# Patient Record
Sex: FEMALE | Race: WHITE | NOT HISPANIC OR LATINO | Employment: OTHER | URBAN - METROPOLITAN AREA
[De-identification: names, ages, dates, MRNs, and addresses within clinical notes are randomized per-mention and may not be internally consistent; named-entity substitution may affect disease eponyms.]

---

## 2018-03-26 DIAGNOSIS — E78.5 HYPERLIPIDEMIA, UNSPECIFIED HYPERLIPIDEMIA TYPE: Primary | ICD-10-CM

## 2018-03-27 DIAGNOSIS — E11.8 TYPE 2 DIABETES MELLITUS WITH COMPLICATION, UNSPECIFIED LONG TERM INSULIN USE STATUS: Primary | ICD-10-CM

## 2018-03-27 RX ORDER — FENOFIBRATE 54 MG/1
TABLET ORAL
Qty: 90 TABLET | Refills: 0 | Status: SHIPPED | OUTPATIENT
Start: 2018-03-27 | End: 2018-06-13 | Stop reason: SDUPTHER

## 2018-03-27 RX ORDER — GLIPIZIDE 10 MG/1
10 TABLET, FILM COATED, EXTENDED RELEASE ORAL 2 TIMES DAILY
Qty: 30 TABLET | Refills: 0 | Status: SHIPPED | OUTPATIENT
Start: 2018-03-27 | End: 2018-03-28 | Stop reason: SDUPTHER

## 2018-03-28 DIAGNOSIS — E11.8 TYPE 2 DIABETES MELLITUS WITH COMPLICATION, UNSPECIFIED LONG TERM INSULIN USE STATUS: ICD-10-CM

## 2018-03-28 DIAGNOSIS — E11.8 TYPE 2 DIABETES MELLITUS WITH COMPLICATION, WITHOUT LONG-TERM CURRENT USE OF INSULIN (HCC): Primary | ICD-10-CM

## 2018-03-28 PROBLEM — E11.9 TYPE 2 DIABETES MELLITUS (HCC): Status: ACTIVE | Noted: 2018-03-28

## 2018-03-28 RX ORDER — GLIPIZIDE 10 MG/1
TABLET, FILM COATED, EXTENDED RELEASE ORAL
Qty: 30 TABLET | Refills: 0 | Status: SHIPPED | OUTPATIENT
Start: 2018-03-28 | End: 2018-03-28 | Stop reason: SDUPTHER

## 2018-03-28 RX ORDER — GLIPIZIDE 10 MG/1
10 TABLET, FILM COATED, EXTENDED RELEASE ORAL 2 TIMES DAILY
Qty: 180 TABLET | Refills: 1 | Status: SHIPPED | OUTPATIENT
Start: 2018-03-28 | End: 2018-09-20 | Stop reason: SDUPTHER

## 2018-03-28 NOTE — TELEPHONE ENCOUNTER
Pt called about glipizide script that was just sent in  It was only for 15 days  Can she get a 90 day supply sent in to [facility]  Her pending appt is 6/13/18  Thank you

## 2018-05-11 LAB — HBA1C MFR BLD HPLC: 8.1 %

## 2018-05-15 ENCOUNTER — TELEPHONE (OUTPATIENT)
Dept: ENDOCRINOLOGY | Facility: HOSPITAL | Age: 73
End: 2018-05-15

## 2018-05-15 NOTE — TELEPHONE ENCOUNTER
I forget what she is currently taking for diabetes and why she went off Byetta, was it cost?I need to know so I can make an adjustment

## 2018-05-15 NOTE — TELEPHONE ENCOUNTER
She stopped the Byetta due to cost  She is taking Metformin 1000mg twice daily, and Glipizide ER 10 twice daily  She is asking what you thought about Lacie?

## 2018-05-15 NOTE — TELEPHONE ENCOUNTER
Patient has been Off bayetta since march    A1C results are 8 1 very concerned  Is there something she can take? Her appt is 6/13 and sugars are high

## 2018-05-16 DIAGNOSIS — E11.8 TYPE 2 DIABETES MELLITUS WITH COMPLICATION, WITHOUT LONG-TERM CURRENT USE OF INSULIN (HCC): Primary | ICD-10-CM

## 2018-05-16 NOTE — TELEPHONE ENCOUNTER
Pt said that she looked into Bydureon and it is affordable  She would like it sent to [facility] in [address] please

## 2018-05-16 NOTE — TELEPHONE ENCOUNTER
Ova Xochitl is once a week byetta  Is this cost better than byetta with her insurance? It is fine to use this if she can afford it

## 2018-06-13 ENCOUNTER — OFFICE VISIT (OUTPATIENT)
Dept: ENDOCRINOLOGY | Facility: HOSPITAL | Age: 73
End: 2018-06-13
Payer: MEDICARE

## 2018-06-13 VITALS
DIASTOLIC BLOOD PRESSURE: 76 MMHG | HEIGHT: 58 IN | WEIGHT: 177.4 LBS | HEART RATE: 78 BPM | SYSTOLIC BLOOD PRESSURE: 140 MMHG | BODY MASS INDEX: 37.24 KG/M2

## 2018-06-13 DIAGNOSIS — E11.65 TYPE 2 DIABETES MELLITUS WITH HYPERGLYCEMIA, WITHOUT LONG-TERM CURRENT USE OF INSULIN (HCC): Primary | ICD-10-CM

## 2018-06-13 DIAGNOSIS — E04.2 MULTIPLE THYROID NODULES: ICD-10-CM

## 2018-06-13 PROBLEM — I10 HYPERTENSION: Status: ACTIVE | Noted: 2018-06-13

## 2018-06-13 PROBLEM — E78.5 HYPERLIPIDEMIA: Status: ACTIVE | Noted: 2018-06-13

## 2018-06-13 PROCEDURE — 99214 OFFICE O/P EST MOD 30 MIN: CPT | Performed by: INTERNAL MEDICINE

## 2018-06-13 RX ORDER — FUROSEMIDE 40 MG/1
40 TABLET ORAL DAILY
COMMUNITY
End: 2019-08-15 | Stop reason: ALTCHOICE

## 2018-06-13 RX ORDER — METOPROLOL SUCCINATE 100 MG/1
100 TABLET, EXTENDED RELEASE ORAL 2 TIMES DAILY
COMMUNITY
Start: 2015-09-15 | End: 2021-08-30 | Stop reason: ALTCHOICE

## 2018-06-13 RX ORDER — FENOFIBRATE 54 MG/1
54 TABLET ORAL DAILY
COMMUNITY
End: 2019-02-14 | Stop reason: SDUPTHER

## 2018-06-13 RX ORDER — ACETAMINOPHEN 160 MG
2000 TABLET,DISINTEGRATING ORAL DAILY
COMMUNITY

## 2018-06-13 RX ORDER — FOLIC ACID 0.8 MG
TABLET ORAL DAILY
COMMUNITY

## 2018-06-13 RX ORDER — EXENATIDE 2 MG/.85ML
2 INJECTION, SUSPENSION, EXTENDED RELEASE SUBCUTANEOUS WEEKLY
Qty: 4 PEN | Refills: 6 | Status: SHIPPED | OUTPATIENT
Start: 2018-06-13 | End: 2019-04-23 | Stop reason: SDUPTHER

## 2018-06-13 NOTE — LETTER
June 13, 2018     Kailyn Keys, [address] [place] [address] 1    Patient: Jackson Bay   YOB: 1945   Date of Visit: 6/13/2018       Dear Dr Sean Wu: Thank you for referring Jackson [place] to me for evaluation  Below are my notes for this consultation  If you have questions, please do not hesitate to call me  I look forward to following your patient along with you  Sincerely,        Camila Sorto MD        CC: MD Camila Hannah MD  6/13/2018 12:20 PM  Sign at close encounter  6/13/2018    Assessment/Plan      Diagnoses and all orders for this visit:    Type 2 diabetes mellitus with hyperglycemia, without long-term current use of insulin ([address] )  -     BYDUREON BCISE 2 MG/0 85ML AUIJ; Inject 2 mg under the skin once a week  -     HEMOGLOBIN A1C W/ EAG ESTIMATION Lab Collect; Future  -     Comprehensive metabolic panel Lab Collect; Future  -     TSH, 3rd generation Lab Collect; Future  -     Thyroid Antibodies Panel Lab Collect; Future  -     Microalbumin / creatinine urine ratio Lab Collect; Future    Multiple thyroid nodules  -     TSH, 3rd generation Lab Collect; Future  -     Thyroid Antibodies Panel Lab Collect; Future  -     US thyroid; Future    Other orders  -     apixaban (ELIQUIS) 5 mg; Take 5 mg by mouth 2 (two) times a day    -     metoprolol succinate (TOPROL-XL) 100 mg 24 hr tablet; Take 100 mg by mouth 2 (two) times a day    -     fenofibrate (TRICOR) 54 MG tablet; Take 54 mg by mouth daily    -     metFORMIN (GLUCOPHAGE) 1000 MG tablet; Take 1,000 mg by mouth 2 (two) times a day with meals    -     cholecalciferol (VITAMIN D3) 1,000 units tablet; Take 2,000 Units by mouth daily  -     Magnesium 500 MG CAPS; Take by mouth daily    -     furosemide (LASIX) 40 mg tablet; Take 40 mg by mouth daily prn        Assessment/Plan:  1  Type 2 diabetes    Her most recent hemoglobin A1c has gone up to 8 1%, most likely due to her being off Byetta  She is now on Bydureon which should bring her blood sugars down  They are already starting to improve  She will continue the same Bydureon, metformin, and glipizide for now  She will continue to check her sugars at least once a day  She will continue to work on diet and weight loss  2   Diabetic neuropathy  She has some minor symptoms with some numbness of her feet and some decrease in vibration sensation on exam   I will monitor these over time  3   Thyroid nodules  She has a history of thyroid nodules biopsy benign in the past   I will repeat her thyroid ultrasound before her next visit  4   Hyperkalemia  She has been hospitalized in the past for significant hyperkalemia when she was taking both Prinivil and valsartan  Her potassium is elevated again this time and I have asked her to follow up with Dr Perlita Hong regarding this  I will have her follow up in 3 months with preceding hemoglobin A1c, CMP, TSH, thyroid antibodies, urine microalbumin to creatinine ratio, and thyroid ultrasound  CC: Diabetes Consult    History of Present Illness     HPI: Mejia Hector is a 67y o  year old female with type 2 diabetes for between 10-15 years years  She is on oral agents at home and takes Metformin 1000 mg BID, Glipizide XL 10 mg BID, and Bydureon 2 mg once a week  She denies any polyuria, polydipsia,  and blurry vision  She has up to once a night nocturia  She always has polyphagia  She has some numbness and tingling of the toes and feet  She denies chest pain or shortness of breath and is due to see her cardiologist, Dr Perlita Hong next week  She denies nephropathy, retinopathy, heart attack, stroke and claudication but does admit to neuropathy  She had good blood sugars in the past until she stopped the byetta several months and the blood sugars went up to 150-170  She started Bydureon about 2 weeks ago   Blood sugars last 2 days 120 or less in am   She has a history of thyroid nodules with biopsy in the past that was benign  She has not had a recent ultrasound  She has some occasional palpitation, but no other symptoms of thyroid excess or deficiency  She has no history of head or neck irradiation in the past   She has no compressive thyroid symptoms or difficulty with swallowing  Hypoglycemic episodes: No never  H/o of hypoglycemia causing hospitalization or intervention such as glucagon injection  or ambulance call  No   Hypoglycemia symptoms: never less than 85 blood sugar  Treatment of hypoglycemia: she would drink juice  Glucagon:No   Medic alert tag: recommended,Yes  The patient's last eye exam was in may 2018 with no retinopathy in the past   The patient's last foot exam was in not seen  Last A1C was 8 1% on 05/11/2018  Blood Sugar/Glucometer/Pump/CGM review: checks blood sugars once a day in the am   Before breakfast: were 150-180, now less than 120 the last 2 days back on Bydureon  Before lunch:   Before dinner:   Bedtime:     Review of Systems   Constitutional: Negative for fatigue and unexpected weight change  Weight has gone down slowly over the years  HENT: Positive for hearing loss  Negative for tinnitus  Low tones are a problem with hearing  Eyes: Negative for visual disturbance  Respiratory: Negative for chest tightness and shortness of breath  Cardiovascular: Positive for palpitations and leg swelling  Negative for chest pain  Occasional palpitations  For cardiology 6 month follow up next week with Dr Barbara Sinclair  Ankle edema by the evening  Gastrointestinal: Negative for abdominal pain, constipation, diarrhea and nausea  Endocrine: Positive for polyphagia  Negative for polydipsia and polyuria  Nocturia once a night  Drinking more for health reasons  Musculoskeletal: Positive for arthralgias and neck pain  Negative for back pain  Pain in posterior neck when turn neck  Has bilateral  Knee pain     Skin: Negative for rash and wound  Neurological: Positive for numbness  Negative for dizziness, tremors, light-headedness and headaches  Some numbness and tingling in toes and fingers  Psychiatric/Behavioral: Negative for dysphoric mood and sleep disturbance  The patient is not nervous/anxious          Historical Information   Past Medical History:   Diagnosis Date    Atrial [facility] )     Colon cancer ([facility] [facility] )     limited to wall of colon, no chemo or XRT needed    Hypomagnesemia     Leukocytosis     chanelle 2 gene positive    Osteoarthritis      Past Surgical History:   Procedure Laterality Date    APPENDECTOMY      with colectomy    COLECTOMY LAKE      24 inches    HYSTERECTOMY      LAPAROSCOPIC CHOLECYSTECTOMY       Social History   History   Alcohol Use No     History   Drug Use No     History   Smoking Status    Never Smoker   Smokeless Tobacco    Never Used     Family History:   Family History   Problem Relation Age of Onset    Asthma Mother     Heart disease Mother      post heart surgery    Heart attack Father     Heart attack Brother     Colon cancer Brother     Colon cancer Brother     Heart attack Brother     Diabetes type II Brother     Heart attack Brother     Colon cancer Brother     No Known Problems Brother     Heart disease Paternal Uncle     No Known Problems Daughter     No Known Problems Daughter     No Known Problems Daughter        Meds/Allergies   Current Outpatient Prescriptions   Medication Sig Dispense Refill    apixaban (ELIQUIS) 5 mg Take 5 mg by mouth 2 (two) times a day        BYDUREON BCISE 2 MG/0 85ML AUIJ Inject 2 mg under the skin once a week 4 pen 6    cholecalciferol (VITAMIN D3) 1,000 units tablet Take 2,000 Units by mouth daily      fenofibrate (TRICOR) 54 MG tablet Take 54 mg by mouth daily        furosemide (LASIX) 40 mg tablet Take 40 mg by mouth daily prn      glipiZIDE (GLUCOTROL XL) 10 mg 24 hr tablet Take 1 tablet (10 mg total) by mouth 2 (two) times a day 180 tablet 1    Magnesium 500 MG CAPS Take by mouth daily        metFORMIN (GLUCOPHAGE) 1000 MG tablet Take 1,000 mg by mouth 2 (two) times a day with meals        metoprolol succinate (TOPROL-XL) 100 mg 24 hr tablet Take 100 mg by mouth 2 (two) times a day         No current facility-administered medications for this visit  Allergies   Allergen Reactions    Epinephrine      Other reaction(s): increased HR    Lidocaine      Other reaction(s): increased HR    Neomycin      Other reaction(s): rash    Pioglitazone      Other reaction(s): Fluid retention       Objective   Vitals: Blood pressure 140/76, pulse 78, height 4' 10" (1 473 m), weight 80 5 kg (177 lb 6 4 oz)  Invasive Devices          No matching active lines, drains, or airways          Physical Exam   Constitutional: She is oriented to person, place, and time  She appears well-developed and well-nourished  HENT:   Head: Normocephalic and atraumatic  Eyes: Conjunctivae and EOM are normal  Pupils are equal, round, and reactive to light  No lid lag, stare, proptosis, or periorbital edema  Neck: Normal range of motion  Neck supple  Thyromegaly present  Thyroid enlarged and nodular in feel  No bruits over the thyroid or carotids  Cardiovascular: Normal rate, regular rhythm, normal heart sounds and intact distal pulses  No murmur heard  BP recheck 122/70  Pulmonary/Chest: Effort normal and breath sounds normal  She has no wheezes  Abdominal: Soft  Bowel sounds are normal  There is no tenderness  Musculoskeletal: Normal range of motion  She exhibits edema  She exhibits no deformity  1-2+ bilateral lower extremity edema, right greater than left  No ulceration of the feet  No tremor of the outstretched hands  No spinous process tenderness  No CVAT  Lymphadenopathy:     She has no cervical adenopathy  Neurological: She is alert and oriented to person, place, and time  She has normal reflexes     Vibration sensation diminished tot he bilateral 1st toe DIP  Skin: Skin is warm and dry  No rash noted  Vitals reviewed  The history was obtained from the review of the chart and from the patient  Lab Results:    Blood workdone on 05/11/2018 at [address] [place] [address] showed a CMP with a potassium of 5 9, BUN 29, creatinine 0 63, GFR over 60, glucose 144 fasting, BUN/creatinine ratio 46 but was otherwise normal   Magnesium was 1 3  Total cholesterol 126, triglycerides 163, HDL 39, LDL 54  Hemoglobin A1c was 8 1%  CBC continues to show an elevated WBC at 20, MCV at 102, platelet count 571  No results found for this or any previous visit (from the past 99847 hour(s))        Future Appointments  Date Time Provider Rasheed Clancy   10/22/2018 10:20 AM Brandyn Zavaleta, [address] [facility]

## 2018-06-13 NOTE — PATIENT INSTRUCTIONS
hgba1c 8 1%  This is due to being off of the byetta  It should get better now you are on bydureon  Your potassium is high, Follow up with Dr Anaid Fuentes regarding that  Continue the Bydureon, glipizide, and metformin  Continue to check blood sugars at least once a day  We'll recheck your thyroid ultrasound before the next visit  Follow up in 3 months with blood work

## 2018-06-13 NOTE — PROGRESS NOTES
6/13/2018    Assessment/Plan      Diagnoses and all orders for this visit:    Type 2 diabetes mellitus with hyperglycemia, without long-term current use of insulin ([facility]CC)  -     BYDUREON BCISE 2 MG/0 85ML AUIJ; Inject 2 mg under the skin once a week  -     HEMOGLOBIN A1C W/ EAG ESTIMATION Lab Collect; Future  -     Comprehensive metabolic panel Lab Collect; Future  -     TSH, 3rd generation Lab Collect; Future  -     Thyroid Antibodies Panel Lab Collect; Future  -     Microalbumin / creatinine urine ratio Lab Collect; Future    Multiple thyroid nodules  -     TSH, 3rd generation Lab Collect; Future  -     Thyroid Antibodies Panel Lab Collect; Future  -     US thyroid; Future    Other orders  -     apixaban (ELIQUIS) 5 mg; Take 5 mg by mouth 2 (two) times a day    -     metoprolol succinate (TOPROL-XL) 100 mg 24 hr tablet; Take 100 mg by mouth 2 (two) times a day    -     fenofibrate (TRICOR) 54 MG tablet; Take 54 mg by mouth daily    -     metFORMIN (GLUCOPHAGE) 1000 MG tablet; Take 1,000 mg by mouth 2 (two) times a day with meals    -     cholecalciferol (VITAMIN D3) 1,000 units tablet; Take 2,000 Units by mouth daily  -     Magnesium 500 MG CAPS; Take by mouth daily    -     furosemide (LASIX) 40 mg tablet; Take 40 mg by mouth daily prn        Assessment/Plan:  1  Type 2 diabetes  Her most recent hemoglobin A1c has gone up to 8 1%, most likely due to her being off Byetta  She is now on Bydureon which should bring her blood sugars down  They are already starting to improve  She will continue the same Bydureon, metformin, and glipizide for now  She will continue to check her sugars at least once a day  She will continue to work on diet and weight loss  2   Diabetic neuropathy  She has some minor symptoms with some numbness of her feet and some decrease in vibration sensation on exam   I will monitor these over time  3   Thyroid nodules    She has a history of thyroid nodules biopsy benign in the past   I will repeat her thyroid ultrasound before her next visit  4   Hyperkalemia  She has been hospitalized in the past for significant hyperkalemia when she was taking both Prinivil and valsartan  Her potassium is elevated again this time and I have asked her to follow up with Dr Florentino Jaramillo regarding this  I will have her follow up in 3 months with preceding hemoglobin A1c, CMP, TSH, thyroid antibodies, urine microalbumin to creatinine ratio, and thyroid ultrasound  CC: Diabetes Consult    History of Present Illness     HPI: Rufino Ward is a 67y o  year old female with type 2 diabetes for between 10-15 years years  She is on oral agents at home and takes Metformin 1000 mg BID, Glipizide XL 10 mg BID, and Bydureon 2 mg once a week  She denies any polyuria, polydipsia,  and blurry vision  She has up to once a night nocturia  She always has polyphagia  She has some numbness and tingling of the toes and feet  She denies chest pain or shortness of breath and is due to see her cardiologist, Dr Florentino Jaramillo next week  She denies nephropathy, retinopathy, heart attack, stroke and claudication but does admit to neuropathy  She had good blood sugars in the past until she stopped the byetta several months and the blood sugars went up to 150-170  She started Bydureon about 2 weeks ago  Blood sugars last 2 days 120 or less in am   She has a history of thyroid nodules with biopsy in the past that was benign  She has not had a recent ultrasound  She has some occasional palpitation, but no other symptoms of thyroid excess or deficiency  She has no history of head or neck irradiation in the past   She has no compressive thyroid symptoms or difficulty with swallowing  Hypoglycemic episodes: No never  H/o of hypoglycemia causing hospitalization or intervention such as glucagon injection  or ambulance call  No   Hypoglycemia symptoms: never less than 85 blood sugar    Treatment of hypoglycemia: she would drink juice   Glucagon:No   Medic alert tag: recommended,Yes  The patient's last eye exam was in may 2018 with no retinopathy in the past   The patient's last foot exam was in not seen  Last A1C was 8 1% on 05/11/2018  Blood Sugar/Glucometer/Pump/CGM review: checks blood sugars once a day in the am   Before breakfast: were 150-180, now less than 120 the last 2 days back on Bydureon  Before lunch:   Before dinner:   Bedtime:     Review of Systems   Constitutional: Negative for fatigue and unexpected weight change  Weight has gone down slowly over the years  HENT: Positive for hearing loss  Negative for tinnitus  Low tones are a problem with hearing  Eyes: Negative for visual disturbance  Respiratory: Negative for chest tightness and shortness of breath  Cardiovascular: Positive for palpitations and leg swelling  Negative for chest pain  Occasional palpitations  For cardiology 6 month follow up next week with Dr Virginie Mcdonald  Ankle edema by the evening  Gastrointestinal: Negative for abdominal pain, constipation, diarrhea and nausea  Endocrine: Positive for polyphagia  Negative for polydipsia and polyuria  Nocturia once a night  Drinking more for health reasons  Musculoskeletal: Positive for arthralgias and neck pain  Negative for back pain  Pain in posterior neck when turn neck  Has bilateral  Knee pain  Skin: Negative for rash and wound  Neurological: Positive for numbness  Negative for dizziness, tremors, light-headedness and headaches  Some numbness and tingling in toes and fingers  Psychiatric/Behavioral: Negative for dysphoric mood and sleep disturbance  The patient is not nervous/anxious          Historical Information   Past Medical History:   Diagnosis Date    Atrial [facility] [address] )     Colon cancer ([facility] [address] )     limited to wall of colon, no chemo or XRT needed    Hypomagnesemia     Leukocytosis     chanelle 2 gene positive    Osteoarthritis Past Surgical History:   Procedure Laterality Date    APPENDECTOMY      with colectomy    COLECTOMY LAKE      24 inches    HYSTERECTOMY      LAPAROSCOPIC CHOLECYSTECTOMY       Social History   History   Alcohol Use No     History   Drug Use No     History   Smoking Status    Never Smoker   Smokeless Tobacco    Never Used     Family History:   Family History   Problem Relation Age of Onset    Asthma Mother     Heart disease Mother      post heart surgery    Heart attack Father     Heart attack Brother     Colon cancer Brother     Colon cancer Brother     Heart attack Brother     Diabetes type II Brother     Heart attack Brother     Colon cancer Brother     No Known Problems Brother     Heart disease Paternal Uncle     No Known Problems Daughter     No Known Problems Daughter     No Known Problems Daughter        Meds/Allergies   Current Outpatient Prescriptions   Medication Sig Dispense Refill    apixaban (ELIQUIS) 5 mg Take 5 mg by mouth 2 (two) times a day        BYDUREON BCISE 2 MG/0 85ML AUIJ Inject 2 mg under the skin once a week 4 pen 6    cholecalciferol (VITAMIN D3) 1,000 units tablet Take 2,000 Units by mouth daily      fenofibrate (TRICOR) 54 MG tablet Take 54 mg by mouth daily        furosemide (LASIX) 40 mg tablet Take 40 mg by mouth daily prn      glipiZIDE (GLUCOTROL XL) 10 mg 24 hr tablet Take 1 tablet (10 mg total) by mouth 2 (two) times a day 180 tablet 1    Magnesium 500 MG CAPS Take by mouth daily        metFORMIN (GLUCOPHAGE) 1000 MG tablet Take 1,000 mg by mouth 2 (two) times a day with meals        metoprolol succinate (TOPROL-XL) 100 mg 24 hr tablet Take 100 mg by mouth 2 (two) times a day         No current facility-administered medications for this visit        Allergies   Allergen Reactions    Epinephrine      Other reaction(s): increased HR    Lidocaine      Other reaction(s): increased HR    Neomycin      Other reaction(s): rash    Pioglitazone Other reaction(s): Fluid retention       Objective   Vitals: Blood pressure 140/76, pulse 78, height 4' 10" (1 473 m), weight 80 5 kg (177 lb 6 4 oz)  Invasive Devices          No matching active lines, drains, or airways          Physical Exam   Constitutional: She is oriented to person, place, and time  She appears well-developed and well-nourished  HENT:   Head: Normocephalic and atraumatic  Eyes: Conjunctivae and EOM are normal  Pupils are equal, round, and reactive to light  No lid lag, stare, proptosis, or periorbital edema  Neck: Normal range of motion  Neck supple  Thyromegaly present  Thyroid enlarged and nodular in feel  No bruits over the thyroid or carotids  Cardiovascular: Normal rate, regular rhythm, normal heart sounds and intact distal pulses  No murmur heard  BP recheck 122/70  Pulmonary/Chest: Effort normal and breath sounds normal  She has no wheezes  Abdominal: Soft  Bowel sounds are normal  There is no tenderness  Musculoskeletal: Normal range of motion  She exhibits edema  She exhibits no deformity  1-2+ bilateral lower extremity edema, right greater than left  No ulceration of the feet  No tremor of the outstretched hands  No spinous process tenderness  No CVAT  Lymphadenopathy:     She has no cervical adenopathy  Neurological: She is alert and oriented to person, place, and time  She has normal reflexes  Vibration sensation diminished tot he bilateral 1st toe DIP  Skin: Skin is warm and dry  No rash noted  Vitals reviewed  The history was obtained from the review of the chart and from the patient  Lab Results:    Blood workdone on 05/11/2018 at [address] [place] [address] showed a CMP with a potassium of 5 9, BUN 29, creatinine 0 63, GFR over 60, glucose 144 fasting, BUN/creatinine ratio 46 but was otherwise normal   Magnesium was 1 3  Total cholesterol 126, triglycerides 163, HDL 39, LDL 54  Hemoglobin A1c was 8 1%    CBC continues to show an elevated WBC at 20, MCV at 102, platelet count 557  No results found for this or any previous visit (from the past 46441 hour(s))        Future Appointments  Date Time Provider Rasheed Clancy   10/22/2018 10:20 AM Deanna Laughlin, [address] [facility]

## 2018-06-28 DIAGNOSIS — E11.8 TYPE 2 DIABETES MELLITUS WITH COMPLICATION, UNSPECIFIED WHETHER LONG TERM INSULIN USE: Primary | ICD-10-CM

## 2018-06-28 RX ORDER — METOPROLOL SUCCINATE 100 MG/1
TABLET, EXTENDED RELEASE ORAL
Qty: 180 TABLET | Refills: 0 | OUTPATIENT
Start: 2018-06-28

## 2018-06-28 NOTE — TELEPHONE ENCOUNTER
I do not order metoprolol, so I will not refill it  I can not refill metformin is it is requested on the same order  It must be sent individually to refill

## 2018-09-04 DIAGNOSIS — E78.5 HYPERLIPIDEMIA, UNSPECIFIED HYPERLIPIDEMIA TYPE: ICD-10-CM

## 2018-09-04 RX ORDER — FENOFIBRATE 54 MG/1
TABLET ORAL
Qty: 90 TABLET | Refills: 0 | Status: SHIPPED | OUTPATIENT
Start: 2018-09-04 | End: 2018-12-12 | Stop reason: SDUPTHER

## 2018-09-20 DIAGNOSIS — E11.8 TYPE 2 DIABETES MELLITUS WITH COMPLICATION, UNSPECIFIED WHETHER LONG TERM INSULIN USE: Primary | ICD-10-CM

## 2018-09-20 RX ORDER — GLIPIZIDE 10 MG/1
10 TABLET, FILM COATED, EXTENDED RELEASE ORAL 2 TIMES DAILY
Qty: 180 TABLET | Refills: 2 | Status: SHIPPED | OUTPATIENT
Start: 2018-09-20 | End: 2019-06-11 | Stop reason: SDUPTHER

## 2018-09-30 DIAGNOSIS — E11.8 TYPE 2 DIABETES MELLITUS WITH COMPLICATION, UNSPECIFIED WHETHER LONG TERM INSULIN USE: ICD-10-CM

## 2018-10-16 LAB
HBA1C MFR BLD HPLC: 7.1 %
MICROALBUMIN UR-MCNC: 4.2 MG/L (ref 0–20)
MICROALBUMIN/CREAT UR: 85 MG/G{CREAT}

## 2018-10-22 ENCOUNTER — OFFICE VISIT (OUTPATIENT)
Dept: ENDOCRINOLOGY | Facility: HOSPITAL | Age: 73
End: 2018-10-22
Payer: MEDICARE

## 2018-10-22 VITALS
HEIGHT: 58 IN | DIASTOLIC BLOOD PRESSURE: 78 MMHG | WEIGHT: 179.4 LBS | BODY MASS INDEX: 37.66 KG/M2 | SYSTOLIC BLOOD PRESSURE: 140 MMHG | HEART RATE: 90 BPM

## 2018-10-22 DIAGNOSIS — I10 HYPERTENSION, UNSPECIFIED TYPE: ICD-10-CM

## 2018-10-22 DIAGNOSIS — E11.65 TYPE 2 DIABETES MELLITUS WITH HYPERGLYCEMIA, WITHOUT LONG-TERM CURRENT USE OF INSULIN (HCC): Primary | ICD-10-CM

## 2018-10-22 DIAGNOSIS — E78.5 HYPERLIPIDEMIA, UNSPECIFIED HYPERLIPIDEMIA TYPE: ICD-10-CM

## 2018-10-22 DIAGNOSIS — E04.2 MULTIPLE THYROID NODULES: ICD-10-CM

## 2018-10-22 DIAGNOSIS — E11.42 DIABETIC POLYNEUROPATHY ASSOCIATED WITH TYPE 2 DIABETES MELLITUS (HCC): ICD-10-CM

## 2018-10-22 PROCEDURE — 99214 OFFICE O/P EST MOD 30 MIN: CPT | Performed by: INTERNAL MEDICINE

## 2018-10-22 NOTE — LETTER
October 22, 2018     Tong Rose, [address] [facility] [address]    Patient: Viola Klein   YOB: 1945   Date of Visit: 10/22/2018       Dear Dr Mariah Shaffer: Thank you for referring Viola Klein to me for evaluation  Below are my notes for this consultation  If you have questions, please do not hesitate to call me  I look forward to following your patient along with you  Sincerely,        Lalo Fajardo MD        CC: No Recipients  Lalo Fajardo MD  10/22/2018 12:50 PM  Sign at close encounter  10/22/2018    Assessment/Plan      Diagnoses and all orders for this visit:    Type 2 diabetes mellitus with hyperglycemia, without long-term current use of insulin (HCC)  -     Microalbumin / creatinine urine ratio Lab Collect; Future  -     Comprehensive metabolic panel Lab Collect; Future  -     HEMOGLOBIN A1C W/ EAG ESTIMATION Lab Collect; Future    Multiple thyroid nodules    Hypertension, unspecified type  -     Comprehensive metabolic panel Lab Collect; Future    Hyperlipidemia, unspecified hyperlipidemia type  -     Comprehensive metabolic panel Lab Collect; Future  -     Lipid Panel with Direct LDL reflex Lab Collect; Future    Diabetic polyneuropathy associated with type 2 diabetes mellitus (HCC)        Assessment/Plan:  1  Type 2 diabetes  Her most recent hemoglobin A1c is improved at 7 1%  This is reasonable for her age  For now, she will continue the same Bydureon, metformin, and glipizide  She will continue to check her blood sugars at least once a day  She will continue to keep working on diet and exercise  Of note, her urine microalbumin to creatinine ratio was slightly elevated today  I will repeat it next visit I she has a history of hyperkalemia and has been unable to use Ace inhibitors or ARBs  2   Diabetic neuropathy  She has some decreased vibration sensation on exam but no other complaints  I will follow this over time    3   Multinodular goiter  Most recent thyroid ultrasound did show a decrease in size of the largest dominant nodule  The rest of the nodules are stable in size  She is biochemically euthyroid  I will follow this over time  4   Hypertension  Blood pressure is under reasonable control on her current metoprolol  5   Hyperlipidemia  She is on fenofibrate 54 mg daily  I will repeat her lipid profile with next visit  I have asked her to follow up in 3 months with preceding hemoglobin A1c, CMP, lipid profile, and urine microalbumin to creatinine ratio  CC: Diabetes Follow up    History of Present Illness     HPI: Ed Cantu is a 75y o  year old female with type 2 diabetes for 10-15 years  She is on oral agents at home and takes bydureon 2 mg once a week, Metformin 1000 mg BID, and glipizide XL 10 mg BID  She denies any polyuria, polydipsia, polyphagia, and blurry vision she has nocturia up to once a night  She denies chest pain or shortness of breath  She is due for an echocardiogram and a follow-up visit with her cardiologist in the next several months  She has slight numbness and tingling of the toes without change  She denies nephropathy, retinopathy, heart attack, stroke and claudication but does admit to neuropathy  She has a nontoxic multinodular goiter  She is on no medicines  She is getting serial ultrasounds and blood work to follow this  She has no compressive thyroid symptoms or difficulties with swallowing  She has hypertension  She is on metoprolol 100 mg twice a day and furosemide 40 mg daily  She has no chest pain, shortness of breath, or headaches  She has hyperlipidemia and is on fenofibrate 54 mg daily  Hypoglycemic episodes: Yes rare  Medic alert tag: recommended,Yes  The patient's last eye exam was in in October 2018 with no retinopathy  The patient's last foot exam was in not seen   Last A1C was   Lab Results   Component Value Date    HGBA1C 7 1 10/16/2018     Blood Sugar/Glucometer/Pump/CGM review: checks blood sugars once a day in am   Before breakfast:   Before lunch:   Before dinner:   Bedtime:     Review of Systems   Constitutional: Negative for fatigue and unexpected weight change  HENT: Negative for trouble swallowing  Eyes: Negative for visual disturbance  Respiratory: Negative for chest tightness and shortness of breath  Cardiovascular: Positive for palpitations  Negative for chest pain  Occasional heart palpitations  Due to see Dr Jadyn Baptiste in jan 2019, last visit July 2018 and stable cardiac status  For echo soon  Gastrointestinal: Negative for constipation and diarrhea  Endocrine: Negative for polydipsia, polyphagia and polyuria  Nocturia up to once a night  Drinking more for health so urinates more  Musculoskeletal: Positive for arthralgias  Has knee pain on steps  To go back to orthopedic for cortisone injection  Skin: Negative for wound  Neurological: Positive for numbness  A bit of numbness and tingling in the toes without change  Psychiatric/Behavioral: Negative for sleep disturbance         Historical Information   Past Medical History:   Diagnosis Date    [facility] fibrillation [facility] )     [facility]     limited to wall of colon, no chemo or XRT needed    Hypomagnesemia     Leukocytosis     chanelle 2 gene positive    Osteoarthritis      Past Surgical History:   Procedure Laterality Date    APPENDECTOMY      with colectomy    COLECTOMY [place]      24 inches    HYSTERECTOMY      LAPAROSCOPIC CHOLECYSTECTOMY       Social History   History   Alcohol Use No     History   Drug Use No     History   Smoking Status    Never Smoker   Smokeless Tobacco    Never Used     Family History:   Family History   Problem Relation Age of Onset    Asthma Mother     Heart disease Mother         post heart surgery    Heart attack Father     Heart attack Brother     Colon cancer Brother     Colon cancer Brother     Heart attack Brother     Diabetes type II Brother     Heart attack Brother     Colon cancer Brother     No Known Problems Brother     Heart disease Paternal Uncle     No Known Problems Daughter     No Known Problems Daughter     No Known Problems Daughter        Meds/Allergies   Current Outpatient Prescriptions   Medication Sig Dispense Refill    apixaban (ELIQUIS) 5 mg Take 5 mg by mouth 2 (two) times a day        BYDUREON BCISE 2 MG/0 85ML AUIJ Inject 2 mg under the skin once a week 4 pen 6    cholecalciferol (VITAMIN D3) 1,000 units tablet Take 2,000 Units by mouth daily      fenofibrate (TRICOR) 54 MG tablet Take 54 mg by mouth daily        fenofibrate (TRICOR) 54 MG tablet TAKE 1 TABLET BY MOUTH EVERY DAY 90 tablet 0    furosemide (LASIX) 40 mg tablet Take 40 mg by mouth daily prn      glipiZIDE (GLUCOTROL XL) 10 mg 24 hr tablet Take 1 tablet (10 mg total) by mouth 2 (two) times a day 180 tablet 2    Magnesium 500 MG CAPS Take by mouth daily        metFORMIN (GLUCOPHAGE) 1000 MG tablet TAKE 1 TABLET BY MOUTH TWICE A DAY WITH FOOD 180 tablet 0    metoprolol succinate (TOPROL-XL) 100 mg 24 hr tablet Take 100 mg by mouth 2 (two) times a day         No current facility-administered medications for this visit  Allergies   Allergen Reactions    Epinephrine      Other reaction(s): increased HR    Lidocaine      Other reaction(s): increased HR    Neomycin      Other reaction(s): rash    Pioglitazone      Other reaction(s): Fluid retention       Objective   Vitals: Blood pressure 140/78, pulse 90, height 4' 10" (1 473 m), weight 81 4 kg (179 lb 6 4 oz)  Invasive Devices          No matching active lines, drains, or airways          Physical Exam   Constitutional: She is oriented to person, place, and time  She appears well-developed and well-nourished  HENT:   Head: Normocephalic and atraumatic  Eyes: Pupils are equal, round, and reactive to light   Conjunctivae and EOM are normal    Neck: Normal range of motion  Neck supple  No thyromegaly present  No carotid bruits  Cardiovascular: Normal rate, regular rhythm, normal heart sounds and intact distal pulses  No murmur heard  Pulmonary/Chest: Effort normal and breath sounds normal  She has no wheezes  Musculoskeletal: Normal range of motion  She exhibits no edema or deformity  Flat arches  1-2+ edema of the lower extremities  No ulceration of the feet when examined with the shoes and socks removed  Lymphadenopathy:     She has no cervical adenopathy  Neurological: She is alert and oriented to person, place, and time  She has normal reflexes  Vibration sensation diminished to the 1st toe DIP joint bilaterally  Microfilament sensation intact bilaterally  Skin: Skin is warm and dry  No rash noted  Vitals reviewed  The history was obtained from the review of the chart and from the patient and   Lab Results:    Most recent Alc is  Lab Results   Component Value Date    HGBA1C 7 1 10/16/2018           CMP done on 10/16/2018 showed a potassium of 5 6 with a creatinine is 0 67 and glucose 128 fasting  The rest of the CMP was normal  TSH was 2 23  Urine microalbumin to creatinine ratio was 85  Thyroid ultrasound done [facility] on 10/15/2018 demonstrated a right lobe of the thyroid measuring 5 4 x 1 9 x 1 4 cm and a left lobe of the thyroid measuring 6 9 x 3 5 x 4 3 cm  There is 3 3 cm complex nodule in the mid to upper pole of the left lobe of the thyroid that is decreased in size from previous ultrasound when it was 3 8 cm  There is a 1 5 cm lower pole left thyroid nodule stable in size  There is a 1 cm upper pole right-sided thyroid nodule stable in size        Future Appointments  Date Time Provider Rasheed Clancy   2/14/2019 11:00 AM Melodie Rose MD [facility]

## 2018-10-22 NOTE — PATIENT INSTRUCTIONS
hgab1c is 7 1%  This is much better  Urine test shows a bit of protein spilling, kidney function is normal  Let's repeat next visit  No change in bydureon, metformin, or glipizide  Continue to check blood sugars once a day  Thyroid nodules are stable in size  Potassium is still high and you are not on any medicines that raise the potassium  Avoid foods with potassium in them  Follow up in 3 months with blood work  Potassium Content of Foods List   WHAT YOU NEED TO KNOW:   What is potassium? Potassium is a mineral that is found in most foods  Potassium helps to balance fluids and minerals in your body  It also helps your body maintain a normal blood pressure  Potassium helps your muscles contract and your nerves function normally  You may need to increase or decrease potassium if you have certain health conditions  Why do I need to change the amount of potassium I eat? · You may need more potassium  if you have hypokalemia (low potassium levels) or high blood pressure  You may also need more potassium if you are taking diuretics  Diuretics and certain medicines cause your body to lose potassium  · You may need less potassium  in your diet if you have hyperkalemia (high potassium levels) or kidney disease  How much potassium does fruit contain? The amount of potassium in milligrams (mg) contained in each fruit or serving of fruit is listed beside the item    · High-potassium foods (more than 200 mg per serving):      ¨ 1 medium banana (425)    ¨ ½ of a papaya (390)    ¨ ½ cup of prune juice (370)    ¨ ¼ cup of raisins (270)    ¨ 1 medium andrew (325) or kiwi (240)    ¨ 1 small orange (240) or ½ cup of orange juice (235)    ¨ ½ cup of cubed cantaloupe (215) or diced honeydew melon (200)    ¨ 1 medium pear (200)    · Medium-potassium foods (50 to 200 mg per serving):      ¨ 1 medium peach (185)    ¨ 1 small apple or ½ cup of apple juice (150)    ¨ ½ cup of peaches canned in juice (120)    ¨ ½ cup of canned pineapple (100)    ¨ ½ cup of fresh, sliced strawberries (127)    ¨ ½ cup of watermelon (85)    · Low-potassium foods (less than 50 mg per serving):      ¨ ½ cup of cranberries (45) or cranberry juice cocktail (20)    ¨ ½ cup of nectar of papaya, andrew, or pear (35)  How much potassium do vegetables contain? · High-potassium foods (more than 200 mg per serving):      ¨ 1 medium baked potato, with skin (925)    ¨ 1 baked medium sweet potato, with skin (450)    ¨ ½ cup of tomato or vegetable juice (275), or 1 medium raw tomato (290)    ¨ ½ cup of mushrooms (280)    ¨ ½ cup of fresh brussels sprouts (250)    ¨ ½ cup of cooked zucchini (220) or winter squash (250)    ¨ ¼ of a medium avocado (245)    ¨ ½ cup of broccoli (230)    · Medium-potassium foods (50 to 200 mg per serving):      ¨ ½ cup of corn (195)    ¨ ½ cup of fresh or cooked carrots (180)    ¨ ½ cup of fresh cauliflower (150)    ¨ ½ cup of asparagus (155)    ¨ ½ cup of canned peas (90)     ¨ 1 cup of lettuce, all types (100)    ¨ ½ cup of fresh green beans (90)    ¨ ½ cup of frozen green beans (85)    ¨ ½ cup of cucumber (80)  How much potassium do protein foods contain? · High-potassium foods (more than 200 mg per serving):      ¨ ½ cup of cooked jackson beans (400) or lentils (365)    ¨ 1 cup of soy milk (300)    ¨ 3 ounces of baked or broiled salmon (319)    ¨ 3 ounces of roasted turkey, dark meat (250)    ¨ ¼ cup of sunflower seeds (241)    ¨ 3 ounces of cooked lean beef (224)    ¨ 2 tablespoons of smooth peanut butter (210)    · Medium-potassium foods (50 to 200 mg per serving):      ¨ 1 ounce of salted peanuts, almonds, or cashews (200)    ¨ 1 large egg (60)  How much potassium do dairy foods contain?    · High-potassium foods (more than 200 mg per serving):      ¨ 6 ounces of yogurt (260 to 435)    ¨ 1 cup of nonfat, low-fat, or whole milk (350 to 380)    · Medium-potassium foods (50 to 200 mg per serving):      ¨ ½ cup of ricotta cheese (154)    ¨ ½ cup of vanilla ice cream (131)    ¨ ½ cup of low-fat (2%) cottage cheese (110)    · Low-potassium foods (less than 50 mg per serving):      ¨ 1 ounce of cheese (20 to 30)    How much potassium do grains contain? · 1 slice of white bread (30)    · ½ cup of white or brown rice (50)    · ½ cup of spaghetti or macaroni (30)    · 1 flour or corn tortilla (50)    · 1 four-inch waffle (50)  What other foods contain potassium? · 1 tablespoon of molasses (295)    · 1½ ounces of chocolate (165)    · Some salt substitutes may contain a high amount of potassium  Check the food label to find the amount of potassium it contains  CARE AGREEMENT:   You have the right to help plan your care  Discuss treatment options with your caregivers to decide what care you want to receive  You always have the right to refuse treatment  The above information is an  only  It is not intended as medical advice for individual conditions or treatments  Talk to your doctor, nurse or pharmacist before following any medical regimen to see if it is safe and effective for you  © 2017 [employer]  Information is for End User's use only and may not be sold, redistributed or otherwise used for commercial purposes  All illustrations and images included in CareNotes® are the copyrighted property of [employer]  or [employer]

## 2018-10-22 NOTE — PROGRESS NOTES
10/22/2018    Assessment/Plan      Diagnoses and all orders for this visit:    Type 2 diabetes mellitus with hyperglycemia, without long-term current use of insulin (HCC)  -     Microalbumin / creatinine urine ratio Lab Collect; Future  -     Comprehensive metabolic panel Lab Collect; Future  -     HEMOGLOBIN A1C W/ EAG ESTIMATION Lab Collect; Future    Multiple thyroid nodules    Hypertension, unspecified type  -     Comprehensive metabolic panel Lab Collect; Future    Hyperlipidemia, unspecified hyperlipidemia type  -     Comprehensive metabolic panel Lab Collect; Future  -     Lipid Panel with Direct LDL reflex Lab Collect; Future    Diabetic polyneuropathy associated with type 2 diabetes mellitus (HCC)        Assessment/Plan:  1  Type 2 diabetes  Her most recent hemoglobin A1c is improved at 7 1%  This is reasonable for her age  For now, she will continue the same Bydureon, metformin, and glipizide  She will continue to check her blood sugars at least once a day  She will continue to keep working on diet and exercise  Of note, her urine microalbumin to creatinine ratio was slightly elevated today  I will repeat it next visit I she has a history of hyperkalemia and has been unable to use Ace inhibitors or ARBs  2   Diabetic neuropathy  She has some decreased vibration sensation on exam but no other complaints  I will follow this over time  3   Multinodular goiter  Most recent thyroid ultrasound did show a decrease in size of the largest dominant nodule  The rest of the nodules are stable in size  She is biochemically euthyroid  I will follow this over time  4   Hypertension  Blood pressure is under reasonable control on her current metoprolol  5   Hyperlipidemia  She is on fenofibrate 54 mg daily  I will repeat her lipid profile with next visit  I have asked her to follow up in 3 months with preceding hemoglobin A1c, CMP, lipid profile, and urine microalbumin to creatinine ratio        CC: Diabetes Follow up    History of Present Illness     HPI: Ed Cantu is a 75y o  year old female with type 2 diabetes for 10-15 years  She is on oral agents at home and takes bydureon 2 mg once a week, Metformin 1000 mg BID, and glipizide XL 10 mg BID  She denies any polyuria, polydipsia, polyphagia, and blurry vision she has nocturia up to once a night  She denies chest pain or shortness of breath  She is due for an echocardiogram and a follow-up visit with her cardiologist in the next several months  She has slight numbness and tingling of the toes without change  She denies nephropathy, retinopathy, heart attack, stroke and claudication but does admit to neuropathy  She has a nontoxic multinodular goiter  She is on no medicines  She is getting serial ultrasounds and blood work to follow this  She has no compressive thyroid symptoms or difficulties with swallowing  She has hypertension  She is on metoprolol 100 mg twice a day and furosemide 40 mg daily  She has no chest pain, shortness of breath, or headaches  She has hyperlipidemia and is on fenofibrate 54 mg daily  Hypoglycemic episodes: Yes rare  Medic alert tag: recommended,Yes  The patient's last eye exam was in in October 2018 with no retinopathy  The patient's last foot exam was in not seen  Last A1C was   Lab Results   Component Value Date    HGBA1C 7 1 10/16/2018     Blood Sugar/Glucometer/Pump/CGM review: checks blood sugars once a day in am   Before breakfast:   Before lunch:   Before dinner:   Bedtime:     Review of Systems   Constitutional: Negative for fatigue and unexpected weight change  HENT: Negative for trouble swallowing  Eyes: Negative for visual disturbance  Respiratory: Negative for chest tightness and shortness of breath  Cardiovascular: Positive for palpitations  Negative for chest pain  Occasional heart palpitations   Due to see Dr Bayron Allen in jan 2019, last visit July 2018 and stable cardiac status  For echo soon  Gastrointestinal: Negative for constipation and diarrhea  Endocrine: Negative for polydipsia, polyphagia and polyuria  Nocturia up to once a night  Drinking more for health so urinates more  Musculoskeletal: Positive for arthralgias  Has knee pain on steps  To go back to orthopedic for cortisone injection  Skin: Negative for wound  Neurological: Positive for numbness  A bit of numbness and tingling in the toes without change  Psychiatric/Behavioral: Negative for sleep disturbance         Historical Information   Past Medical History:   Diagnosis Date    Atrial fibrillation ([address] )     Colon cancer (HCC)     limited to wall of colon, no chemo or XRT needed    Hypomagnesemia     Leukocytosis     chanelle 2 gene positive    Osteoarthritis      Past Surgical History:   Procedure Laterality Date    APPENDECTOMY      with colectomy    COLECTOMY [place]      24 inches    HYSTERECTOMY      LAPAROSCOPIC CHOLECYSTECTOMY       Social History   History   Alcohol Use No     History   Drug Use No     History   Smoking Status    Never Smoker   Smokeless Tobacco    Never Used     Family History:   Family History   Problem Relation Age of Onset    Asthma Mother     Heart disease Mother         post heart surgery    Heart attack Father     Heart attack Brother     Colon cancer Brother     Colon cancer Brother     Heart attack Brother     Diabetes type II Brother     Heart attack Brother     Colon cancer Brother     No Known Problems Brother     Heart disease Paternal Uncle     No Known Problems Daughter     No Known Problems Daughter     No Known Problems Daughter        Meds/Allergies   Current Outpatient Prescriptions   Medication Sig Dispense Refill    apixaban (ELIQUIS) 5 mg Take 5 mg by mouth 2 (two) times a day        BYDUREON BCISE 2 MG/0 85ML AUIJ Inject 2 mg under the skin once a week 4 pen 6    cholecalciferol (VITAMIN D3) 1,000 units tablet Take 2,000 Units by mouth daily      fenofibrate (TRICOR) 54 MG tablet Take 54 mg by mouth daily        fenofibrate (TRICOR) 54 MG tablet TAKE 1 TABLET BY MOUTH EVERY DAY 90 tablet 0    furosemide (LASIX) 40 mg tablet Take 40 mg by mouth daily prn      glipiZIDE (GLUCOTROL XL) 10 mg 24 hr tablet Take 1 tablet (10 mg total) by mouth 2 (two) times a day 180 tablet 2    Magnesium 500 MG CAPS Take by mouth daily        metFORMIN (GLUCOPHAGE) 1000 MG tablet TAKE 1 TABLET BY MOUTH TWICE A DAY WITH FOOD 180 tablet 0    metoprolol succinate (TOPROL-XL) 100 mg 24 hr tablet Take 100 mg by mouth 2 (two) times a day         No current facility-administered medications for this visit  Allergies   Allergen Reactions    Epinephrine      Other reaction(s): increased HR    Lidocaine      Other reaction(s): increased HR    Neomycin      Other reaction(s): rash    Pioglitazone      Other reaction(s): Fluid retention       Objective   Vitals: Blood pressure 140/78, pulse 90, height 4' 10" (1 473 m), weight 81 4 kg (179 lb 6 4 oz)  Invasive Devices          No matching active lines, drains, or airways          Physical Exam   Constitutional: She is oriented to person, place, and time  She appears well-developed and well-nourished  HENT:   Head: Normocephalic and atraumatic  Eyes: Pupils are equal, round, and reactive to light  Conjunctivae and EOM are normal    Neck: Normal range of motion  Neck supple  No thyromegaly present  No carotid bruits  Cardiovascular: Normal rate, regular rhythm, normal heart sounds and intact distal pulses  Pulses are no weak pulses  No murmur heard  Pulses:       Dorsalis pedis pulses are 2+ on the right side, and 2+ on the left side  Posterior tibial pulses are 2+ on the right side, and 2+ on the left side  Pulmonary/Chest: Effort normal and breath sounds normal  She has no wheezes  Musculoskeletal: Normal range of motion   She exhibits no edema or deformity  Flat arches  1-2+ edema of the lower extremities  No ulceration of the feet when examined with the shoes and socks removed  Feet:   Right Foot:   Skin Integrity: Negative for ulcer, skin breakdown, erythema, warmth, callus or dry skin  Left Foot:   Skin Integrity: Negative for ulcer, skin breakdown, erythema, warmth, callus or dry skin  Lymphadenopathy:     She has no cervical adenopathy  Neurological: She is alert and oriented to person, place, and time  She has normal reflexes  Vibration sensation diminished to the 1st toe DIP joint bilaterally  Microfilament sensation intact bilaterally  Skin: Skin is warm and dry  No rash noted  Vitals reviewed  Patient's shoes and socks removed  Right Foot/Ankle   Right Foot Inspection  Skin Exam: skin normal and skin intact no dry skin, no warmth, no callus, no erythema, no maceration, no abnormal color, no pre-ulcer, no ulcer and no callus                          Toe Exam: ROM and strength within normal limits and swelling  Sensory   Vibration: diminished    Monofilament testing: intact  Vascular  Capillary refills: < 3 seconds  The right DP pulse is 2+  The right PT pulse is 2+  Left Foot/Ankle  Left Foot Inspection  Skin Exam: skin normal and skin intactno dry skin, no warmth, no erythema, no maceration, normal color, no pre-ulcer, no ulcer and no callus                         Toe Exam: ROM and strength within normal limits and swelling                   Sensory   Vibration: diminished    Monofilament: intact  Vascular  Capillary refills: < 3 seconds  The left DP pulse is 2+  The left PT pulse is 2+  Assign Risk Category:  No deformity present; No loss of protective sensation; No weak pulses       Risk: 0        The history was obtained from the review of the chart and from the patient and       Lab Results:    Most recent Alc is  Lab Results   Component Value Date    HGBA1C 7 1 10/16/2018           CMP done on 10/16/2018 showed a potassium of 5 6 with a creatinine is 0 67 and glucose 128 fasting  The rest of the CMP was normal  TSH was 2 23  Urine microalbumin to creatinine ratio was 85  Thyroid ultrasound done a [facility] on 10/15/2018 demonstrated a right lobe of the thyroid measuring 5 4 x 1 9 x 1 4 cm and a left lobe of the thyroid measuring 6 9 x 3 5 x 4 3 cm  There is 3 3 cm complex nodule in the mid to upper pole of the left lobe of the thyroid that is decreased in size from previous ultrasound when it was 3 8 cm  There is a 1 5 cm lower pole left thyroid nodule stable in size  There is a 1 cm upper pole right-sided thyroid nodule stable in size        Future Appointments  Date Time Provider Rasheed Clancy   2/14/2019 11:00 AM Dany Steward MD [facility]

## 2018-12-12 DIAGNOSIS — E78.5 HYPERLIPIDEMIA, UNSPECIFIED HYPERLIPIDEMIA TYPE: ICD-10-CM

## 2018-12-13 RX ORDER — FENOFIBRATE 54 MG/1
TABLET ORAL
Qty: 90 TABLET | Refills: 0 | Status: SHIPPED | OUTPATIENT
Start: 2018-12-13 | End: 2019-03-13 | Stop reason: SDUPTHER

## 2019-01-03 DIAGNOSIS — E11.8 TYPE 2 DIABETES MELLITUS WITH COMPLICATION, UNSPECIFIED WHETHER LONG TERM INSULIN USE: ICD-10-CM

## 2019-02-01 LAB
CREAT ?TM UR-SCNC: 42 UMOL/L
EXT MICROALBUMIN URINE RANDOM: 2.1
HBA1C MFR BLD HPLC: 7.1 %
MICROALBUMIN/CREAT UR: 50 MG/G{CREAT}

## 2019-02-08 ENCOUNTER — TELEPHONE (OUTPATIENT)
Dept: GASTROENTEROLOGY | Facility: CLINIC | Age: 74
End: 2019-02-08

## 2019-02-08 NOTE — TELEPHONE ENCOUNTER
tranferred from [facility]-   Pt needs to reschedule breath test cancelled last week and reschedule colonoscopy from December  She states she was driving at the moment and will call back Monday to schedule

## 2019-02-11 NOTE — TELEPHONE ENCOUNTER
Spoke with pt to sched SIBO, pt stated she is having a lot of health problems and will call back to schedule SIBO and COLON when she is ready and feeling better   Routing message to TK so she is aware regarding COLON   [facility]

## 2019-02-14 ENCOUNTER — OFFICE VISIT (OUTPATIENT)
Dept: ENDOCRINOLOGY | Facility: HOSPITAL | Age: 74
End: 2019-02-14
Payer: MEDICARE

## 2019-02-14 VITALS
BODY MASS INDEX: 36.53 KG/M2 | HEART RATE: 84 BPM | SYSTOLIC BLOOD PRESSURE: 138 MMHG | WEIGHT: 174 LBS | DIASTOLIC BLOOD PRESSURE: 64 MMHG | HEIGHT: 58 IN

## 2019-02-14 DIAGNOSIS — I10 ESSENTIAL HYPERTENSION: ICD-10-CM

## 2019-02-14 DIAGNOSIS — E04.2 MULTIPLE THYROID NODULES: ICD-10-CM

## 2019-02-14 DIAGNOSIS — E11.65 TYPE 2 DIABETES MELLITUS WITH HYPERGLYCEMIA, WITHOUT LONG-TERM CURRENT USE OF INSULIN (HCC): Primary | ICD-10-CM

## 2019-02-14 DIAGNOSIS — E78.2 MIXED HYPERLIPIDEMIA: ICD-10-CM

## 2019-02-14 DIAGNOSIS — E11.42 DIABETIC POLYNEUROPATHY ASSOCIATED WITH TYPE 2 DIABETES MELLITUS (HCC): ICD-10-CM

## 2019-02-14 PROCEDURE — 99215 OFFICE O/P EST HI 40 MIN: CPT | Performed by: INTERNAL MEDICINE

## 2019-02-14 RX ORDER — HYDROCHLOROTHIAZIDE 25 MG/1
25 TABLET ORAL DAILY
COMMUNITY
End: 2021-08-30 | Stop reason: ALTCHOICE

## 2019-02-14 NOTE — LETTER
February 14, 2019     Irasema Catherine, [address] [facility] [address]    Patient: Albert Canchola   YOB: 1945   Date of Visit: 2/14/2019       Dear Dr Giancarlo Fajardo: Thank you for referring Albert Canchola to me for evaluation  Below are my notes for this consultation  If you have questions, please do not hesitate to call me  I look forward to following your patient along with you  Sincerely,        Rishi Delacruz MD        CC: No Recipients  Rishi Delacruz MD  2/14/2019  5:47 PM  Sign at close encounter  2/14/2019    Assessment/Plan      Diagnoses and all orders for this visit:    Type 2 diabetes mellitus with hyperglycemia, without long-term current use of insulin ([facility] [address] )  -     HEMOGLOBIN A1C W/ EAG ESTIMATION Lab Collect; Future  -     Comprehensive metabolic panel Lab Collect; Future  -     TSH, 3rd generation Lab Collect; Future  -     T4, free Lab Collect; Future    Diabetic polyneuropathy associated with type 2 diabetes mellitus (HCC)  -     HEMOGLOBIN A1C W/ EAG ESTIMATION Lab Collect; Future  -     Comprehensive metabolic panel Lab Collect; Future  -     TSH, 3rd generation Lab Collect; Future  -     T4, free Lab Collect; Future    Multiple thyroid nodules  -     TSH, 3rd generation Lab Collect; Future  -     T4, free Lab Collect; Future    Essential hypertension  -     Comprehensive metabolic panel Lab Collect; Future    Mixed hyperlipidemia  -     Comprehensive metabolic panel Lab Collect; Future    Other orders  -     hydrochlorothiazide (HYDRODIURIL) 25 mg tablet; Take 25 mg by mouth daily        Assessment/Plan:  1  Type 2 diabetes  Her most recent hemoglobin A1c was good at 7 1%  For now, she will continue the same metformin, Bydureon, and glipizide  She will continue to check her blood sugars up to twice a day  2  Diabetic neuropathy  She has no significant symptoms  I will follow this over time  3  Microalbuminuria    Her most recent microalbumin was decreased from over 80 to 50  Unfortunately, this may be inaccurate due to her recent renal stones causing hematuria which will increase the microalbumin in the urine  For now, she is not on an ACE-inhibitor or ARB due to elevated potassium levels  I would like her renal stones to be treated and then I would repeat her urine microalbumin when there are no renal stones present  4  Hypertension  Most recent blood pressure is under decent control on hydrochlorothiazide and metoprolol  5  Hyperlipidemia  She is taking fenofibrate 54 mg daily and lipids have improved  6  Multinodular goiter  She is clinically euthyroid  Nodules have been biopsied in the past   This will be followed over time  Last thyroid ultrasound in October of 2018 showed stable size thyroid nodules  I have asked her to follow up in 3 months with preceding hemoglobin A1c, CMP, TSH, and free T4       CC: Diabetes type 2, thyroid, blood pressure, and cholesterol follow-up    History of Present Illness     HPI: Kaylan Mosquera is a 75y o  year old female with type 2 diabetes for 15 years  She is on oral agents at home and takes glipizide XL 10 mg twice a day, metformin 1000 mg twice a day, Bydureon 2 mg once a week  She denies any polyuria, polydipsia, polyphagia, and blurry vision  She will get nocturia after 6 hours  She can get slight numbness and tingling of the fingers and toes  She denies chest pain or shortness of breath  She denies nephropathy, retinopathy, heart attack, stroke and claudication but does admit to neuropathy  She has hypertension and takes metoprolol 100 mg twice a day and hydrochlorothiazide 25 mg daily  She has hyperlipidemia and takes fenofibrate 54 mg daily  She has a history of multiple thyroid nodules with a nontoxic multinodular goiter  She has had some of the thyroid nodules biopsied in the past and were benign  Last ultrasound in October of 2018 showed stable thyroid nodules    She denies constipation, depression or anxiety, tremors, fatigue, or insomnia  She has some intermittent diarrhea or loose stools and weight is 5 lb less than last visit  Hypoglycemic episodes: Yes rare  The patient's last eye exam was in October 2018 with no retinopathy  The patient's last foot exam was in not seen  Last A1C was done on 02/01/2019 was  Lab Results   Component Value Date    HGBA1C 7 1 02/01/2019     Blood Sugar/Glucometer/Pump/CGM review: checks blood sugars once a day in am, occasionally checks twice a day  Before breakfast:   Before lunch:   Before dinner:   Bedtime:     Review of Systems   Constitutional: Positive for unexpected weight change  Negative for fatigue  5 lb less than last visit  HENT: Negative for trouble swallowing  Eyes: Negative for visual disturbance  Wears glasses  Respiratory: Negative for chest tightness and shortness of breath  Cardiovascular: Positive for palpitations and leg swelling  Negative for chest pain  Dr Pacheco Coker last week  Has been having renal stones since November 2018 which is new  She has been passing a lot of them  Potassium has been elevated  Lasix changed to HCTZ  Saw urologist 2 weeks ago and under evaluation to do 24 hour urine  Has normal; calcium and high uric acid  Occasional afib sensation  Gastrointestinal: Positive for diarrhea  Negative for abdominal pain, constipation and nausea  Has very loose bowels  Has rapid bowel movement after eating  Endocrine: Negative for polydipsia, polyphagia and polyuria  Nocturia after about 6 hours  Genitourinary:        Has had frequent renal stones since 11/18 and under evaluation from urologist  Having intermittent hematuria  Skin: Negative for wound  Neurological: Positive for numbness  Negative for tremors  Will get numbness and tingling of the fingers a slightly in the toes     Psychiatric/Behavioral: Negative for dysphoric mood and sleep disturbance  The patient is not nervous/anxious          Historical Information   Past Medical History:   Diagnosis Date    Atrial [address] [address] )     Colon cancer ([facility] [address] )     limited to wall of colon, no chemo or XRT needed    Hypomagnesemia     Leukocytosis     chanelle 2 gene positive    Osteoarthritis      Past Surgical History:   Procedure Laterality Date    APPENDECTOMY      with colectomy    COLECTOMY [place]      24 inches    HYSTERECTOMY      LAPAROSCOPIC CHOLECYSTECTOMY       Social History   Social History     Substance and Sexual Activity   Alcohol Use No     Social History     Substance and Sexual Activity   Drug Use No     Social History     Tobacco Use   Smoking Status Never Smoker   Smokeless Tobacco Never Used     Family History:   Family History   Problem Relation Age of Onset    Asthma Mother     Heart disease Mother         post heart surgery    Heart attack Father     Heart attack Brother     Colon cancer Brother     Colon cancer Brother     Heart attack Brother     Diabetes type II Brother     Heart attack Brother     Colon cancer Brother     No Known Problems Brother     Heart disease Paternal Uncle     No Known Problems Daughter     No Known Problems Daughter     No Known Problems Daughter        Meds/Allergies   Current Outpatient Medications   Medication Sig Dispense Refill    apixaban (ELIQUIS) 5 mg Take 5 mg by mouth 2 (two) times a day        BYDUREON BCISE 2 MG/0 85ML AUIJ Inject 2 mg under the skin once a week 4 pen 6    cholecalciferol (VITAMIN D3) 1,000 units tablet Take 2,000 Units by mouth daily      fenofibrate (TRICOR) 54 MG tablet TAKE 1 TABLET BY MOUTH EVERY DAY 90 tablet 0    glipiZIDE (GLUCOTROL XL) 10 mg 24 hr tablet Take 1 tablet (10 mg total) by mouth 2 (two) times a day 180 tablet 2    hydrochlorothiazide (HYDRODIURIL) 25 mg tablet Take 25 mg by mouth daily      Magnesium 500 MG CAPS Take by mouth daily        metFORMIN (GLUCOPHAGE) 1000 MG tablet TAKE 1 TABLET BY MOUTH TWICE A DAY WITH FOOD 180 tablet 0    metoprolol succinate (TOPROL-XL) 100 mg 24 hr tablet Take 100 mg by mouth 2 (two) times a day        furosemide (LASIX) 40 mg tablet Take 40 mg by mouth daily prn       No current facility-administered medications for this visit  Allergies   Allergen Reactions    Epinephrine      Other reaction(s): increased HR    Lidocaine      Other reaction(s): increased HR    Neomycin      Other reaction(s): rash    Pioglitazone      Other reaction(s): Fluid retention       Objective   Vitals: Blood pressure 138/64, pulse 84, height 4' 10" (1 473 m), weight 78 9 kg (174 lb)  Invasive Devices          None          Physical Exam   Constitutional: She is oriented to person, place, and time  She appears well-developed and well-nourished  HENT:   Head: Normocephalic and atraumatic  Eyes: Pupils are equal, round, and reactive to light  Conjunctivae and EOM are normal    No lid lag, stare, proptosis, or periorbital edema  Neck: Normal range of motion  Neck supple  No thyromegaly present  No carotid bruits  Thyroid remains nodular in size  Cardiovascular: Normal rate, regular rhythm, normal heart sounds and intact distal pulses  No murmur heard  Pulmonary/Chest: Effort normal and breath sounds normal  She has no wheezes  Musculoskeletal: Normal range of motion  She exhibits no edema or deformity  No ulceration of the feet  No tremor of the outstretched hands  Lymphadenopathy:     She has no cervical adenopathy  Neurological: She is alert and oriented to person, place, and time  She has normal reflexes  Skin: Skin is warm and dry  No rash noted  Vitals reviewed  The history was obtained from the review of the chart and from the patient and   Lab Results:    Most recent Alc is  Lab Results   Component Value Date    HGBA1C 7 1 02/01/2019           Urine microalbumin to creatinine ratio was 50   PTH was 26 with a calcium of 10 1     Future Appointments   Date Time Provider Rasheed Clancy   5/30/2019 11:20 AM John Hale MD [facility]

## 2019-02-14 NOTE — PATIENT INSTRUCTIONS
hgba1c is 7 1%  This is good  Continue the same bydureon, metformin, and glipizide  We have to wait until the kidney stones are treated and gone and then we can reevaluate the protein in the urine  Continue to check blood sugars daily  Follow up in 3 months with blood work

## 2019-02-14 NOTE — PROGRESS NOTES
2/14/2019    Assessment/Plan      Diagnoses and all orders for this visit:    Type 2 diabetes mellitus with hyperglycemia, without long-term current use of insulin ([facility] [address] )  -     HEMOGLOBIN A1C W/ EAG ESTIMATION Lab Collect; Future  -     Comprehensive metabolic panel Lab Collect; Future  -     TSH, 3rd generation Lab Collect; Future  -     T4, free Lab Collect; Future    Diabetic polyneuropathy associated with type 2 diabetes mellitus (HCC)  -     HEMOGLOBIN A1C W/ EAG ESTIMATION Lab Collect; Future  -     Comprehensive metabolic panel Lab Collect; Future  -     TSH, 3rd generation Lab Collect; Future  -     T4, free Lab Collect; Future    Multiple thyroid nodules  -     TSH, 3rd generation Lab Collect; Future  -     T4, free Lab Collect; Future    Essential hypertension  -     Comprehensive metabolic panel Lab Collect; Future    Mixed hyperlipidemia  -     Comprehensive metabolic panel Lab Collect; Future    Other orders  -     hydrochlorothiazide (HYDRODIURIL) 25 mg tablet; Take 25 mg by mouth daily        Assessment/Plan:  1  Type 2 diabetes  Her most recent hemoglobin A1c was good at 7 1%  For now, she will continue the same metformin, Bydureon, and glipizide  She will continue to check her blood sugars up to twice a day  2  Diabetic neuropathy  She has no significant symptoms  I will follow this over time  3  Microalbuminuria  Her most recent microalbumin was decreased from over 80 to 50  Unfortunately, this may be inaccurate due to her recent renal stones causing hematuria which will increase the microalbumin in the urine  For now, she is not on an ACE-inhibitor or ARB due to elevated potassium levels  I would like her renal stones to be treated and then I would repeat her urine microalbumin when there are no renal stones present  4  Hypertension  Most recent blood pressure is under decent control on hydrochlorothiazide and metoprolol  5  Hyperlipidemia    She is taking fenofibrate 54 mg daily and lipids have improved  6  Multinodular goiter  She is clinically euthyroid  Nodules have been biopsied in the past   This will be followed over time  Last thyroid ultrasound in October of 2018 showed stable size thyroid nodules  I have asked her to follow up in 3 months with preceding hemoglobin A1c, CMP, TSH, and free T4       CC: Diabetes type 2, thyroid, blood pressure, and cholesterol follow-up    History of Present Illness     HPI: Amadeo Shay is a 75y o  year old female with type 2 diabetes for 15 years  She is on oral agents at home and takes glipizide XL 10 mg twice a day, metformin 1000 mg twice a day, Bydureon 2 mg once a week  She denies any polyuria, polydipsia, polyphagia, and blurry vision  She will get nocturia after 6 hours  She can get slight numbness and tingling of the fingers and toes  She denies chest pain or shortness of breath  She denies nephropathy, retinopathy, heart attack, stroke and claudication but does admit to neuropathy  She has hypertension and takes metoprolol 100 mg twice a day and hydrochlorothiazide 25 mg daily  She has hyperlipidemia and takes fenofibrate 54 mg daily  She has a history of multiple thyroid nodules with a nontoxic multinodular goiter  She has had some of the thyroid nodules biopsied in the past and were benign  Last ultrasound in October of 2018 showed stable thyroid nodules  She denies constipation, depression or anxiety, tremors, fatigue, or insomnia  She has some intermittent diarrhea or loose stools and weight is 5 lb less than last visit  Hypoglycemic episodes: Yes rare  The patient's last eye exam was in October 2018 with no retinopathy  The patient's last foot exam was in not seen  Last A1C was done on 02/01/2019 was  Lab Results   Component Value Date    HGBA1C 7 1 02/01/2019     Blood Sugar/Glucometer/Pump/CGM review: checks blood sugars once a day in am, occasionally checks twice a day    Before breakfast:   Before lunch:   Before dinner:   Bedtime:     Review of Systems   Constitutional: Positive for unexpected weight change  Negative for fatigue  5 lb less than last visit  HENT: Negative for trouble swallowing  Eyes: Negative for visual disturbance  Wears glasses  Respiratory: Negative for chest tightness and shortness of breath  Cardiovascular: Positive for palpitations and leg swelling  Negative for chest pain  Dr Justice Louis last week  Has been having renal stones since November 2018 which is new  She has been passing a lot of them  Potassium has been elevated  Lasix changed to HCTZ  Saw urologist 2 weeks ago and under evaluation to do 24 hour urine  Has normal; calcium and high uric acid  Occasional afib sensation  Gastrointestinal: Positive for diarrhea  Negative for abdominal pain, constipation and nausea  Has very loose bowels  Has rapid bowel movement after eating  Endocrine: Negative for polydipsia, polyphagia and polyuria  Nocturia after about 6 hours  Genitourinary:        Has had frequent renal stones since 11/18 and under evaluation from urologist  Having intermittent hematuria  Skin: Negative for wound  Neurological: Positive for numbness  Negative for tremors  Will get numbness and tingling of the fingers a slightly in the toes  Psychiatric/Behavioral: Negative for dysphoric mood and sleep disturbance  The patient is not nervous/anxious          Historical Information   Past Medical History:   Diagnosis Date    Atrial fibrillation ([facility] [address] )     Colon cancer ([facility] [address] )     limited to wall of colon, no chemo or XRT needed    Hypomagnesemia     Leukocytosis     chanelle 2 gene positive    Osteoarthritis      Past Surgical History:   Procedure Laterality Date    APPENDECTOMY      with colectomy    COLECTOMY LAKE      24 inches    HYSTERECTOMY      LAPAROSCOPIC CHOLECYSTECTOMY       Social History   Social History     Substance and Sexual Activity   Alcohol Use No     Social History     Substance and Sexual Activity   Drug Use No     Social History     Tobacco Use   Smoking Status Never Smoker   Smokeless Tobacco Never Used     Family History:   Family History   Problem Relation Age of Onset    Asthma Mother     Heart disease Mother         post heart surgery    Heart attack Father     Heart attack Brother     Colon cancer Brother     Colon cancer Brother     Heart attack Brother     Diabetes type II Brother     Heart attack Brother     Colon cancer Brother     No Known Problems Brother     Heart disease Paternal Uncle     No Known Problems Daughter     No Known Problems Daughter     No Known Problems Daughter        Meds/Allergies   Current Outpatient Medications   Medication Sig Dispense Refill    apixaban (ELIQUIS) 5 mg Take 5 mg by mouth 2 (two) times a day        BYDUREON BCISE 2 MG/0 85ML AUIJ Inject 2 mg under the skin once a week 4 pen 6    cholecalciferol (VITAMIN D3) 1,000 units tablet Take 2,000 Units by mouth daily      fenofibrate (TRICOR) 54 MG tablet TAKE 1 TABLET BY MOUTH EVERY DAY 90 tablet 0    glipiZIDE (GLUCOTROL XL) 10 mg 24 hr tablet Take 1 tablet (10 mg total) by mouth 2 (two) times a day 180 tablet 2    hydrochlorothiazide (HYDRODIURIL) 25 mg tablet Take 25 mg by mouth daily      Magnesium 500 MG CAPS Take by mouth daily        metFORMIN (GLUCOPHAGE) 1000 MG tablet TAKE 1 TABLET BY MOUTH TWICE A DAY WITH FOOD 180 tablet 0    metoprolol succinate (TOPROL-XL) 100 mg 24 hr tablet Take 100 mg by mouth 2 (two) times a day        furosemide (LASIX) 40 mg tablet Take 40 mg by mouth daily prn       No current facility-administered medications for this visit        Allergies   Allergen Reactions    Epinephrine      Other reaction(s): increased HR    Lidocaine      Other reaction(s): increased HR    Neomycin      Other reaction(s): rash    Pioglitazone      Other reaction(s): Fluid retention       Objective Vitals: Blood pressure 138/64, pulse 84, height 4' 10" (1 473 m), weight 78 9 kg (174 lb)  Invasive Devices          None          Physical Exam   Constitutional: She is oriented to person, place, and time  She appears well-developed and well-nourished  HENT:   Head: Normocephalic and atraumatic  Eyes: Pupils are equal, round, and reactive to light  Conjunctivae and EOM are normal    No lid lag, stare, proptosis, or periorbital edema  Neck: Normal range of motion  Neck supple  No thyromegaly present  No carotid bruits  Thyroid remains nodular in size  Cardiovascular: Normal rate, regular rhythm, normal heart sounds and intact distal pulses  No murmur heard  Pulmonary/Chest: Effort normal and breath sounds normal  She has no wheezes  Musculoskeletal: Normal range of motion  She exhibits no edema or deformity  No ulceration of the feet  No tremor of the outstretched hands  Lymphadenopathy:     She has no cervical adenopathy  Neurological: She is alert and oriented to person, place, and time  She has normal reflexes  Skin: Skin is warm and dry  No rash noted  Vitals reviewed  The history was obtained from the review of the chart and from the patient and   Lab Results:    Most recent Alc is  Lab Results   Component Value Date    HGBA1C 7 1 02/01/2019           Urine microalbumin to creatinine ratio was 50  PTH was 26 with a calcium of 10 1      Future Appointments   Date Time Provider Rasheed Clancy   5/30/2019 11:20 AM Suzie Franklin MD [facility]

## 2019-02-21 ENCOUNTER — TELEPHONE (OUTPATIENT)
Dept: GASTROENTEROLOGY | Facility: CLINIC | Age: 74
End: 2019-02-21

## 2019-02-21 NOTE — TELEPHONE ENCOUNTER
----- Message from Raul De Jesus MD sent at 2/21/2019  3:53 PM EST -----  Highly and I just wanted to see if he got this message about the CT scan

## 2019-02-21 NOTE — TELEPHONE ENCOUNTER
----- Message from Mason Ambrocio MD sent at 2/21/2019  3:53 PM EST -----  Highly and I just wanted to see if he got this message about the CT scan

## 2019-02-21 NOTE — TELEPHONE ENCOUNTER
----- Message from Adalberto Jang MD sent at 2/21/2019  3:53 PM EST -----  Highly and I just wanted to see if he got this message about the CT scan

## 2019-02-22 ENCOUNTER — TELEPHONE (OUTPATIENT)
Dept: GASTROENTEROLOGY | Facility: CLINIC | Age: 74
End: 2019-02-22

## 2019-02-22 DIAGNOSIS — K86.2 PANCREATIC CYST: Primary | ICD-10-CM

## 2019-02-22 NOTE — TELEPHONE ENCOUNTER
Order created/printed  Called patient to verify where she would go for study so I could fax order with labs  Patient states she thought Dr Sanjay Robertson said she did not have to go for CT scan for at least 3 months  Patient is concerned about contrast affecting her kidneys as she is having kidney issues and wanted me also to confirm that she definitely needs contrast?  Also wants to know why she can't have MRI instead so she does not have to have more radiation? I did advise patient that if the CT scan is done at a later date that labs will need to be done prior to the CT scan

## 2019-02-22 NOTE — TELEPHONE ENCOUNTER
----- Message from Amos Zuniga MD sent at 2/21/2019  3:53 PM EST -----  Highly and I just wanted to see if he got this message about the CT scan

## 2019-02-26 NOTE — TELEPHONE ENCOUNTER
We can do an MRI of her insurance company approves it  Abdomen MRI attention pancreas    Patient and I had this discussion already I did explain the CT scan is a slightly superior test but if she would rather have the MRI and can get it approved it will give us the information we need with less dye risk thanks

## 2019-02-26 NOTE — TELEPHONE ENCOUNTER
Patient wants to know if she can have MRI instead? And/or if she needs to proceed with CT scan - how soon does it need to be done  We can return the call for you if you let us know

## 2019-02-27 NOTE — TELEPHONE ENCOUNTER
Dr Kevin Gillette cancelled breath test and colon (pt had scheduled twice and cancelled twice) She did not want to reschedule-other health issues going on  Wait for her to reschedule? Or send final letters?

## 2019-03-04 NOTE — TELEPHONE ENCOUNTER
I spoke to patient again  Explained MRI of abdomen was okay (less contrast)  She was thinking the MRI of abdomen would not have any contrast   I am just confirming with Dr Anitha Toure that abdominal MRI with attention to pancreas is usually ordered as  MRI abdomen with and w/o contrast     Also patient states that she wants to do the test that Dr Anitha Toure believes will give the best answer and if both tests require contrast please let me know which test that is  Patient has Medicare so no prior [facility] is needed for either test   She does have secondary of [employer] but per patient does not need PA through [facility] either

## 2019-03-04 NOTE — TELEPHONE ENCOUNTER
A little bit of phone tag but I did reach the patient  After long discussion let's go with the MRI with and without contrast   Attention pancreas and liver    Thanks

## 2019-03-06 NOTE — TELEPHONE ENCOUNTER
Documentation on result notes WO sent to clinical requested CT be ordered  Patient had called office and requested MRI which WO has approved  No CT order completed for noted

## 2019-03-13 DIAGNOSIS — E78.5 HYPERLIPIDEMIA, UNSPECIFIED HYPERLIPIDEMIA TYPE: ICD-10-CM

## 2019-03-13 RX ORDER — FENOFIBRATE 54 MG/1
54 TABLET ORAL DAILY
Qty: 90 TABLET | Refills: 1 | Status: SHIPPED | OUTPATIENT
Start: 2019-03-13 | End: 2019-09-07 | Stop reason: SDUPTHER

## 2019-03-14 NOTE — TELEPHONE ENCOUNTER
High nursing, this appeared in might been today  I do not know that there is anything to do looking at it and my missing something?

## 2019-03-30 DIAGNOSIS — E11.8 TYPE 2 DIABETES MELLITUS WITH COMPLICATION, UNSPECIFIED WHETHER LONG TERM INSULIN USE: ICD-10-CM

## 2019-04-23 ENCOUNTER — TELEPHONE (OUTPATIENT)
Dept: ENDOCRINOLOGY | Facility: HOSPITAL | Age: 74
End: 2019-04-23

## 2019-04-23 DIAGNOSIS — E11.65 TYPE 2 DIABETES MELLITUS WITH HYPERGLYCEMIA, WITHOUT LONG-TERM CURRENT USE OF INSULIN (HCC): ICD-10-CM

## 2019-04-23 RX ORDER — EXENATIDE 2 MG/.85ML
2 INJECTION, SUSPENSION, EXTENDED RELEASE SUBCUTANEOUS WEEKLY
Qty: 8 PEN | Refills: 0 | Status: SHIPPED | OUTPATIENT
Start: 2019-04-23 | End: 2019-05-30 | Stop reason: SDUPTHER

## 2019-04-29 LAB
LEFT EYE DIABETIC RETINOPATHY: NORMAL
RIGHT EYE DIABETIC RETINOPATHY: NORMAL

## 2019-05-07 NOTE — TELEPHONE ENCOUNTER
Pt left [facility] asking if Dr Khris Jones can call her [phone]; states she is supposed to have a proc but has add'l questions for him first; test is either a CT or an MRI of the abdomen

## 2019-05-08 LAB
LEFT EYE DIABETIC RETINOPATHY: NORMAL
RIGHT EYE DIABETIC RETINOPATHY: NORMAL

## 2019-05-15 NOTE — TELEPHONE ENCOUNTER
Called pt back and again she asked similar questions about the MRI vs CT scan  Did review the past notes and noted that Dr Freda Posada at the end did order the MRI with and without contrast   She is agreeable but still has concerns with the contrast and also has questions if her potassium level would be affected by the contrast   States her levels are often elevated  Told her will call [facility] where she would have it done and confirm that is safe for her  Called [facility] MRI and was noted that the Gadolinium would not affect her potassium level  Relayed this to the pt but she is now thinking a ct scan may be better  Called [facility] ct scan Mckayla who checked with the nurse and noted the contrast would not affect her potassium levels at all  Pt notified  She will have her labs done next week and once Dr Freda Posada reviews she will then proceed with a test, probably the ct scan  audio/video/written material

## 2019-05-23 LAB — HBA1C MFR BLD HPLC: 6.8 %

## 2019-05-30 ENCOUNTER — OFFICE VISIT (OUTPATIENT)
Dept: ENDOCRINOLOGY | Facility: HOSPITAL | Age: 74
End: 2019-05-30
Payer: MEDICARE

## 2019-05-30 VITALS
DIASTOLIC BLOOD PRESSURE: 70 MMHG | SYSTOLIC BLOOD PRESSURE: 130 MMHG | HEART RATE: 86 BPM | BODY MASS INDEX: 36.78 KG/M2 | HEIGHT: 58 IN | WEIGHT: 175.2 LBS

## 2019-05-30 DIAGNOSIS — E78.2 MIXED HYPERLIPIDEMIA: ICD-10-CM

## 2019-05-30 DIAGNOSIS — I10 ESSENTIAL HYPERTENSION: ICD-10-CM

## 2019-05-30 DIAGNOSIS — E04.2 MULTIPLE THYROID NODULES: ICD-10-CM

## 2019-05-30 DIAGNOSIS — E11.65 TYPE 2 DIABETES MELLITUS WITH HYPERGLYCEMIA, WITHOUT LONG-TERM CURRENT USE OF INSULIN (HCC): Primary | ICD-10-CM

## 2019-05-30 DIAGNOSIS — E11.42 DIABETIC POLYNEUROPATHY ASSOCIATED WITH TYPE 2 DIABETES MELLITUS (HCC): ICD-10-CM

## 2019-05-30 PROCEDURE — 99214 OFFICE O/P EST MOD 30 MIN: CPT | Performed by: NURSE PRACTITIONER

## 2019-05-30 RX ORDER — EXENATIDE 2 MG/.85ML
2 INJECTION, SUSPENSION, EXTENDED RELEASE SUBCUTANEOUS WEEKLY
Qty: 8 PEN | Refills: 4 | Status: SHIPPED | OUTPATIENT
Start: 2019-05-30 | End: 2019-08-01 | Stop reason: SDUPTHER

## 2019-05-31 DIAGNOSIS — E11.65 TYPE 2 DIABETES MELLITUS WITH HYPERGLYCEMIA, WITHOUT LONG-TERM CURRENT USE OF INSULIN (HCC): ICD-10-CM

## 2019-06-11 DIAGNOSIS — E11.8 TYPE 2 DIABETES MELLITUS WITH COMPLICATION, UNSPECIFIED WHETHER LONG TERM INSULIN USE: ICD-10-CM

## 2019-06-12 RX ORDER — GLIPIZIDE 10 MG/1
TABLET, FILM COATED, EXTENDED RELEASE ORAL
Qty: 180 TABLET | Refills: 2 | Status: SHIPPED | OUTPATIENT
Start: 2019-06-12 | End: 2019-08-21 | Stop reason: SDUPTHER

## 2019-06-18 DIAGNOSIS — E11.8 TYPE 2 DIABETES MELLITUS WITH COMPLICATION, UNSPECIFIED WHETHER LONG TERM INSULIN USE: ICD-10-CM

## 2019-06-26 DIAGNOSIS — E11.65 TYPE 2 DIABETES MELLITUS WITH HYPERGLYCEMIA, WITHOUT LONG-TERM CURRENT USE OF INSULIN (HCC): ICD-10-CM

## 2019-06-26 RX ORDER — EXENATIDE 2 MG/.85ML
2 INJECTION, SUSPENSION, EXTENDED RELEASE SUBCUTANEOUS WEEKLY
Qty: 3.4 PEN | Refills: 1 | Status: SHIPPED | OUTPATIENT
Start: 2019-06-26 | End: 2020-05-05

## 2019-06-26 NOTE — TELEPHONE ENCOUNTER
FYI    Pt called back, she is still unsure of what test to pursue  Discussed the option of having an ov to discuss and she immediately agreed  She would like to have the safest and most thorough test but still has concerns with side effects and her lab results  She feels would be best to discuss so she can feel comfortable with her decision    Transferred to  for an apt with Dr Edyta Marcus

## 2019-06-26 NOTE — TELEPHONE ENCOUNTER
Called pt to follow up on the MRI/CT scan issue and did she have either done? Pt states no she has not done yet as she is dealing with eye issues that required surgery  She will call back soon to discuss the order but may want to see Dr Mel Nyhan first to discuss

## 2019-08-01 ENCOUNTER — OFFICE VISIT (OUTPATIENT)
Dept: GASTROENTEROLOGY | Facility: CLINIC | Age: 74
End: 2019-08-01
Payer: MEDICARE

## 2019-08-01 VITALS
HEIGHT: 58 IN | WEIGHT: 174 LBS | BODY MASS INDEX: 36.53 KG/M2 | SYSTOLIC BLOOD PRESSURE: 130 MMHG | HEART RATE: 86 BPM | DIASTOLIC BLOOD PRESSURE: 62 MMHG

## 2019-08-01 DIAGNOSIS — R18.8 CIRRHOSIS OF LIVER WITH ASCITES, UNSPECIFIED HEPATIC CIRRHOSIS TYPE (HCC): ICD-10-CM

## 2019-08-01 DIAGNOSIS — K74.60 HEPATIC CIRRHOSIS, UNSPECIFIED HEPATIC CIRRHOSIS TYPE, UNSPECIFIED WHETHER ASCITES PRESENT (HCC): Primary | ICD-10-CM

## 2019-08-01 DIAGNOSIS — C18.2 MALIGNANT NEOPLASM OF ASCENDING COLON (HCC): ICD-10-CM

## 2019-08-01 DIAGNOSIS — K74.60 CIRRHOSIS OF LIVER WITH ASCITES, UNSPECIFIED HEPATIC CIRRHOSIS TYPE (HCC): ICD-10-CM

## 2019-08-01 DIAGNOSIS — K86.2 PANCREATIC CYST: ICD-10-CM

## 2019-08-01 PROCEDURE — 99213 OFFICE O/P EST LOW 20 MIN: CPT | Performed by: INTERNAL MEDICINE

## 2019-08-01 RX ORDER — ALLOPURINOL 100 MG/1
100 TABLET ORAL DAILY
Refills: 3 | COMMUNITY
Start: 2019-07-01 | End: 2019-12-19 | Stop reason: SDUPTHER

## 2019-08-01 NOTE — LETTER
August 1, 2019     [facility] [place] [id number]    Patient: Precious Griggs   YOB: 1945   Date of Visit: 8/1/2019       Dear Dr Aliyah Wilson: Thank you for referring Precious Griggs to me for evaluation  Below are my notes for this consultation  If you have questions, please do not hesitate to call me  I look forward to following your patient along with you           Sincerely,        Jayden Willingham MD        CC: Demetrio Funez MD

## 2019-08-01 NOTE — PROGRESS NOTES
[facility] - Outpatient Follow-up Note  Daniel Hitchcock 75 y o  female MRN: [id number]  Encounter: [id number]    ASSESSMENT AND PLAN:      1  Hepatic cirrhosis, unspecified hepatic cirrhosis type, unspecified whether ascites present ([facility] [address] )      2  Cirrhosis of liver with ascites, unspecified hepatic cirrhosis type ([facility] [address] )  Her cirrhosis is almost certainly caused by PINTO  Previous serology his route excluded viral etiology she does not drink alcohol  The new finding of a fluid wave raises a question of ascites  I would like to proceed with ultrasound 1st if she has ascites I would recommend a paracentesis  We did discuss the importance of a 2000 mg sodium diet  She is run consistently high potassiums in the scheduled to see Nephrology for management  This will be useful as I suspect she will require diuretics going forward for the ascites and probably can't tolerate potassium-sparing diuretics  I will be in touch with her after the ultrasound is completed  - Protime-INR; Future  - Comprehensive metabolic panel; Future  - US abdomen complete; Future    3  Malignant neoplasm of ascending colon [facility])  She has a history of colon cancer resected for cure over 10 years ago  She should have surveillance colonoscopy she is due for it I would like to defer this for right now given the new problem with ascites  4  Pancreatic cyst  Incidental finding of a 1 5 cm cyst in the pancreas 6 months ago she does need repeat imaging  We will see if the ultrasound visualize is this  If not eventually an MRI would be in order      Followup Appointment:   Four weeks  ______________________________________________________________________    Chief Complaint   Patient presents with    Discuss ordering CT scan    Due for colonoscopy     HPI:  She reports increased abdominal distension  No pain but feeling weak tired and run down  No swelling of her hands or feet  Bowels are pretty regular  No heartburn or indigestion  She is eating fairly normally      Historical Information   Past Medical History:   Diagnosis Date    Atrial fibrillation ([facility] [address] )     Colon cancer ([facility] [address] )     limited to wall of colon, no chemo or XRT needed    Diabetes mellitus ([facility] [address] )     Hypertension     Hypomagnesemia     Leukocytosis     chanelle 2 gene positive    Osteoarthritis      Past Surgical History:   Procedure Laterality Date    APPENDECTOMY      with colectomy    COLECTOMY LAKE      24 inches    HYSTERECTOMY      LAPAROSCOPIC CHOLECYSTECTOMY       Social History     Substance and Sexual Activity   Alcohol Use No     Social History     Substance and Sexual Activity   Drug Use No     Social History     Tobacco Use   Smoking Status Never Smoker   Smokeless Tobacco Never Used     Family History   Problem Relation Age of Onset    Asthma Mother     Heart disease Mother         post heart surgery    Heart attack Father     Coronary artery disease Father     Heart attack Brother     Colon cancer Brother     Colon cancer Brother     Heart attack Brother     Diabetes type II Brother     Heart attack Brother     Colon cancer Brother     No Known Problems Brother     Heart disease Paternal Uncle     No Known Problems Daughter     No Known Problems Daughter     No Known Problems Daughter          Current Outpatient Medications:     allopurinol (ZYLOPRIM) 100 mg tablet    apixaban (ELIQUIS) 5 mg    BYDUREON BCISE 2 MG/0 85ML AUIJ    cholecalciferol (VITAMIN D3) 1,000 units tablet    fenofibrate (TRICOR) 54 MG tablet    glipiZIDE (GLUCOTROL XL) 10 mg 24 hr tablet    glucose blood (ONE TOUCH ULTRA TEST) test strip    hydrochlorothiazide (HYDRODIURIL) 25 mg tablet    Magnesium 500 MG CAPS    metFORMIN (GLUCOPHAGE) 1000 MG tablet    metoprolol succinate (TOPROL-XL) 100 mg 24 hr tablet    furosemide (LASIX) 40 mg tablet  Allergies   Allergen Reactions    Epinephrine      Other reaction(s): increased HR    Lidocaine      Other reaction(s): increased HR    Neomycin      Other reaction(s): rash    Pioglitazone      Other reaction(s): Fluid retention       10 Point REVIEW OF SYSTEMS IS OTHERWISE NEGATIVE  PHYSICAL EXAM:    Blood pressure 130/62, pulse 86, height 4' 10" (1 473 m), weight 78 9 kg (174 lb)  Body mass index is 36 37 kg/m²  General Appearance:  Alert, cooperative, no distress  HEENT:  Normocephalic, atraumatic, anicteric  Neck: Supple, symmetrical, trachea midline  Lungs: Clear to auscultation bilaterally; no rales, rhonchi or wheezing; respirations unlabored   Heart: Regular rate and rhythm; no murmur, rub, or gallop  Abdomen:   Soft, distended with a fluid wave mild tenderness no rebound or guarding  normal bowel sounds; no masses, no organomegaly   Rectal:  Deferred   Extremities:  No cyanosis, clubbing or edema   Skin:  No jaundice, rashes, or lesions   Lymph nodes: No palpable cervical lymphadenopathy     Lab Results:   No results found for: WBC, HGB, HCT, MCV, PLT  No results found for: NA, K, CL, CO2, ANIONGAP, BUN, CREATININE, GLUCOSE, GLUF, CALCIUM, CORRECTEDCA, AST, ALT, ALKPHOS, PROT, BILITOT, EGFR  No results found for: IRON, TIBC, FERRITIN  No results found for: LIPASE    Radiology Results:   No results found

## 2019-08-15 ENCOUNTER — CONSULT (OUTPATIENT)
Dept: NEPHROLOGY | Facility: HOSPITAL | Age: 74
End: 2019-08-15
Payer: MEDICARE

## 2019-08-15 VITALS
DIASTOLIC BLOOD PRESSURE: 64 MMHG | SYSTOLIC BLOOD PRESSURE: 120 MMHG | HEIGHT: 58 IN | BODY MASS INDEX: 35.89 KG/M2 | HEART RATE: 75 BPM | RESPIRATION RATE: 16 BRPM | WEIGHT: 171 LBS

## 2019-08-15 DIAGNOSIS — E11.65 TYPE 2 DIABETES MELLITUS WITH HYPERGLYCEMIA, WITHOUT LONG-TERM CURRENT USE OF INSULIN (HCC): ICD-10-CM

## 2019-08-15 DIAGNOSIS — N20.0 NEPHROLITHIASIS: ICD-10-CM

## 2019-08-15 DIAGNOSIS — E87.5 HYPERKALEMIA: Primary | ICD-10-CM

## 2019-08-15 DIAGNOSIS — I10 ESSENTIAL HYPERTENSION: ICD-10-CM

## 2019-08-15 DIAGNOSIS — E79.0 HYPERURICEMIA: ICD-10-CM

## 2019-08-15 DIAGNOSIS — E83.42 HYPOMAGNESEMIA: ICD-10-CM

## 2019-08-15 LAB
CLARITY, POC: NORMAL
COLOR, POC: YELLOW
EXT BILIRUBIN, UA: NORMAL
EXT BLOOD URINE: NORMAL
EXT GLUCOSE, UA: NORMAL
EXT KETONES: NORMAL
EXT NITRITE, UA: NORMAL
EXT PH, UA: 5
EXT PROTEIN, UA: NORMAL
EXT SPECIFIC GRAVITY, UA: 1.01
EXT UROBILINOGEN: NORMAL
WBC # BLD EST: NORMAL 10*3/UL

## 2019-08-15 PROCEDURE — 81002 URINALYSIS NONAUTO W/O SCOPE: CPT | Performed by: INTERNAL MEDICINE

## 2019-08-15 PROCEDURE — 99204 OFFICE O/P NEW MOD 45 MIN: CPT | Performed by: INTERNAL MEDICINE

## 2019-08-15 NOTE — PROGRESS NOTES
NEPHROLOGY OUTPATIENT CONSULTATION   Tiana Garza 75 y o  female MRN: [id number]  Date: 8/15/2019  Reason for consultation:   Chief Complaint   Patient presents with    Consult    Nephrolithiasis       Patient instructions:  Patient Instructions   1  History of nephrolithiasis -   -in office UA shows trace protein and trace WBCs  -last sCr 0 83 as of 8/12/19  -would adhere to a low sodium(<2000mg) diet to prevent stone formation  Avoid canned foods, packaged foods, [place] food, and eating out frequently in restaurants    -should drink enough water to urinate 2-2 5L/day   -just had an ultrasound of the kidneys this past Monday - will obtain results of this  -will obtain litholink urine collection to determine how best to reduce risk of kidney stones  The bottle will be mailed to your home with instructions on urine collection  2  Hyperkalemia ? D/t obstruction from stones vs from RTA in setting of DM and kidney stones   - K 5 2 on latest bloodwork  Repeat BMP  Currently off lasix  On HCTZ 25mg daily  - check urine potassium, urine osm, urine sodium, urine cr, serum cortisol, serum renin and aldosterone, uric acid, phosphorus, CPK, calcium levels    3  HTN - BP well controlled  Continue metoprolol 100mg twice daily, HCTZ 25mg daily    4  DM2 - on metformin, glipizide, sugars well controlled  Also on bydureon     5  Elevated uric acid level - uric acid 7 9 - on allopurinol 100mg daily  Will check uric acid level  6  Hypomagnesemia - does not use PPIs  On magnesium supplement  Last mag 1 7 as of 7/29/19    -will check urine mag and urine Cr levels as well     RTC in 3 months  Lester Marsh was seen today for consult and nephrolithiasis  Diagnoses and all orders for this visit:    Hyperkalemia  -     Comprehensive metabolic panel; Future  -     Potassium, urine, random; Future  -     Creatinine, urine, random; Future  -     Osmolality, urine; Future  -     Sodium, urine, random;  Future  -     Magnesium, urine; Future  -     Aldosterone; Future  -     Renin Direct Assay; Future  -     Cortisol Level, AM Specimen; Future  -     CK (with reflex to MB); Future    Essential hypertension    Type 2 diabetes mellitus with hyperglycemia, without long-term current use of insulin ([facility] )  -     Ambulatory referral to Nephrology    Nephrolithiasis  -     PTH, intact; Future  -     Phosphorus; Future    Hypomagnesemia  -     Magnesium, urine; Future    Hyperuricemia  -     Uric acid; Future    Other orders  -     POCT urinalysis dipstick        ASSESSMENT and PLAN:  1  History of nephrolithiasis -   -in office UA shows trace protein and trace WBCs  -last sCr 0 83 as of 8/12/19  -would adhere to a low sodium(<2000mg) diet to prevent stone formation  Avoid canned foods, packaged foods, [place] food, and eating out frequently in restaurants    -should drink enough water to urinate 2-2 5L/day   -just had an ultrasound of the kidneys this past Monday - will obtain results of this  -will obtain litholink urine collection to determine how best to reduce risk of kidney stones  2  Hyperkalemia ? D/t obstruction from stones vs from RTA in setting of DM and kidney stones   - K 5 2 on latest bloodwork  Repeat BMP  Currently off lasix  On HCTZ 25mg daily  - check urine potassium, urine osm, urine sodium, urine cr, serum cortisol, serum renin and aldosterone, uric acid, phosphorus, CPK, calcium levels    3  HTN - BP well controlled  Continue metoprolol 100mg twice daily, HCTZ 25mg daily    4  DM2 - on metformin, glipizide, sugars well controlled  Also on bydureon     5  Elevated uric acid level - uric acid 7 9 - on allopurinol 100mg daily  Will check uric acid level  6  Hypomagnesemia - does not use PPIs  On magnesium supplement   Last mag 1 7 as of 7/29/19    -will check urine mag and urine Cr levels as well       HISTORY OF PRESENT ILLNESS:  Requesting Physician: Emeterio Wallace MD    Loraine Brown is a 75 y o  female with history of DM, HTN who presents in consultation for nephrolithiasis  Denies history of recent NSAID use, herbal/OTC medications, history of UTIs  Denies history of prior known kidney disease  She has had to go to the ER for hyperkalemia with K as high as 6 1  Has had high potassium levels since seeing endocrinology  This past November 2018 she was found to have kidney stones  Has seen Dr Miranda Harrison, urologist  She was told to see a nephrologist due her kidney stones  She has been passing a lot of stones  She passes them  They do not hurt but she passes many  She had not seen as many stones on allopurinol  She did have recently many stones  Has been on veltassa but no longer on this  Has constipation with this  Nephrolithiasis - had her first episode of kidney stones in November 2018  Has seen Dr Miranda Harrison  She was told she has uric acid stones  No interventions for stone removal  Has not had obstruction  Just had renal ultrasound this past Monday  DM2 x at least 10 years - she does have neuropathy in hands and feet  She does not have diabetic retinopathy  She has well controlled sugars as of late  Her sugars are 80-110s fasting  On bydureon  Last A1c 6 8 in May 2019  On metformin and glipizide  HTN since her 35s so over 40 years  She was not told a reason for elevated BP  Her weight has been under control  Has been taking magnesium tablets a few years ago due to low magnesium levels       PAST MEDICAL HISTORY:  Past Medical History:   Diagnosis Date    Atrial fibrillation ([address] )     Colon cancer ([address] )     limited to wall of colon, no chemo or XRT needed    Diabetes mellitus ([address] )     Hypertension     Hypomagnesemia     Kidney stone     Leukocytosis     chanelle 2 gene positive    Osteoarthritis        PAST SURGICAL HISTORY:  Past Surgical History:   Procedure Laterality Date    APPENDECTOMY      with colectomy    COLECTOMY LA[place]      24 inches    COLONOSCOPY  2016    HYSTERECTOMY      LAPAROSCOPIC CHOLECYSTECTOMY         ALLERGIES:  Allergies   Allergen Reactions    Epinephrine      Other reaction(s): increased HR    Lidocaine      Other reaction(s): increased HR    Neomycin      Other reaction(s): rash    Pioglitazone      Other reaction(s): Fluid retention       SOCIAL HISTORY:  Social History     Substance and Sexual Activity   Alcohol Use No     Social History     Substance and Sexual Activity   Drug Use No     Social History     Tobacco Use   Smoking Status Never Smoker   Smokeless Tobacco Never Used       FAMILY HISTORY:  Family History   Problem Relation Age of Onset    Asthma Mother     Heart disease Mother         post heart surgery    Heart attack Father     Coronary artery disease Father     Heart attack Brother     Colon cancer Brother     Colon cancer Brother     Heart attack Brother     Diabetes type II Brother     Heart attack Brother     Colon cancer Brother     No Known Problems Brother     Heart disease Paternal Uncle     Hypertension Daughter     Hypertension Daughter     No Known Problems Daughter        MEDICATIONS:    Current Outpatient Medications:     allopurinol (ZYLOPRIM) 100 mg tablet, Take 100 mg by mouth daily, Disp: , Rfl: 3    apixaban (ELIQUIS) 5 mg, Take 5 mg by mouth 2 (two) times a day  , Disp: , Rfl:     BYDUREON BCISE 2 MG/0 85ML AUIJ, INJECT 2 MG UNDER THE SKIN ONCE A WEEK, Disp: 3 4 pen, Rfl: 1    cholecalciferol (VITAMIN D3) 1,000 units tablet, Take 1,000 Units by mouth daily , Disp: , Rfl:     fenofibrate (TRICOR) 54 MG tablet, Take 1 tablet (54 mg total) by mouth daily, Disp: 90 tablet, Rfl: 1    glipiZIDE (GLUCOTROL XL) 10 mg 24 hr tablet, TAKE 1 TABLET BY MOUTH TWICE A DAY, Disp: 180 tablet, Rfl: 2    glucose blood (ONE TOUCH ULTRA TEST) test strip, Test once daily, Disp: 100 each, Rfl: 3    hydrochlorothiazide (HYDRODIURIL) 25 mg tablet, Take 25 mg by mouth daily, Disp: , Rfl:     Magnesium 500 MG CAPS, Take by mouth daily  , Disp: , Rfl:     metFORMIN (GLUCOPHAGE) 1000 MG tablet, TAKE 1 TABLET BY MOUTH TWICE A DAY WITH FOOD, Disp: 180 tablet, Rfl: 0    metoprolol succinate (TOPROL-XL) 100 mg 24 hr tablet, Take 100 mg by mouth 2 (two) times a day  , Disp: , Rfl:     furosemide (LASIX) 40 mg tablet, Take 40 mg by mouth daily prn, Disp: , Rfl:     REVIEW OF SYSTEMS:  Review of Systems   Constitutional: Negative for chills and fever  HENT: Negative for sore throat and trouble swallowing  Eyes: Negative for visual disturbance  Respiratory: Negative for cough and shortness of breath  Cardiovascular: Negative for chest pain and leg swelling  Gastrointestinal: Negative for diarrhea, nausea and vomiting  Endocrine: Negative for polyuria  Genitourinary: Negative for difficulty urinating, dysuria and hematuria  Musculoskeletal: Positive for neck pain (with movement)  Negative for back pain  Skin: Negative for rash  Neurological: Positive for numbness (fingers)  Negative for dizziness and light-headedness  Psychiatric/Behavioral: The patient is not nervous/anxious  PHYSICAL EXAM:   Vitals:    08/15/19 1054   BP: 120/64   BP Location: Left arm   Patient Position: Sitting   Cuff Size: Standard   Pulse: 75   Resp: 16   Weight: 77 6 kg (171 lb)   Height: 4' 10" (1 473 m)     Body mass index is 35 74 kg/m²  Physical Exam   Constitutional: She appears well-developed and well-nourished  No distress  HENT:   Head: Normocephalic and atraumatic  Mouth/Throat: No oropharyngeal exudate  Eyes: Right eye exhibits no discharge  Left eye exhibits no discharge  No scleral icterus  Neck: Normal range of motion  Neck supple  No thyromegaly present  Cardiovascular: Normal rate and regular rhythm  Murmur heard  Pulmonary/Chest: Effort normal and breath sounds normal  No respiratory distress  She has no wheezes  Abdominal: Soft  Bowel sounds are normal  She exhibits no distension     Musculoskeletal: She exhibits edema (b/l LE )    Neurological: She is alert  She exhibits normal muscle tone  awake   Skin: Skin is warm and dry  No rash noted  She is not diaphoretic  Psychiatric: She has a normal mood and affect  Her behavior is normal    Nursing note and vitals reviewed  Laboratory results:   No results found for: SODIUM, K, CL, CO2, BUN, CREATININE, GLUC, CALCIUM     Imaging: none relevant on file    Portions of the record may have been created with voice recognition software   Occasional wrong word or "sound a like" substitutions may have occurred due to the inherent limitations of voice recognition software   Read the chart carefully and recognize, using context, where substitutions have occurred

## 2019-08-19 LAB
CREAT ?TM UR-SCNC: 28 UMOL/L
EXT GLUCOSE BLD: 78
EXTERNAL ALBUMIN: 4.4
EXTERNAL ALK PHOS: 91
EXTERNAL ALT: 13
EXTERNAL ANION GAP: 10
EXTERNAL AST: 21
EXTERNAL BICARBONATE: 27.4
EXTERNAL BUN: 31
EXTERNAL CALCIUM: 9.7
EXTERNAL CHLORIDE: 103
EXTERNAL CREATININE: 0.83
EXTERNAL EGFR: >60
EXTERNAL POTASSIUM: 5.9
EXTERNAL SODIUM: 140
EXTERNAL T.BILIRUBIN: 0.8
EXTERNAL TOTAL PROTEIN: 6.8
OSMOLALITY UR: 257 MMOL/KG (ref 250–900)
POTASSIUM UR-SCNC: 18.7 MMOL/L
SODIUM UR-SCNC: 40 MMOL/L (ref 5–300)

## 2019-08-19 NOTE — RESULT ENCOUNTER NOTE
I left a voicemail for the patient  Ultrasound of the liver is essentially unremarkable    Pancreatic cyst is slightly bigger than it was in the past     ** please put her in for 6 months recall ultrasound of the abdomen and liver thanks

## 2019-08-20 ENCOUNTER — TELEPHONE (OUTPATIENT)
Dept: GASTROENTEROLOGY | Facility: CLINIC | Age: 74
End: 2019-08-20

## 2019-08-20 ENCOUNTER — TELEPHONE (OUTPATIENT)
Dept: NEPHROLOGY | Facility: CLINIC | Age: 74
End: 2019-08-20

## 2019-08-20 DIAGNOSIS — E87.5 HYPERKALEMIA: Primary | ICD-10-CM

## 2019-08-20 NOTE — TELEPHONE ENCOUNTER
She called requesting her blood work results from 8/15  She said she has not gotten a phone call back yet  Her call back number is [phone]

## 2019-08-20 NOTE — TELEPHONE ENCOUNTER
----- Message from Annmarie Belle MD sent at 8/19/2019  6:35 PM EDT -----  I left a voicemail for the patient  Ultrasound of the liver is essentially unremarkable    Pancreatic cyst is slightly bigger than it was in the past     ## please put her in for 6 months recall ultrasound of the abdomen and liver thanks

## 2019-08-20 NOTE — TELEPHONE ENCOUNTER
Per Dr Maia Broussard we are ordering [facility] for the patient to help with chronic hyperkalemia

## 2019-08-21 DIAGNOSIS — E11.65 TYPE 2 DIABETES MELLITUS WITH HYPERGLYCEMIA, WITHOUT LONG-TERM CURRENT USE OF INSULIN (HCC): Primary | ICD-10-CM

## 2019-08-21 DIAGNOSIS — E11.8 TYPE 2 DIABETES MELLITUS WITH COMPLICATION, UNSPECIFIED WHETHER LONG TERM INSULIN USE: ICD-10-CM

## 2019-08-21 LAB
ALDOST SERPL-MCNC: 2 NG/DL
CALCIUM SERPL-MCNC: 9.7 MG/DL (ref 8.3–10.1)
PTH-INTACT SERPL-MCNC: 29 PG/ML
RENIN [ENZYMATIC ACTIVITY/VOLUME] IN PLASMA --BASELINE: 0.8 NG/ML/H

## 2019-08-21 RX ORDER — GLIPIZIDE 10 MG/1
10 TABLET, FILM COATED, EXTENDED RELEASE ORAL 2 TIMES DAILY
Qty: 180 TABLET | Refills: 1 | Status: SHIPPED | OUTPATIENT
Start: 2019-08-21 | End: 2019-09-11 | Stop reason: SDUPTHER

## 2019-08-21 NOTE — TELEPHONE ENCOUNTER
Per Dr Shellie Longoria, patient should check a BMP after taking Veltassa  BMP should be done on Aug 29-30, patient is aware  She is also aware she was approved for co-pay assistance and is awaiting her shipment of [id number]  I mailed the patient the lab slip for a BMP  She is awaiting the results for her Renin and Aldosterone tests

## 2019-08-21 NOTE — TELEPHONE ENCOUNTER
Pharm called for a refill of pt's Metformin, Glipizide, Test Strips and Lancets  She saw you in May and has a follow up next month with you

## 2019-09-04 ENCOUNTER — OFFICE VISIT (OUTPATIENT)
Dept: GASTROENTEROLOGY | Facility: CLINIC | Age: 74
End: 2019-09-04
Payer: MEDICARE

## 2019-09-04 VITALS
DIASTOLIC BLOOD PRESSURE: 82 MMHG | HEART RATE: 79 BPM | BODY MASS INDEX: 36.11 KG/M2 | SYSTOLIC BLOOD PRESSURE: 128 MMHG | WEIGHT: 172 LBS | HEIGHT: 58 IN

## 2019-09-04 DIAGNOSIS — K86.2 PANCREATIC CYST: ICD-10-CM

## 2019-09-04 DIAGNOSIS — C18.2 MALIGNANT NEOPLASM OF ASCENDING COLON (HCC): ICD-10-CM

## 2019-09-04 DIAGNOSIS — K74.60 HEPATIC CIRRHOSIS, UNSPECIFIED HEPATIC CIRRHOSIS TYPE, UNSPECIFIED WHETHER ASCITES PRESENT (HCC): Primary | ICD-10-CM

## 2019-09-04 PROCEDURE — 99214 OFFICE O/P EST MOD 30 MIN: CPT | Performed by: INTERNAL MEDICINE

## 2019-09-04 NOTE — PROGRESS NOTES
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  Appears to be well compensated by laboratory values  Our initial impression of ascites was proven wrong by ultrasound just done  We discussed weight loss  2  Malignant neoplasm of ascending colon ([facility] )  History of this 21 years ago  Is due for surveillance colonoscopy will schedule  3  Pancreatic cyst  Ultrasound shows the cyst has increased very slightly in size to 2 3 cm it is septated  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   Under 3 cm with no solid component carries a low risk for cancer  I have ordered a CA 19 9  We plan to reimage this by MRI in 6 months  She is on anticoagulation  I am not sure the benefits of an endoscopic ultrasound with aspiration exceed the risk  If her CA 19 9 is elevated we might rethink this  If it enlarges in size over 3 cm in size, consider endoscopic ultrasound with aspiration which would require discontinuation of her anticoagulation   - Cancer antigen 19-9; Future      Followup Appointment:  6 months  ______________________________________________________________________    Chief Complaint   Patient presents with    Follow-up     HPI:  Feels surprisingly well  Appetite is good continues to complain of abdominal distension but eating moving bowels regularly  No shortness of breath  No weight loss if anything she is struggling to try to lose a few lb      Historical Information   Past Medical History:   Diagnosis Date    Atrial fibrillation [facility]     Colon cancer ([facility] )     limited to wall of colon, no chemo or XRT needed    Diabetes mellitus ([facility] )     Hypertension     Hypomagnesemia     Kidney stone     Leukocytosis     chanelle 2 gene positive    Osteoarthritis      Past Surgical History:   Procedure Laterality Date    APPENDECTOMY      with colectomy    COLECTOMY [place]      24 inches    COLONOSCOPY  2016    HYSTERECTOMY      LAPAROSCOPIC CHOLECYSTECTOMY       Social History     Substance and Sexual Activity   Alcohol Use No     Social History     Substance and Sexual Activity   Drug Use No     Social History     Tobacco Use   Smoking Status Never Smoker   Smokeless Tobacco Never Used     Family History   Problem Relation Age of Onset    Asthma Mother     Heart disease Mother         post heart surgery    Heart attack Father     Coronary artery disease Father     Heart attack Brother     Colon cancer Brother     Colon cancer Brother     Heart attack Brother     Diabetes type II Brother     Heart attack Brother     Colon cancer Brother     No Known Problems Brother     Heart disease Paternal Uncle     Hypertension Daughter     Hypertension Daughter     No Known Problems Daughter          Current Outpatient Medications:     allopurinol (ZYLOPRIM) 100 mg tablet    apixaban (ELIQUIS) 5 mg    BYDUREON BCISE 2 MG/0 85ML AUIJ    cholecalciferol (VITAMIN D3) 1,000 units tablet    fenofibrate (TRICOR) 54 MG tablet    glipiZIDE (GLUCOTROL XL) 10 mg 24 hr tablet    glucose blood (ONE TOUCH ULTRA TEST) test strip    hydrochlorothiazide (HYDRODIURIL) 25 mg tablet    Magnesium 500 MG CAPS    metFORMIN (GLUCOPHAGE) 1000 MG tablet    metoprolol succinate (TOPROL-XL) 100 mg 24 hr tablet    ONETOUCH DELICA LANCETS FINE MISC    Patiromer Sorbitex Calcium (VELTASSA) 8 4 g PACK  Allergies   Allergen Reactions    Epinephrine      Other reaction(s): increased HR    Lidocaine      Other reaction(s): increased HR    Neomycin      Other reaction(s): rash    Pioglitazone      Other reaction(s): Fluid retention       10 Point REVIEW OF SYSTEMS IS OTHERWISE NEGATIVE      PHYSICAL EXAM:    Blood pressure 128/82, pulse 79, height 4' 10" (1 473 m), weight 78 kg (172 lb)  Body mass index is 35 95 kg/m²  General Appearance: NAD, cooperative, alert  Eyes: Anicteric, PERRLA, EOMI  ENT:  Normocephalic, atraumatic, normal mucosa  Neck:  Supple, symmetrical, trachea midline  Resp:  Clear to auscultation bilaterally; no rales, rhonchi or wheezing; respirations unlabored   CV:  S1 S2, Regular rate and rhythm; no murmur, rub, or gallop  GI:  Softly distended  Nontender  There does appear to be a fluid wave  normal bowel sounds; no masses, no organomegaly   Rectal: Deferred  :  Deferred   Musculoskeletal: No cyanosis, clubbing or edema  Normal ROM  Skin:  No jaundice, rashes, or lesions   Heme/Lymph: No palpable cervical lymphadenopathy  Psych: Normal affect, good eye contact  Neuro: No gross deficits, AAOx3    Lab Results:   No results found for: WBC, HGB, HCT, MCV, PLT  Lab Results   Component Value Date    K 5 9 08/16/2019     08/16/2019    BUN 31 08/16/2019    CREATININE 0 83 08/16/2019    CALCIUM 9 7 08/16/2019    CALCIUM 9 7 08/16/2019    AST 21 08/16/2019    ALT 13 08/16/2019    ALKPHOS 91 08/16/2019    EGFR >60 08/16/2019     No results found for: IRON, TIBC, FERRITIN  No results found for: LIPASE    Radiology Results:   No results found

## 2019-09-06 ENCOUNTER — TELEPHONE (OUTPATIENT)
Dept: GASTROENTEROLOGY | Facility: CLINIC | Age: 74
End: 2019-09-06

## 2019-09-06 NOTE — TELEPHONE ENCOUNTER
CMP ordered 8/1/19  Patient did have CMP done for another provider which was not copied to us  Please see CMP on 8/16/19  There was also PT/INR ordered but this has not been done yet - please advise

## 2019-09-07 DIAGNOSIS — E78.5 HYPERLIPIDEMIA, UNSPECIFIED HYPERLIPIDEMIA TYPE: ICD-10-CM

## 2019-09-09 RX ORDER — FENOFIBRATE 54 MG/1
TABLET ORAL
Qty: 90 TABLET | Refills: 1 | Status: SHIPPED | OUTPATIENT
Start: 2019-09-09 | End: 2020-02-14

## 2019-09-10 ENCOUNTER — TELEPHONE (OUTPATIENT)
Dept: GASTROENTEROLOGY | Facility: CLINIC | Age: 74
End: 2019-09-10

## 2019-09-10 LAB — HBA1C MFR BLD HPLC: 6.4 %

## 2019-09-10 NOTE — TELEPHONE ENCOUNTER
Received call from [address] [place] [address] 19-9 not covered with dx submitted  Given Z85 09 to use as patient has hx of malignant neoplasm colon

## 2019-09-10 NOTE — TELEPHONE ENCOUNTER
[facility] called about another matter on patient  I was able to fax order for PT/INR to them to draw on patient today

## 2019-09-11 ENCOUNTER — OFFICE VISIT (OUTPATIENT)
Dept: ENDOCRINOLOGY | Facility: HOSPITAL | Age: 74
End: 2019-09-11
Payer: MEDICARE

## 2019-09-11 VITALS
HEART RATE: 91 BPM | DIASTOLIC BLOOD PRESSURE: 62 MMHG | SYSTOLIC BLOOD PRESSURE: 120 MMHG | HEIGHT: 58 IN | BODY MASS INDEX: 36.02 KG/M2 | WEIGHT: 171.6 LBS

## 2019-09-11 DIAGNOSIS — I10 ESSENTIAL HYPERTENSION: ICD-10-CM

## 2019-09-11 DIAGNOSIS — E78.5 HYPERLIPIDEMIA, UNSPECIFIED HYPERLIPIDEMIA TYPE: ICD-10-CM

## 2019-09-11 DIAGNOSIS — E04.2 MULTIPLE THYROID NODULES: ICD-10-CM

## 2019-09-11 DIAGNOSIS — E11.65 TYPE 2 DIABETES MELLITUS WITH HYPERGLYCEMIA, WITHOUT LONG-TERM CURRENT USE OF INSULIN (HCC): Primary | ICD-10-CM

## 2019-09-11 LAB
EXT DIFF-ABS BASOPHILS: 0
EXT DIFF-ABS EOSINOPHILS: 3
EXT DIFF-ABS LYMPHOCYTES: 3
EXT DIFF-ABS MONOCYTES: 1
EXT DIFF-ABS NEUTROPHILS: 85
EXT GLUCOSE BLD: 63
EXTERNAL ALBUMIN: 4.5
EXTERNAL ALK PHOS: 95
EXTERNAL ALT: 15
EXTERNAL ANION GAP: 16
EXTERNAL AST: 26
EXTERNAL BICARBONATE: 20.8
EXTERNAL BUN: 28
EXTERNAL CALCIUM: 10.1
EXTERNAL CHLORIDE: 102
EXTERNAL CREATININE: 1.76
EXTERNAL EGFR: >60
EXTERNAL HEMATOCRIT: 51 %
EXTERNAL HEMOGLOBIN: 15.3 G/DL
EXTERNAL MCV: 87
EXTERNAL PLATELET COUNT: 416 K/ΜL
EXTERNAL POTASSIUM: 5.6
EXTERNAL RBC: 5.82
EXTERNAL RDW: 17.1
EXTERNAL SODIUM: 139
EXTERNAL T.BILIRUBIN: 1
EXTERNAL TOTAL PROTEIN: 7.2
EXTERNAL WBC: 20
HBA1C MFR BLD: 6.4 % (ref 4.2–6.3)
TSH SERPL DL<=0.05 MIU/L-ACNC: 1.93 UIU/ML (ref 0.36–3.74)

## 2019-09-11 PROCEDURE — 99214 OFFICE O/P EST MOD 30 MIN: CPT | Performed by: NURSE PRACTITIONER

## 2019-09-11 RX ORDER — GLIPIZIDE 5 MG/1
5 TABLET, FILM COATED, EXTENDED RELEASE ORAL 2 TIMES DAILY
Qty: 180 TABLET | Refills: 3 | Status: SHIPPED | OUTPATIENT
Start: 2019-09-11 | End: 2020-06-24 | Stop reason: SDUPTHER

## 2019-09-11 NOTE — PROGRESS NOTES
Barb Shaw 75 y o  female MRN: [id number]    Encounter: [id number]      Assessment/Plan     Assessment: This is a 75y o -year-old female with type 2 diabetes, multiple thyroid nodules, hypertension and hyperlipidemia  Plan:  1  Type 2 diabetes  Her most recent hemoglobin A1c is at goal at 6 4    For now, she will continue the same metformin and Bydureon at current dose  I have asked her to decrease her dose of glipizide XL to 5 mg twice daily   She will continue to check her blood sugars up to twice a day and send a record to the office for review  Check hemoglobin A1c prior to next office visit  2  Microalbuminuria   She is not currently treated with an ACE-inhibitor/ARB due to elevated potassium levels  This is being evaluated by Cardiology  She also has a history of uric acid renal stones  Will continue to monitor microalbuminuria once renal stones have passed      3  Hypertension  She is normotensive in the office today  Continue current regimen  Check comprehensive metabolic panel prior to next visit  4  Hyperlipidemia  Continue fenofibrate  Check fasting lipid panel prior to next visit  5  Multinodular goiter  Her most recent TSH is normal   She denies any anterior neck discomfort, dysphagia or dysphonia  Obtain thyroid ultrasound to maintain surveillance prior to next visit  CC:  Type 2 Diabetes follow-up    History of Present Illness     HPI:  75 y  o  year old female with type 2 diabetes for 15 years   She is on oral agents at home and takes Glipizide XL 10 mg twice a day, Metformin 1000 mg twice a day, Bydureon 2 mg once a week   her most recent hemoglobin A1c from September 10, 2019 is 6 4    She denies any polyuria, polydipsia, polyphagia, and blurry vision  Emeraldtien Carlos will get nocturia once per night  She denies chest pain or shortness of breath   She denies nephropathy, retinopathy, heart attack, stroke and claudication but does admit to neuropathy        Hypoglycemic episodes: Yes, fairly consistently in the morning and sporadically throughout the day       Her most recent diabetic eye exam was in April 2019 with no retinopathy   She has no complaints about her feet and does not follow Podiatry for regular diabetic foot care      Blood Sugar/Glucometer/Pump/CGM review: checks blood sugars once a day in am, occasionally checks twice a day  Before breakfast:   Before lunch:   Before dinner:   Bedtime:      For her hypertension, she is treated with metoprolol 100 mg twice a day and hydrochlorothiazide 25 mg daily      Her hyperlipidemia is treated with fenofibrate 54 mg daily      She has a history of multiple thyroid nodules with a nontoxic multinodular goiter   She has had some of the thyroid nodules biopsied in the past and were benign    Her most recent thyroid ultrasound in October of 2018 showed stable thyroid nodules  She denies any neck compressive symptoms     She denies constipation, depression or anxiety, tremors, fatigue, or insomnia  Her most recent TSH from September 10, 2019 is 1 930  Review of Systems   Constitutional: Negative  Negative for chills, fatigue and fever  HENT: Negative  Negative for trouble swallowing and voice change  Eyes: Negative  Negative for visual disturbance  Respiratory: Negative  Negative for chest tightness and shortness of breath  Cardiovascular: Positive for leg swelling (slight)  Negative for chest pain and palpitations  Gastrointestinal: Negative  Negative for abdominal pain, constipation, diarrhea and vomiting  Endocrine: Negative for cold intolerance, heat intolerance, polydipsia, polyphagia and polyuria  Genitourinary: Negative  Musculoskeletal: Positive for arthralgias (bilateral knee discomfort)  Skin: Negative  Allergic/Immunologic: Negative  Neurological: Positive for numbness (in feet at times  )  Negative for dizziness, syncope, light-headedness and headaches  Hematological: Negative  Psychiatric/Behavioral: Negative  All other systems reviewed and are negative        Historical Information   Past Medical History:   Diagnosis Date    Atrial fibrillation [facility] [address] [facility]     Colon cancer ([facility] [address] [facility]     limited to wall of colon, no chemo or XRT needed    Diabetes mellitus ([facility] [address] [facility]     Hypertension     Hypomagnesemia     Kidney stone     Leukocytosis     chanelle 2 gene positive    Osteoarthritis      Past Surgical History:   Procedure Laterality Date    APPENDECTOMY      with colectomy    COLECTOMY LAKE      24 inches    COLONOSCOPY  2016    HYSTERECTOMY      LAPAROSCOPIC CHOLECYSTECTOMY       Social History   Social History     Substance and Sexual Activity   Alcohol Use No     Social History     Substance and Sexual Activity   Drug Use No     Social History     Tobacco Use   Smoking Status Never Smoker   Smokeless Tobacco Never Used     Family History:   Family History   Problem Relation Age of Onset    Asthma Mother     Heart disease Mother         post heart surgery    Heart attack Father     Coronary artery disease Father     Heart attack Brother     Colon cancer Brother     Colon cancer Brother     Heart attack Brother     Diabetes type II Brother     Heart attack Brother     Colon cancer Brother     No Known Problems Brother     Heart disease Paternal Uncle     Hypertension Daughter     Hypertension Daughter     No Known Problems Daughter        Meds/Allergies   Current Outpatient Medications   Medication Sig Dispense Refill    allopurinol (ZYLOPRIM) 100 mg tablet Take 100 mg by mouth daily  3    apixaban (ELIQUIS) 5 mg Take 5 mg by mouth 2 (two) times a day        BYDUREON BCISE 2 MG/0 85ML AUIJ INJECT 2 MG UNDER THE SKIN ONCE A WEEK 3 4 pen 1    cholecalciferol (VITAMIN D3) 1,000 units tablet Take 1,000 Units by mouth daily       fenofibrate (TRICOR) 54 MG tablet TAKE 1 TABLET BY MOUTH EVERY DAY 90 tablet 1    glipiZIDE (GLUCOTROL XL) 10 mg 24 hr tablet Take 1 tablet (10 mg total) by mouth 2 (two) times a day 180 tablet 1    glucose blood (ONE TOUCH ULTRA TEST) test strip Test once daily 100 each 3    hydrochlorothiazide (HYDRODIURIL) 25 mg tablet Take 25 mg by mouth daily      Magnesium 500 MG CAPS Take by mouth daily        metFORMIN (GLUCOPHAGE) 1000 MG tablet Take 1 tablet (1,000 mg total) by mouth 2 (two) times a day with meals 180 tablet 1    metoprolol succinate (TOPROL-XL) 100 mg 24 hr tablet Take 100 mg by mouth 2 (two) times a day        ONETOUCH DELICA LANCETS FINE MISC Test once daily 100 each 3    Patiromer Sorbitex Calcium (VELTASSA) 8 4 g PACK Take 8 4 g by mouth daily 30 each 5     No current facility-administered medications for this visit  Allergies   Allergen Reactions    Epinephrine      Other reaction(s): increased HR    Lidocaine      Other reaction(s): increased HR    Neomycin      Other reaction(s): rash    Pioglitazone      Other reaction(s): Fluid retention       Objective   Vitals: Blood pressure 120/62, pulse 91, height 4' 10" (1 473 m), weight 77 8 kg (171 lb 9 6 oz)  Physical Exam   Constitutional: She is oriented to person, place, and time  She appears well-developed and well-nourished  overweight   HENT:   Head: Normocephalic and atraumatic  Mouth/Throat: Oropharynx is clear and moist    Eyes: Pupils are equal, round, and reactive to light  Conjunctivae and EOM are normal    Wears glasses   Neck: Normal range of motion  Neck supple  Cardiovascular: Normal rate, regular rhythm, normal heart sounds and intact distal pulses  Pulmonary/Chest: Effort normal and breath sounds normal    Abdominal: Soft  Bowel sounds are normal    Musculoskeletal: Normal range of motion  Neurological: She is alert and oriented to person, place, and time  Skin: Skin is warm and dry  Dry skin   Psychiatric: She has a normal mood and affect  Her behavior is normal  Judgment and thought content normal    Vitals reviewed      Lab Results:   Lab Results Component Value Date/Time    Hemoglobin A1C 6 4 09/10/2019    Hemoglobin A1C 6 4 (A) 09/10/2019    Hemoglobin A1C 6 8 05/23/2019    Hemoglobin A1C 7 1 02/01/2019    WBC 20 0 09/10/2019    External Hemoglobin 15 3 09/10/2019    Hematocrit 51 09/10/2019    MCV 87 09/10/2019    External Platelets 091 86/38/0050    BUN 28 09/10/2019    BUN 31 08/16/2019    POTASSIUM 5 6 09/10/2019    POTASSIUM 5 9 08/16/2019    CHLORIDE 102 09/10/2019    CHLORIDE 103 08/16/2019    CREATININE 1 76 09/10/2019    CREATININE 0 83 08/16/2019    AST 26 09/10/2019    AST 21 08/16/2019    ALT 15 09/10/2019    ALT 13 08/16/2019    ALBUMIN 4 5 09/10/2019    ALBUMIN 4 4 08/16/2019     Portions of the record may have been created with voice recognition software  Occasional wrong word or "sound a like" substitutions may have occurred due to the inherent limitations of voice recognition software  Read the chart carefully and recognize, using context, where substitutions have occurred

## 2019-09-11 NOTE — PATIENT INSTRUCTIONS
Be mindful of diet      Stay active and stay hydrated  Continue Metformin and Bydureon  Decrease Glipizide to 5 mg twice daily      Please check your blood sugars 1-2 times daily      Continue current regimen      Continue HCTZ and Metoprolol      Continue Fenofibrate      Continue follow up with Nephrology

## 2019-09-15 DIAGNOSIS — E11.8 TYPE 2 DIABETES MELLITUS WITH COMPLICATION, UNSPECIFIED WHETHER LONG TERM INSULIN USE: ICD-10-CM

## 2019-09-17 DIAGNOSIS — E11.8 TYPE 2 DIABETES MELLITUS WITH COMPLICATION, UNSPECIFIED WHETHER LONG TERM INSULIN USE: ICD-10-CM

## 2019-09-20 ENCOUNTER — TELEPHONE (OUTPATIENT)
Dept: NEPHROLOGY | Facility: CLINIC | Age: 74
End: 2019-09-20

## 2019-10-15 ENCOUNTER — TELEPHONE (OUTPATIENT)
Dept: NEPHROLOGY | Facility: CLINIC | Age: 74
End: 2019-10-15

## 2019-10-15 NOTE — TELEPHONE ENCOUNTER
I spoke to the patient, she is aware she needs to drink more water during the day, and add 1 tbls of lemon juice to an 8oz glass of water three times a day  She is on the Allopurinol to help reduce Uric Acid and help the Ph  She made her follow up appointment  No further questions or concerns at this time

## 2019-10-15 NOTE — TELEPHONE ENCOUNTER
----- Message from Yonny Quezada DO sent at 10/15/2019 12:04 PM EDT -----  10/8/19 urine volume 1 7L  Ideally, she should drink enough to urinate 2-2 5L/day so she should be asked to drink more water  I note urine citrate is low at 243 and urine pH low at 5 078  As patient's potassium remains high normal, I recommend she drink water containing 1 tablespoon lemon juice in 8oz water 3 times daily rather than start potassium citrate  I also noted bloodwork from Sept 11, 2019 shows sCr 0 76 which is stable with K 5 6

## 2019-11-26 ENCOUNTER — TELEPHONE (OUTPATIENT)
Dept: GASTROENTEROLOGY | Facility: CLINIC | Age: 74
End: 2019-11-26

## 2019-12-06 ENCOUNTER — TELEPHONE (OUTPATIENT)
Dept: NEPHROLOGY | Facility: CLINIC | Age: 74
End: 2019-12-06

## 2019-12-06 ENCOUNTER — DOCUMENTATION (OUTPATIENT)
Dept: NEPHROLOGY | Facility: CLINIC | Age: 74
End: 2019-12-06

## 2019-12-06 DIAGNOSIS — E87.5 HYPERKALEMIA: Primary | ICD-10-CM

## 2019-12-06 LAB
ALBUMIN SNV-MCNC: 4.2 G/DL
ALP SERPL-CCNC: 97 U/L (ref 46–116)
ALT SERPL W P-5'-P-CCNC: 15 U/L (ref 12–78)
ANION GAP SERPL CALCULATED.3IONS-SCNC: 12 MMOL/L (ref 4–13)
AST SERPL W P-5'-P-CCNC: 23 U/L (ref 5–45)
BILIRUB SERPL-MCNC: 1 MG/DL
BNP SERPL-MCNC: 184 PG/ML (ref 1–100)
BUN SERPL-MCNC: 32 MG/DL (ref 5–25)
CALCIUM SERPL-MCNC: 9.9 MG/DL (ref 8.3–10.1)
CHLORIDE SERPL-SCNC: 105 MMOL/L (ref 100–108)
CO2 SERPL-SCNC: 22 MMOL/L (ref 21–32)
CREAT SERPL-MCNC: 0.85 MG/DL (ref 0.6–1.3)
EXT DIFF-ABS BASOPHILS: 0
EXT DIFF-ABS EOSINOPHILS: 1
EXT DIFF-ABS LYMPHOCYTES: 3
EXT DIFF-ABS MONOCYTES: 1
EXT DIFF-ABS NEUTROPHILS: 93
EXTERNAL EGFR: >60
EXTERNAL HEMATOCRIT: 49 %
EXTERNAL HEMOGLOBIN: 14.9 G/DL
EXTERNAL MCV: 91
EXTERNAL PLATELET COUNT: 445 K/ΜL
EXTERNAL RBC: 5.41
EXTERNAL RDW: 20.5
EXTERNAL WBC: 19.9
GLUCOSE SERPL-MCNC: 74 MG/DL
MAGNESIUM SERPL-MCNC: 1.2 MG/DL (ref 1.6–2.6)
POTASSIUM SERPL-SCNC: 6 MMOL/L (ref 3.5–5.3)
PROT SERPL-MCNC: 6.9 G/DL (ref 6.4–8.2)
SODIUM SERPL-SCNC: 139 MMOL/L (ref 136–145)

## 2019-12-06 NOTE — TELEPHONE ENCOUNTER
Patient called concerned because she had blood work done at [facility] and her potassium is 6 0 despite using Veltassa daily  She confirmed she is following a low potassium diet, she wants to know if she should continue on the Veltassa  I called [place] lab and they are faxing over a copy of the labs the patient had done  As soon as I receive it I will document it into the chart and flow sheet  I am going to send a Tigertext to Dr Amy Rutherford to make her aware of the situation

## 2019-12-06 NOTE — TELEPHONE ENCOUNTER
I spoke to Dr Arielle Garrison re: Kwaku Miller and she wants the patient to repeat a BMP on Saturday at [facility]  She will go tomorrow [facility] in [facility]  The patient will keep  Food diary, she is also aware to continue her Veltassa

## 2019-12-09 ENCOUNTER — LAB (OUTPATIENT)
Dept: LAB | Facility: HOSPITAL | Age: 74
End: 2019-12-09
Attending: INTERNAL MEDICINE
Payer: MEDICARE

## 2019-12-09 DIAGNOSIS — E04.2 MULTIPLE THYROID NODULES: ICD-10-CM

## 2019-12-09 DIAGNOSIS — K74.60 CIRRHOSIS OF LIVER WITH ASCITES, UNSPECIFIED HEPATIC CIRRHOSIS TYPE (HCC): ICD-10-CM

## 2019-12-09 DIAGNOSIS — N20.0 NEPHROLITHIASIS: ICD-10-CM

## 2019-12-09 DIAGNOSIS — K86.2 PANCREATIC CYST: ICD-10-CM

## 2019-12-09 DIAGNOSIS — E87.5 HYPERKALEMIA: ICD-10-CM

## 2019-12-09 DIAGNOSIS — E79.0 HYPERURICEMIA: ICD-10-CM

## 2019-12-09 DIAGNOSIS — E78.5 HYPERLIPIDEMIA, UNSPECIFIED HYPERLIPIDEMIA TYPE: ICD-10-CM

## 2019-12-09 DIAGNOSIS — R18.8 CIRRHOSIS OF LIVER WITH ASCITES, UNSPECIFIED HEPATIC CIRRHOSIS TYPE (HCC): ICD-10-CM

## 2019-12-09 DIAGNOSIS — E83.42 HYPOMAGNESEMIA: ICD-10-CM

## 2019-12-09 DIAGNOSIS — E11.42 DIABETIC POLYNEUROPATHY ASSOCIATED WITH TYPE 2 DIABETES MELLITUS (HCC): ICD-10-CM

## 2019-12-09 DIAGNOSIS — E11.65 TYPE 2 DIABETES MELLITUS WITH HYPERGLYCEMIA, WITHOUT LONG-TERM CURRENT USE OF INSULIN (HCC): ICD-10-CM

## 2019-12-09 DIAGNOSIS — I10 ESSENTIAL HYPERTENSION: ICD-10-CM

## 2019-12-09 LAB
ALBUMIN SERPL BCP-MCNC: 4.2 G/DL (ref 3.5–5)
ALP SERPL-CCNC: 132 U/L (ref 46–116)
ALT SERPL W P-5'-P-CCNC: 28 U/L (ref 12–78)
ANION GAP SERPL CALCULATED.3IONS-SCNC: 6 MMOL/L (ref 4–13)
AST SERPL W P-5'-P-CCNC: 23 U/L (ref 5–45)
BILIRUB SERPL-MCNC: 0.62 MG/DL (ref 0.2–1)
BUN SERPL-MCNC: 37 MG/DL (ref 5–25)
CALCIUM ALBUM COR SERPL-MCNC: 10 MG/DL (ref 8.3–10.1)
CALCIUM SERPL-MCNC: 10.2 MG/DL (ref 8.3–10.1)
CHLORIDE SERPL-SCNC: 112 MMOL/L (ref 100–108)
CHOLEST SERPL-MCNC: 117 MG/DL (ref 50–200)
CK SERPL-CCNC: 26 U/L (ref 26–192)
CO2 SERPL-SCNC: 23 MMOL/L (ref 21–32)
CORTIS AM PEAK SERPL-MCNC: 14.8 UG/DL (ref 4.2–22.4)
CREAT SERPL-MCNC: 0.88 MG/DL (ref 0.6–1.3)
CREAT UR-MCNC: 46.9 MG/DL
EST. AVERAGE GLUCOSE BLD GHB EST-MCNC: 160 MG/DL
GFR SERPL CREATININE-BSD FRML MDRD: 64 ML/MIN/1.73SQ M
GLUCOSE P FAST SERPL-MCNC: 75 MG/DL (ref 65–99)
HBA1C MFR BLD: 7.2 % (ref 4.2–6.3)
HDLC SERPL-MCNC: 37 MG/DL
INR PPP: 1.55 (ref 0.84–1.19)
LDLC SERPL CALC-MCNC: 51 MG/DL (ref 0–100)
NONHDLC SERPL-MCNC: 80 MG/DL
OSMOLALITY UR: 444 MMOL/KG
PHOSPHATE SERPL-MCNC: 3.6 MG/DL (ref 2.3–4.1)
POTASSIUM SERPL-SCNC: 5.3 MMOL/L (ref 3.5–5.3)
POTASSIUM UR-SCNC: 17.2 MMOL/L
PROT SERPL-MCNC: 7.8 G/DL (ref 6.4–8.2)
PROTHROMBIN TIME: 18.1 SECONDS (ref 11.6–14.5)
PTH-INTACT SERPL-MCNC: 7.5 PG/ML (ref 18.4–80.1)
SODIUM 24H UR-SCNC: 69 MOL/L
SODIUM SERPL-SCNC: 141 MMOL/L (ref 136–145)
T4 FREE SERPL-MCNC: 1.08 NG/DL (ref 0.76–1.46)
TRIGL SERPL-MCNC: 146 MG/DL
TSH SERPL DL<=0.05 MIU/L-ACNC: 1.25 UIU/ML (ref 0.36–3.74)
URATE SERPL-MCNC: 6.3 MG/DL (ref 2–6.8)

## 2019-12-09 PROCEDURE — 83970 ASSAY OF PARATHORMONE: CPT

## 2019-12-09 PROCEDURE — 84443 ASSAY THYROID STIM HORMONE: CPT

## 2019-12-09 PROCEDURE — 82550 ASSAY OF CK (CPK): CPT

## 2019-12-09 PROCEDURE — 80053 COMPREHEN METABOLIC PANEL: CPT

## 2019-12-09 PROCEDURE — 84439 ASSAY OF FREE THYROXINE: CPT

## 2019-12-09 PROCEDURE — 84133 ASSAY OF URINE POTASSIUM: CPT

## 2019-12-09 PROCEDURE — 86301 IMMUNOASSAY TUMOR CA 19-9: CPT

## 2019-12-09 PROCEDURE — 84244 ASSAY OF RENIN: CPT

## 2019-12-09 PROCEDURE — 36415 COLL VENOUS BLD VENIPUNCTURE: CPT

## 2019-12-09 PROCEDURE — 84300 ASSAY OF URINE SODIUM: CPT

## 2019-12-09 PROCEDURE — 82570 ASSAY OF URINE CREATININE: CPT

## 2019-12-09 PROCEDURE — 80061 LIPID PANEL: CPT

## 2019-12-09 PROCEDURE — 83935 ASSAY OF URINE OSMOLALITY: CPT

## 2019-12-09 PROCEDURE — 84550 ASSAY OF BLOOD/URIC ACID: CPT

## 2019-12-09 PROCEDURE — 84100 ASSAY OF PHOSPHORUS: CPT

## 2019-12-09 PROCEDURE — 82533 TOTAL CORTISOL: CPT

## 2019-12-09 PROCEDURE — 85610 PROTHROMBIN TIME: CPT

## 2019-12-09 PROCEDURE — 83735 ASSAY OF MAGNESIUM: CPT

## 2019-12-09 PROCEDURE — 82088 ASSAY OF ALDOSTERONE: CPT

## 2019-12-09 PROCEDURE — 83036 HEMOGLOBIN GLYCOSYLATED A1C: CPT

## 2019-12-10 LAB — CANCER AG19-9 SERPL-ACNC: 16 U/ML (ref 0–35)

## 2019-12-10 NOTE — RESULT ENCOUNTER NOTE
Please call the patient regarding result  TSH is normal   HDL is slightly low on fasting lipid panel  Otherwise, lipid panel looks good

## 2019-12-11 ENCOUNTER — TELEPHONE (OUTPATIENT)
Dept: NEPHROLOGY | Facility: CLINIC | Age: 74
End: 2019-12-11

## 2019-12-11 NOTE — TELEPHONE ENCOUNTER
I left a detailed message for the patient letting her know her potassium was 5 3, within normal limits  She needs to continue on the potassium restricted diet (I emailed her a low potassium diet information sheet last week), and also continue taking Veltassa just like she is now  No changes at this time

## 2019-12-11 NOTE — TELEPHONE ENCOUNTER
Jacquelyn Garcia a patient of Dr Paulino Wiseman said she had blood work done on 12/9/19 she wants someone to call her with the potassium level  Jacquelyn Garcia phone number is [phone] Jacquelyn Garcia said if she is not there you can leave the message with her     Fredi Zamarripa mr

## 2019-12-12 LAB — RENIN PLAS-CCNC: 0.25 NG/ML/HR (ref 0.17–5.38)

## 2019-12-13 LAB — SCAN RESULT: NORMAL

## 2019-12-15 LAB — ALDOST SERPL-MCNC: 4.5 NG/DL (ref 0–30)

## 2019-12-19 ENCOUNTER — OFFICE VISIT (OUTPATIENT)
Dept: NEPHROLOGY | Facility: HOSPITAL | Age: 74
End: 2019-12-19
Payer: MEDICARE

## 2019-12-19 VITALS
WEIGHT: 170 LBS | HEART RATE: 82 BPM | SYSTOLIC BLOOD PRESSURE: 124 MMHG | DIASTOLIC BLOOD PRESSURE: 82 MMHG | HEIGHT: 58 IN | RESPIRATION RATE: 16 BRPM | BODY MASS INDEX: 35.68 KG/M2

## 2019-12-19 DIAGNOSIS — I10 ESSENTIAL HYPERTENSION: ICD-10-CM

## 2019-12-19 DIAGNOSIS — E11.65 TYPE 2 DIABETES MELLITUS WITH HYPERGLYCEMIA, WITHOUT LONG-TERM CURRENT USE OF INSULIN (HCC): ICD-10-CM

## 2019-12-19 DIAGNOSIS — N20.0 NEPHROLITHIASIS: ICD-10-CM

## 2019-12-19 DIAGNOSIS — E87.5 HYPERKALEMIA: Primary | ICD-10-CM

## 2019-12-19 PROCEDURE — 99214 OFFICE O/P EST MOD 30 MIN: CPT | Performed by: INTERNAL MEDICINE

## 2019-12-19 RX ORDER — ALLOPURINOL 100 MG/1
100 TABLET ORAL DAILY
Qty: 90 TABLET | Refills: 1 | Status: SHIPPED | OUTPATIENT
Start: 2019-12-19 | End: 2020-06-02

## 2019-12-19 NOTE — PATIENT INSTRUCTIONS
1  History of nephrolithiasis   -in office UA shows trace protein and trace WBCs  -last sCr 0 83 as of 8/12/19  -would adhere to a low sodium(<2000mg) diet to prevent stone formation  Avoid canned foods, packaged foods, [place] food, and eating out frequently in restaurants    -should drink enough water to urinate 2-2 5L/day   -just had an ultrasound of the kidneys this past Monday - will obtain results of this  -most recent litholink from October 2019 shows low urine citrate at 243 with low urine volume at only 1 7L  -increase hydration  -typically, potassium citrate could help raise citrate levels but in light of hyperkalemia, will recommend 1 tablespoon lemon juice in 8 oz glass of water two-three times daily instead  -already on HCTZ     2  Hyperkalemia ? D/t obstruction from stones vs from RTA in setting of DM and kidney stones   -K 5 3 on latest bloodwork  Monitor BMP  Currently off lasix  On HCTZ 25mg daily  -would increase veltassa dose to 16 8g  -renin within normal range  Uric acid 6 3  Phos normal   - check urine potassium 17 2, urine osm 444, urine sodium 69, urine cr 46 9, serum cortisol 14 8, CPK 26  -TTKG is low suggestive of hypoaldosteronism  Darian level 4 5, low normal range       3  HTN - BP well controlled  Continue metoprolol 100mg twice daily, HCTZ 25mg daily     4  DM2 - on metformin, glipizide, sugars well controlled  Also on bydureon      5  Elevated uric acid level - uric acid 6 3 - on allopurinol 100mg daily - renewed today       6  Hypomagnesemia - does not use PPIs  On magnesium supplement  Last mag 1 2 as of 12/5/19  Urine mag 180, urine Cr 46 9  FeMag 402% - this indicates renal magnesium wasting  Repeat mag level  7  Hypercalcemia - calcium 10 2, would encourage hydration  Repeat CMP       RTC in 3 months

## 2019-12-19 NOTE — PROGRESS NOTES
NEPHROLOGY OUTPATIENT PROGRESS NOTE   Ella Altamirano 75 y o  female MRN: [id number]  DATE: 12/19/2019  Reason for visit:   Chief Complaint   Patient presents with    Chronic Kidney Disease    Follow-up        Patient Instructions   1  History of nephrolithiasis   -in office UA shows trace protein and trace WBCs  -last sCr 0 83 as of 8/12/19  -would adhere to a low sodium(<2000mg) diet to prevent stone formation  Avoid canned foods, packaged foods, [place] food, and eating out frequently in restaurants    -should drink enough water to urinate 2-2 5L/day   -just had an ultrasound of the kidneys this past Monday - will obtain results of this  -most recent litholink from October 2019 shows low urine citrate at 243 with low urine volume at only 1 7L  -increase hydration  -typically, potassium citrate could help raise citrate levels but in light of hyperkalemia, will recommend 1 tablespoon lemon juice in 8 oz glass of water two-three times daily instead  -already on HCTZ     2  Hyperkalemia ? D/t obstruction from stones vs from RTA in setting of DM and kidney stones   -K 5 3 on latest bloodwork  Monitor BMP  Currently off lasix  On HCTZ 25mg daily  -would increase veltassa dose to 16 8g  -renin within normal range  Uric acid 6 3  Phos normal   - check urine potassium 17 2, urine osm 444, urine sodium 69, urine cr 46 9, serum cortisol 14 8, CPK 26  -TTKG is low suggestive of hypoaldosteronism  Darian level 4 5, low normal range       3  HTN - BP well controlled  Continue metoprolol 100mg twice daily, HCTZ 25mg daily     4  DM2 - on metformin, glipizide, sugars well controlled  Also on bydureon      5  Elevated uric acid level - uric acid 6 3 - on allopurinol 100mg daily - renewed today       6  Hypomagnesemia - does not use PPIs  On magnesium supplement  Last mag 1 2 as of 12/5/19  Urine mag 180, urine Cr 46 9  FeMag 402% - this indicates renal magnesium wasting  Repeat mag level       7  Hypercalcemia - calcium 10 2, would encourage hydration  Repeat CMP       RTC in 3 months  Christiano Leung was seen today for chronic kidney disease and follow-up  Diagnoses and all orders for this visit:    Hyperkalemia  -     Patiromer Sorbitex Calcium 16 8 g PACK; Take 16 8 g by mouth daily  -     Comprehensive metabolic panel; Future    Type 2 diabetes mellitus with hyperglycemia, without long-term current use of insulin (HCC)    Essential hypertension    Nephrolithiasis  -     allopurinol (ZYLOPRIM) 100 mg tablet; Take 1 tablet (100 mg total) by mouth daily        Assessment/Plan:  1  History of nephrolithiasis   -in office UA shows trace protein and trace WBCs  -last sCr 0 83 as of 8/12/19  -would adhere to a low sodium(<2000mg) diet to prevent stone formation  Avoid canned foods, packaged foods, [place] food, and eating out frequently in restaurants    -should drink enough water to urinate 2-2 5L/day   -just had an ultrasound of the kidneys this past Monday - will obtain results of this  -most recent litholink from October 2019 shows low urine citrate at 243 with low urine volume at only 1 7L  -increase hydration  -typically, potassium citrate could help raise citrate levels but in light of hyperkalemia, will recommend 1 tablespoon lemon juice in 8 oz glass of water two-three times daily instead  -already on HCTZ     2  Hyperkalemia ? D/t obstruction from stones vs from RTA in setting of DM and kidney stones   -K 5 3 on latest bloodwork  Monitor BMP  Currently off lasix  On HCTZ 25mg daily  -would increase veltassa dose to 16 8g  -renin within normal range  Uric acid 6 3  Phos normal   - check urine potassium 17 2, urine osm 444, urine sodium 69, urine cr 46 9, serum cortisol 14 8, CPK 26  -TTKG is low suggestive of hypoaldosteronism  Darian level 4 5, low normal range       3  HTN - BP well controlled  Continue metoprolol 100mg twice daily, HCTZ 25mg daily     4  DM2 - on metformin, glipizide, sugars well controlled   Also on bydureon    5  Elevated uric acid level - uric acid 6 3 - on allopurinol 100mg daily - renewed today       6  Hypomagnesemia - does not use PPIs  On magnesium supplement  Last mag 1 2 as of 12/5/19  Urine mag 180, urine Cr 46 9  FeMag 402% - this indicates renal magnesium wasting  Repeat mag level  7  Hypercalcemia - calcium 10 2, would encourage hydration  Repeat CMP       RTC in 3 months  SUBJECTIVE / INTERVAL HISTORY:  75 y o  female presents in follow up of hyperkalemia  Morris Clock denies any recent illness/hospitalizations/medication changes since last office visit  Denies NSAID use  She does see a few kidney stones when she urinates at times but this has significantly improved  Reviewed low potassium diet with her  Blood sugars have been improved  Now on glipizide twice daily  Blood sugars 130s at highest  Has been on bydureon  Review of Systems   Constitutional: Negative for chills and fever  HENT: Negative for sore throat and trouble swallowing  Eyes: Negative for visual disturbance  Respiratory: Negative for cough and shortness of breath  Cardiovascular: Negative for chest pain and leg swelling  Gastrointestinal: Positive for diarrhea (loose stools)  Negative for abdominal pain, constipation, nausea and vomiting  Endocrine: Negative for polyuria  Genitourinary: Negative for difficulty urinating, dysuria and hematuria  Musculoskeletal: Negative for back pain and neck pain  Skin: Negative for rash  Neurological: Negative for dizziness, light-headedness and numbness  Psychiatric/Behavioral: The patient is not nervous/anxious  OBJECTIVE:  /82 (BP Location: Left arm, Patient Position: Sitting, Cuff Size: Large)   Pulse 82   Resp 16   Ht 4' 10" (1 473 m)   Wt 77 1 kg (170 lb)   BMI 35 53 kg/m²  Body mass index is 35 53 kg/m²  Physical exam:  Physical Exam   Constitutional: She appears well-developed and well-nourished  No distress     HENT:   Head: Normocephalic and atraumatic  Mouth/Throat: No oropharyngeal exudate  Eyes: Right eye exhibits no discharge  Left eye exhibits no discharge  No scleral icterus  Neck: Normal range of motion  Neck supple  No thyromegaly present  Cardiovascular: Normal rate and regular rhythm  No murmur heard  Pulmonary/Chest: Effort normal and breath sounds normal  No respiratory distress  She has no wheezes  Abdominal: Soft  Bowel sounds are normal  She exhibits no distension  Musculoskeletal: She exhibits edema (b/l pitting LE)  Neurological: She is alert  She exhibits normal muscle tone  awake   Skin: Skin is warm and dry  No rash noted  She is not diaphoretic  Subcutaneous mobile cyst under right mid neck   Psychiatric: She has a normal mood and affect  Her behavior is normal    Nursing note and vitals reviewed        Medications:    Current Outpatient Medications:     allopurinol (ZYLOPRIM) 100 mg tablet, Take 100 mg by mouth daily, Disp: , Rfl: 3    apixaban (ELIQUIS) 5 mg, Take 5 mg by mouth 2 (two) times a day  , Disp: , Rfl:     BYDUREON BCISE 2 MG/0 85ML AUIJ, INJECT 2 MG UNDER THE SKIN ONCE A WEEK, Disp: 3 4 pen, Rfl: 1    cholecalciferol (VITAMIN D3) 1,000 units tablet, Take 1,000 Units by mouth daily , Disp: , Rfl:     fenofibrate (TRICOR) 54 MG tablet, TAKE 1 TABLET BY MOUTH EVERY DAY, Disp: 90 tablet, Rfl: 1    glipiZIDE (GLUCOTROL XL) 5 mg 24 hr tablet, Take 1 tablet (5 mg total) by mouth 2 (two) times a day, Disp: 180 tablet, Rfl: 3    glucose blood (ONE TOUCH ULTRA TEST) test strip, Test once daily, Disp: 100 each, Rfl: 3    hydrochlorothiazide (HYDRODIURIL) 25 mg tablet, Take 25 mg by mouth daily, Disp: , Rfl:     Magnesium 500 MG CAPS, Take by mouth daily  , Disp: , Rfl:     metFORMIN (GLUCOPHAGE) 1000 MG tablet, Take 1 tablet (1,000 mg total) by mouth 2 (two) times a day with meals, Disp: 180 tablet, Rfl: 1    metoprolol succinate (TOPROL-XL) 100 mg 24 hr tablet, Take 100 mg by mouth 2 (two) times a day  , Disp: , Rfl:     ONETOUCH DELICA LANCETS FINE MISC, Test once daily, Disp: 100 each, Rfl: 3    Patiromer Sorbitex Calcium (VELTASSA) 8 4 g PACK, Take 8 4 g by mouth daily, Disp: 30 each, Rfl: 5    metFORMIN (GLUCOPHAGE) 1000 MG tablet, TAKE 1 TABLET BY MOUTH TWICE A DAY WITH FOOD (Patient not taking: Reported on 12/19/2019), Disp: 60 tablet, Rfl: 0    metFORMIN (GLUCOPHAGE) 1000 MG tablet, TAKE 1 TABLET BY MOUTH TWICE A DAY WITH FOOD (Patient not taking: Reported on 12/19/2019), Disp: 180 tablet, Rfl: 0    Allergies: Allergies as of 12/19/2019 - Reviewed 12/19/2019   Allergen Reaction Noted    Epinephrine  04/14/2014    Lidocaine  03/16/2016    Neomycin  06/27/2012    Pioglitazone  10/12/2016       The following portions of the patient's history were reviewed and updated as appropriate: past family history, past surgical history and problem list     Laboratory Results:  Lab Results   Component Value Date    SODIUM 141 12/09/2019    K 5 3 12/09/2019     (H) 12/09/2019    CO2 23 12/09/2019    BUN 37 (H) 12/09/2019    CREATININE 0 88 12/09/2019    GLUC 74 12/05/2019    CALCIUM 10 2 (H) 12/09/2019        Lab Results   Component Value Date    PTH 7 5 (L) 12/09/2019    CALCIUM 10 2 (H) 12/09/2019    PHOS 3 6 12/09/2019       Portions of the record may have been created with voice recognition software   Occasional wrong word or "sound a like" substitutions may have occurred due to the inherent limitations of voice recognition software   Read the chart carefully and recognize, using context, where substitutions have occurred

## 2019-12-26 ENCOUNTER — TELEPHONE (OUTPATIENT)
Dept: NEPHROLOGY | Facility: CLINIC | Age: 74
End: 2019-12-26

## 2019-12-26 DIAGNOSIS — R79.89 LOW SERUM PARATHYROID HORMONE (PTH): Primary | ICD-10-CM

## 2019-12-26 NOTE — TELEPHONE ENCOUNTER
----- Message from Nilam Da Silva DO sent at 12/26/2019 10:38 AM EST -----  I note a low PTH level  This could be in part due to hypomagnesemia  PTH suppressed in setting of hypercalcemia  Please ask patient to obtain repeat PTH, mag, phos levels (already ordered) with next set of bloodwork  Thanks

## 2019-12-26 NOTE — TELEPHONE ENCOUNTER
I spoke to the patient and I explained the low PTH, the elevated calcium and the low magnesium and all play off one another  She is taking her madnesium supplements and she is aware we are keeping an eye and rechecking in January   Lab orders mailed to the patient per her request

## 2020-01-09 ENCOUNTER — TELEPHONE (OUTPATIENT)
Dept: GASTROENTEROLOGY | Facility: CLINIC | Age: 75
End: 2020-01-09

## 2020-01-09 NOTE — TELEPHONE ENCOUNTER
I contacted patient and she has lab orders to complete in January from nephrologist  She asked if the MRI  Abdomen for pancreatic cyst can be performed without contrast if her BUN/Cr & GFR are too high due to her CKD   Please advise

## 2020-01-09 NOTE — TELEPHONE ENCOUNTER
Pt left  mssg stating she needs to talk to a nurse; has an MRI sched'd for [facility] 1/16 and needs papers sent for labs first  CB# [phone]

## 2020-01-13 ENCOUNTER — OFFICE VISIT (OUTPATIENT)
Dept: GASTROENTEROLOGY | Facility: CLINIC | Age: 75
End: 2020-01-13
Payer: MEDICARE

## 2020-01-13 VITALS
HEART RATE: 92 BPM | BODY MASS INDEX: 35.68 KG/M2 | HEIGHT: 58 IN | WEIGHT: 170 LBS | DIASTOLIC BLOOD PRESSURE: 68 MMHG | SYSTOLIC BLOOD PRESSURE: 116 MMHG

## 2020-01-13 DIAGNOSIS — K86.2 PANCREATIC CYST: ICD-10-CM

## 2020-01-13 DIAGNOSIS — C18.2 MALIGNANT NEOPLASM OF ASCENDING COLON (HCC): ICD-10-CM

## 2020-01-13 DIAGNOSIS — R18.8 CIRRHOSIS OF LIVER WITH ASCITES, UNSPECIFIED HEPATIC CIRRHOSIS TYPE (HCC): ICD-10-CM

## 2020-01-13 DIAGNOSIS — K74.60 HEPATIC CIRRHOSIS, UNSPECIFIED HEPATIC CIRRHOSIS TYPE, UNSPECIFIED WHETHER ASCITES PRESENT (HCC): Primary | ICD-10-CM

## 2020-01-13 DIAGNOSIS — K74.60 CIRRHOSIS OF LIVER WITH ASCITES, UNSPECIFIED HEPATIC CIRRHOSIS TYPE (HCC): ICD-10-CM

## 2020-01-13 LAB — HBA1C MFR BLD HPLC: 7 %

## 2020-01-13 PROCEDURE — 99214 OFFICE O/P EST MOD 30 MIN: CPT | Performed by: INTERNAL MEDICINE

## 2020-01-13 NOTE — TELEPHONE ENCOUNTER
I spoke with Eloise at MRI dept [facility]  They base contrast on GFR, they will not inject if under 30

## 2020-01-13 NOTE — PROGRESS NOTES
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  Well-compensated no ascites by previous ultrasound  MRI this week which should serve as screening for hepatoma  2  Malignant neoplasm of ascending colon ([address] )  History of this diagnosed 21 years ago is due for surveillance colonoscopy  Plan to do off Eliquis for 2 days  She understands the small risk of thromboembolic complications  3  Pancreatic cyst  Ultrasound shows a cyst 2 3 cm septated  CA 19 9 was normal   MRI do this week    4  Cirrhosis of liver with ascites, unspecified hepatic cirrhosis type ([address] )  As above    5  Anticoagulation  She takes this for atrial fibrillation  Will hold for 2 days for colonoscopy  She understands the small risk    6  MRI contrast   I discussed with our nurse Coleen Guevara  Will check the results of blood work done today for creatinine and check with Radiology  If her present creatinine is acceptable for IV contrast would give it  If it is not per Radiology guidelines would do without contrast   If there is any concerns the MRI could be delayed until her nephrology appointment in March  Followup Appointment:  6 months  ______________________________________________________________________    Chief Complaint   Patient presents with    Clearance for colonoscopy, pt is on Eliquis     HPI:  Patient is doing reasonably well  No significant abdominal pain or change in bowels  She denies any fever sweats or chills      Historical Information   Past Medical History:   Diagnosis Date    Atrial fibrillation ([address] )     Colon cancer ([address] )     limited to wall of colon, no chemo or XRT needed    Diabetes mellitus ([address] )     Hypertension     Hypomagnesemia     Kidney stone     Leukocytosis     chanelle 2 gene positive    Osteoarthritis      Past Surgical History:   Procedure Laterality Date    APPENDECTOMY      with colectomy    COLECTOMY LAKE      24 inches    COLONOSCOPY  2016    HYSTERECTOMY      LAPAROSCOPIC CHOLECYSTECTOMY       Social History     Substance and Sexual Activity   Alcohol Use No     Social History     Substance and Sexual Activity   Drug Use No     Social History     Tobacco Use   Smoking Status Never Smoker   Smokeless Tobacco Never Used     Family History   Problem Relation Age of Onset    Asthma Mother     Heart disease Mother         post heart surgery    Heart attack Father     Coronary artery disease Father     Heart attack Brother     Colon cancer Brother     Colon cancer Brother     Heart attack Brother     Diabetes type II Brother     Heart attack Brother     Colon cancer Brother     No Known Problems Brother     Heart disease Paternal Uncle     Hypertension Daughter     Hypertension Daughter     No Known Problems Daughter          Current Outpatient Medications:     allopurinol (ZYLOPRIM) 100 mg tablet    apixaban (ELIQUIS) 5 mg    BYDUREON BCISE 2 MG/0 85ML AUIJ    cholecalciferol (VITAMIN D3) 1,000 units tablet    fenofibrate (TRICOR) 54 MG tablet    glipiZIDE (GLUCOTROL XL) 5 mg 24 hr tablet    glucose blood (ONE TOUCH ULTRA TEST) test strip    hydrochlorothiazide (HYDRODIURIL) 25 mg tablet    Magnesium 500 MG CAPS    metFORMIN (GLUCOPHAGE) 1000 MG tablet    metoprolol succinate (TOPROL-XL) 100 mg 24 hr tablet    ONETOUCH DELICA LANCETS FINE MISC    Patiromer Sorbitex Calcium 16 8 g PACK  Allergies   Allergen Reactions    Epinephrine      Other reaction(s): increased HR    Lidocaine      Other reaction(s): increased HR    Neomycin      Other reaction(s): rash    Pioglitazone      Other reaction(s): Fluid retention     Reviewed medications and allergies and updated as indicated    PHYSICAL EXAM:    Blood pressure 116/68, pulse 92, height 4' 10" (1 473 m), weight 77 1 kg (170 lb)  Body mass index is 35 53 kg/m²  General Appearance: NAD, cooperative, alert  Eyes: Anicteric, PERRLA, EOMI  ENT:  Normocephalic, atraumatic, normal mucosa  Neck:  Supple, symmetrical, trachea midline  Resp:  Clear to auscultation bilaterally; no rales, rhonchi or wheezing; respirations unlabored   CV:  S1 S2, Regular rate and rhythm; no murmur, rub, or gallop  GI:  Soft, non-tender, non-distended; normal bowel sounds; no masses, liver is enlarged for finger breaths below the right costal margin somewhat firm  Rectal: Deferred  Musculoskeletal: No cyanosis, clubbing or edema  Normal ROM  Skin:  No jaundice, rashes, or lesions   Heme/Lymph: No palpable cervical lymphadenopathy  Psych: Normal affect, good eye contact  Neuro: No gross deficits, AAOx3    Lab Results:   Lab Results   Component Value Date    WBC 19 9 12/05/2019    HGB 14 9 12/05/2019    HCT 49 12/05/2019    MCV 91 12/05/2019     12/05/2019     Lab Results   Component Value Date    K 5 3 12/09/2019     (H) 12/09/2019    CO2 23 12/09/2019    BUN 37 (H) 12/09/2019    CREATININE 0 88 12/09/2019    GLUF 75 12/09/2019    CALCIUM 10 2 (H) 12/09/2019    CORRECTEDCA 10 0 12/09/2019    AST 23 12/09/2019    ALT 28 12/09/2019    ALKPHOS 132 (H) 12/09/2019    EGFR 64 12/09/2019     No results found for: IRON, TIBC, FERRITIN  No results found for: LIPASE    Radiology Results:   No results found

## 2020-01-13 NOTE — LETTER
January 13, 2020     [facility] [place] [address]    Patient: Qasim Yepez   YOB: 1945   Date of Visit: 1/13/2020       Dear Dr Hallie Mcintosh: Thank you for referring Qasim Yepez to me for evaluation  Below are my notes for this consultation  If you have questions, please do not hesitate to call me  I look forward to following your patient along with you           Sincerely,        Jose Raul Leblanc MD        CC: [id number] [facility]  Teresa Sood MD

## 2020-01-14 ENCOUNTER — TELEPHONE (OUTPATIENT)
Dept: NEPHROLOGY | Facility: CLINIC | Age: 75
End: 2020-01-14

## 2020-01-14 ENCOUNTER — DOCUMENTATION (OUTPATIENT)
Dept: NEPHROLOGY | Facility: HOSPITAL | Age: 75
End: 2020-01-14

## 2020-01-14 LAB
CHOLEST SERPL-MCNC: 117 MG/DL (ref 50–200)
EXT DIFF-ABS BASOPHILS: 0.3
EXT DIFF-ABS EOSINOPHILS: 0.6
EXT DIFF-ABS LYMPHOCYTES: 1
EXT DIFF-ABS MONOCYTES: 0.5
EXT DIFF-ABS NEUTROPHILS: 15.7
EXT GLUCOSE BLD: 97
EXTERNAL ALBUMIN: 4.3
EXTERNAL ALK PHOS: 92
EXTERNAL ALT: 13
EXTERNAL ANION GAP: 13
EXTERNAL AST: 21
EXTERNAL BICARBONATE: 21.5
EXTERNAL BUN: 30
EXTERNAL CALCIUM: 1.4
EXTERNAL CHLORIDE: 102
EXTERNAL CREATININE: 0.79
EXTERNAL EGFR: >60
EXTERNAL HEMATOCRIT: 45 %
EXTERNAL HEMOGLOBIN: 13.6 G/DL
EXTERNAL MCV: 94
EXTERNAL PLATELET COUNT: 419 K/ΜL
EXTERNAL POTASSIUM: 5.9
EXTERNAL RBC: 4.74
EXTERNAL RDW: 19.9
EXTERNAL SODIUM: 136
EXTERNAL T.BILIRUBIN: 0.7
EXTERNAL TOTAL PROTEIN: 6.9
EXTERNAL WBC: 18.3
HBA1C MFR BLD HPLC: 7 %
HDLC SERPL-MCNC: 36 MG/DL (ref 40–?)
LDLC SERPL DIRECT ASSAY-MCNC: 54 MG/DL
MAGNESIUM SERPL-MCNC: 1.4 MG/DL (ref 1.6–2.6)
PHOSPHATE SERPL-MCNC: 4.3 MG/DL (ref 2.3–4.1)
T4, FREE (DIRECT) HISTORICAL: 1.4
TRIGL SERPL-MCNC: 136 MG/DL (ref ?–150)
TSH SERPL DL<=0.05 MIU/L-ACNC: 1.92 UIU/ML (ref 0.34–4.82)

## 2020-01-14 NOTE — TELEPHONE ENCOUNTER
Patient sees Jenn Little and she is calling to inform office that she had labs done on 01/13/20 and to call Castine for the results but Castine's  phone number was not provided

## 2020-01-14 NOTE — TELEPHONE ENCOUNTER
[facility] resulted, BUN 30, Cr 0 79, GFR >60  Patient clear for contrast per radiology department protocol

## 2020-01-15 ENCOUNTER — TELEPHONE (OUTPATIENT)
Dept: GASTROENTEROLOGY | Facility: CLINIC | Age: 75
End: 2020-01-15

## 2020-01-15 ENCOUNTER — TELEPHONE (OUTPATIENT)
Dept: NEPHROLOGY | Facility: HOSPITAL | Age: 75
End: 2020-01-15

## 2020-01-15 DIAGNOSIS — E87.5 HYPERKALEMIA: ICD-10-CM

## 2020-01-15 NOTE — TELEPHONE ENCOUNTER
Pt states she needs to spk w/ Julius Pica; was in the other day and Dr Shakeel Hernandez said to iejoma w/ her about checking on blood before MRI tomorrow   # [phone]

## 2020-01-15 NOTE — TELEPHONE ENCOUNTER
I spoke to the patient and she is aware of the change in the Veltassa dosage and is following a low K diet  She will repeat a BMP in 2 weeks  She is also aware it's OK to proceed with her MRI tomorrow

## 2020-01-15 NOTE — TELEPHONE ENCOUNTER
Patient called, she had labs done on 1/13 she has an MRI scheduled for tomorrow with contrast, she wants to know if it's ok to proceed  Also she saw on the Portal that her K is 5 9, even being on the Veltassa higher dose for 1 month

## 2020-01-15 NOTE — TELEPHONE ENCOUNTER
I reviewed with patient that Cr normal okay to proceed and I also reviewed that nephrologist documented same on their telephone encounter

## 2020-01-16 ENCOUNTER — DOCUMENTATION (OUTPATIENT)
Dept: NEPHROLOGY | Facility: HOSPITAL | Age: 75
End: 2020-01-16

## 2020-01-16 ENCOUNTER — TELEPHONE (OUTPATIENT)
Dept: GASTROENTEROLOGY | Facility: CLINIC | Age: 75
End: 2020-01-16

## 2020-01-16 LAB — PTH-INTACT SERPL-MCNC: 13 PG/ML

## 2020-01-16 NOTE — TELEPHONE ENCOUNTER
Spoke with pt   Let him know ok for her to hold her Eliquis prior to procedure  Last dose is 1/27/2020

## 2020-01-17 ENCOUNTER — TELEPHONE (OUTPATIENT)
Dept: GASTROENTEROLOGY | Facility: CLINIC | Age: 75
End: 2020-01-17

## 2020-01-17 NOTE — TELEPHONE ENCOUNTER
Pt asks for results of MRI done at [facility] CB# [phone]  Pt seems anxious for results

## 2020-01-17 NOTE — TELEPHONE ENCOUNTER
I reviewed the MRI with the patient  Pancreatic cysts have not changed much in size  Recommend a 1 year follow-up  That is MRI  Also discussed other lesions in the lung knee which have not changed in size and 6 years and are not concerning to me

## 2020-01-20 ENCOUNTER — OFFICE VISIT (OUTPATIENT)
Dept: ENDOCRINOLOGY | Facility: HOSPITAL | Age: 75
End: 2020-01-20
Payer: MEDICARE

## 2020-01-20 VITALS
BODY MASS INDEX: 35.48 KG/M2 | WEIGHT: 169 LBS | HEART RATE: 80 BPM | SYSTOLIC BLOOD PRESSURE: 124 MMHG | HEIGHT: 58 IN | DIASTOLIC BLOOD PRESSURE: 70 MMHG

## 2020-01-20 DIAGNOSIS — R80.9 MICROALBUMINURIA DUE TO TYPE 2 DIABETES MELLITUS (HCC): ICD-10-CM

## 2020-01-20 DIAGNOSIS — E04.2 MULTIPLE THYROID NODULES: ICD-10-CM

## 2020-01-20 DIAGNOSIS — E11.29 MICROALBUMINURIA DUE TO TYPE 2 DIABETES MELLITUS (HCC): ICD-10-CM

## 2020-01-20 DIAGNOSIS — E11.42 DIABETIC POLYNEUROPATHY ASSOCIATED WITH TYPE 2 DIABETES MELLITUS (HCC): ICD-10-CM

## 2020-01-20 DIAGNOSIS — I10 ESSENTIAL HYPERTENSION: ICD-10-CM

## 2020-01-20 DIAGNOSIS — E04.2 GOITER, NONTOXIC, MULTINODULAR: ICD-10-CM

## 2020-01-20 DIAGNOSIS — E11.65 TYPE 2 DIABETES MELLITUS WITH HYPERGLYCEMIA, WITHOUT LONG-TERM CURRENT USE OF INSULIN (HCC): Primary | ICD-10-CM

## 2020-01-20 DIAGNOSIS — E78.2 MIXED HYPERLIPIDEMIA: ICD-10-CM

## 2020-01-20 PROCEDURE — 99215 OFFICE O/P EST HI 40 MIN: CPT | Performed by: INTERNAL MEDICINE

## 2020-01-20 NOTE — PROGRESS NOTES
1/20/2020    Assessment/Plan      Diagnoses and all orders for this visit:    Type 2 diabetes mellitus with hyperglycemia, without long-term current use of insulin ([place] [address] )  -     HEMOGLOBIN A1C W/ EAG ESTIMATION Lab Collect; Future  -     Comprehensive metabolic panel Lab Collect; Future  -     Microalbumin / creatinine urine ratio Lab Collect; Future  -     glucose blood (ONE TOUCH ULTRA TEST) test strip; Test once daily    Diabetic polyneuropathy associated with type 2 diabetes mellitus ([place] [address] )  -     HEMOGLOBIN A1C W/ EAG ESTIMATION Lab Collect; Future  -     Comprehensive metabolic panel Lab Collect; Future  -     Microalbumin / creatinine urine ratio Lab Collect; Future    Microalbuminuria due to type 2 diabetes mellitus ([place] [address] )  -     HEMOGLOBIN A1C W/ EAG ESTIMATION Lab Collect; Future  -     Comprehensive metabolic panel Lab Collect; Future  -     Microalbumin / creatinine urine ratio Lab Collect; Future    Multiple thyroid nodules  -     HEMOGLOBIN A1C W/ EAG ESTIMATION Lab Collect; Future  -     Comprehensive metabolic panel Lab Collect; Future  -     Microalbumin / creatinine urine ratio Lab Collect; Future    Essential hypertension  -     HEMOGLOBIN A1C W/ EAG ESTIMATION Lab Collect; Future  -     Comprehensive metabolic panel Lab Collect; Future  -     Microalbumin / creatinine urine ratio Lab Collect; Future    Mixed hyperlipidemia  -     HEMOGLOBIN A1C W/ EAG ESTIMATION Lab Collect; Future  -     Comprehensive metabolic panel Lab Collect; Future  -     Microalbumin / creatinine urine ratio Lab Collect; Future    Goiter, nontoxic, multinodular        Assessment/Plan:  1  Type 2 diabetes  Most recent hemoglobin A1c is 7%  This is a bit higher but still not unreasonable for her age  It is likely slightly higher due to dietary indiscretion over the holidays  For now, she will continue the same glipizide XL, metformin, and Bydureon  She will continue to check her blood sugars at least once a day    She will call if she has frequent hypoglycemia again  2  Diabetic neuropathy  She has stable neuropathic symptoms  Diabetic foot exam is are performed in the office on a regular basis  3  Microalbuminuria  She is following with a nephrologist as her potassium is quite elevated and precludes the use of Ace or ARB inhibitor use  4  Nontoxic multinodular goiter  Most recent thyroid function tests are normal   She is biochemically and clinically euthyroid  Her thyroid ultrasound showed stable size thyroid nodules  There have been some biopsies of nodules in the past in no new biopsies need to be done at this point  5  Hypertension  Blood pressure is under good control on her current dose of hydrochlorothiazide and metoprolol  6  Hyperlipidemia  Lipids are excellent on her current dose of fenofibrate  I have asked her to follow up in 3 months with preceding hemoglobin A1c, CMP, and urine microalbumin to creatinine ratio  CC: Diabetes type 2, thyroid, blood pressure, lipid follow-up    History of Present Illness     HPI: Raphael Olivas is a 75y o  year old female with type 2 diabetes with neuropathy, microalbuminuria for 11-16 years, multiple thyroid nodules, hypertension, hyperlipidemia here for follow-up  She is on oral agents at home and takes glipizide XL 5 mg twice a day, metformin 1000 mg twice a day, and Bydureon 2 mg once a week  She denies any polyuria, polydipsia, and blurry vision  She has polyphagia and once a night nocturia  She has some numbness and tingling of her toes a bit and of her hands unchanged  She denies chest pain or shortness of breath  Her last cardiology visit was December 2019  She denies nephropathy, retinopathy, heart attack, stroke and claudication but does admit to neuropathy  Seeing Dr Tom Artis for renal stones and microalbuminuria, hyperkalemia  She is taking valtessa for high potassium  Hypoglycemic episodes: Yes several times per month   Blood sugar was dropping in am, glipizide  Decreased last visit  The patient's last eye exam was in April 2019 with no retinopathy  The patient's last foot exam was in May 2019 at endocrine office visit  Last A1C was   Lab Results   Component Value Date    HGBA1C 7 0 01/13/2020    HGBA1C 7 0 01/13/2020     Blood Sugar/Glucometer/Pump/CGM review: checks blood sugars once in am   Before breakfast: , primarily less than 90    She has multiple thyroid nodules in the setting of a nontoxic multinodular goiter  She has had some of the thyroid nodules biopsied in the past that were benign  Last thyroid ultrasound was October 2018 with stable size thyroid nodules  She has no compressive thyroid symptoms or difficulties with swallowing  She denies heat or cold intolerance, palpitation, tremors, fatigue, or weight changes  She has some chronic loose stools from a previous surgery of her abdomen  She denies constipation, insomnia, anxiety or depression  She has some winter dry skin but no brittle nails or hair loss  She has hyperlipidemia and takes fenofibrate 54 mg daily  She denies chest pain or shortness of breath  She has hypertension and takes hydrochlorothiazide 25 mg daily and metoprolol  mg twice a day  She denies headache or stroke-like symptoms  She has hyperkalemia which has prevented ACE/ARB use for her microalbuminuria  Review of Systems   Constitutional: Negative for fatigue and unexpected weight change  Weight 2 lbs less than last visit  HENT: Negative for trouble swallowing  Eyes: Negative for visual disturbance  Wears glasses  Respiratory: Negative for chest tightness and shortness of breath  Cardiovascular: Positive for leg swelling  Negative for chest pain and palpitations  Last saw Dr Lilo Urban with stress test and echo in dec 2019  Has mod to severe mitral and aortic regurg  Some slight edema end of the day  Gastrointestinal: Positive for diarrhea   Negative for abdominal pain, constipation and nausea  Bowels always loose  For colonoscopy end of jan 2020  Endocrine: Positive for polyphagia  Negative for cold intolerance, heat intolerance, polydipsia and polyuria  Nocturia once a night  Skin: Negative for wound  Some winter dry skin  No brittle nails  No hair loss  Neurological: Positive for numbness  Negative for dizziness, tremors, light-headedness and headaches  Some numbness and tingling of the toes a bit and hand unchanged  Psychiatric/Behavioral: Negative for dysphoric mood and sleep disturbance  The patient is not nervous/anxious          Historical Information   Past Medical History:   Diagnosis Date    At[facility] [address] [address]     [facility]     limited to wall of colon, no chemo or XRT needed    [facility]     Hypertension     Hypomagnesemia     Kidney stone     Leukocytosis     chanelle 2 gene positive    Osteoarthritis      Past Surgical History:   Procedure Laterality Date    APPENDECTOMY      with colectomy    COLECTOMY LAKE      24 inches    COLONOSCOPY  2016    HYSTERECTOMY      LAPAROSCOPIC CHOLECYSTECTOMY       Social History   Social History     Substance and Sexual Activity   Alcohol Use No     Social History     Substance and Sexual Activity   Drug Use No     Social History     Tobacco Use   Smoking Status Never Smoker   Smokeless Tobacco Never Used     Family History:   Family History   Problem Relation Age of Onset    Asthma Mother     Heart disease Mother         post heart surgery    Heart attack Father     Coronary artery disease Father     Heart attack Brother     Colon cancer Brother     Colon cancer Brother     Heart attack Brother     Diabetes type II Brother     Heart attack Brother     Colon cancer Brother     No Known Problems Brother     Heart disease Paternal Uncle     Hypertension Daughter     Hypertension Daughter     No Known Problems Daughter Meds/Allergies   Current Outpatient Medications   Medication Sig Dispense Refill    allopurinol (ZYLOPRIM) 100 mg tablet Take 1 tablet (100 mg total) by mouth daily 90 tablet 1    apixaban (ELIQUIS) 5 mg Take 5 mg by mouth 2 (two) times a day        BYDUREON BCISE 2 MG/0 85ML AUIJ INJECT 2 MG UNDER THE SKIN ONCE A WEEK 3 4 pen 1    Cholecalciferol (VITAMIN D3) 50 MCG (2000 UT) capsule Take 2,000 Units by mouth daily       fenofibrate (TRICOR) 54 MG tablet TAKE 1 TABLET BY MOUTH EVERY DAY 90 tablet 1    glipiZIDE (GLUCOTROL XL) 5 mg 24 hr tablet Take 1 tablet (5 mg total) by mouth 2 (two) times a day 180 tablet 3    glucose blood (ONE TOUCH ULTRA TEST) test strip Test once daily 100 each 3    hydrochlorothiazide (HYDRODIURIL) 25 mg tablet Take 25 mg by mouth daily prn      Magnesium 500 MG CAPS Take by mouth daily        metFORMIN (GLUCOPHAGE) 1000 MG tablet Take 1 tablet (1,000 mg total) by mouth 2 (two) times a day with meals 180 tablet 1    metoprolol succinate (TOPROL-XL) 100 mg 24 hr tablet Take 100 mg by mouth 2 (two) times a day        ONETOUCH DELICA LANCETS FINE MISC Test once daily 100 each 3    Patiromer Sorbitex Calcium 25 2 g PACK Take 25 2 g by mouth daily (Patient taking differently: Take 25 2 g by mouth daily Valtessa) 90 each 1     No current facility-administered medications for this visit  Allergies   Allergen Reactions    Epinephrine      Other reaction(s): increased HR    Lidocaine      Other reaction(s): increased HR    Neomycin      Other reaction(s): rash    Pioglitazone      Other reaction(s): Fluid retention       Objective   Vitals: Blood pressure 124/70, pulse 80, height 4' 10" (1 473 m), weight 76 7 kg (169 lb)  Invasive Devices     None                 Physical Exam   Constitutional: She is oriented to person, place, and time  She appears well-developed and well-nourished  HENT:   Head: Normocephalic and atraumatic     Eyes: Pupils are equal, round, and reactive to light  Conjunctivae and EOM are normal    No lid lag, stare, proptosis, or periorbital edema  Neck: Normal range of motion  Neck supple  No thyromegaly present  Thyroid irregular and nodular in feel  3 cm nodule palpable in the left lobe of the thyroid  No bruits over the thyroid gland or carotids  Cardiovascular: Normal rate, regular rhythm, normal heart sounds and intact distal pulses  No murmur heard  Pulmonary/Chest: Effort normal and breath sounds normal  She has no wheezes  Abdominal: Soft  There is no tenderness  Musculoskeletal: Normal range of motion  She exhibits no edema or deformity  No ulcerations of the feet  No tremor of the outstretched hands  Lymphadenopathy:     She has no cervical adenopathy  Neurological: She is alert and oriented to person, place, and time  She has normal reflexes  Skin: Skin is warm and dry  No rash noted  Vitals reviewed  The history was obtained from the review of the chart and from the patient and   Lab Results:    Most recent Alc is  Lab Results   Component Value Date    HGBA1C 7 0 01/13/2020    HGBA1C 7 0 01/13/2020         CMP done on 01/13/2020 showed a potassium of 5 9, creatinine 0 79 but was otherwise normal     Lab Results   Component Value Date    CREATININE 0 79 01/13/2020    CREATININE 0 88 12/09/2019    CREATININE 0 85 12/05/2019    BUN 30 01/13/2020    K 5 9 01/13/2020     01/13/2020    CO2 23 12/09/2019     Total cholesterol 117, LDL cholesterol 54  Lab Results   Component Value Date    HDL 36 (A) 01/13/2020    TRIG 136 01/13/2020     Lab Results   Component Value Date    ALT 13 01/13/2020    AST 21 01/13/2020    ALKPHOS 92 01/13/2020     TSH is 1 92  Lab Results   Component Value Date    FREET4 1 08 12/09/2019     Thyroid ultrasound done a [facility] on 01/13/2020 showed a right lobe of the thyroid measuring 5 4 x 1 4 x 2 cm and a left lobe of the thyroid measuring 7 9 x 3 2 x 4 1 cm    There is a 4 2 x 3 6 x 3 4 cm lesion of mixed echogenicity with coarse shadowing central calcifications in the upper pole of the left lobe stable in size in appearance  There is a 1 6 cm lower left pole nodule, a 1 1 cm hypoechoic nodule in the upper pole of the right both stable in size      Future Appointments   Date Time Provider Rasheed Julieth   3/9/2020 11:00 AM [facility]   4/23/2020  2:20 PM Jeanine Billy MD ENDO [facility]

## 2020-01-20 NOTE — PATIENT INSTRUCTIONS
hgba1c is 7 0%  This is good  Continue the same glipizide, metformin, and bydureon  Continue to check blood sugars once a day  The cholesterol is good  Continue the fenofibrate  The thyroid blood work is normal    the thyroid ultrasound shows stable size thyroid nodules  Follow up in 3 months with blood work

## 2020-01-30 ENCOUNTER — LAB REQUISITION (OUTPATIENT)
Dept: LAB | Facility: HOSPITAL | Age: 75
End: 2020-01-30
Payer: MEDICARE

## 2020-01-30 DIAGNOSIS — K64.0 FIRST DEGREE HEMORRHOIDS: ICD-10-CM

## 2020-01-30 DIAGNOSIS — Z85.038 PERSONAL HISTORY OF OTHER MALIGNANT NEOPLASM OF LARGE INTESTINE: ICD-10-CM

## 2020-01-30 DIAGNOSIS — K57.30 DIVERTICULOSIS OF LARGE INTESTINE WITHOUT PERFORATION OR ABSCESS WITHOUT BLEEDING: ICD-10-CM

## 2020-01-30 DIAGNOSIS — K62.1 RECTAL POLYP: ICD-10-CM

## 2020-01-30 PROCEDURE — 88305 TISSUE EXAM BY PATHOLOGIST: CPT | Performed by: PATHOLOGY

## 2020-02-05 DIAGNOSIS — E11.65 TYPE 2 DIABETES MELLITUS WITH HYPERGLYCEMIA, WITHOUT LONG-TERM CURRENT USE OF INSULIN (HCC): ICD-10-CM

## 2020-02-10 LAB
LEFT EYE DIABETIC RETINOPATHY: NORMAL
RIGHT EYE DIABETIC RETINOPATHY: NORMAL

## 2020-02-13 ENCOUNTER — TELEPHONE (OUTPATIENT)
Dept: NEPHROLOGY | Facility: CLINIC | Age: 75
End: 2020-02-13

## 2020-02-13 DIAGNOSIS — E87.5 HYPERKALEMIA: ICD-10-CM

## 2020-02-13 NOTE — TELEPHONE ENCOUNTER
Veronica Chauhan called in wanting to speak with you directly  She stated that she hasn't heard back from you regarding her medication and MRI  I made her aware that the last conversation documented was from 1/15/20 and it stated that results were discussed at that time  Regardless of the fact, Veronica Chauhan stated that she needs samples of her higher dose of Veltassa from [address] [place] [address]  She wanted to know if you could facilitate this for her  She will be expecting your call tomorrow, 2/14/20

## 2020-02-14 DIAGNOSIS — E78.5 HYPERLIPIDEMIA, UNSPECIFIED HYPERLIPIDEMIA TYPE: ICD-10-CM

## 2020-02-14 RX ORDER — FENOFIBRATE 54 MG/1
TABLET ORAL
Qty: 90 TABLET | Refills: 1 | Status: SHIPPED | OUTPATIENT
Start: 2020-02-14 | End: 2020-08-10

## 2020-02-17 NOTE — TELEPHONE ENCOUNTER
Unfortunately I was out of the office until 2/17/20  I called and left a message for the patient to return the call  I am putting in an order for Veltassa to be sent to the patient at the higher dose as discussed  The Veltassa higher dose prescription was sent to the pharmacy on 1/15/20 and we have a confirmed receipt from them  I am not sure if the patient never received or needs a refill

## 2020-02-17 NOTE — TELEPHONE ENCOUNTER
Patient returned my call, she did receive the prescription in the higher dose, however, the [facility] has run out at the moment, so she does not have co-pay assistance right now  I am taking samples for her to the O, and also calling [facility]'s pharmacy to see if samples can be sent directly to the patient  She will have repeat blood work done next Monday

## 2020-03-03 ENCOUNTER — DOCUMENTATION (OUTPATIENT)
Dept: NEPHROLOGY | Facility: CLINIC | Age: 75
End: 2020-03-03

## 2020-03-03 LAB
EXT GLUCOSE BLD: 77
EXTERNAL ANION GAP: 13
EXTERNAL BUN: 26
EXTERNAL CALCIUM: 10
EXTERNAL CHLORIDE: 105
EXTERNAL CO2: 22.2
EXTERNAL CREATININE: 0.76
EXTERNAL EGFR: >60
EXTERNAL POTASSIUM: 5.7
EXTERNAL SODIUM: 140

## 2020-03-09 ENCOUNTER — OFFICE VISIT (OUTPATIENT)
Dept: NEPHROLOGY | Facility: HOSPITAL | Age: 75
End: 2020-03-09
Payer: MEDICARE

## 2020-03-09 VITALS
WEIGHT: 166 LBS | BODY MASS INDEX: 34.85 KG/M2 | HEIGHT: 58 IN | SYSTOLIC BLOOD PRESSURE: 112 MMHG | HEART RATE: 82 BPM | RESPIRATION RATE: 16 BRPM | DIASTOLIC BLOOD PRESSURE: 70 MMHG

## 2020-03-09 DIAGNOSIS — I10 ESSENTIAL HYPERTENSION: ICD-10-CM

## 2020-03-09 DIAGNOSIS — N20.0 NEPHROLITHIASIS: ICD-10-CM

## 2020-03-09 DIAGNOSIS — R80.9 MICROALBUMINURIA DUE TO TYPE 2 DIABETES MELLITUS (HCC): Primary | ICD-10-CM

## 2020-03-09 DIAGNOSIS — E87.5 HYPERKALEMIA: ICD-10-CM

## 2020-03-09 DIAGNOSIS — E11.29 MICROALBUMINURIA DUE TO TYPE 2 DIABETES MELLITUS (HCC): Primary | ICD-10-CM

## 2020-03-09 PROCEDURE — 99214 OFFICE O/P EST MOD 30 MIN: CPT | Performed by: INTERNAL MEDICINE

## 2020-03-09 NOTE — PROGRESS NOTES
NEPHROLOGY OUTPATIENT PROGRESS NOTE   Raphael Olivas 75 y o  female MRN: [id number]  DATE: 3/9/2020  Reason for visit:   Chief Complaint   Patient presents with    Follow-up    Hyperkalemia        Patient Instructions   1  History of nephrolithiasis   -in office UA shows trace protein and trace WBCs  -last sCr 0 76 as of 2/27/20  -would adhere to a low sodium(<2000mg) diet to prevent stone formation  Avoid canned foods, packaged foods, [employer] food, and eating out frequently in restaurants    -should drink enough water to urinate 2-2 5L/day   -will order renal ultrasound  -most recent litholink from October 2019 shows low urine citrate at 243 with low urine volume at only 1 7L  -recommend increased hydration  -typically, potassium citrate could help raise citrate levels but in light of hyperkalemia, recommend 1 tablespoon lemon juice in 8 oz glass of water two-three times daily instead  -will hold off on lokelma for now    -already on HCTZ     2  Hyperkalemia ? D/t obstruction from stones vs from RTA in setting of DM and kidney stones   -K 5 7 on latest bloodwork  Monitor BMP  Currently off lasix  On HCTZ 25mg daily  -on veltassa dose to 25 2g  -renin within normal range  Uric acid 6 3  Phos normal   -check urine potassium 17 2, urine osm 444, urine sodium 69, urine cr 46 9, serum cortisol 14 8, CPK 26  -TTKG is low suggestive of hypoaldosteronism  Darian level 4 5, low normal range       3  HTN - BP well controlled  Continue metoprolol 100mg twice daily, HCTZ 25mg daily     4  DM2 - on metformin, glipizide, sugars well controlled, last A1C 7  Also on bydureon      5  Elevated uric acid level - last uric acid 6 3 - on allopurinol 100mg  F/u uric acid       6  Hypomagnesemia - does not use PPIs  On magnesium supplement  Last mag 1 4 as of 1/13/20  Urine mag 180, urine Cr 46 9  FeMag 402% - this indicates renal magnesium wasting  Repeat mag level       7   Hypercalcemia - resolved, calcium 10, would encourage hydration, monitor this  8  Leukocytosis - WBC 18 on last check, has seen hematology  She has ROSEANNE-2 mutation       RTC in 3 months    Obtain renal ultrasound as soon as you can  Begin lemon juice in water as we discussed 2-3 x a day  Continue current veltassa dose  F/u bloodwork once you are able to drink more fluids and begin lemon juice in water regularly  Hernández Breath was seen today for follow-up and hyperkalemia  Diagnoses and all orders for this visit:    Microalbuminuria due to type 2 diabetes mellitus ([address] )  -     Uric acid; Future  -     Protein / creatinine ratio, urine; Future    Essential hypertension    Nephrolithiasis  -     US retroperitoneal complete; Future    Hyperkalemia  -     Basic metabolic panel; Future  -     Magnesium; Future        Assessment/Plan:  1  History of nephrolithiasis   -in office UA shows trace protein and trace WBCs  -last sCr 0 76 as of 2/27/20  -would adhere to a low sodium(<2000mg) diet to prevent stone formation  Avoid canned foods, packaged foods, [place] food, and eating out frequently in restaurants    -should drink enough water to urinate 2-2 5L/day   -will order renal ultrasound  -most recent litholink from October 2019 shows low urine citrate at 243 with low urine volume at only 1 7L  -recommend increased hydration  -typically, potassium citrate could help raise citrate levels but in light of hyperkalemia, recommend 1 tablespoon lemon juice in 8 oz glass of water two-three times daily instead  -will hold off on lokelma for now    -already on HCTZ     2  Hyperkalemia ? D/t obstruction from stones vs from RTA in setting of DM and kidney stones   -K 5 7 on latest bloodwork  Monitor BMP  Currently off lasix  On HCTZ 25mg daily  -on veltassa dose to 25 2g  -renin within normal range  Uric acid 6 3  Phos normal   -check urine potassium 17 2, urine osm 444, urine sodium 69, urine cr 46 9, serum cortisol 14 8, CPK 26  -TTKG is low suggestive of hypoaldosteronism  Darian level 4 5, low normal range       3  HTN - BP well controlled  Continue metoprolol 100mg twice daily, HCTZ 25mg daily     4  DM2 - on metformin, glipizide, sugars well controlled, last A1C 7  Also on bydureon      5  Elevated uric acid level - last uric acid 6 3 - on allopurinol 100mg  F/u uric acid       6  Hypomagnesemia - does not use PPIs  On magnesium supplement  Last mag 1 4 as of 1/13/20  Urine mag 180, urine Cr 46 9  FeMag 402% - this indicates renal magnesium wasting  Repeat mag level       7  Hypercalcemia - resolved, calcium 10, would encourage hydration, monitor this  8  Leukocytosis - WBC 18 on last check, has seen hematology  She has ROSEANNE-2 mutation       RTC in 3 months        SUBJECTIVE / INTERVAL HISTORY:  75 y o  female presents in follow up of hyperkalemia, nephrolithiasis  Vitaliybryce Mcwilliams denies any recent illness/hospitalizations/medication changes since last office visit  She has been seeing kidney stones despite being on allopurinol  She sees 6-10 stones  She saw pink urine in the toilet recently and this had occurred months ago  She thought she had UTI but no dysuria  This just occurred 5 days ago  She was told she had a kidney cyst from MRI  She drinks 1L water a day  Denies NSAID use  BP has been well controlled  Blood sugars well controlled  This morning in the 70s-90s  Review of Systems   Constitutional: Negative for chills and fever  HENT: Negative for sore throat and trouble swallowing  Eyes: Negative for visual disturbance  Respiratory: Negative for cough and shortness of breath  Cardiovascular: Negative for chest pain and leg swelling  Gastrointestinal: Negative for diarrhea, nausea and vomiting  Endocrine: Negative for polyuria  Genitourinary: Positive for hematuria (few days ago with stone passage)  Negative for difficulty urinating and dysuria  Musculoskeletal: Negative for back pain and neck pain  Skin: Negative for rash     Neurological: Negative for dizziness, light-headedness and numbness  Hematological: Negative for adenopathy  Does not bruise/bleed easily  Psychiatric/Behavioral: The patient is not nervous/anxious  OBJECTIVE:  /70 (BP Location: Left arm, Patient Position: Sitting, Cuff Size: Standard)   Pulse 82   Resp 16   Ht 4' 10" (1 473 m)   Wt 75 3 kg (166 lb)   BMI 34 69 kg/m²  Body mass index is 34 69 kg/m²  Physical exam:  Physical Exam   Constitutional: She appears well-developed and well-nourished  No distress  HENT:   Head: Normocephalic and atraumatic  Mouth/Throat: No oropharyngeal exudate  Eyes: Right eye exhibits no discharge  Left eye exhibits no discharge  No scleral icterus  Neck: Normal range of motion  Neck supple  No thyromegaly present  Cardiovascular: Normal rate and regular rhythm  Murmur heard  Pulmonary/Chest: Effort normal and breath sounds normal  No respiratory distress  She has no wheezes  Abdominal: Soft  Bowel sounds are normal  She exhibits no distension  Musculoskeletal: She exhibits no edema  Neurological: She is alert  She exhibits normal muscle tone  awake   Skin: Skin is warm and dry  No rash noted  She is not diaphoretic  Psychiatric: She has a normal mood and affect  Her behavior is normal    Nursing note and vitals reviewed        Medications:    Current Outpatient Medications:     allopurinol (ZYLOPRIM) 100 mg tablet, Take 1 tablet (100 mg total) by mouth daily, Disp: 90 tablet, Rfl: 1    apixaban (ELIQUIS) 5 mg, Take 5 mg by mouth 2 (two) times a day  , Disp: , Rfl:     BYDUREON BCISE 2 MG/0 85ML AUIJ, INJECT 2 MG UNDER THE SKIN ONCE A WEEK, Disp: 3 4 pen, Rfl: 1    Cholecalciferol (VITAMIN D3) 50 MCG (2000 UT) capsule, Take 2,000 Units by mouth daily , Disp: , Rfl:     fenofibrate (TRICOR) 54 MG tablet, TAKE 1 TABLET BY MOUTH EVERY DAY, Disp: 90 tablet, Rfl: 1    glipiZIDE (GLUCOTROL XL) 5 mg 24 hr tablet, Take 1 tablet (5 mg total) by mouth 2 (two) times a day, Disp: 180 tablet, Rfl: 3    glucose blood (ONE TOUCH ULTRA TEST) test strip, Test once daily, Disp: 100 each, Rfl: 3    hydrochlorothiazide (HYDRODIURIL) 25 mg tablet, Take 25 mg by mouth daily prn, Disp: , Rfl:     Magnesium 500 MG CAPS, Take by mouth daily  , Disp: , Rfl:     metFORMIN (GLUCOPHAGE) 1000 MG tablet, TAKE 1 TABLET BY MOUTH TWICE A DAY WITH FOOD, Disp: 180 tablet, Rfl: 0    metoprolol succinate (TOPROL-XL) 100 mg 24 hr tablet, Take 100 mg by mouth 2 (two) times a day  , Disp: , Rfl:     ONETOUCH DELICA LANCETS FINE MISC, Test once daily, Disp: 100 each, Rfl: 3    Patiromer Sorbitex Calcium 25 2 g PACK, Take 8 4 g by mouth daily 4 boxes of Veltassa, 4 packets of 8 4g in each box  Lot CCTFY Exp 5/2022 4 boxes of Veltassa, 4 packets of 8 4g in each box Lot CBVTF Exp 1/2022, Disp: 16 each, Rfl: 1    Allergies: Allergies as of 03/09/2020 - Reviewed 03/09/2020   Allergen Reaction Noted    Epinephrine  04/14/2014    Lidocaine  03/16/2016    Neomycin  06/27/2012    Pioglitazone  10/12/2016       The following portions of the patient's history were reviewed and updated as appropriate: past family history, past surgical history and problem list     Laboratory Results:  Lab Results   Component Value Date    SODIUM 140 02/27/2020    K 5 7 02/27/2020     02/27/2020    CO2 22 2 02/27/2020    BUN 26 02/27/2020    CREATININE 0 76 02/27/2020    GLUC 77 02/27/2020    CALCIUM 10 0 02/27/2020        Lab Results   Component Value Date    PTH 7 5 (L) 12/09/2019    CALCIUM 10 0 02/27/2020    PHOS 4 3 (A) 01/13/2020       Portions of the record may have been created with voice recognition software   Occasional wrong word or "sound a like" substitutions may have occurred due to the inherent limitations of voice recognition software   Read the chart carefully and recognize, using context, where substitutions have occurred

## 2020-03-09 NOTE — PATIENT INSTRUCTIONS
1  History of nephrolithiasis   -in office UA shows trace protein and trace WBCs  -last sCr 0 76 as of 2/27/20  -would adhere to a low sodium(<2000mg) diet to prevent stone formation  Avoid canned foods, packaged foods, [place] food, and eating out frequently in restaurants    -should drink enough water to urinate 2-2 5L/day   -will order renal ultrasound  -most recent litholink from October 2019 shows low urine citrate at 243 with low urine volume at only 1 7L  -recommend increased hydration  -typically, potassium citrate could help raise citrate levels but in light of hyperkalemia, recommend 1 tablespoon lemon juice in 8 oz glass of water two-three times daily instead  -will hold off on lokelma for now    -already on HCTZ     2  Hyperkalemia ? D/t obstruction from stones vs from RTA in setting of DM and kidney stones   -K 5 7 on latest bloodwork  Monitor BMP  Currently off lasix  On HCTZ 25mg daily  -on veltassa dose to 25 2g  -renin within normal range  Uric acid 6 3  Phos normal   -check urine potassium 17 2, urine osm 444, urine sodium 69, urine cr 46 9, serum cortisol 14 8, CPK 26  -TTKG is low suggestive of hypoaldosteronism  Darian level 4 5, low normal range       3  HTN - BP well controlled  Continue metoprolol 100mg twice daily, HCTZ 25mg daily     4  DM2 - on metformin, glipizide, sugars well controlled, last A1C 7  Also on bydureon      5  Elevated uric acid level - last uric acid 6 3 - on allopurinol 100mg  F/u uric acid       6  Hypomagnesemia - does not use PPIs  On magnesium supplement  Last mag 1 4 as of 1/13/20  Urine mag 180, urine Cr 46 9  FeMag 402% - this indicates renal magnesium wasting  Repeat mag level       7  Hypercalcemia - resolved, calcium 10, would encourage hydration, monitor this  8  Leukocytosis - WBC 18 on last check, has seen hematology  She has ROSEANNE-2 mutation       RTC in 3 months    Obtain renal ultrasound as soon as you can    Begin lemon juice in water as we discussed 2-3 x a day  Continue current veltassa dose  F/u bloodwork once you are able to drink more fluids and begin lemon juice in water regularly

## 2020-05-04 DIAGNOSIS — E11.65 TYPE 2 DIABETES MELLITUS WITH HYPERGLYCEMIA, WITHOUT LONG-TERM CURRENT USE OF INSULIN (HCC): ICD-10-CM

## 2020-05-05 RX ORDER — EXENATIDE 2 MG/.85ML
2 INJECTION, SUSPENSION, EXTENDED RELEASE SUBCUTANEOUS WEEKLY
Qty: 3.4 PEN | Refills: 1 | Status: SHIPPED | OUTPATIENT
Start: 2020-05-05 | End: 2021-05-26 | Stop reason: SDUPTHER

## 2020-05-06 LAB
CREAT ?TM UR-SCNC: 53 UMOL/L
EXT MICROALBUMIN URINE RANDOM: 4.7
HBA1C MFR BLD HPLC: 6 %
MICROALBUMIN/CREAT UR: 88 MG/G{CREAT}

## 2020-05-20 ENCOUNTER — TELEPHONE (OUTPATIENT)
Dept: NEPHROLOGY | Facility: CLINIC | Age: 75
End: 2020-05-20

## 2020-05-20 DIAGNOSIS — E87.2 ACIDOSIS: Primary | ICD-10-CM

## 2020-05-20 RX ORDER — SODIUM BICARBONATE 650 MG/1
650 TABLET ORAL DAILY
Qty: 90 TABLET | Refills: 1 | Status: SHIPPED | OUTPATIENT
Start: 2020-05-20 | End: 2020-11-18

## 2020-06-02 DIAGNOSIS — N20.0 NEPHROLITHIASIS: ICD-10-CM

## 2020-06-02 RX ORDER — ALLOPURINOL 100 MG/1
TABLET ORAL
Qty: 90 TABLET | Refills: 1 | Status: SHIPPED | OUTPATIENT
Start: 2020-06-02 | End: 2020-10-07

## 2020-06-19 ENCOUNTER — TELEPHONE (OUTPATIENT)
Dept: NEPHROLOGY | Facility: CLINIC | Age: 75
End: 2020-06-19

## 2020-06-24 ENCOUNTER — OFFICE VISIT (OUTPATIENT)
Dept: ENDOCRINOLOGY | Facility: HOSPITAL | Age: 75
End: 2020-06-24
Payer: MEDICARE

## 2020-06-24 VITALS
WEIGHT: 168 LBS | TEMPERATURE: 98 F | BODY MASS INDEX: 35.26 KG/M2 | HEART RATE: 102 BPM | DIASTOLIC BLOOD PRESSURE: 60 MMHG | SYSTOLIC BLOOD PRESSURE: 126 MMHG | HEIGHT: 58 IN

## 2020-06-24 DIAGNOSIS — E78.2 MIXED HYPERLIPIDEMIA: ICD-10-CM

## 2020-06-24 DIAGNOSIS — E11.42 DIABETIC POLYNEUROPATHY ASSOCIATED WITH TYPE 2 DIABETES MELLITUS (HCC): ICD-10-CM

## 2020-06-24 DIAGNOSIS — I10 ESSENTIAL HYPERTENSION: ICD-10-CM

## 2020-06-24 DIAGNOSIS — E04.2 GOITER, NONTOXIC, MULTINODULAR: ICD-10-CM

## 2020-06-24 DIAGNOSIS — E11.65 TYPE 2 DIABETES MELLITUS WITH HYPERGLYCEMIA, WITHOUT LONG-TERM CURRENT USE OF INSULIN (HCC): Primary | ICD-10-CM

## 2020-06-24 DIAGNOSIS — R80.9 MICROALBUMINURIA DUE TO TYPE 2 DIABETES MELLITUS (HCC): ICD-10-CM

## 2020-06-24 DIAGNOSIS — E11.29 MICROALBUMINURIA DUE TO TYPE 2 DIABETES MELLITUS (HCC): ICD-10-CM

## 2020-06-24 PROCEDURE — 99215 OFFICE O/P EST HI 40 MIN: CPT | Performed by: INTERNAL MEDICINE

## 2020-06-24 RX ORDER — LANCETS 30 GAUGE
EACH MISCELLANEOUS
Qty: 200 EACH | Refills: 3 | Status: SHIPPED | OUTPATIENT
Start: 2020-06-24 | End: 2020-09-08

## 2020-06-24 RX ORDER — GLIPIZIDE 5 MG/1
5 TABLET, FILM COATED, EXTENDED RELEASE ORAL DAILY
Qty: 180 TABLET | Refills: 3
Start: 2020-06-24 | End: 2020-09-29

## 2020-08-01 DIAGNOSIS — E11.65 TYPE 2 DIABETES MELLITUS WITH HYPERGLYCEMIA, WITHOUT LONG-TERM CURRENT USE OF INSULIN (HCC): ICD-10-CM

## 2020-08-08 DIAGNOSIS — E78.5 HYPERLIPIDEMIA, UNSPECIFIED HYPERLIPIDEMIA TYPE: ICD-10-CM

## 2020-08-10 DIAGNOSIS — E87.5 HYPERKALEMIA: ICD-10-CM

## 2020-08-10 RX ORDER — FENOFIBRATE 54 MG/1
TABLET ORAL
Qty: 90 TABLET | Refills: 1 | Status: SHIPPED | OUTPATIENT
Start: 2020-08-10 | End: 2021-02-08

## 2020-08-11 ENCOUNTER — TELEPHONE (OUTPATIENT)
Dept: GASTROENTEROLOGY | Facility: CLINIC | Age: 75
End: 2020-08-11

## 2020-08-11 DIAGNOSIS — Z01.812 BLOOD TESTS PRIOR TO TREATMENT OR PROCEDURE: ICD-10-CM

## 2020-08-11 DIAGNOSIS — K86.2 PANCREATIC CYST: Primary | ICD-10-CM

## 2020-08-11 NOTE — TELEPHONE ENCOUNTER
Order placed for [facility] and labs required prior due to contrast  Patient notified and requests [facility]

## 2020-08-20 LAB
CREAT ?TM UR-SCNC: 47 UMOL/L
EXT PROTEIN URINE: 8
HBA1C MFR BLD HPLC: 6.1 %
PROT/CREAT UR: 0.17 MG/G{CREAT}

## 2020-08-25 ENCOUNTER — TELEPHONE (OUTPATIENT)
Dept: NEPHROLOGY | Facility: HOSPITAL | Age: 75
End: 2020-08-25

## 2020-08-25 NOTE — TELEPHONE ENCOUNTER
----- Message from Pablo Veloz DO sent at 8/25/2020 11:29 AM EDT -----  August 20, 2020 labs reviewed  Patient's serum creatinine 0 85, potassium elevated at 5 5, bicarb 24, uric acid elevated at 6 9, WBC elevated at 19 9, urine protein to creatinine ratio 0 17 g  Please encourage the patient to continue to follow a low-potassium diet and continue veltassa  Thanks

## 2020-08-25 NOTE — TELEPHONE ENCOUNTER
I spoke to the patient and she is aware of her test results, she will keep following her low potassium diet and continue with her Veltassa   She will also drink more water,

## 2020-09-08 DIAGNOSIS — E11.65 TYPE 2 DIABETES MELLITUS WITH HYPERGLYCEMIA, WITHOUT LONG-TERM CURRENT USE OF INSULIN (HCC): ICD-10-CM

## 2020-09-08 RX ORDER — LANCETS 30 GAUGE
EACH MISCELLANEOUS
Qty: 200 EACH | Refills: 3 | Status: SHIPPED | OUTPATIENT
Start: 2020-09-08 | End: 2021-02-17 | Stop reason: SDUPTHER

## 2020-09-26 DIAGNOSIS — E11.65 TYPE 2 DIABETES MELLITUS WITH HYPERGLYCEMIA, WITHOUT LONG-TERM CURRENT USE OF INSULIN (HCC): ICD-10-CM

## 2020-09-29 RX ORDER — GLIPIZIDE 5 MG/1
TABLET, FILM COATED, EXTENDED RELEASE ORAL
Qty: 180 TABLET | Refills: 3 | Status: SHIPPED | OUTPATIENT
Start: 2020-09-29 | End: 2021-03-11

## 2020-10-07 DIAGNOSIS — N20.0 NEPHROLITHIASIS: ICD-10-CM

## 2020-10-07 RX ORDER — ALLOPURINOL 100 MG/1
TABLET ORAL
Qty: 90 TABLET | Refills: 1 | Status: SHIPPED | OUTPATIENT
Start: 2020-10-07 | End: 2021-03-11 | Stop reason: SDUPTHER

## 2020-10-22 ENCOUNTER — TELEPHONE (OUTPATIENT)
Dept: ENDOCRINOLOGY | Facility: HOSPITAL | Age: 75
End: 2020-10-22

## 2020-10-22 ENCOUNTER — TELEPHONE (OUTPATIENT)
Dept: NEPHROLOGY | Facility: CLINIC | Age: 75
End: 2020-10-22

## 2020-10-23 DIAGNOSIS — E04.2 GOITER, NONTOXIC, MULTINODULAR: ICD-10-CM

## 2020-10-23 DIAGNOSIS — E04.2 MULTIPLE THYROID NODULES: Primary | ICD-10-CM

## 2020-10-30 ENCOUNTER — TELEPHONE (OUTPATIENT)
Dept: NEPHROLOGY | Facility: HOSPITAL | Age: 75
End: 2020-10-30

## 2020-10-30 DIAGNOSIS — I10 ESSENTIAL HYPERTENSION: Primary | ICD-10-CM

## 2020-10-30 DIAGNOSIS — E87.5 HYPERKALEMIA: ICD-10-CM

## 2020-10-30 DIAGNOSIS — E11.29 MICROALBUMINURIA DUE TO TYPE 2 DIABETES MELLITUS (HCC): ICD-10-CM

## 2020-10-30 DIAGNOSIS — R80.9 MICROALBUMINURIA DUE TO TYPE 2 DIABETES MELLITUS (HCC): ICD-10-CM

## 2020-11-01 DIAGNOSIS — E11.65 TYPE 2 DIABETES MELLITUS WITH HYPERGLYCEMIA, WITHOUT LONG-TERM CURRENT USE OF INSULIN (HCC): ICD-10-CM

## 2020-11-09 LAB
CREAT ?TM UR-SCNC: 41 UMOL/L
CREAT ?TM UR-SCNC: 42 UMOL/L
EXT MICROALBUMIN URINE RANDOM: 2.9
EXT PROTEIN URINE: 9
HBA1C MFR BLD HPLC: 6.5 %
MICROALBUMIN/CREAT UR: 69 MG/G{CREAT}
PROT/CREAT UR: 0.21 MG/G{CREAT}

## 2020-11-11 ENCOUNTER — DOCUMENTATION (OUTPATIENT)
Dept: NEPHROLOGY | Facility: CLINIC | Age: 75
End: 2020-11-11

## 2020-11-11 LAB
CHOLEST SERPL-MCNC: 93 MG/DL (ref 50–200)
EXT GLUCOSE BLD: 79
EXTERNAL ALBUMIN: 3.9
EXTERNAL ALK PHOS: 99
EXTERNAL ALT: 11
EXTERNAL ANION GAP: 9
EXTERNAL AST: 22
EXTERNAL BICARBONATE: 23.7
EXTERNAL BUN: 31
EXTERNAL CALCIUM: 9.8
EXTERNAL CHLORIDE: 102
EXTERNAL CREATININE: 0.92
EXTERNAL EGFR: 59
EXTERNAL HEMATOCRIT: 48 %
EXTERNAL HEMOGLOBIN: 14.2 G/DL
EXTERNAL MCV: 86
EXTERNAL PLATELET COUNT: 436 K/ΜL
EXTERNAL POTASSIUM: 5.4
EXTERNAL RBC: 5.61
EXTERNAL RDW: 16.6
EXTERNAL SODIUM: 135
EXTERNAL T.BILIRUBIN: 0.8
EXTERNAL TOTAL PROTEIN: 6.7
EXTERNAL WBC: 19.7
HDLC SERPL-MCNC: 32 MG/DL (ref 40–?)
LDLC SERPL DIRECT ASSAY-MCNC: 37 MG/DL
MAGNESIUM SERPL-MCNC: 1.3 MG/DL (ref 1.6–2.6)
TRIGL SERPL-MCNC: 122 MG/DL (ref ?–150)

## 2020-11-18 DIAGNOSIS — E87.2 ACIDOSIS: ICD-10-CM

## 2020-11-18 RX ORDER — SODIUM BICARBONATE 650 MG/1
650 TABLET ORAL DAILY
Qty: 90 TABLET | Refills: 1 | Status: SHIPPED | OUTPATIENT
Start: 2020-11-18 | End: 2020-11-19 | Stop reason: SDUPTHER

## 2020-11-19 ENCOUNTER — OFFICE VISIT (OUTPATIENT)
Dept: NEPHROLOGY | Facility: HOSPITAL | Age: 75
End: 2020-11-19
Payer: MEDICARE

## 2020-11-19 VITALS
WEIGHT: 158 LBS | DIASTOLIC BLOOD PRESSURE: 68 MMHG | TEMPERATURE: 97.8 F | SYSTOLIC BLOOD PRESSURE: 128 MMHG | RESPIRATION RATE: 16 BRPM | HEART RATE: 98 BPM | BODY MASS INDEX: 33.17 KG/M2 | HEIGHT: 58 IN

## 2020-11-19 DIAGNOSIS — E87.2 ACIDOSIS: ICD-10-CM

## 2020-11-19 DIAGNOSIS — E87.5 HYPERKALEMIA: Primary | ICD-10-CM

## 2020-11-19 DIAGNOSIS — E11.29 MICROALBUMINURIA DUE TO TYPE 2 DIABETES MELLITUS (HCC): ICD-10-CM

## 2020-11-19 DIAGNOSIS — E11.65 TYPE 2 DIABETES MELLITUS WITH HYPERGLYCEMIA, WITHOUT LONG-TERM CURRENT USE OF INSULIN (HCC): ICD-10-CM

## 2020-11-19 DIAGNOSIS — R80.9 MICROALBUMINURIA DUE TO TYPE 2 DIABETES MELLITUS (HCC): ICD-10-CM

## 2020-11-19 DIAGNOSIS — N20.0 NEPHROLITHIASIS: ICD-10-CM

## 2020-11-19 DIAGNOSIS — I10 ESSENTIAL HYPERTENSION: ICD-10-CM

## 2020-11-19 PROCEDURE — 99214 OFFICE O/P EST MOD 30 MIN: CPT | Performed by: INTERNAL MEDICINE

## 2020-11-19 RX ORDER — SODIUM BICARBONATE 650 MG/1
650 TABLET ORAL 2 TIMES DAILY
Qty: 180 TABLET | Refills: 1 | Status: SHIPPED | OUTPATIENT
Start: 2020-11-19 | End: 2021-05-10 | Stop reason: ALTCHOICE

## 2020-12-30 ENCOUNTER — TELEPHONE (OUTPATIENT)
Dept: NEPHROLOGY | Facility: CLINIC | Age: 75
End: 2020-12-30

## 2021-01-11 ENCOUNTER — TELEPHONE (OUTPATIENT)
Dept: ENDOCRINOLOGY | Facility: HOSPITAL | Age: 76
End: 2021-01-11

## 2021-01-11 NOTE — TELEPHONE ENCOUNTER
Decrease the glipizide to 5 mg 1 tablet in the morning only, none in the evening  Continue all other medicines the same

## 2021-01-11 NOTE — TELEPHONE ENCOUNTER
Received call from patient  She states she has been having lows in the morning and overnight, with a range of 50-70  This morning fasting her blood sugar was 84  She would like to know if adjustments need to be made  ? She does have a 1/15/21 appointment, but wants advice in the mean time

## 2021-02-04 DIAGNOSIS — E11.65 TYPE 2 DIABETES MELLITUS WITH HYPERGLYCEMIA, WITHOUT LONG-TERM CURRENT USE OF INSULIN (HCC): ICD-10-CM

## 2021-02-06 DIAGNOSIS — E78.5 HYPERLIPIDEMIA, UNSPECIFIED HYPERLIPIDEMIA TYPE: ICD-10-CM

## 2021-02-08 ENCOUNTER — TELEPHONE (OUTPATIENT)
Dept: ENDOCRINOLOGY | Facility: HOSPITAL | Age: 76
End: 2021-02-08

## 2021-02-08 ENCOUNTER — TELEPHONE (OUTPATIENT)
Dept: NEPHROLOGY | Facility: HOSPITAL | Age: 76
End: 2021-02-08

## 2021-02-08 RX ORDER — FENOFIBRATE 54 MG/1
TABLET ORAL
Qty: 90 TABLET | Refills: 1 | Status: SHIPPED | OUTPATIENT
Start: 2021-02-08 | End: 2021-08-13

## 2021-02-08 NOTE — TELEPHONE ENCOUNTER
I spoke to the patient and she is aware her urine volume was inadequate and she needs to drink more water during the day  Her potassium citrate is low, she will drink the 1 tablespoon of lemon juice in 8oz of water BID  She will obtain labs before her next appointment which we scheduled for 3/11/21

## 2021-02-08 NOTE — TELEPHONE ENCOUNTER
Patient has an appointment with you on 2/17  She is asking if you need any labs before that appointment  Her last labs were done 11/9 so she stated she would like to have labs done  She would like the orders faxed to [facility] Patient in [facility]

## 2021-02-08 NOTE — TELEPHONE ENCOUNTER
----- Message from Tomeka Green DO sent at 2/8/2021 11:00 AM EST -----  Patient is already on thiazide  Has had high K so not on K citrate  Most recent litholink shows inadequate UOP at 1 19L  Goal 2-2 5L/day to prevent stone formation  Citrate 124  She should drink 1 tablespoon of lemon juice in 8oz water twice daily

## 2021-02-09 DIAGNOSIS — E11.65 TYPE 2 DIABETES MELLITUS WITH HYPERGLYCEMIA, WITHOUT LONG-TERM CURRENT USE OF INSULIN (HCC): Primary | ICD-10-CM

## 2021-02-09 DIAGNOSIS — E87.5 HYPERKALEMIA: ICD-10-CM

## 2021-02-12 DIAGNOSIS — Z23 ENCOUNTER FOR IMMUNIZATION: ICD-10-CM

## 2021-02-12 LAB — HBA1C MFR BLD HPLC: 5.8 %

## 2021-02-17 ENCOUNTER — OFFICE VISIT (OUTPATIENT)
Dept: ENDOCRINOLOGY | Facility: HOSPITAL | Age: 76
End: 2021-02-17

## 2021-02-17 VITALS
BODY MASS INDEX: 33.42 KG/M2 | HEART RATE: 86 BPM | WEIGHT: 159.2 LBS | SYSTOLIC BLOOD PRESSURE: 122 MMHG | DIASTOLIC BLOOD PRESSURE: 70 MMHG | HEIGHT: 58 IN

## 2021-02-17 DIAGNOSIS — R80.9 MICROALBUMINURIA DUE TO TYPE 2 DIABETES MELLITUS (HCC): ICD-10-CM

## 2021-02-17 DIAGNOSIS — E11.65 TYPE 2 DIABETES MELLITUS WITH HYPERGLYCEMIA, WITHOUT LONG-TERM CURRENT USE OF INSULIN (HCC): Primary | ICD-10-CM

## 2021-02-17 DIAGNOSIS — E11.29 MICROALBUMINURIA DUE TO TYPE 2 DIABETES MELLITUS (HCC): ICD-10-CM

## 2021-02-17 DIAGNOSIS — E78.2 MIXED HYPERLIPIDEMIA: ICD-10-CM

## 2021-02-17 DIAGNOSIS — E04.2 GOITER, NONTOXIC, MULTINODULAR: ICD-10-CM

## 2021-02-17 DIAGNOSIS — E04.2 MULTIPLE THYROID NODULES: ICD-10-CM

## 2021-02-17 DIAGNOSIS — I10 ESSENTIAL HYPERTENSION: ICD-10-CM

## 2021-02-17 DIAGNOSIS — E11.42 DIABETIC POLYNEUROPATHY ASSOCIATED WITH TYPE 2 DIABETES MELLITUS (HCC): ICD-10-CM

## 2021-02-17 PROCEDURE — 99215 OFFICE O/P EST HI 40 MIN: CPT | Performed by: INTERNAL MEDICINE

## 2021-02-17 RX ORDER — LANCETS 30 GAUGE
EACH MISCELLANEOUS
Qty: 200 EACH | Refills: 3 | Status: SHIPPED | OUTPATIENT
Start: 2021-02-17 | End: 2022-05-25 | Stop reason: ALTCHOICE

## 2021-02-17 NOTE — PATIENT INSTRUCTIONS
Hgba1c is 5 8%  this is excellent  Continue the same glipizide and bydureon  decrease the metformin to 500 mg in am(in the morning 1/2 of a 1000 mg tablet) and 1000 mg in pm   Call in blood sugars in 3-4 weeks  Follow up in 3 months with blood work

## 2021-02-17 NOTE — PROGRESS NOTES
2/19/2021    Assessment/Plan      Diagnoses and all orders for this visit:    Type 2 diabetes mellitus with hyperglycemia, without long-term current use of insulin ([place] [address] )  -     HEMOGLOBIN A1C W/ EAG ESTIMATION Lab Collect; Future  -     Comprehensive metabolic panel Lab Collect; Future  -     glucose blood (ONE TOUCH ULTRA TEST) test strip; Test 3 times a day  -     Lancets (OneTouch Delica Plus [id number]) MISC; USE TO TEST BLOOD SUGARS 3 times A DAY  -     metFORMIN (GLUCOPHAGE) 1000 MG tablet; Take 1/2 tablet in am and 1 whole tablet in pm    Diabetic polyneuropathy associated with type 2 diabetes mellitus ([place] [address] )  -     HEMOGLOBIN A1C W/ EAG ESTIMATION Lab Collect; Future  -     Comprehensive metabolic panel Lab Collect; Future    Microalbuminuria due to type 2 diabetes mellitus ([place] [address] )  -     HEMOGLOBIN A1C W/ EAG ESTIMATION Lab Collect; Future  -     Comprehensive metabolic panel Lab Collect; Future    Goiter, nontoxic, multinodular  -     HEMOGLOBIN A1C W/ EAG ESTIMATION Lab Collect; Future  -     Comprehensive metabolic panel Lab Collect; Future    Multiple thyroid nodules  -     HEMOGLOBIN A1C W/ EAG ESTIMATION Lab Collect; Future  -     Comprehensive metabolic panel Lab Collect; Future    Essential hypertension  -     HEMOGLOBIN A1C W/ EAG ESTIMATION Lab Collect; Future  -     Comprehensive metabolic panel Lab Collect; Future    Mixed hyperlipidemia  -     HEMOGLOBIN A1C W/ EAG ESTIMATION Lab Collect; Future  -     Comprehensive metabolic panel Lab Collect; Future        Assessment/Plan:    1  Type 2 diabetes  Hemoglobin A1c is 5 8%  At this point, this is excellent  She is having some lower blood sugars as yet throughout the day  I decrease her glipizide recently  She is having some diarrhea which seems to be worse in the morning so I have asked her to decrease her metformin to 500 mg or half of a 1000 mg tablet in the morning and 1000 mg in the evening    She will continue the current dose of glipizide and Bydureon  I have asked her to continue to check her blood sugars at least once a day and to call in her blood sugars in 3-4 weeks for review  2  Diabetic neuropathy  She has slight numbness and tingling of the feet  Diabetic foot exams are up-to-date  3  Microalbuminuria  She is following with nephrology because of her elevated potassium so is unable to use an ACE-inhibitor or ARB  4  Nontoxic multinodular goiter  She continues to have thyroid nodules on palpation with no compressive thyroid symptoms  5  Hypertension  Blood pressure is under good control on her current dose of metoprolol and hydrochlorothiazide  6  Hyperlipidemia  She will continue the same fenofibrate 54 mg daily  I have asked her to follow up in 3 months with preceding hemoglobin A1c and CMP  CC: Diabetes Type 2, thyroid, blood pressure, lipid follow-up    History of Present Illness     HPI: Adriano Jimenez is a 67y o  year old female with type 2 diabetes for 12-17 years  She is on oral agents at home and takes glipizide XL 5 mg daily, metformin 1000 mg twice daily, and bydureon 2 mg once a week  She denies any  polydipsia, polyphagia, and blurry vision  She has polyuria and nocturia after 6 hours  She has chronic numbness and tingling of her toes and fingers unchanged  She denies chest pain or shortness of breath  She denies retinopathy, heart attack, stroke and claudication but does admit to neuropathy and nephropathy  Hypoglycemic episodes: Yes several times per week  she was waking with sweats and weakness and pm glipizide stopped and symptoms stopped  They were in the 50's  glipizide was decreased in jan 2021  The patient's last eye exam was in fall 2019, has follow up appointment  The patient's last foot exam was in  In June 2020 at endocrine office visit  Last A1C was   Lab Results   Component Value Date    HGBA1C 5 8 02/12/2021         Blood Sugar/Glucometer/Pump/CGM review: She checks blood sugars 1-3 times a day  Will check extra if feeling low blood sugars symptoms  Blood sugars are anywhere from 60 to the mid 100s but primarily less than 120  She has hypertension and takes metoprolol  mg twice a day and hydrochlorothiazide 25 mg daily  She has a history of hyperkalemia and microalbuminuria so cannot use an Ace or an ARB and is followed by a nephrologist     She has some transient dizziness with position changes but no headache or stroke-like symptoms  She has hyperlipidemia and takes fenofibrate 54 mg daily  She denies chest pain or shortness of breath  She has a nontoxic multinodular goiter  She has multiple thyroid nodules in her goiter and there have been nodules that were biopsy benign in the past   Last thyroid ultrasound was 2018 showing stable size thyroid nodules  She has no compressive thyroid symptoms or difficulties with swallowing  Review of Systems   Constitutional: Negative for fatigue and unexpected weight change  HENT: Negative for trouble swallowing  Eyes: Negative for visual disturbance  Wears glasses  Respiratory: Negative for chest tightness and shortness of breath  Cardiovascular: Negative for chest pain  Gastrointestinal: Positive for diarrhea  Negative for abdominal pain, constipation and nausea  Always loose bowels  Endocrine: Positive for polyuria  Negative for polydipsia and polyphagia  Nocturia  After 6 hours  Nephrologist having her drink 5 bottles water a day  Polyuria due to the increase water intake  Skin: Negative for wound  Neurological: Positive for dizziness and numbness  Negative for weakness, light-headedness and headaches  Toes a bit numb and tingling, finger always  Very transient dizziness with change in position of the head         Historical Information   Past Medical History:   Diagnosis Date    Atrial fibrillation ([facility] )     Colon cancer ([facility] )     limited to wall of colon, no chemo or XRT needed    Diabetes mellitus ([facility] )     Hypertension     Hypomagnesemia     Kidney stone     Leukocytosis     chanelle 2 gene positive    Osteoarthritis      Past Surgical History:   Procedure Laterality Date    APPENDECTOMY      with colectomy    COLECTOMY [place]      24 inches    COLONOSCOPY  2016    HYSTERECTOMY      LAPAROSCOPIC CHOLECYSTECTOMY       Social History   Social History     Substance and Sexual Activity   Alcohol Use No     Social History     Substance and Sexual Activity   Drug Use No     Social History     Tobacco Use   Smoking Status Never Smoker   Smokeless Tobacco Never Used     Family History:   Family History   Problem Relation Age of Onset    Asthma Mother     Heart disease Mother         post heart surgery    Heart attack Father     Coronary artery disease Father     Heart attack Brother     Colon cancer Brother     Colon cancer Brother     Heart attack Brother     Diabetes type II Brother     Heart attack Brother     Colon cancer Brother     No Known Problems Brother     Heart disease Paternal Uncle     Hypertension Daughter     Hypertension Daughter     No Known Problems Daughter        Meds/Allergies   Current Outpatient Medications   Medication Sig Dispense Refill    allopurinol (ZYLOPRIM) 100 mg tablet TAKE 1 TABLET BY MOUTH EVERY DAY 90 tablet 1    apixaban (ELIQUIS) 5 mg Take 5 mg by mouth 2 (two) times a day        BYDUREON BCISE 2 MG/0 85ML AUIJ INJECT 2 MG UNDER THE SKIN ONCE A WEEK 3 4 pen 1    Cholecalciferol (VITAMIN D3) 50 MCG (2000 UT) capsule Take 2,000 Units by mouth daily       fenofibrate (TRICOR) 54 MG tablet TAKE 1 TABLET BY MOUTH EVERY DAY 90 tablet 1    glipiZIDE (GLUCOTROL XL) 5 mg 24 hr tablet TAKE 1 TABLET BY MOUTH TWICE A DAY (Patient taking differently: Take 5 mg by mouth daily ) 180 tablet 3    glucose blood (ONE TOUCH ULTRA TEST) test strip Test 3 times a day 300 each 3    hydrochlorothiazide (HYDRODIURIL) 25 mg tablet Take 25 mg by mouth daily prn      Lancets (OneTouch Delica Plus [id number]) MISC USE TO TEST BLOOD SUGARS 3 times A DAY  200 each 3    Magnesium 500 MG CAPS Take by mouth daily        metFORMIN (GLUCOPHAGE) 1000 MG tablet Take 1/2 tablet in am and 1 whole tablet in pm 180 tablet 0    metoprolol succinate (TOPROL-XL) 100 mg 24 hr tablet Take 100 mg by mouth 2 (two) times a day        Patiromer Sorbitex Calcium 25 2 g PACK Take 25 2 g by mouth daily 90 each 1    sodium bicarbonate 650 mg tablet Take 1 tablet (650 mg total) by mouth 2 (two) times a day 180 tablet 1     No current facility-administered medications for this visit  Allergies   Allergen Reactions    Epinephrine      Other reaction(s): increased HR    Lidocaine      Other reaction(s): increased HR    Neomycin      Other reaction(s): rash    Pioglitazone      Other reaction(s): Fluid retention       Objective   Vitals: Blood pressure 122/70, pulse 86, height 4' 10" (1 473 m), weight 72 2 kg (159 lb 3 2 oz)  Invasive Devices     None                 Physical Exam  Vitals signs reviewed  Constitutional:       Appearance: Normal appearance  She is well-developed  She is obese  HENT:      Head: Normocephalic and atraumatic  Eyes:      Extraocular Movements: Extraocular movements intact  Conjunctiva/sclera: Conjunctivae normal    Neck:      Musculoskeletal: Normal range of motion and neck supple  Thyroid: No thyromegaly  Vascular: No carotid bruit  Comments: 1-2 cm thyroid nodule palpable in the right lobe of the thyroid  Thyroid enlarged and nodular in feel  No bruits over the thyroid gland  Cardiovascular:      Rate and Rhythm: Normal rate and regular rhythm  Heart sounds: Normal heart sounds  No murmur  Pulmonary:      Effort: Pulmonary effort is normal       Breath sounds: Normal breath sounds  No wheezing  Abdominal:      Palpations: Abdomen is soft  Musculoskeletal: Normal range of motion           General: No deformity  Right lower leg: No edema  Left lower leg: No edema  Comments: No tremor of the outstretched hands  Lymphadenopathy:      Cervical: No cervical adenopathy  Skin:     General: Skin is warm and dry  Findings: No rash  Neurological:      Mental Status: She is alert and oriented to person, place, and time  Deep Tendon Reflexes: Reflexes are normal and symmetric  The history was obtained from the review of the chart and from the patient and       Lab Results:    Most recent Alc is  Lab Results   Component Value Date    HGBA1C 5 8 02/12/2021            blood work done at [facility] on 02/12/2021 showed a CMP with a potassium of 5 3,  BUN 43, BUN /Creatinine ratio 47 8, glucose 75 fasting, but was otherwise normal   Lab Results   Component Value Date    CREATININE 0 92 11/09/2020    CREATININE 0 76 02/27/2020    CREATININE 0 79 01/13/2020    BUN 31 11/09/2020    K 5 4 11/09/2020     11/09/2020    CO2 22 2 02/27/2020     eGFR   Date Value Ref Range Status   12/09/2019 64 ml/min/1 73sq m Final     EXTERNAL EGFR   Date Value Ref Range Status   11/09/2020 59  Final       Lab Results   Component Value Date    HDL 32 (A) 11/09/2020    TRIG 122 11/09/2020     Lab Results   Component Value Date    ALT 11 11/09/2020    AST 22 11/09/2020    ALKPHOS 99 11/09/2020       Lab Results   Component Value Date    FREET4 1 08 12/09/2019         Future Appointments   Date Time Provider Rasheed Julieth   3/1/2021  1:30 PM [address]   3/11/2021  2:00 PM Elizabet Holguin DO NEPH [facility]   5/26/2021  1:00 PM Dorma Mohs, MD ENDO [facility]

## 2021-03-11 ENCOUNTER — OFFICE VISIT (OUTPATIENT)
Dept: NEPHROLOGY | Facility: HOSPITAL | Age: 76
End: 2021-03-11
Payer: MEDICARE

## 2021-03-11 VITALS
HEIGHT: 58 IN | BODY MASS INDEX: 33.8 KG/M2 | RESPIRATION RATE: 16 BRPM | TEMPERATURE: 97.8 F | WEIGHT: 161 LBS | HEART RATE: 86 BPM | DIASTOLIC BLOOD PRESSURE: 62 MMHG | SYSTOLIC BLOOD PRESSURE: 116 MMHG

## 2021-03-11 DIAGNOSIS — N20.0 NEPHROLITHIASIS: ICD-10-CM

## 2021-03-11 DIAGNOSIS — E11.65 TYPE 2 DIABETES MELLITUS WITH HYPERGLYCEMIA, WITHOUT LONG-TERM CURRENT USE OF INSULIN (HCC): ICD-10-CM

## 2021-03-11 DIAGNOSIS — E87.5 HYPERKALEMIA: Primary | ICD-10-CM

## 2021-03-11 DIAGNOSIS — I10 HYPERTENSION: ICD-10-CM

## 2021-03-11 DIAGNOSIS — E11.29 MICROALBUMINURIA DUE TO TYPE 2 DIABETES MELLITUS (HCC): ICD-10-CM

## 2021-03-11 DIAGNOSIS — R80.9 MICROALBUMINURIA DUE TO TYPE 2 DIABETES MELLITUS (HCC): ICD-10-CM

## 2021-03-11 PROCEDURE — 99214 OFFICE O/P EST MOD 30 MIN: CPT | Performed by: INTERNAL MEDICINE

## 2021-03-11 RX ORDER — TRISODIUM CITRATE DIHYDRATE AND CITRIC ACID MONOHYDRATE 500; 334 MG/5ML; MG/5ML
30 SOLUTION ORAL
Qty: 2700 ML | Refills: 3 | Status: SHIPPED | OUTPATIENT
Start: 2021-03-11 | End: 2021-08-30 | Stop reason: SINTOL

## 2021-03-11 RX ORDER — ALLOPURINOL 100 MG/1
200 TABLET ORAL DAILY
Qty: 90 TABLET | Refills: 1 | Status: SHIPPED | OUTPATIENT
Start: 2021-03-11 | End: 2021-04-15 | Stop reason: SDUPTHER

## 2021-03-11 RX ORDER — SODIUM ZIRCONIUM CYCLOSILICATE 5 G/5G
1 POWDER, FOR SUSPENSION ORAL DAILY
Qty: 30 EACH | Refills: 1 | Status: SHIPPED | OUTPATIENT
Start: 2021-03-11 | End: 2021-03-23 | Stop reason: SDUPTHER

## 2021-03-11 RX ORDER — TRISODIUM CITRATE DIHYDRATE AND CITRIC ACID MONOHYDRATE 500; 334 MG/5ML; MG/5ML
30 SOLUTION ORAL
Qty: 30 ML | Refills: 3 | Status: SHIPPED | OUTPATIENT
Start: 2021-03-11 | End: 2021-03-11

## 2021-03-11 RX ORDER — GLIPIZIDE 5 MG/1
5 TABLET, FILM COATED, EXTENDED RELEASE ORAL DAILY
Start: 2021-03-11 | End: 2021-10-01

## 2021-03-11 NOTE — PROGRESS NOTES
NEPHROLOGY OUTPATIENT PROGRESS NOTE   Jack Watson 67 y o  female MRN: [id number]  DATE: 3/11/2021  Reason for visit:   Chief Complaint   Patient presents with    Follow-up    Hyperkalemia        Patient Instructions   1  History of nephrolithiasis   -in office UA shows trace protein and trace WBCs  -last sCr 0 76 as of 2/27/20  -would adhere to a low sodium(<2000mg) diet to prevent stone formation  Avoid canned foods, packaged foods, [place] food, and eating out frequently in restaurants    -should drink enough water to urinate 2-2 5L/day   -renal ultrasound performed November 16, 2020 shows right kidney 11 8 cm, left kidney 11 5 cm with nonobstructive kidney stones noted in the left pelvis with the largest 6 mm in size  Two right renal cysts also noted with 1 minimally complex   -most recent litholink from Feb 2021 shows low urine citrate at 117-->124 with low urine volume at only 1 19L(lower than in June 2020)  -Would increase hydration and take 1 table spoon lemon juice in 8 oz glass of water three times daily    -will begin bicitra as well    -will hold off on lokelma for now  Continue veltassa   -already on HCTZ   -double water intake      2  Hyperkalemia ? D/t obstruction from stones vs from RTA in setting of DM and kidney stones   -K 5 5 on latest bloodwork  Monitor BMP  Currently off lasix  On HCTZ 25mg daily  -on veltassa dose to 25 2g previously but has run out  Will prescribe lokelma as cannot get more veltassa until August 2021    -renin within normal range  Uric acid 6 3  Phos normal   -check urine potassium 17 2, urine osm 444, urine sodium 69, urine cr 46 9, serum cortisol 14 8, CPK 26  -TTKG is low suggestive of hypoaldosteronism  Darian level 4 5, low normal range   -will begin sodium bicarbonate 650mg twice daily as urine pH low and this also poses a risk of stone formation        3  HTN - BP well controlled  Continue metoprolol 100mg twice daily, HCTZ 25mg daily     4   DM2 - on metformin, glipizide, sugars well controlled, last A1C 5 8  Also on bydureon      5  Elevated uric acid level - last uric acid 7 3 - on allopurinol 100mg  Monitor uric acid  Increase to 200mg daily       6  Hypomagnesemia - does not use PPIs  Continue magnesium supplement  Last mag 1 6 as of 2/12/21  Urine mag 180, urine Cr 46 9  FeMag 402% - this indicates renal magnesium wasting  Repeat mag level       7  Leukocytosis - WBC 19 7 on last check, has seen hematology  She has ROSEANNE-2 mutation       RTC in 3 months    Begin lemon juice in water as we discussed 3 x a day  Begin lokelma instead of veltassa  Appreciate veltassa expensive  Take 2 allopurinol in the morning  Ok to receive MRI with gadolinium if needed  Kerri Jackson was seen today for follow-up and hyperkalemia  Diagnoses and all orders for this visit:    Hyperkalemia  -     Cancel: Magnesium; Future  -     Magnesium; Future  -     Basic metabolic panel; Future  -     Urinalysis with microscopic; Future  -     Protein / creatinine ratio, urine; Future    Microalbuminuria due to type 2 diabetes mellitus (HCC)  -     Protein / creatinine ratio, urine; Future    Hypertension    Nephrolithiasis  -     allopurinol (ZYLOPRIM) 100 mg tablet; Take 2 tablets (200 mg total) by mouth daily  -     sod citrate-citric acid (BICITRA) 500-334 MG/5ML; Take 30 mL by mouth 3 (three) times a day with meals    Type 2 diabetes mellitus with hyperglycemia, without long-term current use of insulin (HCC)  -     glipiZIDE (GLUCOTROL XL) 5 mg 24 hr tablet; Take 1 tablet (5 mg total) by mouth daily        Assessment/Plan:  1  History of nephrolithiasis   -in office UA shows trace protein and trace WBCs  -last sCr 0 76 as of 2/27/20  -would adhere to a low sodium(<2000mg) diet to prevent stone formation   Avoid canned foods, packaged foods, [email or web address] food, and eating out frequently in restaurants    -should drink enough water to urinate 2-2 5L/day   -renal ultrasound performed November 16, 2020 shows right kidney 11 8 cm, left kidney 11 5 cm with nonobstructive kidney stones noted in the left pelvis with the largest 6 mm in size  Two right renal cysts also noted with 1 minimally complex   -most recent litholink from Feb 2021 shows low urine citrate at 117-->124 with low urine volume at only 1 19L(lower than in June 2020)  -Would increase hydration and take 1 table spoon lemon juice in 8 oz glass of water three times daily    -will hold off on lokelma for now  Continue veltassa   -already on HCTZ   -double water intake      2  Hyperkalemia ? D/t obstruction from stones vs from RTA in setting of DM and kidney stones   -K 5 5 on latest bloodwork  Monitor BMP  Currently off lasix  On HCTZ 25mg daily  -on veltassa dose to 25 2g previously but has run out  Will prescribe lokelma as cannot get more veltassa until August 2021    -renin within normal range  Uric acid 6 3  Phos normal   -check urine potassium 17 2, urine osm 444, urine sodium 69, urine cr 46 9, serum cortisol 14 8, CPK 26  -TTKG is low suggestive of hypoaldosteronism  Darian level 4 5, low normal range   -will begin sodium bicarbonate 650mg twice daily as urine pH low and this also poses a risk of stone formation        3  HTN - BP well controlled  Continue metoprolol 100mg twice daily, HCTZ 25mg daily     4  DM2 - on metformin, glipizide, sugars well controlled, last A1C 5 8  Also on bydureon      5  Elevated uric acid level - last uric acid 7 3 - on allopurinol 100mg  Monitor uric acid  Increase to 200mg daily       6  Hypomagnesemia - does not use PPIs  Continue magnesium supplement  Last mag 1 6 as of 2/12/21  Urine mag 180, urine Cr 46 9  FeMag 402% - this indicates renal magnesium wasting  Repeat mag level       7  Leukocytosis - WBC 19 7 on last check, has seen hematology  She has ROSEANNE-2 mutation       RTC in 3 months    Begin lemon juice in water as we discussed 3 x a day  Begin lokelma instead of veltassa   Appreciate veltassa expensive  Take 2 allopurinol in the morning  Ok to receive MRI with gadolinium if needed  SUBJECTIVE / INTERVAL HISTORY:  67 y o  female presents in follow up of hyperkalemia  Hannah Knows denies any recent illness/hospitalizations/medication changes since last office visit  Denies NSAID use  HTN - BP under control, usually normal range  DM2 - blood sugars well controlled  Had passed kidney stones in January 21, 2021  Had had pain with this  Has frequent diarrhea from metformin and veltassa  Review of Systems   Constitutional: Negative for chills and fever  HENT: Negative for sore throat and trouble swallowing  Eyes: Negative for visual disturbance  Respiratory: Negative for cough and shortness of breath  Cardiovascular: Negative for chest pain and leg swelling  Gastrointestinal: Positive for diarrhea  Negative for abdominal pain, constipation, nausea and vomiting  Endocrine: Negative for polyuria  Genitourinary: Negative for difficulty urinating, dysuria and hematuria  Has passed stones   Musculoskeletal: Negative for back pain and neck pain  Skin: Negative for rash  Neurological: Positive for numbness (in fingertips)  Negative for dizziness and light-headedness  Hematological: Negative for adenopathy  Does not bruise/bleed easily  Psychiatric/Behavioral: The patient is not nervous/anxious  OBJECTIVE:  /62 (BP Location: Left arm, Patient Position: Sitting, Cuff Size: Standard)   Pulse 86   Temp 97 8 °F (36 6 °C) (Temporal)   Resp 16   Ht 4' 10" (1 473 m)   Wt 73 kg (161 lb)   BMI 33 65 kg/m²  Body mass index is 33 65 kg/m²  Physical exam:  Physical Exam  Vitals signs and nursing note reviewed  Constitutional:       General: She is not in acute distress  Appearance: Normal appearance  She is well-developed  She is obese  She is not diaphoretic  HENT:      Head: Normocephalic and atraumatic  Eyes:      General: No scleral icterus  Right eye: No discharge  Left eye: No discharge  Comments: eyeglasses   Neck:      Musculoskeletal: Normal range of motion and neck supple  Thyroid: No thyromegaly  Cardiovascular:      Rate and Rhythm: Normal rate and regular rhythm  Heart sounds: No murmur  Pulmonary:      Effort: Pulmonary effort is normal  No respiratory distress  Breath sounds: Normal breath sounds  No wheezing  Abdominal:      General: Bowel sounds are normal  There is no distension  Palpations: Abdomen is soft  Musculoskeletal: Normal range of motion  General: Swelling (b/l LE) present  Skin:     General: Skin is warm and dry  Findings: No rash  Neurological:      General: No focal deficit present  Mental Status: She is alert  Motor: No abnormal muscle tone        Comments: awake   Psychiatric:         Mood and Affect: Mood normal          Behavior: Behavior normal          Medications:    Current Outpatient Medications:     allopurinol (ZYLOPRIM) 100 mg tablet, Take 2 tablets (200 mg total) by mouth daily, Disp: 90 tablet, Rfl: 1    apixaban (ELIQUIS) 5 mg, Take 5 mg by mouth 2 (two) times a day  , Disp: , Rfl:     BYDUREON BCISE 2 MG/0 85ML AUIJ, INJECT 2 MG UNDER THE SKIN ONCE A WEEK, Disp: 3 4 pen, Rfl: 1    Cholecalciferol (VITAMIN D3) 50 MCG (2000 UT) capsule, Take 2,000 Units by mouth daily , Disp: , Rfl:     fenofibrate (TRICOR) 54 MG tablet, TAKE 1 TABLET BY MOUTH EVERY DAY, Disp: 90 tablet, Rfl: 1    glipiZIDE (GLUCOTROL XL) 5 mg 24 hr tablet, Take 1 tablet (5 mg total) by mouth daily, Disp: , Rfl:     glucose blood (ONE TOUCH ULTRA TEST) test strip, Test 3 times a day, Disp: 300 each, Rfl: 3    hydrochlorothiazide (HYDRODIURIL) 25 mg tablet, Take 25 mg by mouth daily prn, Disp: , Rfl:     Lancets (OneTouch Delica Plus [id number]) MISC, USE TO TEST BLOOD SUGARS 3 times A DAY , Disp: 200 each, Rfl: 3    Magnesium 500 MG CAPS, Take by mouth daily  , Disp: , Rfl:     metFORMIN (GLUCOPHAGE) 1000 MG tablet, Take 1/2 tablet in am and 1 whole tablet in pm, Disp: 180 tablet, Rfl: 0    metoprolol succinate (TOPROL-XL) 100 mg 24 hr tablet, Take 100 mg by mouth 2 (two) times a day  , Disp: , Rfl:     Patiromer Sorbitex Calcium 25 2 g PACK, Take 25 2 g by mouth daily, Disp: 90 each, Rfl: 1    sodium bicarbonate 650 mg tablet, Take 1 tablet (650 mg total) by mouth 2 (two) times a day, Disp: 180 tablet, Rfl: 1    sod citrate-citric acid (BICITRA) 500-334 MG/5ML, Take 30 mL by mouth 3 (three) times a day with meals, Disp: 30 mL, Rfl: 3    Allergies: Allergies as of 03/11/2021 - Reviewed 03/11/2021   Allergen Reaction Noted    Epinephrine  04/14/2014    Lidocaine  03/16/2016    Neomycin  06/27/2012    Pioglitazone  10/12/2016       The following portions of the patient's history were reviewed and updated as appropriate: past family history, past surgical history and problem list     Laboratory Results:  Lab Results   Component Value Date    SODIUM 135 11/09/2020    K 5 4 11/09/2020     11/09/2020    CO2 22 2 02/27/2020    BUN 31 11/09/2020    CREATININE 0 92 11/09/2020    GLUC 79 11/09/2020    CALCIUM 9 8 11/09/2020        Lab Results   Component Value Date    PTH 7 5 (L) 12/09/2019    CALCIUM 9 8 11/09/2020    PHOS 4 3 (A) 01/13/2020       Portions of the record may have been created with voice recognition software   Occasional wrong word or "sound a like" substitutions may have occurred due to the inherent limitations of voice recognition software   Read the chart carefully and recognize, using context, where substitutions have occurred

## 2021-03-11 NOTE — LETTER
March 11, 2021     MD Radha Cordero  [address] [address]  [address] [facility] [address] [address]    Patient: Lissett Yeung   YOB: 1945   Date of Visit: 3/11/2021       Dear Dr Milton Soto:    Thank you for referring Lissett Yeung to me for evaluation  Below are my notes for this consultation  The patient mentions she has significant diarrhea on metformin  I note you recently cut back her metformin as A1C at best at 5 8  I wonder if this can safely be cut back further  If you have questions, please do not hesitate to call me  I look forward to following your patient along with you  Sincerely,        Popeye Reagan DO        CC: No Recipients  Popeye Reagan DO  3/11/2021  2:42 PM  Incomplete  NEPHROLOGY OUTPATIENT PROGRESS NOTE   Lissett Yeung 67 y o  female MRN: [id number]  DATE: 3/11/2021  Reason for visit:   Chief Complaint   Patient presents with    Follow-up    Hyperkalemia        Patient Instructions   1  History of nephrolithiasis   -in office UA shows trace protein and trace WBCs  -last sCr 0 76 as of 2/27/20  -would adhere to a low sodium(<2000mg) diet to prevent stone formation  Avoid canned foods, packaged foods, [place] food, and eating out frequently in restaurants    -should drink enough water to urinate 2-2 5L/day   -renal ultrasound performed November 16, 2020 shows right kidney 11 8 cm, left kidney 11 5 cm with nonobstructive kidney stones noted in the left pelvis with the largest 6 mm in size  Two right renal cysts also noted with 1 minimally complex   -most recent litholink from Feb 2021 shows low urine citrate at 117-->124 with low urine volume at only 1 19L(lower than in June 2020)  -Would increase hydration and take 1 table spoon lemon juice in 8 oz glass of water three times daily    -will hold off on lokelma for now  Continue veltassa   -already on HCTZ   -double water intake      2  Hyperkalemia ?  D/t obstruction from stones vs from RTA in setting of DM and kidney stones   -K 5 5 on latest bloodwork  Monitor BMP  Currently off lasix  On HCTZ 25mg daily  -on veltassa dose to 25 2g previously but has run out  Will prescribe lokelma as cannot get more veltassa until August 2021    -renin within normal range  Uric acid 6 3  Phos normal   -check urine potassium 17 2, urine osm 444, urine sodium 69, urine cr 46 9, serum cortisol 14 8, CPK 26  -TTKG is low suggestive of hypoaldosteronism  Darian level 4 5, low normal range   -will begin sodium bicarbonate 650mg twice daily as urine pH low and this also poses a risk of stone formation        3  HTN - BP well controlled  Continue metoprolol 100mg twice daily, HCTZ 25mg daily     4  DM2 - on metformin, glipizide, sugars well controlled, last A1C 5 8  Also on bydureon      5  Elevated uric acid level - last uric acid 7 3 - on allopurinol 100mg  Monitor uric acid  Increase to 200mg daily       6  Hypomagnesemia - does not use PPIs  Continue magnesium supplement  Last mag 1 6 as of 2/12/21  Urine mag 180, urine Cr 46 9  FeMag 402% - this indicates renal magnesium wasting  Repeat mag level       7  Leukocytosis - WBC 19 7 on last check, has seen hematology  She has ROSEANNE-2 mutation       RTC in 3 months    Begin lemon juice in water as we discussed 3 x a day  Begin lokelma instead of veltassa  Appreciate veltassa expensive  Take 2 allopurinol in the morning  Ok to receive MRI with gadolinium if needed  Grant Reyes was seen today for follow-up and hyperkalemia  Diagnoses and all orders for this visit:    Hyperkalemia  -     Magnesium; Future    Microalbuminuria due to type 2 diabetes mellitus ([facility] [address] )    Hypertension    Nephrolithiasis  -     allopurinol (ZYLOPRIM) 100 mg tablet; Take 2 tablets (200 mg total) by mouth daily  -     sod citrate-citric acid (BICITRA) 500-334 MG/5ML;  Take 30 mL by mouth 3 (three) times a day with meals    Type 2 diabetes mellitus with hyperglycemia, without long-term current use of insulin (HCC)  -     glipiZIDE (GLUCOTROL XL) 5 mg 24 hr tablet; Take 1 tablet (5 mg total) by mouth daily        Assessment/Plan:  1  History of nephrolithiasis   -in office UA shows trace protein and trace WBCs  -last sCr 0 76 as of 2/27/20  -would adhere to a low sodium(<2000mg) diet to prevent stone formation  Avoid canned foods, packaged foods, [place] food, and eating out frequently in restaurants    -should drink enough water to urinate 2-2 5L/day   -renal ultrasound performed November 16, 2020 shows right kidney 11 8 cm, left kidney 11 5 cm with nonobstructive kidney stones noted in the left pelvis with the largest 6 mm in size  Two right renal cysts also noted with 1 minimally complex   -most recent litholink from Feb 2021 shows low urine citrate at 117-->124 with low urine volume at only 1 19L(lower than in June 2020)  -Would increase hydration and take 1 table spoon lemon juice in 8 oz glass of water three times daily    -will hold off on lokelma for now  Continue veltassa   -already on HCTZ   -double water intake      2  Hyperkalemia ? D/t obstruction from stones vs from RTA in setting of DM and kidney stones   -K 5 5 on latest bloodwork  Monitor BMP  Currently off lasix  On HCTZ 25mg daily  -on veltassa dose to 25 2g previously but has run out  Will prescribe lokelma as cannot get more veltassa until August 2021    -renin within normal range  Uric acid 6 3  Phos normal   -check urine potassium 17 2, urine osm 444, urine sodium 69, urine cr 46 9, serum cortisol 14 8, CPK 26  -TTKG is low suggestive of hypoaldosteronism  Darian level 4 5, low normal range   -will begin sodium bicarbonate 650mg twice daily as urine pH low and this also poses a risk of stone formation        3  HTN - BP well controlled  Continue metoprolol 100mg twice daily, HCTZ 25mg daily     4  DM2 - on metformin, glipizide, sugars well controlled, last A1C 5 8  Also on bydureon      5  Elevated uric acid level - last uric acid 7 3 - on allopurinol 100mg   Monitor uric acid  Increase to 200mg daily       6  Hypomagnesemia - does not use PPIs  Continue magnesium supplement  Last mag 1 6 as of 2/12/21  Urine mag 180, urine Cr 46 9  FeMag 402% - this indicates renal magnesium wasting  Repeat mag level       7  Leukocytosis - WBC 19 7 on last check, has seen hematology  She has ROSEANNE-2 mutation       RTC in 3 months    Begin lemon juice in water as we discussed 3 x a day  Begin lokelma instead of veltassa  Appreciate veltassa expensive  Take 2 allopurinol in the morning  Ok to receive MRI with gadolinium if needed  SUBJECTIVE / INTERVAL HISTORY:  67 y o  female presents in follow up of hyperkalemia  Lafe [place] denies any recent illness/hospitalizations/medication changes since last office visit  Denies NSAID use  HTN - BP under control, usually normal range  DM2 - blood sugars well controlled  Had passed kidney stones in January 21, 2021  Had had pain with this  Has frequent diarrhea from metformin and veltassa  Review of Systems   Constitutional: Negative for chills and fever  HENT: Negative for sore throat and trouble swallowing  Eyes: Negative for visual disturbance  Respiratory: Negative for cough and shortness of breath  Cardiovascular: Negative for chest pain and leg swelling  Gastrointestinal: Negative for diarrhea, nausea and vomiting  Endocrine: Negative for polyuria  Genitourinary: Negative for difficulty urinating, dysuria and hematuria  Musculoskeletal: Negative for back pain and neck pain  Skin: Negative for rash  Neurological: Negative for dizziness, light-headedness and numbness  Hematological: Negative for adenopathy  Does not bruise/bleed easily  Psychiatric/Behavioral: The patient is not nervous/anxious          OBJECTIVE:  /62 (BP Location: Left arm, Patient Position: Sitting, Cuff Size: Standard)   Pulse 86   Temp 97 8 °F (36 6 °C) (Temporal)   Resp 16   Ht 4' 10" (1 473 m)   Wt 73 kg (161 lb)   BMI 33 65 kg/m²  Body mass index is 33 65 kg/m²  Physical exam:  Physical Exam  Vitals signs and nursing note reviewed  Constitutional:       General: She is not in acute distress  Appearance: She is well-developed  She is not diaphoretic  HENT:      Head: Normocephalic and atraumatic  Mouth/Throat:      Pharynx: No oropharyngeal exudate  Eyes:      General: No scleral icterus  Right eye: No discharge  Left eye: No discharge  Neck:      Musculoskeletal: Normal range of motion and neck supple  Thyroid: No thyromegaly  Cardiovascular:      Rate and Rhythm: Normal rate and regular rhythm  Heart sounds: No murmur  Pulmonary:      Effort: Pulmonary effort is normal  No respiratory distress  Breath sounds: Normal breath sounds  No wheezing  Abdominal:      General: Bowel sounds are normal  There is no distension  Palpations: Abdomen is soft  Skin:     General: Skin is warm and dry  Findings: No rash  Neurological:      Mental Status: She is alert  Motor: No abnormal muscle tone        Comments: awake   Psychiatric:         Behavior: Behavior normal          Medications:    Current Outpatient Medications:     allopurinol (ZYLOPRIM) 100 mg tablet, Take 2 tablets (200 mg total) by mouth daily, Disp: 90 tablet, Rfl: 1    apixaban (ELIQUIS) 5 mg, Take 5 mg by mouth 2 (two) times a day  , Disp: , Rfl:     BYDUREON BCISE 2 MG/0 85ML AUIJ, INJECT 2 MG UNDER THE SKIN ONCE A WEEK, Disp: 3 4 pen, Rfl: 1    Cholecalciferol (VITAMIN D3) 50 MCG (2000 UT) capsule, Take 2,000 Units by mouth daily , Disp: , Rfl:     fenofibrate (TRICOR) 54 MG tablet, TAKE 1 TABLET BY MOUTH EVERY DAY, Disp: 90 tablet, Rfl: 1    glipiZIDE (GLUCOTROL XL) 5 mg 24 hr tablet, Take 1 tablet (5 mg total) by mouth daily, Disp: , Rfl:     glucose blood (ONE TOUCH ULTRA TEST) test strip, Test 3 times a day, Disp: 300 each, Rfl: 3    hydrochlorothiazide (HYDRODIURIL) 25 mg tablet, Take 25 mg by mouth daily prn, Disp: , Rfl:     Lancets (OneTouch Delica Plus [id number]) MISC, USE TO TEST BLOOD SUGARS 3 times A DAY , Disp: 200 each, Rfl: 3    Magnesium 500 MG CAPS, Take by mouth daily  , Disp: , Rfl:     metFORMIN (GLUCOPHAGE) 1000 MG tablet, Take 1/2 tablet in am and 1 whole tablet in pm, Disp: 180 tablet, Rfl: 0    metoprolol succinate (TOPROL-XL) 100 mg 24 hr tablet, Take 100 mg by mouth 2 (two) times a day  , Disp: , Rfl:     Patiromer Sorbitex Calcium 25 2 g PACK, Take 25 2 g by mouth daily, Disp: 90 each, Rfl: 1    sodium bicarbonate 650 mg tablet, Take 1 tablet (650 mg total) by mouth 2 (two) times a day, Disp: 180 tablet, Rfl: 1    sod citrate-citric acid (BICITRA) 500-334 MG/5ML, Take 30 mL by mouth 3 (three) times a day with meals, Disp: 30 mL, Rfl: 3    Allergies: Allergies as of 03/11/2021 - Reviewed 03/11/2021   Allergen Reaction Noted    Epinephrine  04/14/2014    Lidocaine  03/16/2016    Neomycin  06/27/2012    Pioglitazone  10/12/2016       The following portions of the patient's history were reviewed and updated as appropriate: past family history, past surgical history and problem list     Laboratory Results:  Lab Results   Component Value Date    SODIUM 135 11/09/2020    K 5 4 11/09/2020     11/09/2020    CO2 22 2 02/27/2020    BUN 31 11/09/2020    CREATININE 0 92 11/09/2020    GLUC 79 11/09/2020    CALCIUM 9 8 11/09/2020        Lab Results   Component Value Date    PTH 7 5 (L) 12/09/2019    CALCIUM 9 8 11/09/2020    PHOS 4 3 (A) 01/13/2020       Portions of the record may have been created with voice recognition software   Occasional wrong word or "sound a like" substitutions may have occurred due to the inherent limitations of voice recognition software   Read the chart carefully and recognize, using context, where substitutions have occurred      Yonny Quezada DO  3/11/2021  2:14 PM  Sign when Signing Visit  [address] [place] [address] NOTE   Clarita Polanco 65 y o  female MRN: [id number]  DATE: 3/11/2021  Reason for visit:   Chief Complaint   Patient presents with    Follow-up    Hyperkalemia        There are no Patient Instructions on file for this visit  Dion Cook was seen today for follow-up and hyperkalemia  Diagnoses and all orders for this visit:    Hyperkalemia    Microalbuminuria due to type 2 diabetes mellitus ([address] )    Hypertension    Nephrolithiasis        Assessment/Plan:  1  History of nephrolithiasis   -in office UA shows trace protein and trace WBCs  -last sCr 0 76 as of 2/27/20  -would adhere to a low sodium(<2000mg) diet to prevent stone formation  Avoid canned foods, packaged foods, [place] food, and eating out frequently in restaurants    -should drink enough water to urinate 2-2 5L/day   -renal ultrasound performed November 16, 2020 shows right kidney 11 8 cm, left kidney 11 5 cm with nonobstructive kidney stones noted in the left pelvis with the largest 6 mm in size  Two right renal cysts also noted with 1 minimally complex   -most recent litholink from Feb 2021 shows low urine citrate at 117-->124 with low urine volume at only 1 19L(lower than in June 2020)  -Would increase hydration and take 1 table spoon lemon juice in 8 oz glass of water three times daily    -will hold off on lokelma for now  Continue veltassa   -already on HCTZ   -increase water intake to five 16 oz bottles = 80 oz a day      2  Hyperkalemia ? D/t obstruction from stones vs from RTA in setting of DM and kidney stones   -K 5 5 on latest bloodwork  Monitor BMP  Currently off lasix  On HCTZ 25mg daily  -on veltassa dose to 25 2g previously but has run out  Will prescribe lokelma as cannot get more veltassa until August 2021    -renin within normal range  Uric acid 6 3  Phos normal   -check urine potassium 17 2, urine osm 444, urine sodium 69, urine cr 46 9, serum cortisol 14 8, CPK 26  -TTKG is low suggestive of hypoaldosteronism   Darian level 4 5, low normal range   -will begin sodium bicarbonate 650mg twice daily as urine pH low and this also poses a risk of stone formation        3  HTN - BP well controlled  Continue metoprolol 100mg twice daily, HCTZ 25mg daily     4  DM2 - on metformin, glipizide, sugars well controlled, last A1C 5 8  Also on bydureon      5  Elevated uric acid level - last uric acid 7 3 - on allopurinol 100mg  Monitor uric acid       6  Hypomagnesemia - does not use PPIs  Continue magnesium supplement  Last mag 1 6 as of 2/12/21  Urine mag 180, urine Cr 46 9  FeMag 402% - this indicates renal magnesium wasting  Repeat mag level       7  Leukocytosis - WBC 19 7 on last check, has seen hematology  She has ROSEANNE-2 mutation       RTC in 3 months    Begin lemon juice in water as we discussed 3 x a day  Begin lokelma instead of veltassa  Appreciate veltassa expensive  Ok to receive MRI with gadolinium if needed  SUBJECTIVE / INTERVAL HISTORY:  67 y o  female presents in follow up of hyperkalemia  Rudi Mace denies any recent illness/hospitalizations/medication changes since last office visit  ?NSAID use  HTN - BP? DM2 - blood sugars? No recent kidney stones  Review of Systems   Constitutional: Negative for chills and fever  HENT: Negative for sore throat and trouble swallowing  Eyes: Negative for visual disturbance  Respiratory: Negative for cough and shortness of breath  Cardiovascular: Negative for chest pain and leg swelling  Gastrointestinal: Negative for diarrhea, nausea and vomiting  Endocrine: Negative for polyuria  Genitourinary: Negative for difficulty urinating, dysuria and hematuria  Musculoskeletal: Negative for back pain and neck pain  Skin: Negative for rash  Neurological: Negative for dizziness, light-headedness and numbness  Hematological: Negative for adenopathy  Does not bruise/bleed easily  Psychiatric/Behavioral: The patient is not nervous/anxious          OBJECTIVE:  /62 (BP Location: Left arm, Patient Position: Sitting, Cuff Size: Standard)   Pulse 86   Temp 97 8 °F (36 6 °C) (Temporal)   Resp 16   Ht 4' 10" (1 473 m)   Wt 73 kg (161 lb)   BMI 33 65 kg/m²  Body mass index is 33 65 kg/m²  Physical exam:  Physical Exam  Vitals signs and nursing note reviewed  Constitutional:       General: She is not in acute distress  Appearance: She is well-developed  She is not diaphoretic  HENT:      Head: Normocephalic and atraumatic  Mouth/Throat:      Pharynx: No oropharyngeal exudate  Eyes:      General: No scleral icterus  Right eye: No discharge  Left eye: No discharge  Neck:      Musculoskeletal: Normal range of motion and neck supple  Thyroid: No thyromegaly  Cardiovascular:      Rate and Rhythm: Normal rate and regular rhythm  Heart sounds: No murmur  Pulmonary:      Effort: Pulmonary effort is normal  No respiratory distress  Breath sounds: Normal breath sounds  No wheezing  Abdominal:      General: Bowel sounds are normal  There is no distension  Palpations: Abdomen is soft  Skin:     General: Skin is warm and dry  Findings: No rash  Neurological:      Mental Status: She is alert  Motor: No abnormal muscle tone        Comments: awake   Psychiatric:         Behavior: Behavior normal          Medications:    Current Outpatient Medications:     allopurinol (ZYLOPRIM) 100 mg tablet, TAKE 1 TABLET BY MOUTH EVERY DAY, Disp: 90 tablet, Rfl: 1    apixaban (ELIQUIS) 5 mg, Take 5 mg by mouth 2 (two) times a day  , Disp: , Rfl:     BYDUREON BCISE 2 MG/0 85ML AUIJ, INJECT 2 MG UNDER THE SKIN ONCE A WEEK, Disp: 3 4 pen, Rfl: 1    Cholecalciferol (VITAMIN D3) 50 MCG (2000 UT) capsule, Take 2,000 Units by mouth daily , Disp: , Rfl:     fenofibrate (TRICOR) 54 MG tablet, TAKE 1 TABLET BY MOUTH EVERY DAY, Disp: 90 tablet, Rfl: 1    glipiZIDE (GLUCOTROL XL) 5 mg 24 hr tablet, TAKE 1 TABLET BY MOUTH TWICE A DAY (Patient taking differently: Take 5 mg by mouth daily ), Disp: 180 tablet, Rfl: 3    glucose blood (ONE TOUCH ULTRA TEST) test strip, Test 3 times a day, Disp: 300 each, Rfl: 3    hydrochlorothiazide (HYDRODIURIL) 25 mg tablet, Take 25 mg by mouth daily prn, Disp: , Rfl:     Lancets (OneTouch Delica Plus [id number]) MISC, USE TO TEST BLOOD SUGARS 3 times A DAY , Disp: 200 each, Rfl: 3    Magnesium 500 MG CAPS, Take by mouth daily  , Disp: , Rfl:     metFORMIN (GLUCOPHAGE) 1000 MG tablet, Take 1/2 tablet in am and 1 whole tablet in pm, Disp: 180 tablet, Rfl: 0    metoprolol succinate (TOPROL-XL) 100 mg 24 hr tablet, Take 100 mg by mouth 2 (two) times a day  , Disp: , Rfl:     Patiromer Sorbitex Calcium 25 2 g PACK, Take 25 2 g by mouth daily, Disp: 90 each, Rfl: 1    sodium bicarbonate 650 mg tablet, Take 1 tablet (650 mg total) by mouth 2 (two) times a day, Disp: 180 tablet, Rfl: 1    Allergies: Allergies as of 03/11/2021 - Reviewed 03/11/2021   Allergen Reaction Noted    Epinephrine  04/14/2014    Lidocaine  03/16/2016    Neomycin  06/27/2012    Pioglitazone  10/12/2016       The following portions of the patient's history were reviewed and updated as appropriate: past family history, past surgical history and problem list     Laboratory Results:  Lab Results   Component Value Date    SODIUM 135 11/09/2020    K 5 4 11/09/2020     11/09/2020    CO2 22 2 02/27/2020    BUN 31 11/09/2020    CREATININE 0 92 11/09/2020    GLUC 79 11/09/2020    CALCIUM 9 8 11/09/2020        Lab Results   Component Value Date    PTH 7 5 (L) 12/09/2019    CALCIUM 9 8 11/09/2020    PHOS 4 3 (A) 01/13/2020       Portions of the record may have been created with voice recognition software   Occasional wrong word or "sound a like" substitutions may have occurred due to the inherent limitations of voice recognition software   Read the chart carefully and recognize, using context, where substitutions have occurred

## 2021-03-11 NOTE — PATIENT INSTRUCTIONS
1  History of nephrolithiasis   -in office UA shows trace protein and trace WBCs  -last sCr 0 76 as of 2/27/20  -would adhere to a low sodium(<2000mg) diet to prevent stone formation  Avoid canned foods, packaged foods, [place] food, and eating out frequently in restaurants    -should drink enough water to urinate 2-2 5L/day   -renal ultrasound performed November 16, 2020 shows right kidney 11 8 cm, left kidney 11 5 cm with nonobstructive kidney stones noted in the left pelvis with the largest 6 mm in size  Two right renal cysts also noted with 1 minimally complex   -most recent litholink from Feb 2021 shows low urine citrate at 117-->124 with low urine volume at only 1 19L(lower than in June 2020)  -Would increase hydration and take 1 table spoon lemon juice in 8 oz glass of water three times daily    -will begin bicitra as well    -will hold off on lokelma for now  Continue veltassa   -already on HCTZ   -double water intake      2  Hyperkalemia ? D/t obstruction from stones vs from RTA in setting of DM and kidney stones   -K 5 5 on latest bloodwork  Monitor BMP  Currently off lasix  On HCTZ 25mg daily  -on veltassa dose to 25 2g previously but has run out  Will prescribe lokelma as cannot get more veltassa until August 2021    -renin within normal range  Uric acid 6 3  Phos normal   -check urine potassium 17 2, urine osm 444, urine sodium 69, urine cr 46 9, serum cortisol 14 8, CPK 26  -TTKG is low suggestive of hypoaldosteronism  Darian level 4 5, low normal range   -will begin sodium bicarbonate 650mg twice daily as urine pH low and this also poses a risk of stone formation        3  HTN - BP well controlled  Continue metoprolol 100mg twice daily, HCTZ 25mg daily     4  DM2 - on metformin, glipizide, sugars well controlled, last A1C 5 8  Also on bydureon      5  Elevated uric acid level - last uric acid 7 3 - on allopurinol 100mg  Monitor uric acid  Increase to 200mg daily       6   Hypomagnesemia - does not use PPIs  Continue magnesium supplement  Last mag 1 6 as of 2/12/21  Urine mag 180, urine Cr 46 9  FeMag 402% - this indicates renal magnesium wasting  Repeat mag level       7  Leukocytosis - WBC 19 7 on last check, has seen hematology  She has ROSEANNE-2 mutation       RTC in 3 months    Begin lemon juice in water as we discussed 3 x a day  Begin lokelma instead of veltassa  Appreciate veltassa expensive  Take 2 allopurinol in the morning  Ok to receive MRI with gadolinium if needed

## 2021-03-23 ENCOUNTER — TELEPHONE (OUTPATIENT)
Dept: NEPHROLOGY | Facility: CLINIC | Age: 76
End: 2021-03-23

## 2021-03-23 DIAGNOSIS — E87.5 HYPERKALEMIA: ICD-10-CM

## 2021-03-23 RX ORDER — SODIUM ZIRCONIUM CYCLOSILICATE 5 G/5G
POWDER, FOR SUSPENSION ORAL
Qty: 90 EACH | Refills: 2 | Status: SHIPPED | OUTPATIENT
Start: 2021-03-23 | End: 2021-05-10 | Stop reason: ALTCHOICE

## 2021-03-23 RX ORDER — SODIUM ZIRCONIUM CYCLOSILICATE 5 G/5G
1 POWDER, FOR SUSPENSION ORAL DAILY
Qty: 30 EACH | Refills: 1 | Status: SHIPPED | OUTPATIENT
Start: 2021-03-23 | End: 2021-03-23

## 2021-03-23 NOTE — TELEPHONE ENCOUNTER
Pharmacy/patient is also requesting that a script be sent over for [facility] to see which one would be cheaper after [facility] is [facility]

## 2021-03-23 NOTE — TELEPHONE ENCOUNTER
Spoke with Tito Mariee the pharmacist at [facility]  Pt is now able to renew her cesar for her Veltassa  Idegreysone Nicely was around the same cost  A verbal order was given to the pharmacist for Veltassa 25 2 g pack by mouth daily  Pt has also been made aware

## 2021-03-23 NOTE — TELEPHONE ENCOUNTER
Patient sees Unruly Barriga  She is calling because she stated that [facility] pharmacy needs to contacted at [phone] regarding her medication Veltassa  The pharmacy needs this right away because the cesar is going to  in 24 hours  Please contact [address] [place] [address] at [phone]  Patient will also like a call back at [phone]  She will be home for the next hour

## 2021-04-02 ENCOUNTER — OFFICE VISIT (OUTPATIENT)
Dept: GASTROENTEROLOGY | Facility: CLINIC | Age: 76
End: 2021-04-02
Payer: MEDICARE

## 2021-04-02 VITALS
DIASTOLIC BLOOD PRESSURE: 68 MMHG | SYSTOLIC BLOOD PRESSURE: 120 MMHG | BODY MASS INDEX: 33.8 KG/M2 | HEIGHT: 58 IN | WEIGHT: 161 LBS | HEART RATE: 68 BPM

## 2021-04-02 DIAGNOSIS — C18.2 MALIGNANT NEOPLASM OF ASCENDING COLON (HCC): ICD-10-CM

## 2021-04-02 DIAGNOSIS — R18.8 CIRRHOSIS OF LIVER WITH ASCITES, UNSPECIFIED HEPATIC CIRRHOSIS TYPE (HCC): ICD-10-CM

## 2021-04-02 DIAGNOSIS — K74.60 HEPATIC CIRRHOSIS, UNSPECIFIED HEPATIC CIRRHOSIS TYPE, UNSPECIFIED WHETHER ASCITES PRESENT (HCC): ICD-10-CM

## 2021-04-02 DIAGNOSIS — K74.60 CIRRHOSIS OF LIVER WITH ASCITES, UNSPECIFIED HEPATIC CIRRHOSIS TYPE (HCC): ICD-10-CM

## 2021-04-02 DIAGNOSIS — K86.2 PANCREATIC CYST: Primary | ICD-10-CM

## 2021-04-02 PROCEDURE — 99214 OFFICE O/P EST MOD 30 MIN: CPT | Performed by: INTERNAL MEDICINE

## 2021-04-02 NOTE — PROGRESS NOTES
[facility] - Outpatient Follow-up Note  Vinny Thomas 67 y o  female MRN: [id number]  Encounter: [id number]    ASSESSMENT AND PLAN:      1  Pancreatic cyst    Last MRI was January 2020  Multiple small cyst in the pancreas were seen  These were not particularly worrisome  She did not have six-month follow-up MRI as her concerned about the pandemic  She is willing at this point to undergo some kind every imaging  We did discuss the different options including endoscopic ultrasound CT scan versus MRI  I recommend the latter  There is a small risk of renal dysfunction with the contrast but her GFR is greater than 60 and her creatinine is 0 9  I think the benefit of the test outweighs the risk - MRI abdomen w wo contrast; Future    2  Hepatic cirrhosis, unspecified hepatic cirrhosis type, unspecified whether ascites present ([facility] )    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  Previous ultrasound did not show ascites but I think she has it on physical exam will review the MRI  She may benefit from a paracentesis  We discussed 2 g sodium restriction  3  Malignant neoplasm of ascending colon (HCC)    Remote history of colon cancer 21 years ago resected for cure  5    Chronic anticoagulation with Eliquis for AFib      Followup Appointment:  3-4 months  ______________________________________________________________________    Chief Complaint   Patient presents with    Follow up-cirrhosis     HPI:  Feels generally okay  Complaining of abdominal distension  No real pain she says she is limiting her salt intake no shortness of breath      Historical Information   Past Medical History:   Diagnosis Date    Atrial fibrillation ([facility] )     Colon cancer ([facility] )     limited to wall of colon, no chemo or XRT needed    Diabetes mellitus ([facility] )     Hypertension     Hypomagnesemia     Kidney stone     Leukocytosis     chanelle 2 gene positive    Osteoarthritis      Past Surgical History:   Procedure Laterality Date    APPENDECTOMY      with colectomy    COLECTOMY [place]      24 inches    COLONOSCOPY  2016    HYSTERECTOMY      LAPAROSCOPIC CHOLECYSTECTOMY       Social History     Substance and Sexual Activity   Alcohol Use No     Social History     Substance and Sexual Activity   Drug Use No     Social History     Tobacco Use   Smoking Status Never Smoker   Smokeless Tobacco Never Used     Family History   Problem Relation Age of Onset    Asthma Mother     Heart disease Mother         post heart surgery    Heart attack Father     Coronary artery disease Father     Heart attack Brother     Colon cancer Brother     Colon cancer Brother     Heart attack Brother     Diabetes type II Brother     Heart attack Brother     Colon cancer Brother     No Known Problems Brother     Heart disease Paternal Uncle     Hypertension Daughter     Hypertension Daughter     No Known Problems Daughter          Current Outpatient Medications:     allopurinol (ZYLOPRIM) 100 mg tablet    apixaban (ELIQUIS) 5 mg    BYDUREON BCISE 2 MG/0 85ML AUIJ    Cholecalciferol (VITAMIN D3) 50 MCG (2000 UT) capsule    fenofibrate (TRICOR) 54 MG tablet    glipiZIDE (GLUCOTROL XL) 5 mg 24 hr tablet    glucose blood (ONE TOUCH ULTRA TEST) test strip    hydrochlorothiazide (HYDRODIURIL) 25 mg tablet    Lancets (OneTouch Delica Plus AUFOKQ33V) MISC    Lokelma 5 g PACK    Magnesium 500 MG CAPS    metFORMIN (GLUCOPHAGE) 1000 MG tablet    metoprolol succinate (TOPROL-XL) 100 mg 24 hr tablet    sod citrate-citric acid (BICITRA) 500-334 MG/5ML    sodium bicarbonate 650 mg tablet  Allergies   Allergen Reactions    Epinephrine      Other reaction(s): increased HR    Lidocaine      Other reaction(s): increased HR    Neomycin      Other reaction(s): rash    Pioglitazone      Other reaction(s): Fluid retention     Reviewed medications and allergies and updated as indicated    PHYSICAL EXAM:    Blood pressure 120/68, pulse 68, height 4' 10" (1 473 m), weight 73 kg (161 lb)  Body mass index is 33 65 kg/m²  General Appearance: NAD, cooperative, alert  Eyes: Anicteric, PERRLA, EOMI  ENT:  Normocephalic, atraumatic, normal mucosa  Neck:  Supple, symmetrical, trachea midline  Resp:  Clear to auscultation bilaterally; no rales, rhonchi or wheezing; respirations unlabored   CV:  S1 S2, Regular rate and rhythm; no murmur, rub, or gallop  GI:  Soft,   Distended appears to have a fluid wave nontender liver itself appears somewhat hard and palpable in the right upper quadrant  Rectal: Deferred  Musculoskeletal: No cyanosis, clubbing or edema  Normal ROM  Skin:  No jaundice, rashes, or lesions   Heme/Lymph: No palpable cervical lymphadenopathy  Psych: Normal affect, good eye contact  Neuro: No gross deficits, AAOx3    Lab Results:   Lab Results   Component Value Date    WBC 19 7 11/09/2020    HGB 14 2 11/09/2020    HCT 48 11/09/2020    MCV 86 11/09/2020     11/09/2020     Lab Results   Component Value Date    K 5 4 11/09/2020     11/09/2020    CO2 22 2 02/27/2020    BUN 31 11/09/2020    CREATININE 0 92 11/09/2020    GLUF 75 12/09/2019    CALCIUM 9 8 11/09/2020    CORRECTEDCA 10 0 12/09/2019    AST 22 11/09/2020    ALT 11 11/09/2020    ALKPHOS 99 11/09/2020    EGFR 59 11/09/2020     No results found for: IRON, TIBC, FERRITIN  No results found for: LIPASE    Radiology Results:   No results found

## 2021-04-02 NOTE — LETTER
April 2, 2021     MD Radha Bee  [address] [address]  [address] [address] [address] [address]    Patient: Krishan Isidro   YOB: 1945   Date of Visit: 4/2/2021       Dear Dr Luis Daniel Maya:    Thank you for referring Krishan Isidro to me for evaluation  Below are my notes for this consultation  If you have questions, please do not hesitate to call me  I look forward to following your patient along with you           Sincerely,        Brian Epps MD        CC: MD Layla Cristina MD

## 2021-04-12 ENCOUNTER — TELEPHONE (OUTPATIENT)
Dept: GASTROENTEROLOGY | Facility: CLINIC | Age: 76
End: 2021-04-12

## 2021-04-12 DIAGNOSIS — K86.2 PANCREATIC CYST: Primary | ICD-10-CM

## 2021-04-12 DIAGNOSIS — Z01.812 BLOOD TESTS PRIOR TO TREATMENT OR PROCEDURE: ICD-10-CM

## 2021-04-12 NOTE — TELEPHONE ENCOUNTER
Pt left  mssg stating WO ordered an MRI  She rec'd a call from  after they got order; she wants to go to [facility] if order was sent there also? # [phone]

## 2021-04-15 DIAGNOSIS — N20.0 NEPHROLITHIASIS: ICD-10-CM

## 2021-04-15 RX ORDER — ALLOPURINOL 100 MG/1
TABLET ORAL
Qty: 90 TABLET | Refills: 1 | Status: SHIPPED | OUTPATIENT
Start: 2021-04-15 | End: 2021-10-28

## 2021-04-21 LAB
CREAT ?TM UR-SCNC: 58 UMOL/L
EXT PROTEIN URINE: 32
HBA1C MFR BLD HPLC: 5.7 %
PROT/CREAT UR: 0.55 MG/G{CREAT}

## 2021-04-22 ENCOUNTER — TELEPHONE (OUTPATIENT)
Dept: NEPHROLOGY | Facility: CLINIC | Age: 76
End: 2021-04-22

## 2021-04-22 NOTE — TELEPHONE ENCOUNTER
Patient called and states since starting [facility] her legs and feet have been very swollen  One patient of the Lokelma has 400mg of sodium in it  She is supposed to be on a low sodium diet  Patient also has to have a MRI on the abdomen, with contrast  She wants to make Dr Erick Oliver aware of that as well  Dr Madan Valdez ordered MRI  She had blood work done yesterday at [facility], awaiting results

## 2021-04-22 NOTE — TELEPHONE ENCOUNTER
Patient sees Kimberly Solorzano  She called and left a message on office voice mail asking for a call back from Carlos A Abdi at [phone]

## 2021-04-23 ENCOUNTER — DOCUMENTATION (OUTPATIENT)
Dept: NEPHROLOGY | Facility: HOSPITAL | Age: 76
End: 2021-04-23

## 2021-04-23 DIAGNOSIS — R60.0 LEG EDEMA: Primary | ICD-10-CM

## 2021-04-23 DIAGNOSIS — E87.5 HYPERKALEMIA: ICD-10-CM

## 2021-04-23 LAB
EXT BILIRUBIN, UA: NORMAL
EXT BLOOD, UA: NORMAL
EXT COLOR, UA: YELLOW
EXT DIFF-ABS BASOPHILS: 0.5
EXT DIFF-ABS EOSINOPHILS: 0.6
EXT DIFF-ABS LYMPHOCYTES: 0.9
EXT DIFF-ABS MONOCYTES: 0.5
EXT DIFF-ABS NEUTROPHILS: 19.3
EXT GLUCOSE BLD: 73
EXT GLUCOSE, UA: NORMAL
EXT KETONES: NORMAL
EXT NITRITE, UA: NORMAL
EXT PH, UA: 5.5
EXT PROTEIN, UA: NORMAL
EXT SPECIFIC GRAVITY, UA: 1.02
EXT UROBILINOGEN: NORMAL
EXTERNAL ALBUMIN: 4.1
EXTERNAL ALK PHOS: 107
EXTERNAL ALT: 8
EXTERNAL ANION GAP: 13
EXTERNAL AST: 20
EXTERNAL BICARBONATE: 22.7
EXTERNAL BUN: 34
EXTERNAL CALCIUM: 9.5
EXTERNAL CASTS (UA): NORMAL
EXTERNAL CHLORIDE: 102
EXTERNAL CREATININE: 1.1
EXTERNAL EGFR: 48
EXTERNAL HEMATOCRIT: 45 %
EXTERNAL HEMOGLOBIN: 12.6 G/DL
EXTERNAL MCV: 100
EXTERNAL PLATELET COUNT: 404 K/ΜL
EXTERNAL POTASSIUM: 5.5
EXTERNAL RBC (UA): 0
EXTERNAL RBC: 4.49
EXTERNAL RDW: 15.3
EXTERNAL SODIUM: 138
EXTERNAL T.BILIRUBIN: 1
EXTERNAL TOTAL PROTEIN: 6.8
EXTERNAL WBC (UA): 1.5
EXTERNAL WBC: 22
HBA1C MFR BLD HPLC: 5.7 %
WBC # BLD EST: NORMAL 10*3/UL

## 2021-04-23 NOTE — TELEPHONE ENCOUNTER
I spoke to the patient, she is aware to continue the [facility], and also to continue following the low sodium diet  She will try compression stockings  We scheduled her for an appointment in May to see Dr Rita Hargrove  She also wanted to know if she is OK to have the MRI

## 2021-04-23 NOTE — TELEPHONE ENCOUNTER
Lab results are in the chart  If the patient continues Diego Alarcon, what should she do about the lower extremity edema?

## 2021-04-26 NOTE — TELEPHONE ENCOUNTER
Patient does not want to schedule MRI until Dr Oanh Orozco tells her that her lab results are ok to proceed with contrast   She is very worried about the contrast and her kidneys

## 2021-04-26 NOTE — TELEPHONE ENCOUNTER
I left a voicemail for the patient  Kidney function is slightly off creatinine is 1 10  Hold off on the MRI for now pending renal opinion    Another alternative that would not involve contrast would be an endoscopic ultrasound which we could consider there is no urgency to doing this in my opinion I advised her to call us back and see when we can talk

## 2021-04-26 NOTE — TELEPHONE ENCOUNTER
Pt states she is supposed to have an MRI done but doesn't know if it is safe for her to get contrast; req CB w/ results of BW to [phone]

## 2021-04-27 NOTE — RESULT ENCOUNTER NOTE
I spoke with the patient and spoke with Radiology  They can get reasonable information doing an MRI of the abdomen without contrast to look at the cyst on the pancreas  This will avoid any risk of nephrotoxicity  Please schedule it this way,   I will sign it  Thanks

## 2021-04-28 NOTE — TELEPHONE ENCOUNTER
Per Dr Wiley Basilio -    I spoke with the patient and spoke with Radiology  Gautam Allen can get reasonable information doing an MRI of the abdomen without contrast to look at the cyst on the pancreas   This will avoid any risk of nephrotoxicity   Please schedule it this way,   I will sign it   Thanks

## 2021-05-04 DIAGNOSIS — E11.65 TYPE 2 DIABETES MELLITUS WITH HYPERGLYCEMIA, WITHOUT LONG-TERM CURRENT USE OF INSULIN (HCC): ICD-10-CM

## 2021-05-10 DIAGNOSIS — E87.5 HYPERKALEMIA: ICD-10-CM

## 2021-05-10 DIAGNOSIS — I10 ESSENTIAL HYPERTENSION: Primary | ICD-10-CM

## 2021-05-10 NOTE — TELEPHONE ENCOUNTER
I spoke to the patient, she states her Cardiologist took her off of [facility] and her Sodium Bicarb two weeks ago  She is currently not on anything for hyperkalemia  She had a blood test done at [facility] this morning, but it has not been resulted yet  She is asking to go back on [facility]

## 2021-05-10 NOTE — TELEPHONE ENCOUNTER
Patient called asking to speak to you  Alva if you could please call the patient at [phone]  Thank You!

## 2021-05-11 ENCOUNTER — TELEPHONE (OUTPATIENT)
Dept: GASTROENTEROLOGY | Facility: CLINIC | Age: 76
End: 2021-05-11

## 2021-05-11 NOTE — TELEPHONE ENCOUNTER
Pt states she has MRI of Abd sched'd 5/17 at [facility]; questions order/wants to make sure it will cover entire abdomen down to the pelvic area/below navel; states "something is going on"  CB# [phone]

## 2021-05-13 NOTE — TELEPHONE ENCOUNTER
I spoke with patient, she feels something "fatty under naval, like something dropped, feels a pocket, jiggly"  No change in bowel, not painful  She did just complete US abd GV  Impression: marked enlargement of spleen with small amt of adjacent fluid  Multiple lesions are seen in pancreas most consistent with prior cysts  6 mm nononbstructing left renal stone  Report being scanned into Epic  Shoud we add pelvis to MRI  She declines study with contrast due to kidney disease  Please advise  I informed her I would call her back tomorrow with answer

## 2021-05-13 NOTE — TELEPHONE ENCOUNTER
Pt left VM mssg asking if nurse can talk to Rico Jones; left mssg earlier/wants to know if MRI of Ab will encompass under breast all the way down to the pelvic area/there's something going on around the navel area  CB# [phone]

## 2021-05-14 NOTE — TELEPHONE ENCOUNTER
I called pt, no answer, left message that after discussion with Dr Pop Edmonds, he would like her to proceed with MRI as ordered  New issue will need to be followed up

## 2021-05-26 ENCOUNTER — OFFICE VISIT (OUTPATIENT)
Dept: ENDOCRINOLOGY | Facility: HOSPITAL | Age: 76
End: 2021-05-26
Payer: MEDICARE

## 2021-05-26 VITALS
DIASTOLIC BLOOD PRESSURE: 52 MMHG | WEIGHT: 156 LBS | BODY MASS INDEX: 32.75 KG/M2 | HEART RATE: 81 BPM | HEIGHT: 58 IN | SYSTOLIC BLOOD PRESSURE: 106 MMHG

## 2021-05-26 DIAGNOSIS — E78.2 MIXED HYPERLIPIDEMIA: ICD-10-CM

## 2021-05-26 DIAGNOSIS — E04.2 GOITER, NONTOXIC, MULTINODULAR: ICD-10-CM

## 2021-05-26 DIAGNOSIS — E11.65 TYPE 2 DIABETES MELLITUS WITH HYPERGLYCEMIA, WITHOUT LONG-TERM CURRENT USE OF INSULIN (HCC): Primary | ICD-10-CM

## 2021-05-26 DIAGNOSIS — E11.42 DIABETIC POLYNEUROPATHY ASSOCIATED WITH TYPE 2 DIABETES MELLITUS (HCC): ICD-10-CM

## 2021-05-26 DIAGNOSIS — I10 ESSENTIAL HYPERTENSION: ICD-10-CM

## 2021-05-26 DIAGNOSIS — R80.9 MICROALBUMINURIA DUE TO TYPE 2 DIABETES MELLITUS (HCC): ICD-10-CM

## 2021-05-26 DIAGNOSIS — E11.29 MICROALBUMINURIA DUE TO TYPE 2 DIABETES MELLITUS (HCC): ICD-10-CM

## 2021-05-26 PROCEDURE — 99214 OFFICE O/P EST MOD 30 MIN: CPT | Performed by: INTERNAL MEDICINE

## 2021-05-26 RX ORDER — FUROSEMIDE 40 MG/1
40 TABLET ORAL DAILY
COMMUNITY
Start: 2021-05-06

## 2021-05-26 RX ORDER — LANCING DEVICE/LANCETS
KIT MISCELLANEOUS
Qty: 1 EACH | Refills: 0 | Status: SHIPPED | OUTPATIENT
Start: 2021-05-26 | End: 2021-08-30 | Stop reason: SDUPTHER

## 2021-05-26 RX ORDER — EXENATIDE 2 MG/.85ML
2 INJECTION, SUSPENSION, EXTENDED RELEASE SUBCUTANEOUS WEEKLY
Qty: 3.4 ML | Refills: 6 | Status: SHIPPED | OUTPATIENT
Start: 2021-05-26 | End: 2021-10-11 | Stop reason: SDUPTHER

## 2021-05-26 NOTE — PATIENT INSTRUCTIONS
hgba1c is 5 7%  This is excellent  Continue the same metformin, glipizide, and bydureon  If you start having low blood sugars again, call  Continue to check blood sugars once a day  thyroid ultrasound is due  Follow up in 3 months with blood work

## 2021-05-26 NOTE — PROGRESS NOTES
5/27/2021    Assessment/Plan      Diagnoses and all orders for this visit:    Type 2 diabetes mellitus with hyperglycemia, without long-term current use of insulin ([place] [id number] )  -     HEMOGLOBIN A1C W/ EAG ESTIMATION Lab Collect; Future  -     Comprehensive metabolic panel Lab Collect; Future  -     TSH, 3rd generation Lab Collect; Future  -     T4, free Lab Collect; Future  -     Lancet Devices (ONE TOUCH DELICA LANCING DEV) MISC; Use to test blood sugars once daily  -     Bydureon BCise 2 MG/0 85ML AUIJ; Inject 2 mg under the skin once a week    Diabetic polyneuropathy associated with type 2 diabetes mellitus ([place] [id number] )  -     HEMOGLOBIN A1C W/ EAG ESTIMATION Lab Collect; Future  -     Comprehensive metabolic panel Lab Collect; Future  -     TSH, 3rd generation Lab Collect; Future  -     T4, free Lab Collect; Future  -     Lancet Devices (ONE TOUCH DELICA LANCING DEV) MISC; Use to test blood sugars once daily    Microalbuminuria due to type 2 diabetes mellitus ([place] [id number] )  -     HEMOGLOBIN A1C W/ EAG ESTIMATION Lab Collect; Future  -     Comprehensive metabolic panel Lab Collect; Future  -     TSH, 3rd generation Lab Collect; Future  -     T4, free Lab Collect; Future    Goiter, nontoxic, multinodular  -     HEMOGLOBIN A1C W/ EAG ESTIMATION Lab Collect; Future  -     Comprehensive metabolic panel Lab Collect; Future  -     TSH, 3rd generation Lab Collect; Future  -     T4, free Lab Collect; Future  -     US thyroid; Future    Essential hypertension  -     HEMOGLOBIN A1C W/ EAG ESTIMATION Lab Collect; Future  -     Comprehensive metabolic panel Lab Collect; Future  -     TSH, 3rd generation Lab Collect; Future  -     T4, free Lab Collect; Future    Mixed hyperlipidemia  -     HEMOGLOBIN A1C W/ EAG ESTIMATION Lab Collect; Future  -     Comprehensive metabolic panel Lab Collect; Future  -     TSH, 3rd generation Lab Collect; Future  -     T4, free Lab Collect; Future    Other orders  -     furosemide (LASIX) 40 mg tablet;  Take 40 mg by mouth daily         Assessment/Plan:  1  Type 2 diabetes  Hemoglobin A1c is 5 7%  This demonstrates excellent control of her diabetes  For now, she will continue the same metformin, glipizide, and Bydureon  She has been asked to continue checking her blood sugars at least once a day  She has no evidence of hypoglycemia at present but she has been asked to call if she starts to have hypoglycemia and I will decrease her medications, likely I will decrease her glipizide again  2  Diabetic neuropathy  She has chronic neuropathic symptoms  Diabetic foot exam was performed in the office today  3  Microalbuminuria  She is following with Nephrology on a regular basis  4  Nontoxic multinodular goiter  She is clinically euthyroid  She has not had a recent thyroid ultrasound so I will order a thyroid ultrasound to be done at her earliest convenience  5  Hypertension  Blood pressure is under good control on her current dose of furosemide and metoprolol  6  Hyperlipidemia  She will continue the same fenofibrate 54 mg daily  I have asked her to follow up in 3 months with preceding hemoglobin A1c, CMP, TSH, and free T4       CC: Diabetes Type 2, thyroid, blood pressure, lipid follow-up    History of Present Illness     HPI: Erendira Gann is a 67y o  year old female with type 2 diabetes with neuropathy and microalbuminuria for 13-18 years, thyroid nodule and goiter, hypertension, hyperlipidemia for follow-up visit  She is on oral agents at home and takes Bydureon 2 mg once a week, glipizide XL 5 mg daily, and metformin 500 mg in am and 1000 mg in  pm  She denies any polyphagia, polydipsia, and blurry vision  She has polyuria after her Lasix and twice a night nocturia  She has some numbness and tingling of the toes and fingertips  She denies chest pain or shortness of breath at this point  She last saw her cardiologist several weeks ago and is due to follow-up soon   She denies retinopathy, heart attack, stroke and claudication but does admit to neuropathy and nephropathy  Had significant lower extremity edema 1 month ago  Had gained 16 lbs  Now changed from HCTZ to lasix and was in CHF  The cardiologist sodium bicarbonate  She has now lost all of the edema  Had some SOB with edema and CHF  Hypoglycemic episodes: Yes occasional     The patient's last eye exam was in march 2021 for new glasses, to see eye doctor in 1 month for checkup  The patient's last foot exam was in  June 2020 at endocrine office visit  Last A1C was   Lab Results   Component Value Date    HGBA1C 5 7 04/21/2021    HGBA1C 5 7 04/21/2021     Blood Sugar/Glucometer/Pump/CGM review: checks blood sugars once a day in am, occasionally catracho check twice a day  Blood sugars   She has a history of a nontoxic multinodular goiter  She has multiple nodules which were biopsied in the past and were benign  Last ultrasound in 2018 showed stable size thyroid nodules  She will have an occasional heart palpitation with her Afib  She is fatigued  She has some diarrhea with 1 of her medications  She denies heat or cold intolerance, tremors, constipation, insomnia, dry skin, brittle nails, or hair loss  She has no compressive thyroid symptoms or difficulties with swallowing  She has hypertension and takes furosemide 40 mg daily, and metoprolol 100 mg twice a day  She denies headache or stroke-like symptoms  She has occasional dizziness when changing position  She has hyperlipidemia and takes fenofibrate 54 mg daily  She denies chest pain or shortness of breath  Review of Systems   Constitutional: Positive for fatigue  Negative for unexpected weight change  Tires out easier now  Weight 3 lbs less than feb 2021  HENT: Negative for trouble swallowing  Eyes: Negative for visual disturbance  Wears glasses  Respiratory: Negative for chest tightness and shortness of breath  Cardiovascular: Positive for palpitations and leg swelling  Negative for chest pain  Still occasional heart palpitations with A fib at times  Last visit with cardiologist was 2 weeks ago and to follow up this week  Gastrointestinal: Positive for diarrhea  Negative for abdominal pain, constipation and nausea  Some diarrhea with valtezza  Endocrine: Positive for polyuria  Negative for cold intolerance, heat intolerance, polydipsia and polyphagia  Nocturia 2 times a night  Polyuria after the lasix  Musculoskeletal:        Using a walker to walk  Skin: Negative for rash  No dry skin  No brittle nails  No hair loss  Neurological: Positive for dizziness and numbness  Negative for tremors, weakness, light-headedness and headaches  Toes and fingertips a bit numb  Will get a split second of dizziness or change in position will cause dizziness  Psychiatric/Behavioral: Negative for sleep disturbance         Historical Information   Past Medical History:   Diagnosis Date    Atrial fibrillation ([facility] [address] )     CHF (congestive heart failure) (HCC)     Colon cancer (HCC)     limited to wall of colon, no chemo or XRT needed    Diabetes mellitus ([facility] [address] )     Hypertension     Hypomagnesemia     Kidney stone     Leukocytosis     chanelle 2 gene positive    Osteoarthritis      Past Surgical History:   Procedure Laterality Date    APPENDECTOMY      with colectomy    COLECTOMY [place]KE      24 inches    COLONOSCOPY  2016    HYSTERECTOMY      LAPAROSCOPIC CHOLECYSTECTOMY       Social History   Social History     Substance and Sexual Activity   Alcohol Use No     Social History     Substance and Sexual Activity   Drug Use No     Social History     Tobacco Use   Smoking Status Never Smoker   Smokeless Tobacco Never Used     Family History:   Family History   Problem Relation Age of Onset    Asthma Mother     Heart disease Mother         post heart surgery    Heart attack Father     Coronary artery disease Father     Heart attack Brother     Colon cancer Brother     Colon cancer Brother     Heart attack Brother     Diabetes type II Brother     Heart attack Brother     Colon cancer Brother     No Known Problems Brother     Heart disease Paternal Uncle     Hypertension Daughter     Hypertension Daughter     No Known Problems Daughter        Meds/Allergies   Current Outpatient Medications   Medication Sig Dispense Refill    allopurinol (ZYLOPRIM) 100 mg tablet TAKE 1 TABLET BY MOUTH EVERY DAY (Patient taking differently: Take 200 mg by mouth daily ) 90 tablet 1    apixaban (ELIQUIS) 5 mg Take 5 mg by mouth 2 (two) times a day        Bydureon BCise 2 MG/0 85ML AUIJ Inject 2 mg under the skin once a week 3 4 mL 6    Cholecalciferol (VITAMIN D3) 50 MCG (2000 UT) capsule Take 2,000 Units by mouth daily       fenofibrate (TRICOR) 54 MG tablet TAKE 1 TABLET BY MOUTH EVERY DAY 90 tablet 1    furosemide (LASIX) 40 mg tablet Take 40 mg by mouth daily       glipiZIDE (GLUCOTROL XL) 5 mg 24 hr tablet Take 1 tablet (5 mg total) by mouth daily      glucose blood (ONE TOUCH ULTRA TEST) test strip Test 3 times a day 300 each 3    Lancets (OneTouch Delica Plus [id number]) MISC USE TO TEST BLOOD SUGARS 3 times A DAY   200 each 3    Magnesium 500 MG CAPS Take by mouth daily        metFORMIN (GLUCOPHAGE) 1000 MG tablet TAKE 1 TABLET BY MOUTH TWICE A DAY WITH FOOD (Patient taking differently: Take 500mg in the morning and 1000mg at night) 180 tablet 0    metoprolol succinate (TOPROL-XL) 100 mg 24 hr tablet Take 100 mg by mouth 2 (two) times a day        Patiromer Sorbitex Calcium 25 2 g PACK Take 25 2 g by mouth daily 90 each 3    hydrochlorothiazide (HYDRODIURIL) 25 mg tablet Take 25 mg by mouth daily prn      Lancet Devices (ONE TOUCH DELICA LANCING DEV) MISC Use to test blood sugars once daily 1 each 0    sod citrate-citric acid (BICITRA) 500-334 MG/5ML Take 30 mL by mouth 3 (three) times a day with meals (Patient not taking: Reported on 5/26/2021) 2700 mL 3     No current facility-administered medications for this visit  Allergies   Allergen Reactions    Epinephrine      Other reaction(s): increased HR    Lidocaine      Other reaction(s): increased HR    Neomycin      Other reaction(s): rash    Pioglitazone      Other reaction(s): Fluid retention       Objective   Vitals: Blood pressure 106/52, pulse 81, height 4' 10" (1 473 m), weight 70 8 kg (156 lb)  Invasive Devices     None                 Physical Exam  Vitals signs reviewed  Constitutional:       Appearance: Normal appearance  She is well-developed  HENT:      Head: Normocephalic and atraumatic  Eyes:      Extraocular Movements: Extraocular movements intact  Conjunctiva/sclera: Conjunctivae normal       Comments: No lid lag, stare, proptosis, or periorbital edema  Neck:      Musculoskeletal: Normal range of motion and neck supple  Thyroid: No thyromegaly  Vascular: No carotid bruit  Comments: Thyroid nodular and enlarged  No bruits over the thyroid gland  Cardiovascular:      Rate and Rhythm: Normal rate and regular rhythm  Pulses: Normal pulses  no weak pulses          Dorsalis pedis pulses are 2+ on the right side and 2+ on the left side  Posterior tibial pulses are 2+ on the right side and 2+ on the left side  Heart sounds: Normal heart sounds  No murmur  Comments: 2/6 systolic murmur  Pulmonary:      Effort: Pulmonary effort is normal       Breath sounds: Normal breath sounds  No wheezing  Abdominal:      Palpations: Abdomen is soft  Musculoskeletal: Normal range of motion  General: No deformity  Right lower leg: Edema present  Left lower leg: Edema present  Comments: 1-2+ Bilateral lower extremity edema  No tremor of the outstretched hands  No ulcerations of the feet     Feet:      Right foot:      Skin integrity: No ulcer, skin breakdown, erythema, warmth, callus or dry skin  Left foot:      Skin integrity: No ulcer, skin breakdown, erythema, warmth, callus or dry skin  Lymphadenopathy:      Cervical: No cervical adenopathy  Skin:     General: Skin is warm and dry  Findings: No rash  Neurological:      Mental Status: She is alert and oriented to person, place, and time  Deep Tendon Reflexes: Reflexes are normal and symmetric  Comments: Vibration sensation diminished to the 1st toe DIP joint bilaterally  Microfilament sensation intact bilaterally  Patient's shoes and socks removed  Right Foot/Ankle   Right Foot Inspection  Skin Exam: skin normal and skin intact no dry skin, no warmth, no callus, no erythema, no maceration, no abnormal color, no pre-ulcer, no ulcer and no callus                          Toe Exam: swelling no right toe deformity  Sensory   Vibration: diminished    Monofilament testing: intact  Vascular  Capillary refills: < 3 seconds  The right DP pulse is 2+  The right PT pulse is 2+  Left Foot/Ankle  Left Foot Inspection  Skin Exam: skin normal and skin intactno dry skin, no warmth, no erythema, no maceration, normal color, no pre-ulcer, no ulcer and no callus                         Toe Exam: swellingno left toe deformity                   Sensory   Vibration: diminished    Monofilament: intact  Vascular  Capillary refills: < 3 seconds  The left DP pulse is 2+  The left PT pulse is 2+  Assign Risk Category:  No deformity present; Loss of protective sensation; No weak pulses       Risk: 1        The history was obtained from the review of the chart and from the patient and       Lab Results:    Most recent Alc is  Lab Results   Component Value Date    HGBA1C 5 7 04/21/2021    HGBA1C 5 7 04/21/2021             Blood work done on 04/21/2021 at [facility] showed a CMP with a glucose of 73 fasting, BUN thirty-four, creatinine 1 1, GFR 48, BUN /creatinine ratio 30 9, potassium 5 5, but was otherwise normal     Lab Results   Component Value Date    CREATININE 1 10 04/21/2021    CREATININE 0 92 11/09/2020    CREATININE 0 76 02/27/2020    BUN 34 04/21/2021    K 5 5 04/21/2021     04/21/2021    CO2 22 2 02/27/2020     eGFR   Date Value Ref Range Status   12/09/2019 64 ml/min/1 73sq m Final     EXTERNAL EGFR   Date Value Ref Range Status   04/21/2021 48  Final       Lab Results   Component Value Date    HDL 32 (A) 11/09/2020    TRIG 122 11/09/2020       Lab Results   Component Value Date    ALT 8 04/21/2021    AST 20 04/21/2021    ALKPHOS 107 04/21/2021           Future Appointments   Date Time Provider Rasheed Clancy   7/12/2021 11:30 AM Elizabet Jorgensen DO NEPH [facility]   8/30/2021  2:20 PM Robert Crawford MD ENDO [facility]

## 2021-07-16 ENCOUNTER — TELEPHONE (OUTPATIENT)
Dept: ENDOCRINOLOGY | Facility: HOSPITAL | Age: 76
End: 2021-07-16

## 2021-07-16 NOTE — TELEPHONE ENCOUNTER
Received call from home care nurse Margret with [facility]  She reports for the past week the patient is waking up with blood sugars in the 50s  Please advise  Can call Nicole Steiner at  [phone]

## 2021-07-16 NOTE — TELEPHONE ENCOUNTER
Patient aware  During the day her numbers go high, into the 200 and 300's  She wants to know if you want to continue Glipizide and and reverse her am and pm dose of Metformin

## 2021-07-16 NOTE — TELEPHONE ENCOUNTER
The [facility] did not tell us that her blood sugars were high later in the day and low in the morning  She can switch her metformin to a 1000 mg in the morning and 500 mg in the evening and continue the same glipizide and Bydureon for now  If she continues to have low blood sugars in the morning then she should stop the glipizide

## 2021-07-23 NOTE — TELEPHONE ENCOUNTER
Nory Hernandez left a message  She notes that on Wednesday Daysi's fasting blood sugar was 80 and then 250 later in the afternoon  On Thursday it was 78 fasting and 148 in the afternoon  She work up in the middle of the night Thursday night into Friday morning with a blood sugar of 47  She drank some juice and ate some peanut butter and when she woke up this morning her sugar was 74  Please advise

## 2021-07-29 DIAGNOSIS — E11.65 TYPE 2 DIABETES MELLITUS WITH HYPERGLYCEMIA, WITHOUT LONG-TERM CURRENT USE OF INSULIN (HCC): ICD-10-CM

## 2021-08-11 ENCOUNTER — TELEPHONE (OUTPATIENT)
Dept: ENDOCRINOLOGY | Facility: HOSPITAL | Age: 76
End: 2021-08-11

## 2021-08-13 DIAGNOSIS — E78.5 HYPERLIPIDEMIA, UNSPECIFIED HYPERLIPIDEMIA TYPE: ICD-10-CM

## 2021-08-13 RX ORDER — FENOFIBRATE 54 MG/1
TABLET ORAL
Qty: 90 TABLET | Refills: 1 | Status: SHIPPED | OUTPATIENT
Start: 2021-08-13 | End: 2021-11-29

## 2021-08-30 ENCOUNTER — OFFICE VISIT (OUTPATIENT)
Dept: ENDOCRINOLOGY | Facility: HOSPITAL | Age: 76
End: 2021-08-30
Payer: MEDICARE

## 2021-08-30 VITALS
HEIGHT: 58 IN | HEART RATE: 88 BPM | WEIGHT: 155.8 LBS | BODY MASS INDEX: 32.7 KG/M2 | SYSTOLIC BLOOD PRESSURE: 118 MMHG | DIASTOLIC BLOOD PRESSURE: 58 MMHG

## 2021-08-30 DIAGNOSIS — E11.29 MICROALBUMINURIA DUE TO TYPE 2 DIABETES MELLITUS (HCC): ICD-10-CM

## 2021-08-30 DIAGNOSIS — R80.9 MICROALBUMINURIA DUE TO TYPE 2 DIABETES MELLITUS (HCC): ICD-10-CM

## 2021-08-30 DIAGNOSIS — E11.42 DIABETIC POLYNEUROPATHY ASSOCIATED WITH TYPE 2 DIABETES MELLITUS (HCC): ICD-10-CM

## 2021-08-30 DIAGNOSIS — E78.2 MIXED HYPERLIPIDEMIA: ICD-10-CM

## 2021-08-30 DIAGNOSIS — I10 ESSENTIAL HYPERTENSION: ICD-10-CM

## 2021-08-30 DIAGNOSIS — E11.65 TYPE 2 DIABETES MELLITUS WITH HYPERGLYCEMIA, WITHOUT LONG-TERM CURRENT USE OF INSULIN (HCC): Primary | ICD-10-CM

## 2021-08-30 DIAGNOSIS — E04.2 GOITER, NONTOXIC, MULTINODULAR: ICD-10-CM

## 2021-08-30 PROCEDURE — 99215 OFFICE O/P EST HI 40 MIN: CPT | Performed by: INTERNAL MEDICINE

## 2021-08-30 RX ORDER — HYDROXYUREA 500 MG/1
CAPSULE ORAL DAILY
COMMUNITY
End: 2022-05-25 | Stop reason: ALTCHOICE

## 2021-08-30 RX ORDER — AMIODARONE HYDROCHLORIDE 200 MG/1
200 TABLET ORAL DAILY
COMMUNITY

## 2021-08-30 RX ORDER — LANCING DEVICE/LANCETS
KIT MISCELLANEOUS
Qty: 1 EACH | Refills: 0 | Status: SHIPPED | OUTPATIENT
Start: 2021-08-30

## 2021-08-30 RX ORDER — ATORVASTATIN CALCIUM 40 MG/1
40 TABLET, FILM COATED ORAL DAILY
COMMUNITY

## 2021-08-30 RX ORDER — ASPIRIN 81 MG/1
81 TABLET, CHEWABLE ORAL DAILY
COMMUNITY
End: 2022-05-25 | Stop reason: ALTCHOICE

## 2021-08-30 NOTE — PATIENT INSTRUCTIONS
hgba1c is 6 5%  this is still excellent  Continue the same metformin and glipizide  Continue to test blood sugars 1-2 times a day  You can try vitamin B12 1000 mcg daily to see if it helps the numbness  The thyroid blood work is normal    We'll get the thyroid ultrasound  Follow up in 3 months with blood work

## 2021-08-30 NOTE — PROGRESS NOTES
8/30/2021    Assessment/Plan      Diagnoses and all orders for this visit:    Type 2 diabetes mellitus with hyperglycemia, without long-term current use of insulin ([place] [address] )  -     HEMOGLOBIN A1C W/ EAG ESTIMATION Lab Collect; Future  -     Comprehensive metabolic panel Lab Collect; Future  -     metFORMIN (GLUCOPHAGE) 500 mg tablet; Take 1000mg in the morning and 500mg at night  -     Lancet Devices (ONE TOUCH DELICA LANCING DEV) MISC; Use to test blood sugars once daily    Diabetic polyneuropathy associated with type 2 diabetes mellitus ([place] [address] )  -     HEMOGLOBIN A1C W/ EAG ESTIMATION Lab Collect; Future  -     Comprehensive metabolic panel Lab Collect; Future  -     Lancet Devices (ONE TOUCH DELICA LANCING DEV) MISC; Use to test blood sugars once daily    Microalbuminuria due to type 2 diabetes mellitus ([place] [address] )  -     HEMOGLOBIN A1C W/ EAG ESTIMATION Lab Collect; Future  -     Comprehensive metabolic panel Lab Collect; Future    Goiter, nontoxic, multinodular  -     HEMOGLOBIN A1C W/ EAG ESTIMATION Lab Collect; Future  -     Comprehensive metabolic panel Lab Collect; Future  -     US thyroid; Future    Essential hypertension  -     HEMOGLOBIN A1C W/ EAG ESTIMATION Lab Collect; Future  -     Comprehensive metabolic panel Lab Collect; Future    Mixed hyperlipidemia  -     HEMOGLOBIN A1C W/ EAG ESTIMATION Lab Collect; Future  -     Comprehensive metabolic panel Lab Collect; Future    Other orders  -     amiodarone 200 mg tablet; Take 200 mg by mouth 2 (two) times a day  -     hydroxyurea (HYDREA) 500 mg capsule; Take by mouth daily  -     atorvastatin (LIPITOR) 40 mg tablet; Take 40 mg by mouth daily  -     aspirin 81 mg chewable tablet; Chew 81 mg daily        Assessment/Plan:    1  Type 2 diabetes  Hemoglobin A1c is 6 5%  This demonstrates excellent control of her diabetes even though has increased a bit from last visit  For now, she will continue same metformin and glipizide    She will continue to test her blood sugars once or twice a day  She will continue to work on dietary changes  2  Diabetic neuropathy  She has some numbness of the feet unchanged  She could try vitamin B12 1000 mcg daily to see if it helps given she is on metformin and this can cause some relative vitamin B12 deficiency  Diabetic foot exams are up-to-date  3  Diabetic microalbuminuria  She is following with a nephrologist for this problem  4  Nontoxic multinodular goiter  She did not yet get her thyroid ultrasound done as she was in the hospital frequently over the last several months  I have reordered a thyroid ultrasound  Thyroid function tests are normal so she is biochemically euthyroid  5  Hypertension  Blood pressure is under good control  She will continue the same furosemide  She does follow with Nephrology for this problem  6  Hyperlipidemia  She will continue the same atorvastatin and fenofibrate  I have asked her to follow up in 3 months with preceding hemoglobin A1c and CMP  She will try to get a thyroid ultrasound done between now and then  CC: Diabetes Type 2, thyroid, blood pressure, lipid follow-up    History of Present Illness     HPI: Glory Harman is a 67y o  year old female with type 2 diabetes with neuropathy and microalbuminuria for 13-18 years, multinodular goiter, hypertension, hyperlipidemia for follow-up visit  She is on oral agents at home and takes  Metformin 1000 mg in the morning and 500 mg at night, Bydureon 2 mg once a week, and glipizide XL 5 mg daily  She denies any  polyphagia, polydipsia, and blurry vision  She has polyuria after she takes her diuretic and once a night nocturia  She has numbness and tingling of the feet and toes and hands  She denies chest pain or shortness of breath  She denies retinopathy, heart attack and claudication but does admit to neuropathy, nephropathy and stroke  Was in hospital June 4, 2021 after severe diz[place] spell [place]  Almost feel to the floor, vertigo  911 called and to [facility] and told her she had a mini stroke after MRI and blood work  No residual effects  baby aspirin added  Then was a fib at home and back in the hospital again  Then started amiodarone and off the metoprolol  Blood sugar was very high in the hospital, up to 200-300 as she was prednisone  She had this for a rash on legs and told she had vasculitis  Now off prednisone, had seen a dermatologist and biopsy done  She was cardioverted while in hospital at [facility]  Still in sinus rhythym 1 month later  Hypoglycemic episodes: Yes occasional  Had very low blood sugar readings in the am and dose of meds adjusted  Glipizide stopped  Went back on it when sugars started to go back up  The patient's last eye exam was in   March 2021  The patient's last foot exam was in May 2021 at endocrine office visit  Does not see a foot doctor  Last A1C was   Lab Results   Component Value Date    HGBA1C 6 5 07/14/2021     Blood Sugar/Glucometer/Pump/CGM review: checks blood sugars once or twice a day  Before breakfast:  off glipizide and now low 100s back on glipizide  She has a history of a nontoxic multinodular goiter  Multiple nodules were biopsied in the past and were benign  Last thyroid ultrasound was 2018 showing stable size thyroid nodules  Repeat thyroid ultrasound has been ordered  She denies compressive thyroid symptoms or difficulties with swallowing  She denies fatigue or palpitations  She denies diarrhea constipation, or insomnia  She has hyperlipidemia and takes atorvastatin 40 mg daily and fenofibrate 54 mg daily  She denies chest pain or shortness of breath  She has hypertension and takes furosemide 40 mg daily  She also has microalbuminuria and does follow with Nephrology  She last saw Nephrology in March 2021  She denies headache or stroke-like symptoms      Review of Systems   Constitutional: Negative for fatigue and unexpected weight change  HENT: Negative for trouble swallowing  Eyes: Negative for visual disturbance  Wears glasses  Respiratory: Negative for chest tightness and shortness of breath  Cardiovascular: Positive for leg swelling  Negative for chest pain and palpitations  Just saw Dr Amy Rico recently  Legs swollen and now on the lasix  Gastrointestinal: Negative for abdominal pain, constipation, diarrhea and nausea  Endocrine: Positive for polyuria  Negative for polydipsia and polyphagia  Polyuria with the diuretic  Nocturia once a night  Musculoskeletal:        Uses a walker to walk and for support  Skin: Negative for wound  Neurological: Positive for numbness  Negative for dizziness, weakness, light-headedness and headaches  Fingers are numb  Toes and feet numb now  Psychiatric/Behavioral: Negative for sleep disturbance  Historical Information   Past Medical History:   Diagnosis Date    Atrial fibrillation ([facility] )     CHF (congestive heart failure) (HCC)     Colon cancer (HCC)     limited to wall of colon, no chemo or XRT needed    [facility]     mini stroke with vertigo    Diabetes mellitus ([facility] )     Hypertension     Hypomagnesemia     Kidney stone     Leukocytosis     chanelle 2 gene positive    Osteoarthritis     Vasculitis (HCC)     rash on legs       Past Surgical History:   Procedure Laterality Date    APPENDECTOMY      with colectomy    COLECTOMY LAKE      24 inches    COLONOSCOPY  2016    HYSTERECTOMY      LAPAROSCOPIC CHOLECYSTECTOMY       Social History   Social History     Substance and Sexual Activity   Alcohol Use No     Social History     Substance and Sexual Activity   Drug Use No     Social History     Tobacco Use   Smoking Status Never Smoker   Smokeless Tobacco Never Used     Family History:   Family History   Problem Relation Age of Onset    Asthma Mother     Heart disease Mother         post heart surgery    Heart attack Father     Coronary artery disease Father     Heart attack Brother     Colon cancer Brother     Colon cancer Brother     Heart attack Brother     Diabetes type II Brother     Heart attack Brother     Colon cancer Brother     No Known Problems Brother     Heart disease Paternal Uncle     Hypertension Daughter     Hypertension Daughter     No Known Problems Daughter        Meds/Allergies   Current Outpatient Medications   Medication Sig Dispense Refill    allopurinol (ZYLOPRIM) 100 mg tablet TAKE 1 TABLET BY MOUTH EVERY DAY (Patient taking differently: Take 200 mg by mouth daily ) 90 tablet 1    amiodarone 200 mg tablet Take 200 mg by mouth 2 (two) times a day      apixaban (ELIQUIS) 5 mg Take 5 mg by mouth 2 (two) times a day        aspirin 81 mg chewable tablet Chew 81 mg daily      atorvastatin (LIPITOR) 40 mg tablet Take 40 mg by mouth daily      Bydureon BCise 2 MG/0 85ML AUIJ Inject 2 mg under the skin once a week 3 4 mL 6    Cholecalciferol (VITAMIN D3) 50 MCG (2000 UT) capsule Take 2,000 Units by mouth daily       fenofibrate (TRICOR) 54 MG tablet TAKE 1 TABLET BY MOUTH EVERY DAY 90 tablet 1    furosemide (LASIX) 40 mg tablet Take 40 mg by mouth daily       glipiZIDE (GLUCOTROL XL) 5 mg 24 hr tablet Take 1 tablet (5 mg total) by mouth daily      glucose blood (ONE TOUCH ULTRA TEST) test strip Test 3 times a day 300 each 3    hydroxyurea (HYDREA) 500 mg capsule Take by mouth daily      Lancet Devices (ONE TOUCH DELICA LANCING DEV) MISC Use to test blood sugars once daily 1 each 0    Lancets (OneTouch Delica Plus [id number]) MISC USE TO TEST BLOOD SUGARS 3 times A DAY   200 each 3    Magnesium 500 MG CAPS Take by mouth daily        metFORMIN (GLUCOPHAGE) 500 mg tablet Take 1000mg in the morning and 500mg at night 270 tablet 3    Patiromer Sorbitex Calcium 25 2 g PACK Take 25 2 g by mouth daily (Patient not taking: Reported on 8/30/2021) 90 each 3     No current facility-administered medications for this visit  Allergies   Allergen Reactions    Epinephrine      Other reaction(s): increased HR    Lidocaine      Other reaction(s): increased HR    Neomycin      Other reaction(s): rash    Pioglitazone      Other reaction(s): Fluid retention       Objective   Vitals: Blood pressure 118/58, pulse 88, height 4' 10" (1 473 m), weight 70 7 kg (155 lb 12 8 oz)  Invasive Devices     None                 Physical Exam  Vitals reviewed  Constitutional:       Appearance: Normal appearance  She is well-developed  She is obese  HENT:      Head: Normocephalic and atraumatic  Eyes:      Conjunctiva/sclera: Conjunctivae normal    Neck:      Thyroid: No thyromegaly  Vascular: No carotid bruit  Comments: Thyroid low in the neck and nodular in feel  Cardiovascular:      Rate and Rhythm: Normal rate and regular rhythm  Heart sounds: Normal heart sounds  No murmur heard  Pulmonary:      Effort: Pulmonary effort is normal       Breath sounds: Normal breath sounds  No wheezing  Abdominal:      Palpations: Abdomen is soft  Musculoskeletal:         General: No deformity  Normal range of motion  Cervical back: Normal range of motion and neck supple  Right lower leg: Edema present  Left lower leg: Edema present  Comments: 2+ Bilateral lower extremity edema  No tremor of the outstretched hands  Lymphadenopathy:      Cervical: No cervical adenopathy  Skin:     General: Skin is warm and dry  Findings: No rash  Neurological:      Mental Status: She is alert and oriented to person, place, and time  Deep Tendon Reflexes: Reflexes are normal and symmetric  Comments: Deep tendon reflexes normal          The history was obtained from the review of the chart and from the patient and       Lab Results:    Most recent Alc is  Lab Results   Component Value Date    HGBA1C 6 5 07/14/2021             Blood work done on

## 2021-10-01 DIAGNOSIS — E11.65 TYPE 2 DIABETES MELLITUS WITH HYPERGLYCEMIA, WITHOUT LONG-TERM CURRENT USE OF INSULIN (HCC): ICD-10-CM

## 2021-10-01 RX ORDER — GLIPIZIDE 5 MG/1
TABLET, FILM COATED, EXTENDED RELEASE ORAL
Qty: 180 TABLET | Refills: 3 | Status: SHIPPED | OUTPATIENT
Start: 2021-10-01 | End: 2022-05-25 | Stop reason: SDUPTHER

## 2021-10-11 DIAGNOSIS — E11.65 TYPE 2 DIABETES MELLITUS WITH HYPERGLYCEMIA, WITHOUT LONG-TERM CURRENT USE OF INSULIN (HCC): ICD-10-CM

## 2021-10-11 RX ORDER — EXENATIDE 2 MG/.85ML
2 INJECTION, SUSPENSION, EXTENDED RELEASE SUBCUTANEOUS WEEKLY
Qty: 3.4 ML | Refills: 6 | Status: SHIPPED | OUTPATIENT
Start: 2021-10-11 | End: 2022-05-25 | Stop reason: SDUPTHER

## 2021-10-18 ENCOUNTER — TELEPHONE (OUTPATIENT)
Dept: NEPHROLOGY | Facility: HOSPITAL | Age: 76
End: 2021-10-18

## 2021-10-18 DIAGNOSIS — I10 PRIMARY HYPERTENSION: ICD-10-CM

## 2021-10-18 DIAGNOSIS — E87.5 HYPERKALEMIA: ICD-10-CM

## 2021-10-18 DIAGNOSIS — E11.29 MICROALBUMINURIA DUE TO TYPE 2 DIABETES MELLITUS (HCC): Primary | ICD-10-CM

## 2021-10-18 DIAGNOSIS — R80.9 MICROALBUMINURIA DUE TO TYPE 2 DIABETES MELLITUS (HCC): Primary | ICD-10-CM

## 2021-10-20 ENCOUNTER — TELEPHONE (OUTPATIENT)
Dept: NEPHROLOGY | Facility: CLINIC | Age: 76
End: 2021-10-20

## 2021-10-26 ENCOUNTER — DOCUMENTATION (OUTPATIENT)
Dept: NEPHROLOGY | Facility: CLINIC | Age: 76
End: 2021-10-26

## 2021-10-26 LAB
EXT DIFF-ABS BASOPHILS: 0
EXT DIFF-ABS EOSINOPHILS: 0
EXT DIFF-ABS LYMPHOCYTES: 4
EXT DIFF-ABS MONOCYTES: 3
EXT DIFF-ABS NEUTROPHILS: 27.2
EXT GLUCOSE BLD: 103
EXTERNAL BUN: 42
EXTERNAL CALCIUM: 9.2
EXTERNAL CHLORIDE: 107
EXTERNAL CO2: 22.4
EXTERNAL CREATININE: 1.05
EXTERNAL EGFR: 51
EXTERNAL HEMATOCRIT: 41 %
EXTERNAL HEMOGLOBIN: 10.6 G/DL
EXTERNAL MCV: 91
EXTERNAL PLATELET COUNT: 346 K/ΜL
EXTERNAL POTASSIUM: 5.3
EXTERNAL RBC: 4.57
EXTERNAL RDW: 17.6
EXTERNAL SODIUM: 141
EXTERNAL WBC: 30.1

## 2021-10-28 DIAGNOSIS — N20.0 NEPHROLITHIASIS: ICD-10-CM

## 2021-10-28 RX ORDER — ALLOPURINOL 100 MG/1
TABLET ORAL
Qty: 90 TABLET | Refills: 1 | Status: SHIPPED | OUTPATIENT
Start: 2021-10-28 | End: 2021-11-09

## 2021-11-09 DIAGNOSIS — N20.0 NEPHROLITHIASIS: ICD-10-CM

## 2021-11-09 RX ORDER — ALLOPURINOL 100 MG/1
TABLET ORAL
Qty: 180 TABLET | Refills: 3 | Status: SHIPPED | OUTPATIENT
Start: 2021-11-09 | End: 2021-12-27 | Stop reason: SDUPTHER

## 2021-11-28 DIAGNOSIS — E78.5 HYPERLIPIDEMIA, UNSPECIFIED HYPERLIPIDEMIA TYPE: ICD-10-CM

## 2021-11-29 RX ORDER — FENOFIBRATE 54 MG/1
TABLET ORAL
Qty: 90 TABLET | Refills: 1 | Status: SHIPPED | OUTPATIENT
Start: 2021-11-29 | End: 2022-05-25 | Stop reason: ALTCHOICE

## 2021-12-27 DIAGNOSIS — N20.0 NEPHROLITHIASIS: ICD-10-CM

## 2021-12-27 RX ORDER — ALLOPURINOL 100 MG/1
200 TABLET ORAL DAILY
Qty: 180 TABLET | Refills: 3 | Status: SHIPPED | OUTPATIENT
Start: 2021-12-27

## 2022-03-07 ENCOUNTER — OFFICE VISIT (OUTPATIENT)
Dept: GASTROENTEROLOGY | Facility: CLINIC | Age: 77
End: 2022-03-07
Payer: MEDICARE

## 2022-03-07 VITALS
DIASTOLIC BLOOD PRESSURE: 64 MMHG | BODY MASS INDEX: 32.37 KG/M2 | HEIGHT: 58 IN | WEIGHT: 154.2 LBS | SYSTOLIC BLOOD PRESSURE: 120 MMHG

## 2022-03-07 DIAGNOSIS — C18.2 MALIGNANT NEOPLASM OF ASCENDING COLON (HCC): ICD-10-CM

## 2022-03-07 DIAGNOSIS — D47.1 CHRONIC MYELOPROLIFERATIVE DISEASE (HCC): ICD-10-CM

## 2022-03-07 DIAGNOSIS — K74.60 HEPATIC CIRRHOSIS, UNSPECIFIED HEPATIC CIRRHOSIS TYPE, UNSPECIFIED WHETHER ASCITES PRESENT (HCC): Primary | ICD-10-CM

## 2022-03-07 DIAGNOSIS — E66.01 MORBID OBESITY (HCC): ICD-10-CM

## 2022-03-07 PROCEDURE — 99214 OFFICE O/P EST MOD 30 MIN: CPT | Performed by: INTERNAL MEDICINE

## 2022-03-07 NOTE — PROGRESS NOTES
[facility]  Amadeo Shay 67 y o  female MRN: [id number]  Encounter: [id number]    ASSESSMENT AND PLAN:      1  Hepatic cirrhosis, unspecified hepatic cirrhosis type, unspecified whether ascites present ([facility] )  History of this on the basis of PINTO  She appears to have ascites on physical exam but her abdominal findings are chronic imaging with an MRI as well as ultrasound over the last 2 years have not shown significant ascites repeat the ultrasound  - US abdomen limited; Future    2  Malignant neoplasm of ascending colon ([facility] )  Remote history 20 +years ago  Up-to-date on surveillance    3  Morbid obesity (HCC)  Unchanged    4  [facility]    5  Chronic anticoagulation with Eliquis followed by Dr James Rosenberg  She is on Eliquis I request that she stop this for 2 days prior to the EGD colonoscopy  She is aware of the small risk of thromboembolic complications will request cardiac clearance  6  Iron deficiency [id number] female with a remote history of colon cancer presents with worsening symptomatic iron deficiency anemia  No localizing GI symptoms  Last colonoscopy was 2020 and negative  Differential includes chronic GI blood loss which is statistically most likely I think with the negative colonoscopy an upper source would be more likely perhaps a portal gastropathy from her cirrhosis perhaps ulcer disease perhaps a hiatal hernia  Celiac disease is a consideration  Plan upper and lower endoscopy off the Eliquis for 2 days if approved by Cardiology  If all these tests are negative she may need a capsule endoscopy looking for AVMs  Followup Appointment:  2 months  ______________________________________________________________________    No chief complaint on file  HPI:  Patient is a very pleasant 43-year-old female known to me with a remote history of colon cancer resected over 20 years ago    She has been up-to-date with her every 3 year colonic surveillance  She says about 9 months ago she began feeling weak tired and run down it got worse about a month ago 2 weeks ago she had blood work done was told that her hemoglobin was 8  She was set up for iron infusions  In questioning she does not really have much in the way of GI symptoms no rectal bleeding or melena her stools are somewhat loose but have been chronically that is not watery  She has no significant heartburn trouble swallowing nausea or vomiting  She is on Eliquis    Historical Information   Past Medical History:   Diagnosis Date    Atrial fibrillation ([facility] )     CHF (congestive heart failure) (HCC)     Colon cancer (HCC)     limited to wall of colon, no chemo or XRT needed    [facility]     mini stroke with vertigo    Diabetes mellitus ([facility] )     Hypertension     Hypomagnesemia     Kidney stone     Leukocytosis     chanelle 2 gene positive    Osteoarthritis     Vasculitis (HCC)     rash on legs       Past Surgical History:   Procedure Laterality Date    APPENDECTOMY      with colectomy    CARDIOVERSION      COLECTOMY LAKE      24 inches    COLONOSCOPY  2016    HYSTERECTOMY      LAPAROSCOPIC CHOLECYSTECTOMY       Social History     Substance and Sexual Activity   Alcohol Use No     Social History     Substance and Sexual Activity   Drug Use No     Social History     Tobacco Use   Smoking Status Never Smoker   Smokeless Tobacco Never Used     Family History   Problem Relation Age of Onset    Asthma Mother     Heart disease Mother         post heart surgery    Heart attack Father     Coronary artery disease Father     Heart attack Brother     Colon cancer Brother     Colon cancer Brother     Heart attack Brother     Diabetes type II Brother     Heart attack Brother     Colon cancer Brother     No Known Problems Brother     Heart disease Paternal Uncle     Hypertension Daughter     Hypertension Daughter  No Known Problems Daughter          Current Outpatient Medications:     allopurinol (ZYLOPRIM) 100 mg tablet    amiodarone 200 mg tablet    apixaban (ELIQUIS) 5 mg    atorvastatin (LIPITOR) 40 mg tablet    Bydureon BCise 2 MG/0 85ML AUIJ    Cholecalciferol (VITAMIN D3) 50 MCG (2000 UT) capsule    furosemide (LASIX) 40 mg tablet    glipiZIDE (GLUCOTROL XL) 5 mg 24 hr tablet    glucose blood (ONE TOUCH ULTRA TEST) test strip    Lancet Devices (ONE TOUCH DELICA LANCING DEV) MISC    Magnesium 500 MG CAPS    metFORMIN (GLUCOPHAGE) 500 mg tablet    patient supplied medication    aspirin 81 mg chewable tablet    fenofibrate (TRICOR) 54 MG tablet    hydroxyurea (HYDREA) 500 mg capsule    Lancets (OneTouch Delica Plus [id number]) MISC    Patiromer Sorbitex Calcium 25 2 g PACK  Allergies   Allergen Reactions    Epinephrine      Other reaction(s): increased HR    Lidocaine      Other reaction(s): increased HR    Neomycin      Other reaction(s): rash    Pioglitazone      Other reaction(s): Fluid retention     Reviewed medications and allergies and updated as indicated    PHYSICAL EXAM:    Blood pressure 120/64, height 4' 9 99" (1 473 m), weight 69 9 kg (154 lb 3 2 oz)  Body mass index is 32 24 kg/m²  General Appearance:, cooperative, alert mild distress pale not tachypneic  Eyes: Anicteric, PERRLA, EOMI  ENT:  Normocephalic, atraumatic, normal mucosa  Neck:  Supple, symmetrical, trachea midline  Resp:  Clear to auscultation bilaterally; no rales, rhonchi or wheezing; respirations unlabored   CV:  S1 S2, 2/6 systolic murmur Regular rate and rhythm;, rub, or gallop  GI:   distended normoactive bowel sounds mild diffuse tenderness question fluid wave   Rectal: Deferred  Musculoskeletal: No cyanosis, clubbing or edema  Normal ROM    Skin:  No jaundice, rashes, or lesions   Heme/Lymph: No palpable cervical lymphadenopathy  Psych: Normal affect, good eye contact  Neuro: No gross deficits, AAOx3    Lab Results:   Lab Results   Component Value Date    WBC 30 1 10/18/2021    HGB 10 6 10/18/2021    HCT 41 10/18/2021    MCV 91 10/18/2021     10/18/2021     Lab Results   Component Value Date    K 5 3 10/18/2021     10/18/2021    CO2 22 4 10/18/2021    BUN 42 10/18/2021    CREATININE 1 05 10/18/2021    GLUF 75 12/09/2019    CALCIUM 9 2 10/18/2021    CORRECTEDCA 10 0 12/09/2019    AST 20 04/21/2021    ALT 8 04/21/2021    ALKPHOS 107 04/21/2021    EGFR 51 10/18/2021     No results found for: IRON, TIBC, FERRITIN-most recent hemoglobin was 9 on February 28, 2022  No results found for: LIPASE    Radiology Results:   No results found

## 2022-03-07 NOTE — LETTER
March 10, 2022     Cindy Bosworth, [address]  [facility] [address]    Patient: Kayla Aguilar   YOB: 1945   Date of Visit: 3/7/2022       Dear Dr Hilliard Cos: Thank you for referring Kayla Aguilar to me for evaluation  Below are my notes for this consultation  If you have questions, please do not hesitate to call me  I look forward to following your patient along with you           Sincerely,        Claudean Gambles, MD        CC: MD Shane Barr MD

## 2022-03-08 DIAGNOSIS — Z85.038 HISTORY OF COLON CANCER: Primary | ICD-10-CM

## 2022-03-08 NOTE — TELEPHONE ENCOUNTER
Scheduled date of colonoscopy (as of today): 4/21/22  Physician performing colonoscopy: Dr Ishan Spencer  Location of colonoscopy: [facility]  Bowel prep reviewed with patient: Miralax  Instructions reviewed with patient by:Katya Bullard CMA  Clearances: ELIQUIS/ANEMIA  NEEDS H&H    Clearance form faxed to [facility]

## 2022-03-23 NOTE — TELEPHONE ENCOUNTER
Procedure re-scheduled for 4/22/22  Patient asked scheduling to have MA's give her call regarding prep and diabetic medications  LVM for patient to call back

## 2022-03-24 ENCOUNTER — TELEPHONE (OUTPATIENT)
Dept: ENDOCRINOLOGY | Facility: HOSPITAL | Age: 77
End: 2022-03-24

## 2022-03-24 NOTE — TELEPHONE ENCOUNTER
Patient scheduled for 5-25-22  Was last seen 8-30-21  Can she use the lab slip & ultrasound orders from 8-30-21 or do need anything additional done? Mail lab slip & appointment card to patient

## 2022-03-25 NOTE — TELEPHONE ENCOUNTER
Spoke with patient to review diabetic instructions  Patient is unsure as to if she should have Colyte or Miralax Prep  She would like a return phone call on Monday after she speaks with pharmacy

## 2022-03-28 NOTE — TELEPHONE ENCOUNTER
Patient is undecided as to whether she will take Miralax prep or Colyte  She would like Rx for Colyte sent to her pharmacy because she would like to discuss this with the pharmacist and she will call back with her decision as to which one she will use

## 2022-04-06 NOTE — TELEPHONE ENCOUNTER
Spoke with patient to review prep instructions and she would like a call back  She did decide to use the Colyte  Instructions for Colyte, diabetic medications and Eliquis instructions emailed to patient

## 2022-04-14 DIAGNOSIS — D50.8 OTHER IRON DEFICIENCY ANEMIA: Primary | ICD-10-CM

## 2022-04-14 NOTE — TELEPHONE ENCOUNTER
Patient called requesting another review of her prep  Reviewed prep and blood thinner hold  Patient verbalized understanding

## 2022-04-22 ENCOUNTER — ANESTHESIA EVENT (OUTPATIENT)
Dept: GASTROENTEROLOGY | Facility: AMBULATORY SURGERY CENTER | Age: 77
End: 2022-04-22

## 2022-04-22 ENCOUNTER — HOSPITAL ENCOUNTER (OUTPATIENT)
Dept: GASTROENTEROLOGY | Facility: AMBULATORY SURGERY CENTER | Age: 77
Discharge: HOME | End: 2022-04-22
Payer: MEDICARE

## 2022-04-22 ENCOUNTER — ANESTHESIA (OUTPATIENT)
Dept: GASTROENTEROLOGY | Facility: AMBULATORY SURGERY CENTER | Age: 77
End: 2022-04-22

## 2022-04-22 VITALS
RESPIRATION RATE: 15 BRPM | DIASTOLIC BLOOD PRESSURE: 53 MMHG | OXYGEN SATURATION: 96 % | HEART RATE: 77 BPM | SYSTOLIC BLOOD PRESSURE: 104 MMHG

## 2022-04-22 DIAGNOSIS — D50.9 IRON DEFICIENCY ANEMIA, UNSPECIFIED IRON DEFICIENCY ANEMIA TYPE: ICD-10-CM

## 2022-04-22 PROCEDURE — 45388 COLONOSCOPY W/ABLATION: CPT | Performed by: INTERNAL MEDICINE

## 2022-04-22 PROCEDURE — 43239 EGD BIOPSY SINGLE/MULTIPLE: CPT | Performed by: INTERNAL MEDICINE

## 2022-04-22 PROCEDURE — 88305 TISSUE EXAM BY PATHOLOGIST: CPT | Performed by: PATHOLOGY

## 2022-04-22 RX ORDER — SODIUM CHLORIDE, SODIUM LACTATE, POTASSIUM CHLORIDE, CALCIUM CHLORIDE 600; 310; 30; 20 MG/100ML; MG/100ML; MG/100ML; MG/100ML
INJECTION, SOLUTION INTRAVENOUS CONTINUOUS PRN
Status: DISCONTINUED | OUTPATIENT
Start: 2022-04-22 | End: 2022-04-22

## 2022-04-22 RX ORDER — PROPOFOL 10 MG/ML
INJECTION, EMULSION INTRAVENOUS AS NEEDED
Status: DISCONTINUED | OUTPATIENT
Start: 2022-04-22 | End: 2022-04-22

## 2022-04-22 RX ORDER — SODIUM CHLORIDE, SODIUM LACTATE, POTASSIUM CHLORIDE, CALCIUM CHLORIDE 600; 310; 30; 20 MG/100ML; MG/100ML; MG/100ML; MG/100ML
50 INJECTION, SOLUTION INTRAVENOUS CONTINUOUS
Status: DISCONTINUED | OUTPATIENT
Start: 2022-04-22 | End: 2022-04-26 | Stop reason: HOSPADM

## 2022-04-22 RX ADMIN — PROPOFOL 100 MG: 10 INJECTION, EMULSION INTRAVENOUS at 13:41

## 2022-04-22 RX ADMIN — SODIUM CHLORIDE, SODIUM LACTATE, POTASSIUM CHLORIDE, CALCIUM CHLORIDE 50 ML/HR: 600; 310; 30; 20 INJECTION, SOLUTION INTRAVENOUS at 13:30

## 2022-04-22 RX ADMIN — PROPOFOL 50 MG: 10 INJECTION, EMULSION INTRAVENOUS at 14:16

## 2022-04-22 RX ADMIN — SODIUM CHLORIDE, SODIUM LACTATE, POTASSIUM CHLORIDE, CALCIUM CHLORIDE: 600; 310; 30; 20 INJECTION, SOLUTION INTRAVENOUS at 13:41

## 2022-04-22 RX ADMIN — PROPOFOL 50 MG: 10 INJECTION, EMULSION INTRAVENOUS at 14:04

## 2022-04-22 RX ADMIN — PROPOFOL 50 MG: 10 INJECTION, EMULSION INTRAVENOUS at 13:45

## 2022-04-22 RX ADMIN — PROPOFOL 50 MG: 10 INJECTION, EMULSION INTRAVENOUS at 13:52

## 2022-04-22 NOTE — DISCHARGE INSTRUCTIONS
Hemorrhoids   WHAT YOU NEED TO KNOW:   What are hemorrhoids? Hemorrhoids are swollen blood vessels inside your rectum (internal hemorrhoids) or on your anus (external hemorrhoids)  Sometimes a hemorrhoid may prolapse  This means it extends out of your anus  What increases my risk for hemorrhoids? · Pregnancy or obesity    · Straining or sitting for a long time during bowel movements    · Liver disease    · Weak muscles around the anus caused by older age, rectal surgery, or anal intercourse    · A lack of physical activity    · Chronic diarrhea or constipation    · A low-fiber diet    What are the signs and symptoms of hemorrhoids? · Pain or itching around your anus or inside your rectum    · Swelling or bumps around your anus    · Bright red blood in your bowel movement, on the toilet paper, or in the toilet bowl    · Tissue bulging out of your anus (prolapsed hemorrhoids)    · Incontinence (poor control over urine or bowel movements)    How are hemorrhoids diagnosed? Your healthcare provider will ask about your symptoms, the foods you eat, and your bowel movements  He or she will examine your anus for external hemorrhoids  You may need the following:  · A digital rectal exam  is a test to check for hemorrhoids  Your healthcare provider will put a gloved finger inside your anus to feel for the hemorrhoids  · An anoscopy  is a test that uses a scope (small tube with a light and camera on the end) to look at your hemorrhoids  How are hemorrhoids treated? Treatment will depend on your symptoms  You may need any of the following:  · Medicines  can help decrease pain and swelling, and soften your bowel movement  The medicine may be a pill, pad, cream, or ointment  · Procedures  may be used to shrink or remove your hemorrhoid  Examples include rubber-band ligation, sclerotherapy, and photocoagulation  These procedures may be done in your healthcare provider's office   Ask your healthcare provider for more information about these procedures  · Surgery  may be needed to shrink or remove your hemorrhoids  How can I manage my symptoms? · Apply ice on your anus for 15 to 20 minutes every hour or as directed  Use an ice pack, or put crushed ice in a plastic bag  Cover it with a towel before you apply it to your anus  Ice helps prevent tissue damage and decreases swelling and pain  · Take a sitz bath  Fill a bathtub with 4 to 6 inches of warm water  You may also use a sitz bath pan that fits inside a toilet bowl  Sit in the sitz bath for 15 minutes  Do this 3 times a day, and after each bowel movement  The warm water can help decrease pain and swelling  · Keep your anal area clean  Gently wash the area with warm water daily  Soap may irritate the area  After a bowel movement, wipe with moist towelettes or wet toilet paper  Dry toilet paper can irritate the area  How can I help prevent hemorrhoids? · Do not strain to have a bowel movement  Do not sit on the toilet too long  These actions can increase pressure on the tissues in your rectum and anus  · Drink plenty of liquids  Liquids can help prevent constipation  Ask how much liquid to drink each day and which liquids are best for you  · Eat a variety of high-fiber foods  Examples include fruits, vegetables, and whole grains  Ask your healthcare provider how much fiber you need each day  You may need to take a fiber supplement  · Exercise as directed  Exercise, such as walking, may make it easier to have a bowel movement  Ask your healthcare provider to help you create an exercise plan  · Do not have anal sex  Anal sex can weaken the skin around your rectum and anus  · Avoid heavy lifting  This can cause straining and increase your risk for another hemorrhoid  When should I seek immediate care? · You have severe pain in your rectum or around your anus      · You have severe pain in your abdomen and you are vomiting  · You have bleeding from your anus that soaks through your underwear  When should I contact my healthcare provider? · You have frequent and painful bowel movements  · Your hemorrhoid looks or feels more swollen than usual      · You do not have a bowel movement for 2 days or more  · You see or feel tissue coming through your anus  · You have questions or concerns about your condition or care  CARE AGREEMENT:   You have the right to help plan your care  Learn about your health condition and how it may be treated  Discuss treatment options with your healthcare providers to decide what care you want to receive  You always have the right to refuse treatment  The above information is an  only  It is not intended as medical advice for individual conditions or treatments  Talk to your doctor, nurse or pharmacist before following any medical regimen to see if it is safe and effective for you  © Copyright [employer] 2022 Information is for End User's use only and may not be sold, redistributed or otherwise used for commercial purposes  All illustrations and images included in CareNotes® are the copyrighted property of A D A M , Inc  or [address] [place], [place] [address]von   Colorectal Polyps   WHAT YOU NEED TO KNOW:   What are colorectal polyps? Colorectal polyps are small growths of tissue in the lining of the colon and rectum  Most polyps are usually benign (not cancer)  Certain types of polyps, called adenomatous polyps, may turn into cancer  What increases my risk for colorectal polyps? The exact cause of colorectal polyps is unknown   The following may increase your risk:  · Older age    · Foods high in fat and low in fiber    · Family history of polyps    · Intestinal diseases, such as Crohn disease or ulcerative colitis    · Smoking cigarettes or drinking alcohol    · Lack of physical activity, such as exercise    · Obesity    What are the signs and symptoms of colorectal polyps? · Blood in your bowel movement or bleeding from the rectum    · Change in bowel movement habits, such as diarrhea or constipation    · Abdominal pain    What do I need to know about colorectal polyp screening and diagnosis? Screening means you are checked for polyps that may be cancer, even if you do not have signs or symptoms  Screening is recommended starting at age 48 and continuing to age 76 if you are at average risk  Your healthcare provider may suggest screening starting at age 39  Screening may start before you are 45 or continue after you are 75 if your risk is high  Your provider will tell you how often to get screened  Timing depends on the type of screening and if polyps are found  Timing also depends on your age and if you are at increased risk for cancer  Screening may be recommended every 1, 2, 5, or 10 years  Your healthcare provider will need to test polyps to find out if they are cancer  Any of the following may be used to find polyps:  · A digital rectal exam  means your provider will use a finger to check for polyps  · A barium enema  is an x-ray of the colon  A tube is put into your anus, and a liquid called barium is put through the tube  Barium is used so that healthcare providers can see your colon better on the x-ray film  · A virtual colonoscopy  is a CT scan that takes pictures of the inside of your colon and rectum  A small, flexible tube is put into your rectum and air or carbon dioxide (gas) is used to expand your colon  This lets healthcare providers clearly see your colon and any polyps on a monitor  · Colonoscopy or sigmoidoscopy  are procedures to help your provider see the inside of your colon  He or she will use a flexible tube with a small light and camera on the end  During a sigmoidoscopy, your provider will only look at rectum and lower colon  During a colonoscopy, he or she will look at the full length of your colon   A small amount of tissue may be removed from your colon for tests  How are colorectal polyps treated? Small, benign polyps may not need treatment  Your healthcare provider will check the polyp over time to make sure it is not changing  Polyps that are cancer may be removed with one of the following:  · A polypectomy  is a minimally invasive procedure to remove a polyp during a colonoscopy or sigmoidoscopy  Your healthcare provider may need to remove the polyp with a laparoscope  Laparoscopy is done by inserting a small, flexible scope into incisions made on your abdomen  · Surgery  may be needed to remove large or deep polyps that cannot be removed in a polypectomy  Tissues or lymph nodes near a polyp may also be removed  This helps stop cancer from spreading  What can I do to lower my risk for colorectal polyps? · Eat a variety of healthy foods  Healthy foods include fruit, vegetables, whole-grain breads, low-fat dairy products, beans, lean meat, and fish  Ask if you need to be on a special diet  · Maintain a healthy weight  Ask your healthcare provider what a healthy weight is for you  Ask him or her to help with a weight loss plan if you are overweight  · Exercise as directed  Begin to exercise slowly and do more as you get stronger  Talk with your healthcare provider before you start an exercise program          · Limit alcohol  Your risk for polyps increases the more you drink  · Do not smoke  Nicotine and other chemicals in cigarettes and cigars increase your risk for polyps  Ask your healthcare provider for information if you currently smoke and need help to quit  E-cigarettes or smokeless tobacco still contain nicotine  Talk to your healthcare provider before you use these products  Where can I find more information? · [address] ([facility]) [address], [place] [address]  Phone: [phone]  Web Address: [facility] gov    Call your local emergency number (911 in the [address]) if:   · You have sudden shortness of breath  · You have a fast heart rate, fast breathing, or are too dizzy to stand up  When should I seek immediate care? · You have severe abdominal pain  · You see blood in your bowel movement  When should I call my doctor? · You have a fever  · You have chills, a cough, or feel weak and achy  · You have abdominal pain that does not go away or gets worse after you take medicine  · Your abdomen is swollen  · You are losing weight without trying  · You have questions or concerns about your condition or care  CARE AGREEMENT:   You have the right to help plan your care  Learn about your health condition and how it may be treated  Discuss treatment options with your healthcare providers to decide what care you want to receive  You always have the right to refuse treatment  The above information is an  only  It is not intended as medical advice for individual conditions or treatments  Talk to your doctor, nurse or pharmacist before following any medical regimen to see if it is safe and effective for you  © Copyright [employer] 2022 Information is for End User's use only and may not be sold, redistributed or otherwise used for commercial purposes  All illustrations and images included in CareNotes® are the copyrighted property of [employer]  or [facility]  Gastritis   WHAT YOU NEED TO KNOW:   What is gastritis? Gastritis is inflammation or irritation of the lining of your stomach  What increases my risk for gastritis?    · Infection with bacteria, a virus, or a parasite    · NSAIDs, aspirin, or steroid medicine    · Use of tobacco products or alcohol    · Trauma such as an injury to your stomach or intestine    · Autoimmune disorders such as diabetes, thyroid disease, or Crohn disease    · Stress    · Age older than 60 years    · Illegal drugs, such as cocaine    What are the signs and symptoms of gastritis? · Stomach pain, burning, or tenderness when you press on it    · Stomach fullness or tightness    · Nausea or vomiting    · Loss of appetite, or feeling full quickly when you eat    · Bad breath    · Fatigue or feeling more tired than usual    · Heartburn    How is gastritis diagnosed? Your healthcare provider will ask about your signs and symptoms and examine you  You may need any of the following:  · Blood tests  may be used to show an infection, dehydration, or anemia (low red blood cell levels)  · A bowel movement sample  may be tested for blood or the germ that may be causing your gastritis  · A breath test  may show if H pylori is causing your gastritis  You will be given a liquid to drink  Then you will breathe into a bag  Your healthcare provider will measure the amount of carbon dioxide in your breath  Extra amounts of carbon dioxide may mean you have an H pylori infection  · An endoscopy  may be used to look for irritation or bleeding in your stomach  Your healthcare provider will use an endoscope (tube with a light and camera on the end) during the procedure  He or she may take a sample from your stomach to be tested  How is gastritis treated? Your symptoms may go away without treatment  Treatment will depend on what is causing your gastritis  Your healthcare provider may recommend changes to the medicines you take  Medicines may be given to help treat a bacterial infection or decrease stomach acid  How can I manage or prevent gastritis? · Do not smoke  Nicotine and other chemicals in cigarettes and cigars can make your symptoms worse and cause lung damage  Ask your healthcare provider for information if you currently smoke and need help to quit  E-cigarettes or smokeless tobacco still contain nicotine  Talk to your healthcare provider before you use these products  · Do not drink alcohol  Alcohol can prevent healing and make your gastritis worse   Talk to your healthcare provider if you need help to stop drinking  · Do not take NSAIDs or aspirin unless directed  These and similar medicines can cause irritation of your stomach lining  If your healthcare provider says it is okay to take NSAIDs, take them with food  · Do not eat foods that cause irritation  Foods such as oranges and salsa can cause burning or pain  Eat a variety of healthy foods  Examples include fruits (not citrus), vegetables, low-fat dairy products, beans, whole-grain breads, and lean meats and fish  Try to eat small meals, and drink water with your meals  Do not eat for at least 3 hours before you go to bed  · Find ways to relax and decrease stress  Stress can increase stomach acid and make gastritis worse  Activities such as yoga, meditation, or listening to music can help you relax  Spend time with friends, or do things you enjoy  Call 911 for any of the following:   · You develop chest pain or shortness of breath  When should I seek immediate care? · You vomit blood  · You have black or bloody bowel movements  · You have severe stomach or back pain  When should I contact my healthcare provider? · You have a fever  · You have new or worsening symptoms, even after treatment  · You have questions or concerns about your condition or care  CARE AGREEMENT:   You have the right to help plan your care  Learn about your health condition and how it may be treated  Discuss treatment options with your healthcare providers to decide what care you want to receive  You always have the right to refuse treatment  The above information is an  only  It is not intended as medical advice for individual conditions or treatments  Talk to your doctor, nurse or pharmacist before following any medical regimen to see if it is safe and effective for you    © Copyright [address] 2022 Information is for End User's use only and may not be sold, redistributed or otherwise used for commercial purposes  All illustrations and images included in CareNotes® are the copyrighted property of A D A M , Inc  or [address] [place] [address] V[place]   Upper Endoscopy and Colonoscopy   WHAT YOU NEED TO KNOW:   An upper endoscopy is also called an upper gastrointestinal (GI) endoscopy, or an esophagogastroduodenoscopy (EGD)  It is a procedure to examine the inside of your esophagus, stomach, and duodenum (first part of the small intestine) with a scope  You may feel bloated, gassy, or have some abdominal discomfort after your procedure  Your throat may be sore for 24 to 36 hours  You may burp or pass gas from air that is still inside your body  A colonoscopy is a procedure to examine the inside of your colon (intestine) with a scope  Polyps or tissue growths may have been removed during your colonoscopy  It is normal to feel bloated and to have some abdominal discomfort  You should be passing gas  If you have hemorrhoids or you had polyps removed, you may have a small amount of bleeding  DISCHARGE INSTRUCTIONS:   Seek care immediately if:   · You have sudden, severe abdominal pain  · You have problems swallowing  · You have a large amount of black, sticky bowel movements or blood in your bowel movements  · You have sudden trouble breathing  · You feel weak, lightheaded, or faint or your heart beats faster than normal for you  Contact your healthcare provider if:   · You have a fever and chills  · You have nausea or are vomiting  · Your abdomen is bloated or feels full and hard  · You have abdominal pain  · You have a large amount of black, sticky bowel movements or blood in your bowel movements  · You have not had a bowel movement for 3 days after your procedure  · You have rash or hives  · You have questions or concerns about your procedure  Activity:   ·       Do not lift, strain, or run for 24 hours after your procedure       · Rest after your procedure  You have been given medicine to relax you  Do not drive or make important decisions until the day after your procedure  Return to your normal activity as directed  ·       Relieve gas and discomfort from bloating by lying on your right side with a heating pad on your abdomen  You may need to take short walks to help the gas move out  Eat small meals until bloating is relieved  Follow up with your healthcare provider as directed: Write down your questions so you remember to ask them during your visits  If you take a blood thinner, please review the specific instructions from your endoscopist about when you should resume it  These can be found in the Recommendation and Your Medication list sections of this After Visit Summary

## 2022-04-22 NOTE — H&P
History and Physical -  [facility]  Dahlia Fenton 67 y o  female MRN: [id number]    HPI: Dahlia Fenton is a 67y o  year old female who presents for EGD colonoscopy for history of iron deficiency anemia patient with a personal history colon cancer    REVIEW OF SYSTEMS: Per the HPI, and otherwise unremarkable  Historical Information   Past Medical History:   Diagnosis Date    Atrial fibrillation ([facility] )     CHF (congestive heart failure) (HCC)     Colon cancer (HCC)     limited to wall of colon, no chemo or XRT needed    [facility]     mini stroke with vertigo    Diabetes mellitus ([facility] )     Hypertension     Hypomagnesemia     Kidney stone     Leukocytosis     chanelle 2 gene positive    Osteoarthritis     Vasculitis (HCC)     rash on legs       Past Surgical History:   Procedure Laterality Date    APPENDECTOMY      with colectomy    CARDIOVERSION      COLECTOMY LAKE      24 inches    COLONOSCOPY  2016    HYSTERECTOMY      LAPAROSCOPIC CHOLECYSTECTOMY       Social History   Social History     Substance and Sexual Activity   Alcohol Use No     Social History     Substance and Sexual Activity   Drug Use No     Social History     Tobacco Use   Smoking Status Never Smoker   Smokeless Tobacco Never Used     Family History   Problem Relation Age of Onset    Asthma Mother     Heart disease Mother         post heart surgery    Heart attack Father     Coronary artery disease Father     Heart attack Brother     Colon cancer Brother     Colon cancer Brother     Heart attack Brother     Diabetes type II Brother     Heart attack Brother     Colon cancer Brother     No Known Problems Brother     Heart disease Paternal Uncle     Hypertension Daughter     Hypertension Daughter     No Known Problems Daughter        Meds/Allergies       Current Outpatient Medications:     allopurinol (ZYLOPRIM) 100 mg tablet    amiodarone 200 mg tablet    apixaban (ELIQUIS) 5 mg    atorvastatin (LIPITOR) 40 mg tablet    Bydureon BCise 2 MG/0 85ML AUIJ    Cholecalciferol (VITAMIN D3) 50 MCG (2000 UT) capsule    furosemide (LASIX) 40 mg tablet    glipiZIDE (GLUCOTROL XL) 5 mg 24 hr tablet    Magnesium 500 MG CAPS    metFORMIN (GLUCOPHAGE) 500 mg tablet    aspirin 81 mg chewable tablet    fenofibrate (TRICOR) 54 MG tablet    glucose blood (ONE TOUCH ULTRA TEST) test strip    hydroxyurea (HYDREA) 500 mg capsule    Lancet Devices (ONE TOUCH DELICA LANCING DEV) MISC    Lancets (OneTouch Delica Plus [id number]) MISC    patient supplied medication    Patiromer Sorbitex Calcium 25 2 g PACK    polyethylene glycol (GOLYTELY) 4000 mL solution    Current Facility-Administered Medications:     lactated ringers infusion, 50 mL/hr, Intravenous, Continuous    Allergies   Allergen Reactions    Epinephrine      Other reaction(s): increased HR    Lidocaine      Other reaction(s): increased HR    Neomycin      Other reaction(s): rash    Pioglitazone      Other reaction(s): Fluid retention       Objective     /60   Pulse 85   Resp 20   SpO2 95%     PHYSICAL EXAM    Gen: NAD AAOx3  Head: Normocephalic, Atraumatic  CV: S1S2 RRR no m/r/g  CHEST: Clear b/l no c/r/w  ABD: soft, +BS NT/ND  EXT: no edema    ASSESSMENT/PLAN:  This is a 67y o  year old female here for EGD colonoscopy off Eliquis for 3 days, and she is stable and optimized for her procedure

## 2022-04-22 NOTE — ANESTHESIA POSTPROCEDURE EVALUATION
Post-Op Assessment Note    CV Status:  Stable  Pain Score: 0    Pain management: adequate     Mental Status:  Alert and awake   Hydration Status:  Euvolemic   PONV Controlled:  Controlled   Airway Patency:  Patent      Post Op Vitals Reviewed: Yes      Staff: CRNA         No complications documented      BP 95/42   Temp    Pulse 72   Resp 16   SpO2 100

## 2022-04-22 NOTE — ANESTHESIA PREPROCEDURE EVALUATION
Procedure:  COLONOSCOPY  EGD    Relevant Problems   CARDIO   (+) Atrial fibrillation (HCC)   (+) CHF (congestive heart failure) (HCC)   (+) Hyperlipidemia   (+) Hypertension      ENDO   (+) Type 2 diabetes mellitus with hyperglycemia, without long-term current use of insulin (HCC)      GI/HEPATIC   (+) Malignant neoplasm of ascending colon (HCC)      /RENAL   (+) Microalbuminuria due to type 2 diabetes mellitus ([facility])   (+) Nephrolithiasis      NEURO/PSYCH   (+) CVA [facility]cere[facility] vascular accident) ([facility] )   (+) Diabetic polyneuropathy associated with type 2 diabetes mellitus ([facility] )      Other   (+) Chronic myeloproliferative disease (HCC)        Physical Exam    Airway    Mallampati score: II  TM Distance: >3 FB  Neck ROM: limited     Dental       Cardiovascular  Cardiovascular exam normal    Pulmonary  Pulmonary exam normal     Other Findings        Anesthesia Plan  ASA Score- 3     Anesthesia Type- IV sedation with anesthesia with ASA Monitors  Additional Monitors:   Airway Plan:           Plan Factors-    Chart reviewed  Patient summary reviewed  Patient is not a current smoker  Induction- intravenous  Postoperative Plan-     Informed Consent- Anesthetic plan and risks discussed with patient  I personally reviewed this patient with the CRNA  Discussed and agreed on the Anesthesia Plan with the CRNA  John Horowitz

## 2022-05-11 ENCOUNTER — TELEPHONE (OUTPATIENT)
Dept: ENDOCRINOLOGY | Facility: CLINIC | Age: 77
End: 2022-05-11

## 2022-05-11 NOTE — TELEPHONE ENCOUNTER
Pt called requesting blood work script-she was seen in August 2021 however I don't see any blood work ordered in August -the last script that I see is from May 2021 (TSH/T4) do you want any other orders-scripts to be mailed to patient

## 2022-05-13 DIAGNOSIS — R80.9 MICROALBUMINURIA DUE TO TYPE 2 DIABETES MELLITUS (HCC): ICD-10-CM

## 2022-05-13 DIAGNOSIS — E11.65 TYPE 2 DIABETES MELLITUS WITH HYPERGLYCEMIA, WITHOUT LONG-TERM CURRENT USE OF INSULIN (HCC): Primary | ICD-10-CM

## 2022-05-13 DIAGNOSIS — E11.42 DIABETIC POLYNEUROPATHY ASSOCIATED WITH TYPE 2 DIABETES MELLITUS (HCC): ICD-10-CM

## 2022-05-13 DIAGNOSIS — E11.29 MICROALBUMINURIA DUE TO TYPE 2 DIABETES MELLITUS (HCC): ICD-10-CM

## 2022-05-13 DIAGNOSIS — E78.2 MIXED HYPERLIPIDEMIA: ICD-10-CM

## 2022-05-13 DIAGNOSIS — I10 PRIMARY HYPERTENSION: ICD-10-CM

## 2022-05-13 DIAGNOSIS — E04.2 GOITER, NONTOXIC, MULTINODULAR: Primary | ICD-10-CM

## 2022-05-13 DIAGNOSIS — E04.2 GOITER, NONTOXIC, MULTINODULAR: ICD-10-CM

## 2022-05-16 LAB — HBA1C MFR BLD HPLC: 4.9 %

## 2022-05-25 ENCOUNTER — OFFICE VISIT (OUTPATIENT)
Dept: ENDOCRINOLOGY | Facility: HOSPITAL | Age: 77
End: 2022-05-25
Payer: MEDICARE

## 2022-05-25 VITALS
SYSTOLIC BLOOD PRESSURE: 120 MMHG | HEART RATE: 84 BPM | BODY MASS INDEX: 30.44 KG/M2 | DIASTOLIC BLOOD PRESSURE: 62 MMHG | HEIGHT: 58 IN | WEIGHT: 145 LBS

## 2022-05-25 DIAGNOSIS — I10 PRIMARY HYPERTENSION: ICD-10-CM

## 2022-05-25 DIAGNOSIS — R80.9 MICROALBUMINURIA DUE TO TYPE 2 DIABETES MELLITUS (HCC): ICD-10-CM

## 2022-05-25 DIAGNOSIS — E11.42 DIABETIC POLYNEUROPATHY ASSOCIATED WITH TYPE 2 DIABETES MELLITUS (HCC): ICD-10-CM

## 2022-05-25 DIAGNOSIS — E11.29 MICROALBUMINURIA DUE TO TYPE 2 DIABETES MELLITUS (HCC): ICD-10-CM

## 2022-05-25 DIAGNOSIS — E78.2 MIXED HYPERLIPIDEMIA: ICD-10-CM

## 2022-05-25 DIAGNOSIS — E11.65 TYPE 2 DIABETES MELLITUS WITH HYPERGLYCEMIA, WITHOUT LONG-TERM CURRENT USE OF INSULIN (HCC): Primary | ICD-10-CM

## 2022-05-25 DIAGNOSIS — E04.2 GOITER, NONTOXIC, MULTINODULAR: ICD-10-CM

## 2022-05-25 PROCEDURE — 1124F ACP DISCUSS-NO DSCNMKR DOCD: CPT | Performed by: INTERNAL MEDICINE

## 2022-05-25 PROCEDURE — 99215 OFFICE O/P EST HI 40 MIN: CPT | Performed by: INTERNAL MEDICINE

## 2022-05-25 RX ORDER — EXENATIDE 2 MG/.85ML
2 INJECTION, SUSPENSION, EXTENDED RELEASE SUBCUTANEOUS WEEKLY
Qty: 3.4 ML | Refills: 6 | Status: SHIPPED | OUTPATIENT
Start: 2022-05-25

## 2022-05-25 RX ORDER — GLIPIZIDE 5 MG/1
5 TABLET, FILM COATED, EXTENDED RELEASE ORAL DAILY
Qty: 90 TABLET | Refills: 3 | Status: SHIPPED | OUTPATIENT
Start: 2022-05-25

## 2022-05-25 NOTE — PROGRESS NOTES
5/28/2022    Assessment/Plan      Diagnoses and all orders for this visit:    Type 2 diabetes mellitus with hyperglycemia, without long-term current use of insulin (HCC)  -     Bydureon BCise 2 MG/0 85ML AUIJ; Inject 2 mg under the skin once a week  -     glipiZIDE (GLUCOTROL XL) 5 mg 24 hr tablet; Take 1 tablet (5 mg total) by mouth daily  -     HEMOGLOBIN A1C W/ EAG ESTIMATION Lab Collect; Future  -     Comprehensive metabolic panel Lab Collect; Future  -     CBC and differential Lab Collect; Future  -     TSH, 3rd generation Lab Collect; Future  -     T4, free Lab Collect; Future    Diabetic polyneuropathy associated with type 2 diabetes mellitus (HCC)  -     HEMOGLOBIN A1C W/ EAG ESTIMATION Lab Collect; Future  -     Comprehensive metabolic panel Lab Collect; Future  -     CBC and differential Lab Collect; Future  -     TSH, 3rd generation Lab Collect; Future  -     T4, free Lab Collect; Future    Microalbuminuria due to type 2 diabetes mellitus ([facility] )  -     HEMOGLOBIN A1C W/ EAG ESTIMATION Lab Collect; Future  -     Comprehensive metabolic panel Lab Collect; Future  -     CBC and differential Lab Collect; Future  -     TSH, 3rd generation Lab Collect; Future  -     T4, free Lab Collect; Future    Goiter, nontoxic, multinodular  -     HEMOGLOBIN A1C W/ EAG ESTIMATION Lab Collect; Future  -     Comprehensive metabolic panel Lab Collect; Future  -     CBC and differential Lab Collect; Future  -     TSH, 3rd generation Lab Collect; Future  -     T4, free Lab Collect; Future    Primary hypertension  -     HEMOGLOBIN A1C W/ EAG ESTIMATION Lab Collect; Future  -     Comprehensive metabolic panel Lab Collect; Future  -     CBC and differential Lab Collect; Future  -     TSH, 3rd generation Lab Collect; Future  -     T4, free Lab Collect; Future    Mixed hyperlipidemia  -     HEMOGLOBIN A1C W/ EAG ESTIMATION Lab Collect; Future  -     Comprehensive metabolic panel Lab Collect;  Future  -     CBC and differential Lab Collect; Future  -     TSH, 3rd generation Lab Collect; Future  -     T4, free Lab Collect; Future        Assessment/Plan:  1  Type 2 diabetes  Hemoglobin A1c is 4 9%  This is excellent  Given she has kidney function that is mildly elevated and her history of heart failure, I think our best bet is to stop metformin  This will also help prevent low blood sugars  For now, she will continue the same Bydureon and glipizide  She will continue to test her blood sugars once daily and call them to me in 3 weeks after stopping metformin  2  Diabetic neuropathy  She has some worsening neuropathic symptoms  Diabetic foot exam was performed in the office today  3  Diabetic microalbuminuria  She follows with Nephrology but has not followed up with them recently  I have recommended she follow-up with Nephrology  4  Nontoxic multinodular goiter  Most recent thyroid ultrasound does show stable size or smaller thyroid nodules  She has no compressive thyroid symptoms  I will check thyroid function tests with her next visit  5  Hypertension  Blood pressure is under good control on her current dose of furosemide  6  Hyperlipidemia  She will continue the same atorvastatin 40 mg daily  Medications and treatment will be adjusted if necessary based on blood work blood sugars  I have asked her to follow up in 3 months with preceding hemoglobin A1c, CMP, CBC, TSH, and free T4       CC: Diabetes type 2, thyroid, blood pressure, lipid follow-up    History of Present Illness     HPI: Sherice Leblanc is a 67y o  year old female with type 2 diabetes with neuropathy and microalbuminuria for 14-19 years, multinodular goiter, hypertension, hyperlipidemia for follow-up visit  She is on oral agents at home and takes glipizide 5 mg daily, metformin 1000 mg in the morning and 500 mg in the evening and Bydureon 2 mg once a week  She denies any polyphagia, polydipsia,  and blurry vision    She has polyuria after her diuretic and occasional nocturia  She has very dry mouth  She has worsening numbness and tingling of the feet  She denies chest pain but has shortness of breath  She denies retinopathy, heart attack and claudication but does admit to neuropathy, nephropathy and stroke  She was last seen in August 2021 was lost to follow-up  She was seen by Hematology in June 2021 with a rising white blood cell and platelet since started on Hydrea  She then developed significant anemia with hgb 7 0 in fall and early winter 2022 and required discontinuation of Hydrea and IV then a for  She has been diagnosed with myeloproliferative disorder  Hypoglycemic episodes: Yes several times per month  Waking up with low blood sugars every few weeks, will be more often with eating dinner early and no bedtime snack  The patient's last eye exam was in March 2022 and goes every 6 months  The patient's last foot exam was in May 2021 at endocrine office visit  She does not see a foot doctor  Last A1C was   Lab Results   Component Value Date    HGBA1C 4 9 05/16/2022     Blood Sugar/Glucometer/Pump/CGM review: checks blood sugars once daily in am and later In the day if feels 50-low 100s, seem to be lower recently  off  She has a history of a nontoxic multinodular goiter  Multiple nodules were biopsied in the past were benign  Last thyroid ultrasound was 2018 showing stable size thyroid nodules  She has no compressive thyroid symptoms or difficulties with swallowing  She does have some hoarseness of her throat always feels like she is clearing her throat  She has hyperlipidemia and takes atorvastatin 40 mg daily  She denies chest pain but has shortness of breath chronically  She has hypertension and takes furosemide 40 mg alternating with 80 mg every other day for leg edema  Has been SOB and had a chest xray and there is slight amount of CHF  She has microalbuminuria and does follow with Nephrology    She last saw Nephrology in March 2021  She denies headache or stroke-like symptoms but can be lightheaded  Review of Systems   Constitutional: Positive for fatigue  Negative for unexpected weight change  Weight 10 lb less than August 2021  Fatigued especially if too active or on feet too long, improving slowly  HENT: Positive for voice change  Negative for trouble swallowing  Having more hoarseness in throat and feels as if always clearing throat   Spitting up dark blood at times in am when spits out mucus  Eyes: Negative for visual disturbance  Wears glasses  No diplopia  Respiratory: Positive for shortness of breath  Negative for chest tightness  Cardiovascular: Negative for chest pain and palpitations  Gastrointestinal: Negative for abdominal pain, constipation, diarrhea and nausea  Bowels on softer side in general  Had colonoscopy several weeks ago and ok  Also had EGD  Endocrine: Positive for polyuria  Negative for polydipsia and polyphagia  Polyuria with diuretic  Nocturia occasionally  Has very dry throat  Skin: Negative for wound  Neurological: Positive for light-headedness and numbness  Negative for dizziness, tremors, weakness and headaches  Has numbness and tingling of the feet and worsening  Also in the legs  Using walker as feels unsteady on feet  Blood pressure can be low and will be lightheaded  Psychiatric/Behavioral: Negative for sleep disturbance  Has a palliative nurse once a month          Historical Information   Past Medical History:   Diagnosis Date    Atrial fibrillation [facility]     CHF (congestive heart failure) (HCC)     Colon cancer (HCC)     limited to wall of colon, no chemo or XRT needed    CVA (cerebral vascular accident) ([facility] )     mini stroke with vertigo    Diabetes mellitus ([facility]     Frequent nosebleeds     quite significant and severe as in ER    Hypertension     Hypomagnesemia     Kidney stone     Leukocytosis     chanelle 2 gene positive    Myeloproliferative disease (HCC)     CHANELLE 2    Osteoarthritis     Vasculitis (HCC)     rash on legs       Past Surgical History:   Procedure Laterality Date    APPENDECTOMY      with colectomy    CARDIOVERSION      COLECTOMY LAKE      24 inches    COLONOSCOPY  2016    HYSTERECTOMY      LAPAROSCOPIC CHOLECYSTECTOMY       Social History   Social History     Substance and Sexual Activity   Alcohol Use No     Social History     Substance and Sexual Activity   Drug Use No     Social History     Tobacco Use   Smoking Status Never Smoker   Smokeless Tobacco Never Used     Family History:   Family History   Problem Relation Age of Onset    Asthma Mother     Heart disease Mother         post heart surgery    Heart attack Father     Coronary artery disease Father     Heart attack Brother     Colon cancer Brother     Colon cancer Brother     Heart attack Brother     Diabetes type II Brother     Heart attack Brother     Colon cancer Brother     No Known Problems Brother     Heart disease Paternal Uncle     Hypertension Daughter     Hypertension Daughter     No Known Problems Daughter        Meds/Allergies   Current Outpatient Medications   Medication Sig Dispense Refill    allopurinol (ZYLOPRIM) 100 mg tablet Take 2 tablets (200 mg total) by mouth daily 180 tablet 3    amiodarone 200 mg tablet Take 200 mg by mouth daily      apixaban (ELIQUIS) 5 mg Take 5 mg by mouth 2 (two) times a day        atorvastatin (LIPITOR) 40 mg tablet Take 40 mg by mouth daily      Bydureon BCise 2 MG/0 85ML AUIJ Inject 2 mg under the skin once a week 3 4 mL 6    Cholecalciferol (VITAMIN D3) 50 MCG (2000 UT) capsule Take 2,000 Units by mouth daily       furosemide (LASIX) 40 mg tablet Take 40 mg by mouth daily Alternating 80 mg  daily       glipiZIDE (GLUCOTROL XL) 5 mg 24 hr tablet Take 1 tablet (5 mg total) by mouth daily 90 tablet 3    glucose blood (ONE TOUCH ULTRA TEST) test strip Test 3 times a day 300 each 3    [employer] (ONE TOUCH DELICA LANCING DEV) MISC Use to test blood sugars once daily 1 each 0    Magnesium 500 MG CAPS Take by mouth daily         No current facility-administered medications for this visit  Allergies   Allergen Reactions    Epinephrine      Other reaction(s): increased HR    Lidocaine      Other reaction(s): increased HR    Neomycin      Other reaction(s): rash    Pioglitazone      Other reaction(s): Fluid retention       Objective   Vitals: Blood pressure 120/62, pulse 84, height 4' 10" (1 473 m), weight 65 8 kg (145 lb)  Invasive Devices  Report    None                 Physical Exam  Vitals reviewed  Constitutional:       Appearance: Normal appearance  She is well-developed  She is obese  HENT:      Head: Normocephalic and atraumatic  Eyes:      Conjunctiva/sclera: Conjunctivae normal    Neck:      Thyroid: No thyromegaly  Vascular: No carotid bruit  Comments: Thyroid irregular in feel  Thyroid low in the neck  No bruits over the thyroid gland  Cardiovascular:      Rate and Rhythm: Normal rate and regular rhythm  Pulses: Pulses are weak  Dorsalis pedis pulses are 1+ on the right side and 1+ on the left side  Posterior tibial pulses are 1+ on the right side and 1+ on the left side  Heart sounds: Murmur heard  Comments: 1+ dorsalis pedis and posterior tibialis pulses bilaterally  3/6 systolic heart murmur  Pulmonary:      Effort: Pulmonary effort is normal       Breath sounds: Normal breath sounds  No wheezing  Abdominal:      Palpations: Abdomen is soft  Musculoskeletal:         General: No deformity  Cervical back: Normal range of motion and neck supple  Right lower leg: Edema present  Left lower leg: Edema present  Comments: 2-3+ bilateral lower extremity edema, riught greater then left  No ulcerations of the feet       Feet:      Right foot:      Skin integrity: No ulcer, skin breakdown, erythema, warmth, callus or dry skin  Left foot:      Skin integrity: No ulcer, skin breakdown, erythema, warmth, callus or dry skin  Lymphadenopathy:      Cervical: No cervical adenopathy  Skin:     General: Skin is warm and dry  Findings: No rash  Neurological:      Mental Status: She is alert and oriented to person, place, and time  Deep Tendon Reflexes: Reflexes are normal and symmetric  Comments: Vibration sensation diminished to the 1st toe DIP joint bilaterally  Microfilament sensation intact bilaterally  Patient's shoes and socks removed  Right Foot/Ankle   Right Foot Inspection  Skin Exam: skin normal and skin intact  No dry skin, no warmth, no callus, no erythema, no maceration, no abnormal color, no pre-ulcer, no ulcer and no callus  Toe Exam: swelling  no right toe deformity    Sensory   Vibration: diminished  Monofilament testing: intact    Vascular  Capillary refills: < 3 seconds  The right DP pulse is 1+  The right PT pulse is 1+  Left Foot/Ankle  Left Foot Inspection  Skin Exam: skin normal and skin intact  No dry skin, no warmth, no erythema, no maceration, normal color, no pre-ulcer, no ulcer and no callus  Toe Exam: swelling  No left toe deformity  Sensory   Vibration: diminished  Monofilament testing: intact    Vascular  Capillary refills: < 3 seconds  The left DP pulse is 1+  The left PT pulse is 1+  Assign Risk Category  No deformity present  Loss of protective sensation  Weak pulses  Risk: 2        The history was obtained from the review of the chart and from the patient and       Lab Results:    Most recent Alc is  Lab Results   Component Value Date    HGBA1C 4 9 05/16/2022           Blood work on 05/16/2022 at [address] [place] [address] showed a CMP with a glucose of 79, BUN 62, creatinine 1 14, GFR 46, BUN/creatinine ratio 54 4, , but was otherwise normal     CBC is normal     Lab Results   Component Value Date    CREATININE 1 05 10/18/2021    CREATININE 1 10 04/21/2021    CREATININE 0 92 11/09/2020    BUN 42 10/18/2021    K 5 3 10/18/2021     10/18/2021    CO2 22 4 10/18/2021     eGFR   Date Value Ref Range Status   12/09/2019 64 ml/min/1 73sq m Final     EXTERNAL EGFR   Date Value Ref Range Status   10/18/2021 51  Final         Lab Results   Component Value Date    HDL 32 (A) 11/09/2020    TRIG 122 11/09/2020       Lab Results   Component Value Date    ALT 8 04/21/2021    AST 20 04/21/2021    ALKPHOS 107 04/21/2021       Lab Results   Component Value Date    FREET4 1 08 12/09/2019             Future Appointments   Date Time Provider Rasheed Lindsayi   6/21/2022  3:00 PM Sulaiman Davis MD [facility]   9/21/2022  1:20 PM Donaldo Boo MD [facility]

## 2022-05-25 NOTE — PATIENT INSTRUCTIONS
Hgba1c is 4 9%  this is excellent  The kidney function is a bit off with the lasix  Given the heart failure, I think it is best to stop the metformin  This will help to prevent low blood sugars  Continue the same bydureon and glipizide for now  Continue to test blood sugars once daily and call them in 3 weeks  The thyroid ultrasound shows stable or smaller size thyroid nodules  Follow up in 3 months with blood work

## 2022-06-02 ENCOUNTER — TELEPHONE (OUTPATIENT)
Dept: SCHEDULING | Facility: CLINIC | Age: 77
End: 2022-06-02
Payer: MEDICARE

## 2022-06-02 NOTE — TELEPHONE ENCOUNTER
New Patient Appointment Request    Name of caller: soo    Reason for Visit: leaky valve     Insurance: medicare + [employer]     Insurance ID #: [id number]    Recent Procedures: none    Referred by: self    Previous Cardiologist name and phone number: Dr. Rhianna Landry     [facility] contact number: [phone]    Additional notes: PM or C[facility]

## 2022-06-03 NOTE — TELEPHONE ENCOUNTER
Rere,    I scheduled her appt with AB. Can you call her for NPQ and I also wanted to let you know that she want us to wait until 6/16 to get the records from her previous cardiologist.    Jaky

## 2022-06-15 NOTE — TELEPHONE ENCOUNTER
Patient asked that I not request records from Dr. Pelaez until close to the appt.  She wants to let him know personally that she's switching doctors.

## 2022-06-21 ENCOUNTER — OFFICE VISIT (OUTPATIENT)
Dept: GASTROENTEROLOGY | Facility: CLINIC | Age: 77
End: 2022-06-21
Payer: MEDICARE

## 2022-06-21 VITALS
DIASTOLIC BLOOD PRESSURE: 56 MMHG | SYSTOLIC BLOOD PRESSURE: 112 MMHG | BODY MASS INDEX: 30.64 KG/M2 | HEIGHT: 58 IN | WEIGHT: 146 LBS

## 2022-06-21 DIAGNOSIS — Z85.038 HISTORY OF COLON CANCER: ICD-10-CM

## 2022-06-21 DIAGNOSIS — K74.60 HEPATIC CIRRHOSIS, UNSPECIFIED HEPATIC CIRRHOSIS TYPE, UNSPECIFIED WHETHER ASCITES PRESENT (HCC): ICD-10-CM

## 2022-06-21 DIAGNOSIS — D47.1 CHRONIC MYELOPROLIFERATIVE DISEASE (HCC): ICD-10-CM

## 2022-06-21 DIAGNOSIS — K86.2 PANCREATIC CYST: ICD-10-CM

## 2022-06-21 DIAGNOSIS — D50.9 IRON DEFICIENCY ANEMIA, UNSPECIFIED IRON DEFICIENCY ANEMIA TYPE: Primary | ICD-10-CM

## 2022-06-21 PROCEDURE — 99214 OFFICE O/P EST MOD 30 MIN: CPT | Performed by: INTERNAL MEDICINE

## 2022-06-21 NOTE — PROGRESS NOTES
[facility] - Outpatient Follow-up Note  Kellee Peterson 67 y o  female MRN: [id number]  Encounter: [id number]    ASSESSMENT AND PLAN:      1  Iron deficiency anemia, unspecified iron deficiency anemia type  Hemoglobin is 10 8  Previous iron studies have been low she underwent recent upper and lower endoscopy which were nondiagnostic  She did have polyp from the colon  At this point she does receive periodic iron infusions which I think is fine I believe her baseline hemoglobin is probably in the 10 0 range with her history of cirrhosis  2  History of colon cancer  History of this up-to-date on colonoscopy    3  Chronic myeloproliferative disease (HCC)  Stable    4  Pancreatic cyst  Stable-    5  Hepatic cirrhosis, unspecified hepatic cirrhosis type, unspecified whether ascites present ([place] [address] )  On the basis of PINTO  Reasonably well compensated  Ultrasound done within the last 6 months was negative for hepatoma  Followup Appointment:  6 months  ______________________________________________________________________    Chief Complaint   Patient presents with    Follow-up     HPI:  Feels tired weak and run down  Sometimes short of breath with she is urged herself but not always  No abdominal pain no reflux no significant diarrhea constipation rectal bleeding or melena  She does feel that her ankles swell somewhat      Historical Information   Past Medical History:   Diagnosis Date    Atrial fibrillation ([place] [address] )     CHF (congestive heart failure) ([place] [address] )     Colon cancer (HCC)     limited to wall of colon, no chemo or XRT needed    CVA (cerebral vascular accident) ([place] [address] )     mini stroke with vertigo    Diabetes mellitus ([place] [address] )     Frequent nosebleeds     quite significant and severe as in ER    Hypertension     Hypomagnesemia     Kidney stone     Leukocytosis     chanelle 2 gene positive    Myeloproliferative disease ([facility] )     CHANELLE 2    Osteoarthritis     Vasculitis ([place] [address] ) rash on legs  Past Surgical History:   Procedure Laterality Date    APPENDECTOMY      with colectomy    CARDIOVERSION      COLECTOMY LAKE      24 inches    COLONOSCOPY  2016    HYSTERECTOMY      LAPAROSCOPIC CHOLECYSTECTOMY       Social History     Substance and Sexual Activity   Alcohol Use No     Social History     Substance and Sexual Activity   Drug Use No     Social History     Tobacco Use   Smoking Status Never Smoker   Smokeless Tobacco Never Used     Family History   Problem Relation Age of Onset    Asthma Mother     Heart disease Mother         post heart surgery    Heart attack Father     Coronary artery disease Father     Heart attack Brother     Colon cancer Brother     Colon cancer Brother     Heart attack Brother     Diabetes type II Brother     Heart attack Brother     Colon cancer Brother     No Known Problems Brother     Heart disease Paternal Uncle     Hypertension Daughter     Hypertension Daughter     No Known Problems Daughter          Current Outpatient Medications:     allopurinol (ZYLOPRIM) 100 mg tablet    amiodarone 200 mg tablet    apixaban (ELIQUIS) 5 mg    atorvastatin (LIPITOR) 40 mg tablet    Bydureon BCise 2 MG/0 85ML AUIJ    Cholecalciferol (VITAMIN D3) 50 MCG (2000 UT) capsule    furosemide (LASIX) 40 mg tablet    glipiZIDE (GLUCOTROL XL) 5 mg 24 hr tablet    glucose blood (ONE TOUCH ULTRA TEST) test strip    Lanc[employer] Devices (ONE TOUCH DELICA LANCING DEV) MISC    Magnesium 500 MG CAPS  Allergies   Allergen Reactions    Epinephrine      Other reaction(s): increased HR    Lidocaine      Other reaction(s): increased HR    Neomycin      Other reaction(s): rash    Pioglitazone      Other reaction(s): Fluid retention     Reviewed medications and allergies and updated as indicated    PHYSICAL EXAM:    Blood pressure 112/56, height 4' 10" (1 473 m), weight 66 2 kg (146 lb)  Body mass index is 30 51 kg/m²    General Appearance: NAD, cooperative, alert  Eyes: Anicteric, PERRLA, EOMI  ENT:  Normocephalic, atraumatic, normal mucosa  Neck:  Supple, symmetrical, trachea midline  Resp:  Clear to auscultation bilaterally; no rales, rhonchi or wheezing; respirations unlabored   CV:  S1 S2, Regular rate and rhythm; no murmur, rub, or gallop  GI:  Soft, non-tender, mild distension nontender; normal bowel sounds; no masses, no organomegaly   Rectal: Deferred  Musculoskeletal: No cyanosis, clubbing-2+ edema below the knee  Normal ROM  Skin:  No jaundice, rashes, or lesions   Heme/Lymph: No palpable cervical lymphadenopathy  Psych: Normal affect, good eye contact  Neuro: No gross deficits, AAOx3    Lab Results:   Lab Results   Component Value Date    WBC 30 1 10/18/2021    HGB 10 6 10/18/2021    HCT 41 10/18/2021    MCV 91 10/18/2021     10/18/2021     Lab Results   Component Value Date    K 5 3 10/18/2021     10/18/2021    CO2 22 4 10/18/2021    BUN 42 10/18/2021    CREATININE 1 05 10/18/2021    GLUF 75 12/09/2019    CALCIUM 9 2 10/18/2021    CORRECTEDCA 10 0 12/09/2019    AST 20 04/21/2021    ALT 8 04/21/2021    ALKPHOS 107 04/21/2021    EGFR 51 10/18/2021     No results found for: IRON, TIBC, FERRITIN  No results found for: LIPASE    Radiology Results:   No results found

## 2022-06-30 NOTE — PATIENT INSTRUCTIONS
1  History of nephrolithiasis -   -in office UA shows trace protein and trace WBCs  -last sCr 0 83 as of 8/12/19  -would adhere to a low sodium(<2000mg) diet to prevent stone formation  Avoid canned foods, packaged foods, [place] food, and eating out frequently in restaurants    -should drink enough water to urinate 2-2 5L/day   -just had an ultrasound of the kidneys this past Monday - will obtain results of this  -will obtain litholink urine collection to determine how best to reduce risk of kidney stones  The bottle will be mailed to your home with instructions on urine collection  2  Hyperkalemia ? D/t obstruction from stones vs from RTA in setting of DM and kidney stones   - K 5 2 on latest bloodwork  Repeat BMP  Currently off lasix  On HCTZ 25mg daily  - check urine potassium, urine osm, urine sodium, urine cr, serum cortisol, serum renin and aldosterone, uric acid, phosphorus, CPK, calcium levels    3  HTN - BP well controlled  Continue metoprolol 100mg twice daily, HCTZ 25mg daily    4  DM2 - on metformin, glipizide, sugars well controlled  Also on bydureon     5  Elevated uric acid level - uric acid 7 9 - on allopurinol 100mg daily  Will check uric acid level  6  Hypomagnesemia - does not use PPIs  On magnesium supplement  Last mag 1 7 as of 7/29/19    -will check urine mag and urine Cr levels as well     RTC in 3 months  no wheezing/no shortness of breath

## 2022-08-14 NOTE — TELEPHONE ENCOUNTER
New Patient Visit Questionnaire    Reason for appointment?   Leaky valve    Who referred you:  self    Name of primary care physician   Divya Mckinney    Has the patient ever been seen by a cardiologist before?   yes                  If YES, please obtain name and location of previous cardiologist.   Dr. Pelaez - [facility] fax #[phone]    Do you give us permission to obtain Medical records from the Providers listed?   yes    Have you had any recent lab work performed?  yes        If yes, where was this performed?  [facility]    Have you ever had any cardiac testing (echo, stress test, carotid or aortic ultrasounds, cardiac MRI, etc.)  Echo 6/22    Have you ever had a cardiac catheterization, angioplasty, stent or heart surgery?   Cardioversion x 2    Have you had any recent hospitalizations?   No    Family Hx: Cardiac hx on Father's side mostly and 2 brothers.  Social Hx: Non smoker    Cardiac meds: Amiodarone - 200 mg daily    Eliquis - 5 mg twice daily    If the patient has had previous testing/hospitalization, please determine where and when this was performed.  If the patient has copies of these reports or discharge summaries, please instruct them bring records to the visit.     PATIENT INSTRUCTIONS   Please bring a list of all your medications with the name of the medication, the dosage and how frequently you take the medication.  If you do not have a list, please bring all of your medication bottles with you.               Home

## 2022-09-01 LAB
LEFT EYE DIABETIC RETINOPATHY: NORMAL
RIGHT EYE DIABETIC RETINOPATHY: NORMAL

## 2022-09-22 LAB — HBA1C MFR BLD HPLC: 5.7 %

## 2022-09-28 ENCOUNTER — OFFICE VISIT (OUTPATIENT)
Dept: ENDOCRINOLOGY | Facility: HOSPITAL | Age: 77
End: 2022-09-28
Payer: MEDICARE

## 2022-09-28 VITALS
HEART RATE: 70 BPM | BODY MASS INDEX: 29.18 KG/M2 | WEIGHT: 139 LBS | DIASTOLIC BLOOD PRESSURE: 52 MMHG | HEIGHT: 58 IN | SYSTOLIC BLOOD PRESSURE: 110 MMHG

## 2022-09-28 DIAGNOSIS — E11.65 TYPE 2 DIABETES MELLITUS WITH HYPERGLYCEMIA, WITHOUT LONG-TERM CURRENT USE OF INSULIN (HCC): Primary | ICD-10-CM

## 2022-09-28 DIAGNOSIS — E11.42 DIABETIC POLYNEUROPATHY ASSOCIATED WITH TYPE 2 DIABETES MELLITUS (HCC): ICD-10-CM

## 2022-09-28 DIAGNOSIS — E04.2 MULTIPLE THYROID NODULES: ICD-10-CM

## 2022-09-28 DIAGNOSIS — E78.2 MIXED HYPERLIPIDEMIA: ICD-10-CM

## 2022-09-28 DIAGNOSIS — R80.9 MICROALBUMINURIA DUE TO TYPE 2 DIABETES MELLITUS (HCC): ICD-10-CM

## 2022-09-28 DIAGNOSIS — I10 PRIMARY HYPERTENSION: ICD-10-CM

## 2022-09-28 DIAGNOSIS — E11.29 MICROALBUMINURIA DUE TO TYPE 2 DIABETES MELLITUS (HCC): ICD-10-CM

## 2022-09-28 DIAGNOSIS — E04.2 GOITER, NONTOXIC, MULTINODULAR: ICD-10-CM

## 2022-09-28 PROCEDURE — 99215 OFFICE O/P EST HI 40 MIN: CPT | Performed by: INTERNAL MEDICINE

## 2022-09-28 RX ORDER — METOLAZONE 5 MG/1
TABLET ORAL
COMMUNITY
Start: 2022-08-15

## 2022-09-28 RX ORDER — TORSEMIDE 20 MG/1
20 TABLET ORAL 2 TIMES DAILY
COMMUNITY
Start: 2022-07-14

## 2022-09-28 NOTE — PATIENT INSTRUCTIONS
Hgba1c  is 5 7%  this is excellent  Continue the same glipizide and bydureon  Continue to check blood sugars once daily  The thyroid blood work is normal     Follow up in 3 months with blood work  I do recommend the COVID vaccine booster  I recommend following up with a kidney doctor

## 2023-01-06 LAB — HBA1C MFR BLD HPLC: 5.8 %

## 2023-01-09 ENCOUNTER — TELEPHONE (OUTPATIENT)
Dept: ENDOCRINOLOGY | Facility: HOSPITAL | Age: 78
End: 2023-01-09

## 2023-01-09 NOTE — TELEPHONE ENCOUNTER
Her blood work shows no anemia  She does seem to be a little on the dehydrated side still but also has a little bit of heart failure  The kidney tests continue to improve

## 2023-01-11 ENCOUNTER — OFFICE VISIT (OUTPATIENT)
Dept: ENDOCRINOLOGY | Facility: HOSPITAL | Age: 78
End: 2023-01-11

## 2023-01-11 VITALS
HEIGHT: 58 IN | WEIGHT: 141 LBS | DIASTOLIC BLOOD PRESSURE: 70 MMHG | SYSTOLIC BLOOD PRESSURE: 118 MMHG | HEART RATE: 81 BPM | BODY MASS INDEX: 29.6 KG/M2

## 2023-01-11 DIAGNOSIS — I10 PRIMARY HYPERTENSION: ICD-10-CM

## 2023-01-11 DIAGNOSIS — E11.65 TYPE 2 DIABETES MELLITUS WITH HYPERGLYCEMIA, WITHOUT LONG-TERM CURRENT USE OF INSULIN (HCC): Primary | ICD-10-CM

## 2023-01-11 DIAGNOSIS — E11.42 DIABETIC POLYNEUROPATHY ASSOCIATED WITH TYPE 2 DIABETES MELLITUS (HCC): ICD-10-CM

## 2023-01-11 DIAGNOSIS — E04.2 GOITER, NONTOXIC, MULTINODULAR: ICD-10-CM

## 2023-01-11 DIAGNOSIS — R80.9 MICROALBUMINURIA DUE TO TYPE 2 DIABETES MELLITUS (HCC): ICD-10-CM

## 2023-01-11 DIAGNOSIS — E78.2 MIXED HYPERLIPIDEMIA: ICD-10-CM

## 2023-01-11 DIAGNOSIS — E11.29 MICROALBUMINURIA DUE TO TYPE 2 DIABETES MELLITUS (HCC): ICD-10-CM

## 2023-01-11 NOTE — PATIENT INSTRUCTIONS
Hgba1c is 5 8%  this is excellent but can be causing low blood sugars  Let's stop the glipizide  Continue the same bydureon 2 mg once a week  Continue to test blood sugars once daily  Call if sugars in the upper 100s consistently  Follow up in 3 months with blood work  Consider follow up with kidney doctor, can try Dr Monica Lord

## 2023-01-11 NOTE — PROGRESS NOTES
1/12/2023    Assessment/Plan      Diagnoses and all orders for this visit:    Type 2 diabetes mellitus with hyperglycemia, without long-term current use of insulin ([facility] )  -     HEMOGLOBIN A1C W/ EAG ESTIMATION Lab Collect; Future  -     Comprehensive metabolic panel Lab Collect; Future  -     TSH, 3rd generation Lab Collect; Future  -     CBC and differential Lab Collect; Future    Microalbuminuria due to type 2 diabetes mellitus ([facility] )  -     HEMOGLOBIN A1C W/ EAG ESTIMATION Lab Collect; Future  -     Comprehensive metabolic panel Lab Collect; Future  -     TSH, 3rd generation Lab Collect; Future  -     CBC and differential Lab Collect; Future    Diabetic polyneuropathy associated with type 2 diabetes mellitus (HCC)  -     HEMOGLOBIN A1C W/ EAG ESTIMATION Lab Collect; Future  -     Comprehensive metabolic panel Lab Collect; Future  -     TSH, 3rd generation Lab Collect; Future  -     CBC and differential Lab Collect; Future    Goiter, nontoxic, multinodular  -     HEMOGLOBIN A1C W/ EAG ESTIMATION Lab Collect; Future  -     Comprehensive metabolic panel Lab Collect; Future  -     TSH, 3rd generation Lab Collect; Future  -     CBC and differential Lab Collect; Future    Primary hypertension  -     HEMOGLOBIN A1C W/ EAG ESTIMATION Lab Collect; Future  -     Comprehensive metabolic panel Lab Collect; Future  -     TSH, 3rd generation Lab Collect; Future  -     CBC and differential Lab Collect; Future    Mixed hyperlipidemia  -     HEMOGLOBIN A1C W/ EAG ESTIMATION Lab Collect; Future  -     Comprehensive metabolic panel Lab Collect; Future  -     TSH, 3rd generation Lab Collect; Future  -     CBC and differential Lab Collect; Future        Assessment/Plan:  1  Type 2 diabetes  Hemoglobin A1c is 5 8%  This is excellent but I think it is causing too many low blood sugars so I will have her discontinue her glipizide and take only Bydureon 2 mg once a week    I educated her that medications like glipizide in someone with renal disease do increase her risk of hypoglycemia prolonged  She will continue to test her blood sugars once daily and call if sugars are in the upper 100s consistently at which point we could restart a low-dose of glipizide  2   Diabetic neuropathy  3   Microalbuminuria  Was asked to follow-up with nephrology as we are unable to use ACE inhibitor's or ARB's due to hyperkalemia  4   Nontoxic multinodular goiter  I will repeat a TSH with her next visit  She will be due for repeat thyroid ultrasound in May 2023     5   Hyperlipidemia  She will continue the same atorvastatin 40 mg daily  6   Hypertension  She is normotensive in the office on her current dose of torsemide and metolazone  I have asked her to follow-up in 3 months with preceding hemoglobin A1c, CMP, CBC, and TSH  CC: Diabetes type II, thyroid blood pressure, lipid follow-up    History of Present Illness     HPI: Aayush Armstrong is a 65y o  year old female with type 2 diabetes with neuropathy microalbumin urine for 14-19 years, nontoxic multinodular goiter, hypertension, hyperlipidemia for follow-up visit  She is on oral agents at home and takes glipizide XL 5 mg 1 tablet daily and Bydureon 2 mg once a week  She denies any polyuria, polydipsia, polyphagia, and blurry vision  She has twice a night nocturia  She denies numbness or tingling of the feet  She denies chest pain but does get shortness of breath with long distance walking  She denies retinopathy, heart attack and claudication but does admit to neuropathy, nephropathy and stroke  Hypoglycemic episodes: Yes several times per week  The patient's last eye exam was in March 2022  Has new appointment in several weeks  The patient's last foot exam was in  She does see podiatry with last visit jan 2023  Morro Kovacs Last A1C was   Lab Results   Component Value Date    HGBA1C 5 8 01/06/2023         Blood Sugar/Glucometer/Pump/CGM review:checks blood sugars once daily in am, occasionally twice a day      She has a nontoxic multinodular goiter  Multiple nodules were biopsied benign in the past   Last thyroid ultrasound was May 2022 with stable size thyroid nodules  She denies compressive thyroid symptoms or difficulties with swallowing  She has no compressive thyroid symptoms or difficulties with swallowing  She has hyperlipidemia and takes atorvastatin 40 mg daily  To see Dr Charles Louie soon  She has shortness of breath when walking for long distances  She denies chest pain  She has hypertension and microalbuminuria and takes metolazone 5 mg 1 day a week and torsemide 20 mg once a day  BP in am very low 99/48  She has not seen nephrology recently  She denies headaches or strokelike symptoms but is generally weak in the legs  Review of Systems   Constitutional: Negative for fatigue and unexpected weight change  Tire easily these days  HENT: Negative for trouble swallowing  Eyes: Negative for visual disturbance  Wears glasses  Respiratory: Positive for shortness of breath  Negative for chest tightness  SOB with long distance walking  Cardiovascular: Positive for leg swelling  Negative for chest pain and palpitations  Gastrointestinal: Negative for abdominal pain, constipation, diarrhea and nausea  Endocrine: Positive for cold intolerance  Negative for heat intolerance, polydipsia, polyphagia and polyuria  Nocturia 2 times a night  Skin: Negative for wound  Neurological: Positive for tremors and weakness  Negative for dizziness, light-headedness, numbness and headaches  Occasional tremors  Legs feel numb and tingling without change  uses a walker as not feeling steady on feet and had a fall 2 months ago  Feels she does not have the strength as in the past     Psychiatric/Behavioral: Negative for sleep disturbance         Historical Information   Past Medical History:   Diagnosis Date   • Atrial fibrillation ([facility] ) • CHF (congestive heart failure) (HCC)    • Colon cancer (HCC)     limited to wall of colon, no chemo or XRT needed   • CVA (cerebral vascular accident) ([facility] )     mini stroke with vertigo   • Diabetes mellitus ([facility] )    • Frequent nosebleeds     quite significant and severe as in ER   • Hypertension    • Hypomagnesemia    • Kidney stone    • Leukocytosis     chanelle 2 gene positive   • Myeloproliferative disease ([facility] )     CHANELLE 2   • Osteoarthritis    • Vasculitis (HCC)     rash on legs       Past Surgical History:   Procedure Laterality Date   • APPENDECTOMY      with colectomy   • CARDIOVERSION     • COLECTOMY LAKE      24 inches   • COLONOSCOPY  2016   • HYSTERECTOMY     • LAPAROSCOPIC CHOLECYSTECTOMY       Social History   Social History     Substance and Sexual Activity   Alcohol Use No     Social History     Substance and Sexual Activity   Drug Use No     Social History     Tobacco Use   Smoking Status Never   Smokeless Tobacco Never     Family History:   Family History   Problem Relation Age of Onset   • Asthma Mother    • Heart disease Mother         post heart surgery   • Heart attack Father    • Coronary artery disease Father    • Heart attack Brother    • Colon cancer Brother    • Colon cancer Brother    • Heart attack Brother    • Diabetes type II Brother    • Heart attack Brother    • Colon cancer Brother    • No Known Problems Brother    • Heart disease Paternal Uncle    • Hypertension Daughter    • Hypertension Daughter    • No Known Problems Daughter        Meds/Allergies   Current Outpatient Medications   Medication Sig Dispense Refill   • amiodarone 200 mg tablet Take 200 mg by mouth daily     • apixaban (ELIQUIS) 5 mg Take 5 mg by mouth 2 (two) times a day       • atorvastatin (LIPITOR) 40 mg tablet Take 40 mg by mouth daily     • Bydureon BCise 2 MG/0 85ML AUIJ Inject 2 mg under the skin once a week 3 4 mL 6   • Cholecalciferol (VITAMIN D3) 50 MCG (2000 UT) capsule Take 2,000 Units by mouth daily • glucose blood (ONE TOUCH ULTRA TEST) test strip Test 3 times a day 300 each 3   • [employer] (ONE TOUCH DELICA LANCING DEV) MISC Use to test blood sugars once daily 1 each 0   • Magnesium 500 MG CAPS Take by mouth daily       • metolazone (ZAROXOLYN) 5 mg tablet 1 day a week     • torsemide (DEMADEX) 20 mg tablet Take 20 mg by mouth daily     • allopurinol (ZYLOPRIM) 100 mg tablet TAKE 2 TABLETS BY MOUTH EVERY  tablet 3     No current facility-administered medications for this visit  Allergies   Allergen Reactions   • Epinephrine      Other reaction(s): increased HR   • Lidocaine      Other reaction(s): increased HR   • Neomycin      Other reaction(s): rash   • Pioglitazone      Other reaction(s): Fluid retention       Objective   Vitals: Blood pressure 118/70, pulse 81, height 4' 10" (1 473 m), weight 64 kg (141 lb)  Invasive Devices     None                 Physical Exam  Vitals reviewed  Constitutional:       Appearance: Normal appearance  She is well-developed  HENT:      Head: Normocephalic and atraumatic  Eyes:      Extraocular Movements: Extraocular movements intact  Conjunctiva/sclera: Conjunctivae normal       Comments: No lid lag, stare, proptosis, or periorbital edema  Neck:      Thyroid: No thyromegaly  Vascular: No carotid bruit  Comments: Thyroid irregular and nodular feeling low in the neck  No palpable thyroid nodules  No bruits over the thyroid gland  Cardiovascular:      Rate and Rhythm: Normal rate and regular rhythm  Heart sounds: Normal heart sounds  No murmur heard  Pulmonary:      Effort: Pulmonary effort is normal       Breath sounds: Normal breath sounds  No wheezing  Abdominal:      Palpations: Abdomen is soft  Musculoskeletal:         General: No deformity  Cervical back: Normal range of motion and neck supple  Right lower leg: No edema  Left lower leg: No edema  Comments: No tremor of the outstretched hands  Lymphadenopathy:      Cervical: No cervical adenopathy  Skin:     General: Skin is warm and dry  Findings: No rash  Neurological:      Mental Status: She is alert and oriented to person, place, and time  Deep Tendon Reflexes: Reflexes are normal and symmetric  Comments: Patellar deep tendon reflexes normal          The history was obtained from the review of the chart and from the patient and   Lab Results:    Most recent Alc is  Lab Results   Component Value Date    HGBA1C 5 8 01/06/2023           Blood work done on 1/6/2023 showed a CMP with a glucose fasting of 78, BUN 81, creatinine 1 27, GFR 41, BUN/creatinine ratio 63 8, alkaline phosphatase 181, but was otherwise normal     CBC shows a normal hemoglobin and hematocrit      Lab Results   Component Value Date    CREATININE 1 05 10/18/2021    CREATININE 1 10 04/21/2021    CREATININE 0 92 11/09/2020    BUN 42 10/18/2021    K 5 3 10/18/2021     10/18/2021    CO2 22 4 10/18/2021     eGFR   Date Value Ref Range Status   12/09/2019 64 ml/min/1 73sq m Final     EXTERNAL EGFR   Date Value Ref Range Status   10/18/2021 51  Final         Lab Results   Component Value Date    HDL 32 (A) 11/09/2020    TRIG 122 11/09/2020       Lab Results   Component Value Date    ALT 8 04/21/2021    AST 20 04/21/2021    ALKPHOS 107 04/21/2021       Lab Results   Component Value Date    FREET4 1 08 12/09/2019             Future Appointments   Date Time Provider Rasheed Lindsayi   3/27/2023  2:00 PM Nilsa Velásquez MD [facility]   5/8/2023  1:20 PM Dary Sifuentes MD ENDO  [facility]

## 2023-01-12 DIAGNOSIS — N20.0 NEPHROLITHIASIS: ICD-10-CM

## 2023-01-12 RX ORDER — ALLOPURINOL 100 MG/1
TABLET ORAL
Qty: 180 TABLET | Refills: 3 | Status: SHIPPED | OUTPATIENT
Start: 2023-01-12

## 2023-03-01 LAB
LEFT EYE DIABETIC RETINOPATHY: POSITIVE
RIGHT EYE DIABETIC RETINOPATHY: POSITIVE

## 2023-03-18 ENCOUNTER — HOSPITAL ENCOUNTER (INPATIENT)
Facility: HOSPITAL | Age: 78
LOS: 10 days | Discharge: NURSING, REHAB OR LONG-TERM CARE | DRG: 286 | End: 2023-03-28
Attending: THORACIC SURGERY (CARDIOTHORACIC VASCULAR SURGERY) | Admitting: THORACIC SURGERY (CARDIOTHORACIC VASCULAR SURGERY)
Payer: MEDICARE

## 2023-03-18 ENCOUNTER — APPOINTMENT (INPATIENT)
Dept: RADIOLOGY | Facility: HOSPITAL | Age: 78
DRG: 286 | End: 2023-03-18
Attending: NURSE PRACTITIONER
Payer: MEDICARE

## 2023-03-18 DIAGNOSIS — I34.0 SEVERE MITRAL REGURGITATION: ICD-10-CM

## 2023-03-18 DIAGNOSIS — I48.0 PAROXYSMAL ATRIAL FIBRILLATION (CMS/HCC): ICD-10-CM

## 2023-03-18 DIAGNOSIS — I35.0 SEVERE AORTIC STENOSIS: Primary | ICD-10-CM

## 2023-03-18 DIAGNOSIS — I05.0 MITRAL VALVE STENOSIS, UNSPECIFIED ETIOLOGY: ICD-10-CM

## 2023-03-18 LAB
ABO + RH BLD: NORMAL
ALBUMIN SERPL-MCNC: 2.1 G/DL (ref 3.4–5)
ALP SERPL-CCNC: 121 IU/L (ref 35–126)
ALT SERPL-CCNC: 20 IU/L (ref 11–54)
ANION GAP SERPL CALC-SCNC: 12 MEQ/L (ref 3–15)
ANISOCYTOSIS BLD QL SMEAR: ABNORMAL
APTT PPP: 44 SEC (ref 23–35)
AST SERPL-CCNC: 29 IU/L (ref 15–41)
BASOPHILS # BLD: 0.34 K/UL (ref 0.01–0.1)
BASOPHILS NFR BLD: 1 %
BILIRUB SERPL-MCNC: 0.8 MG/DL (ref 0.3–1.2)
BLD GP AB SCN SERPL QL: NEGATIVE
BNP SERPL-MCNC: 912 PG/ML
BUN SERPL-MCNC: 70 MG/DL (ref 8–20)
CALCIUM SERPL-MCNC: 8.2 MG/DL (ref 8.9–10.3)
CHLORIDE SERPL-SCNC: 99 MEQ/L (ref 98–109)
CO2 SERPL-SCNC: 24 MEQ/L (ref 22–32)
CREAT SERPL-MCNC: 1.1 MG/DL (ref 0.6–1.1)
D AG BLD QL: POSITIVE
DACRYOCYTES BLD QL SMEAR: ABNORMAL
DIFFERENTIAL METHOD BLD: ABNORMAL
EOSINOPHIL # BLD: 1.02 K/UL (ref 0.04–0.36)
EOSINOPHIL NFR BLD: 3 %
ERYTHROCYTE [DISTWIDTH] IN BLOOD BY AUTOMATED COUNT: 20.9 % (ref 11.7–14.4)
EST. AVERAGE GLUCOSE BLD GHB EST-MCNC: 137 MG/DL
GFR SERPL CREATININE-BSD FRML MDRD: 48 ML/MIN/1.73M*2
GLUCOSE BLD-MCNC: 119 MG/DL (ref 70–99)
GLUCOSE SERPL-MCNC: 123 MG/DL (ref 70–99)
HBA1C MFR BLD HPLC: 6.4 %
HCT VFR BLDCO AUTO: 36.9 % (ref 35–45)
HGB BLD-MCNC: 11.3 G/DL (ref 11.8–15.7)
INR PPP: 2.6
LABORATORY COMMENT REPORT: NORMAL
LYMPHOCYTES # BLD: 0.34 K/UL (ref 1.2–3.5)
LYMPHOCYTES NFR BLD: 1 %
MCH RBC QN AUTO: 26.4 PG (ref 28–33.2)
MCHC RBC AUTO-ENTMCNC: 30.6 G/DL (ref 32.2–35.5)
MCV RBC AUTO: 86.2 FL (ref 83–98)
MONOCYTES # BLD: 0.68 K/UL (ref 0.28–0.8)
MONOCYTES NFR BLD: 2 %
MRSA DNA SPEC QL NAA+PROBE: NEGATIVE
MYELOCYTES # BLD MANUAL: 0.34 K/UL
MYELOCYTES NFR BLD MANUAL: 1 %
NEUTS BAND # BLD: 0.34 K/UL (ref 0–0.53)
NEUTS BAND # BLD: 30.95 K/UL (ref 1.7–7)
NEUTS BAND NFR BLD: 1 %
NEUTS SEG NFR BLD: 91 %
OVALOCYTES BLD QL SMEAR: ABNORMAL
PDW BLD AUTO: 11.3 FL (ref 9.4–12.3)
PLAT MORPH BLD: NORMAL
PLATELET # BLD AUTO: 406 K/UL (ref 150–369)
PLATELET # BLD EST: ABNORMAL 10*3/UL
POCT TEST: ABNORMAL
POIKILOCYTOSIS BLD QL SMEAR: ABNORMAL
POLYCHROMASIA BLD QL SMEAR: ABNORMAL
POTASSIUM SERPL-SCNC: 4.5 MEQ/L (ref 3.6–5.1)
PROT SERPL-MCNC: 5.1 G/DL (ref 6–8.2)
PROTHROMBIN TIME: 27.2 SEC (ref 12.2–14.5)
RBC # BLD AUTO: 4.28 M/UL (ref 3.93–5.22)
SARS-COV-2 RNA RESP QL NAA+PROBE: NEGATIVE
SCHISTOCYTES BLD QL SMEAR: ABNORMAL
SODIUM SERPL-SCNC: 135 MEQ/L (ref 136–144)
SPECIMEN EXP DATE BLD: NORMAL
T4 FREE SERPL-MCNC: 1.4 NG/DL (ref 0.58–1.64)
TSH SERPL DL<=0.05 MIU/L-ACNC: 1.41 MIU/L (ref 0.34–5.6)
WBC # BLD AUTO: 34.01 K/UL (ref 3.8–10.5)

## 2023-03-18 PROCEDURE — U0002 COVID-19 LAB TEST NON-CDC: HCPCS | Performed by: NURSE PRACTITIONER

## 2023-03-18 PROCEDURE — 85730 THROMBOPLASTIN TIME PARTIAL: CPT | Performed by: NURSE PRACTITIONER

## 2023-03-18 PROCEDURE — 80053 COMPREHEN METABOLIC PANEL: CPT | Performed by: NURSE PRACTITIONER

## 2023-03-18 PROCEDURE — 71045 X-RAY EXAM CHEST 1 VIEW: CPT

## 2023-03-18 PROCEDURE — 85610 PROTHROMBIN TIME: CPT | Performed by: NURSE PRACTITIONER

## 2023-03-18 PROCEDURE — 85027 COMPLETE CBC AUTOMATED: CPT | Performed by: NURSE PRACTITIONER

## 2023-03-18 PROCEDURE — 83036 HEMOGLOBIN GLYCOSYLATED A1C: CPT | Performed by: NURSE PRACTITIONER

## 2023-03-18 PROCEDURE — 87040 BLOOD CULTURE FOR BACTERIA: CPT | Performed by: NURSE PRACTITIONER

## 2023-03-18 PROCEDURE — 25800000 HC PHARMACY IV SOLUTIONS: Performed by: NURSE PRACTITIONER

## 2023-03-18 PROCEDURE — 93005 ELECTROCARDIOGRAM TRACING: CPT | Performed by: NURSE PRACTITIONER

## 2023-03-18 PROCEDURE — 21400000 HC ROOM AND CARE CCU/INTERMEDIATE

## 2023-03-18 PROCEDURE — 36415 COLL VENOUS BLD VENIPUNCTURE: CPT | Performed by: NURSE PRACTITIONER

## 2023-03-18 PROCEDURE — 63600000 HC DRUGS/DETAIL CODE: Performed by: NURSE PRACTITIONER

## 2023-03-18 PROCEDURE — 84439 ASSAY OF FREE THYROXINE: CPT | Performed by: NURSE PRACTITIONER

## 2023-03-18 PROCEDURE — 83880 ASSAY OF NATRIURETIC PEPTIDE: CPT | Performed by: NURSE PRACTITIONER

## 2023-03-18 PROCEDURE — 84443 ASSAY THYROID STIM HORMONE: CPT | Performed by: NURSE PRACTITIONER

## 2023-03-18 PROCEDURE — 87641 MR-STAPH DNA AMP PROBE: CPT | Performed by: NURSE PRACTITIONER

## 2023-03-18 PROCEDURE — 86900 BLOOD TYPING SEROLOGIC ABO: CPT

## 2023-03-18 PROCEDURE — 86850 RBC ANTIBODY SCREEN: CPT

## 2023-03-18 RX ORDER — ATROPINE SULFATE 0.1 MG/ML
0.5 INJECTION INTRAVENOUS EVERY 5 MIN PRN
Status: DISCONTINUED | OUTPATIENT
Start: 2023-03-18 | End: 2023-03-28 | Stop reason: HOSPADM

## 2023-03-18 RX ORDER — AZITHROMYCIN 250 MG/1
250 TABLET, FILM COATED ORAL DAILY
Status: DISCONTINUED | OUTPATIENT
Start: 2023-03-19 | End: 2023-03-19

## 2023-03-18 RX ORDER — AMIODARONE HYDROCHLORIDE 200 MG/1
200 TABLET ORAL DAILY
Status: DISCONTINUED | OUTPATIENT
Start: 2023-03-19 | End: 2023-03-28 | Stop reason: HOSPADM

## 2023-03-18 RX ORDER — NITROGLYCERIN 0.4 MG/1
0.4 TABLET SUBLINGUAL EVERY 5 MIN PRN
Status: DISCONTINUED | OUTPATIENT
Start: 2023-03-18 | End: 2023-03-28 | Stop reason: HOSPADM

## 2023-03-18 RX ORDER — ATORVASTATIN CALCIUM 40 MG/1
40 TABLET, FILM COATED ORAL
Status: DISCONTINUED | OUTPATIENT
Start: 2023-03-19 | End: 2023-03-20

## 2023-03-18 RX ORDER — LANOLIN ALCOHOL/MO/W.PET/CERES
400 CREAM (GRAM) TOPICAL DAILY
Status: DISCONTINUED | OUTPATIENT
Start: 2023-03-19 | End: 2023-03-28 | Stop reason: HOSPADM

## 2023-03-18 RX ORDER — FUROSEMIDE 10 MG/ML
40 INJECTION INTRAMUSCULAR; INTRAVENOUS DAILY
Status: DISCONTINUED | OUTPATIENT
Start: 2023-03-19 | End: 2023-03-19

## 2023-03-18 RX ORDER — DEXTROSE 40 %
15-30 GEL (GRAM) ORAL AS NEEDED
Status: DISCONTINUED | OUTPATIENT
Start: 2023-03-18 | End: 2023-03-26

## 2023-03-18 RX ORDER — INSULIN LISPRO 100 [IU]/ML
3 INJECTION, SOLUTION INTRAVENOUS; SUBCUTANEOUS
Status: DISCONTINUED | OUTPATIENT
Start: 2023-03-19 | End: 2023-03-28 | Stop reason: HOSPADM

## 2023-03-18 RX ORDER — DEXTROSE 50 % IN WATER (D50W) INTRAVENOUS SYRINGE
25 AS NEEDED
Status: DISCONTINUED | OUTPATIENT
Start: 2023-03-18 | End: 2023-03-26

## 2023-03-18 RX ORDER — IBUPROFEN 200 MG
16-32 TABLET ORAL AS NEEDED
Status: DISCONTINUED | OUTPATIENT
Start: 2023-03-18 | End: 2023-03-26

## 2023-03-18 RX ADMIN — CEFTRIAXONE SODIUM 1 G: 1 INJECTION, POWDER, FOR SOLUTION INTRAMUSCULAR; INTRAVENOUS at 18:56

## 2023-03-18 NOTE — H&P
Cardiac Surgery H&P      Assessment/Plan   Laureen Persaud is a 77 y.o.female  with pmhx Afib (apixan), DCCV x 3, HFpEF, HTN, HLD, DM II (non-insulin dep), anxiety, Severe AS, mod- sev MS and mod-sev MR, Fe def anemia, JAK2 gene mutation, ASHLI w/ CPAP, CVA/TIA 2020, PAH, admitted to OSH for decompensated heart failure and pneumonia, found to have worsening aortic stenosis and mod-severe MS/MR. Transferred to [facility] for surgical evaluation      Severe AS and Mod-severe MS:  Patient's case to be reviewed by surgical team to determine candidacy and procedure, if appropriate.  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  Will need LINDY arranged once pulmonary status improved  -ID consult  -TAVR CT    Permanent afib:  S/p DCCV x 3 AFIB w/ conversion to SR, most recent 11/24/22Continue apixaban. Will determine timing on holding anticoagulation once sx plan determined.       Community Acquired Pneumonia:  Continue ceftiraxone and azithromycin  ID consult  F/u CXR  Review records for sputum cx data    JAK2 gene mutation hx:  -consultation to Heme Onc for perioperative guidance/risk assessment (what to expect hematologically with intraop/postoperative SIRS response, risks with large bolus IV heparin while  on cardiopulmonary bypass)  -patient states did not have hx of CML  -Will obtain records from outpatient Hematologist: Dr. Butler ([facility] [phone])    Fe Def anemia:  -check iron studies  -obtain records from Hematologist  -consider IV Fe transfusion if needed    ASHLI:  -continue home CPAP settings    Ascites: chronic  -s/p paracentesis at OSH w/ removal > 1100 CC  - LFTs normal  -review OSH  "abd US (films to be uploaded w/ our rad dept)  -possibly heart failure related      PT/OT eval/PMR consult  Nutrition consult          HPI: Laureen Persaud is a 77 y.o. female with pmhx Afib (apixan), DCCV x 3, HFpEF, HTN, HLD, DM II - non-insulin dep, anxiety, Severe AS, mod- sev MS and mod-sev MR, Fe def anemia, JAK2 gene mutation,ASHLI w/ CPAP, CVA/TIA 2021, PAH,  and colon resection. Presented to [facility] on 3/10 w/ weakness, fatigue, fever, falls at home, and heart failure. Rx w/ empiric abx for PNA and given IV diuretics. A TTE was done and demonstrated worsening AS and moderate to severe mitral stenosis and mitral regurgitation. Compared to prior TTE on 06/2022 showed that AV gradients worsened from previously 47/28. She underwent a subsequent cardiac catheterization which confirmed significant aortic and mitral valve disease. She was noted to have ascites and  underwent an ultrasound guided paracentesis with removal of 1100 cc on 3/16.  She was evaluated by Cardiac surgery at [place] and was felt to be too high risk for surgery.   Dr. Padgett was notified about the patient, who was then transferred to [facility] for further surgical evaluation.     The patient states that she has had unintentional weight loss over the course of the last two weeks, increased fatigue, and worsening abd swelling. She reports having a fever of 101 on the day of her admission to [facility]. She denies N/V/D. She reports chronic ascites for several years. Family reports that they were told this was due to her \"multiple abdominal surgeries\" and her abdomen \"being hollow\" from her colon resection back in 2000 for a tumor. She has also had a hysterectomy.     Per patient and family - she had \"mini strokes\" x 2 back in 2021 (symptoms was dizziness) and was treated at [facility]. She stated that she developed a petechial rash on her lower extremities around that time, which persists to this day. She has no neurologic deficit.     She is " followed by a Hematologist for the JAK2 mutation. She receives IV iron transfusions as an outpatient.     Functionally, patient is limited with her mobility due to her deconditioning and fatigue. She states that she used to be very active but has needed to use a walker recently and is limited on what she can do. She is able to walk 500 feet on a flat surface with her roller walker. She gets assistance from her  for bathing and dressing.    Sx hx:Chanelle, hysterectomy, appy, colon rsxn (), breast bxp    Transthoracic Echo: 3/11/23: EF 55-60%, Severe AS with pgradient 86.9, mean gradient 46.4, trace AI. Moderate MS, mean gradient 7.6, moderate MR  Mild TR  PAPs: 54/4    Complete Cardiac Cath 3/15/23:  Hemodynamics:  RA 22/17, /14, /44, PCWP: 39  PA sat 79 %  CO CI 4.5/2.9  AV peak to peak grad 34, mean 26, MICHAEL 0.9    MV mean grad 15, valve area 1.1    Coronary angiography:mild  Non-obstructive CAD,  Pertinent radiology results reviewed. Pertinent lab results reviewed.       Prior to Admission medications    Not on File       Current Facility-Administered Medications   Medication Dose Route Frequency Provider Last Rate Last Admin   • atropine injection 0.5 mg  0.5 mg intravenous q5 min PRN Dixon Borjas CRNP       • glucose chewable tablet 16-32 g of dextrose  16-32 g of dextrose oral PRN Dixon Borjas CRNP        Or   • dextrose 40 % oral gel 15-30 g of dextrose  15-30 g of dextrose oral PRN Dixon Borjas CRNP        Or   • glucagon (GLUCAGEN) injection 1 mg  1 mg intramuscular PRN Dixon Borjas CRNP        Or   • dextrose 50 % in water (D50) injection 12.5 g  25 mL intravenous PRN Dixon Borjas CRNP       • nitroglycerin (NITROSTAT) SL tablet 0.4 mg  0.4 mg sublingual q5 min PRN Dixon Borjas CRNP            Social History     Socioeconomic History   • Marital status:        Family hx:   Mother-  age 79. Cause unknown. Had hx of open heart  sx.  Father- age 54 from MI  + family hx premature CAD    Social:  Denies ETOH or drug use  Non-smoker   -- lives with spouse  Retired - formerly worker at [employer]      Review of Systems  All other systems reviewed and negative except as noted in the HPI.    Vital signs in last 24 hours:       Objective     Physical Exam    General: A&O x 3. NAD  Head and Neck: Normocephalic, atraumatic.  No carotid bruit.   Neuro: AO x 3. Equal strength x 4 extremities.   Cardiovascular: RRR, + MICHAEL 3/6  Respiratory: decreased with fine crackles no wheeze or rhonci.   Abdomen: Soft, non-tender, non-distended.  Normoactive bowel sounds.  No organomegaly appreciated.   Extremities: +2 bilateral LE edema. + ascites  Palpable DP and PT pulses bilaterally.   Skin: No lesions or rashes.  Ecchymosis scattered upper and lower extremities. Petechial rash to bilateral lower shins.  Psych:appropariate affect            Imaging  I have independently reviewed the pertinent imaging from the last 24 hrs.    ECG/Telemetry  I have independently reviewed the telemetry. No events for the last 24 hours.            PATRICIA Polanco  3/18/2023

## 2023-03-18 NOTE — Clinical Note
Patient placed on procedure table in supine position with right arm extended. Positioning devices: arm board under arms and safety strap applied.

## 2023-03-18 NOTE — Clinical Note
The right brachial and right neck was clipped, marked and prepped with ChloraPrep. The patient was draped in a sterile fashion after allowing for the recommended dry time.

## 2023-03-19 ENCOUNTER — APPOINTMENT (OUTPATIENT)
Dept: PULMONOLOGY | Facility: HOSPITAL | Age: 78
End: 2023-03-19
Attending: NURSE PRACTITIONER
Payer: MEDICARE

## 2023-03-19 LAB
ANION GAP SERPL CALC-SCNC: 8 MEQ/L (ref 3–15)
ATRIAL RATE: 72
BUN SERPL-MCNC: 68 MG/DL (ref 8–20)
CALCIUM SERPL-MCNC: 8.1 MG/DL (ref 8.9–10.3)
CHLORIDE SERPL-SCNC: 103 MEQ/L (ref 98–109)
CO2 SERPL-SCNC: 26 MEQ/L (ref 22–32)
CREAT SERPL-MCNC: 1.2 MG/DL (ref 0.6–1.1)
ERYTHROCYTE [DISTWIDTH] IN BLOOD BY AUTOMATED COUNT: 21 % (ref 11.7–14.4)
FERRITIN SERPL-MCNC: 286 NG/ML (ref 11–250)
GFR SERPL CREATININE-BSD FRML MDRD: 43.4 ML/MIN/1.73M*2
GLUCOSE BLD-MCNC: 127 MG/DL (ref 70–99)
GLUCOSE BLD-MCNC: 131 MG/DL (ref 70–99)
GLUCOSE BLD-MCNC: 137 MG/DL (ref 70–99)
GLUCOSE SERPL-MCNC: 113 MG/DL (ref 70–99)
HAV IGM SER QL: NONREACTIVE
HBV CORE IGM SER QL: NONREACTIVE
HBV SURFACE AG SER QL: NONREACTIVE
HCT VFR BLDCO AUTO: 35.6 % (ref 35–45)
HCV AB SER QL: NONREACTIVE
HGB BLD-MCNC: 10.7 G/DL (ref 11.8–15.7)
IRON SATN MFR SERPL: 22 % (ref 15–45)
IRON SERPL-MCNC: 40 UG/DL (ref 35–150)
MAGNESIUM SERPL-MCNC: 2 MG/DL (ref 1.8–2.5)
MCH RBC QN AUTO: 26.2 PG (ref 28–33.2)
MCHC RBC AUTO-ENTMCNC: 30.1 G/DL (ref 32.2–35.5)
MCV RBC AUTO: 87.3 FL (ref 83–98)
P AXIS: 57
PDW BLD AUTO: 11.5 FL (ref 9.4–12.3)
PLATELET # BLD AUTO: 380 K/UL (ref 150–369)
POCT TEST: ABNORMAL
POTASSIUM SERPL-SCNC: 4.7 MEQ/L (ref 3.6–5.1)
PR INTERVAL: 168
QRS DURATION: 96
QT INTERVAL: 436
QTC CALCULATION(BAZETT): 477
R AXIS: 92
RBC # BLD AUTO: 4.08 M/UL (ref 3.93–5.22)
SODIUM SERPL-SCNC: 137 MEQ/L (ref 136–144)
T WAVE AXIS: 42
TIBC SERPL-MCNC: 182 UG/DL (ref 270–460)
UIBC SERPL-MCNC: 142 UG/DL (ref 180–360)
VENTRICULAR RATE: 72
WBC # BLD AUTO: 29.85 K/UL (ref 3.8–10.5)

## 2023-03-19 PROCEDURE — 83735 ASSAY OF MAGNESIUM: CPT | Performed by: THORACIC SURGERY (CARDIOTHORACIC VASCULAR SURGERY)

## 2023-03-19 PROCEDURE — 63700000 HC SELF-ADMINISTRABLE DRUG: Performed by: NURSE PRACTITIONER

## 2023-03-19 PROCEDURE — 99291 CRITICAL CARE FIRST HOUR: CPT | Performed by: STUDENT IN AN ORGANIZED HEALTH CARE EDUCATION/TRAINING PROGRAM

## 2023-03-19 PROCEDURE — 80048 BASIC METABOLIC PNL TOTAL CA: CPT | Performed by: NURSE PRACTITIONER

## 2023-03-19 PROCEDURE — 94010 BREATHING CAPACITY TEST: CPT

## 2023-03-19 PROCEDURE — 63600000 HC DRUGS/DETAIL CODE: Performed by: NURSE PRACTITIONER

## 2023-03-19 PROCEDURE — 85027 COMPLETE CBC AUTOMATED: CPT | Performed by: NURSE PRACTITIONER

## 2023-03-19 PROCEDURE — 21400000 HC ROOM AND CARE CCU/INTERMEDIATE

## 2023-03-19 PROCEDURE — 25800000 HC PHARMACY IV SOLUTIONS: Performed by: NURSE PRACTITIONER

## 2023-03-19 PROCEDURE — 99233 SBSQ HOSP IP/OBS HIGH 50: CPT | Performed by: ANESTHESIOLOGY

## 2023-03-19 PROCEDURE — 80074 ACUTE HEPATITIS PANEL: CPT | Performed by: ANESTHESIOLOGY

## 2023-03-19 PROCEDURE — 83540 ASSAY OF IRON: CPT | Performed by: NURSE PRACTITIONER

## 2023-03-19 PROCEDURE — 82728 ASSAY OF FERRITIN: CPT | Performed by: NURSE PRACTITIONER

## 2023-03-19 PROCEDURE — 36415 COLL VENOUS BLD VENIPUNCTURE: CPT | Performed by: NURSE PRACTITIONER

## 2023-03-19 PROCEDURE — 99222 1ST HOSP IP/OBS MODERATE 55: CPT | Performed by: INTERNAL MEDICINE

## 2023-03-19 PROCEDURE — 93010 ELECTROCARDIOGRAM REPORT: CPT | Performed by: INTERNAL MEDICINE

## 2023-03-19 RX ORDER — POTASSIUM CHLORIDE 750 MG/1
20 TABLET, EXTENDED RELEASE ORAL
Status: DISCONTINUED | OUTPATIENT
Start: 2023-03-19 | End: 2023-03-28

## 2023-03-19 RX ORDER — FUROSEMIDE 10 MG/ML
40 INJECTION INTRAMUSCULAR; INTRAVENOUS
Status: DISCONTINUED | OUTPATIENT
Start: 2023-03-19 | End: 2023-03-23

## 2023-03-19 RX ADMIN — MAGNESIUM OXIDE TAB 400 MG (241.3 MG ELEMENTAL MG) 400 MG: 400 (241.3 MG) TAB at 09:39

## 2023-03-19 RX ADMIN — POTASSIUM CHLORIDE 20 MEQ: 750 TABLET, EXTENDED RELEASE ORAL at 09:39

## 2023-03-19 RX ADMIN — INSULIN LISPRO 3 UNITS: 100 INJECTION, SOLUTION INTRAVENOUS; SUBCUTANEOUS at 15:25

## 2023-03-19 RX ADMIN — AMIODARONE HYDROCHLORIDE 200 MG: 200 TABLET ORAL at 09:39

## 2023-03-19 RX ADMIN — CEFTRIAXONE SODIUM 1 G: 1 INJECTION, POWDER, FOR SOLUTION INTRAMUSCULAR; INTRAVENOUS at 18:31

## 2023-03-19 RX ADMIN — INSULIN LISPRO 3 UNITS: 100 INJECTION, SOLUTION INTRAVENOUS; SUBCUTANEOUS at 09:38

## 2023-03-19 RX ADMIN — FUROSEMIDE 40 MG: 10 INJECTION, SOLUTION INTRAMUSCULAR; INTRAVENOUS at 16:23

## 2023-03-19 RX ADMIN — FUROSEMIDE 40 MG: 10 INJECTION, SOLUTION INTRAMUSCULAR; INTRAVENOUS at 09:38

## 2023-03-19 RX ADMIN — INSULIN LISPRO 3 UNITS: 100 INJECTION, SOLUTION INTRAVENOUS; SUBCUTANEOUS at 19:05

## 2023-03-19 RX ADMIN — AZITHROMYCIN 250 MG: 250 TABLET, FILM COATED ORAL at 09:39

## 2023-03-19 RX ADMIN — ATORVASTATIN CALCIUM 40 MG: 40 TABLET, FILM COATED ORAL at 18:30

## 2023-03-19 ASSESSMENT — PULMONARY FUNCTION TESTS
FEV1/FVC: 87%
FVC: 1.46
FEV1: 1.27

## 2023-03-19 NOTE — PROGRESS NOTES
"     Hematology Oncology  Brief Note       ATTENDING ATTESTATION          BRIEF ONCOLOGY NOTE     Reason for consult: \"JAK2 mutation, FE def anemia (hx IV Fe transfucions)\"    Please obtain records from outpatient hematologist with last office note and last 3 sets of lab values. Further recommendations to come.     Please call with additional questions.   Final recommendations pending attending attestation.    Alisa Jade  Hematology/Oncology Fellow PGY VI  [phone]           "

## 2023-03-19 NOTE — CONSULTS
Neurology Consult Note    Subjective     Laureen Persaud is a 77 y.o. right handed woman with history of Afib on Apixaban, DCCV x3, HFpEF, HTN, HLD, IDDM, Severe AS, moderate-severe MS, moderate to severe MR, JAK2 gene mutation, ASHLI with CPAP and TIA (2020) who presents to [facility] [place] as a transfer after initially presenting to an OSH for decompensated HF/PNA and was found to have worsening AS and MS/MR. Transfer was initiated for surgical evaluation. Neurology has been consulted for evaluation for pre-op risk stratification/appropriateness of AC. Per Primary team echo is concerning for subacute MV endocarditis though Blood Cx are negative thus far with ID consult pending. All AP/AC have been held in the interim. When speaking to Ms. Persaud she notes that her main presenting symptom was lethargy/fatigue and she denies any acute vision loss, weakness, numbness, change in speech, change in gait or coordination issues.     Review of Systems  All other systems reviewed and negative except as noted in the HPI.    Allergies: Neomycin, Polymyxin b, Procaine, and Bacitracin    Current Inpatient Medications   Medication Dose Route Frequency Provider Last Rate Last Admin   • amiodarone (PACERONE) tablet 200 mg  200 mg oral Daily Dixon Borjas CRNP   200 mg at 03/19/23 0939   • [Provider Managed Hold] apixaban (ELIQUIS) tablet 5 mg  5 mg oral BID Dixon Borjas CRNP       • atorvastatin (LIPITOR) tablet 40 mg  40 mg oral Daily (6p) Dixon Borjas CRNP       • atropine injection 0.5 mg  0.5 mg intravenous q5 min PRN Dixon Borjas CRNP       • azithromycin (ZITHROMAX) tablet 250 mg  250 mg oral Daily Dixon Borjas CRNP   250 mg at 03/19/23 0939   • cefTRIAXone (ROCEPHIN) IVPB 1 g in 100 mL NSS vial in bag  1 g intravenous q24h INT Dixon Borjas CRNP   Stopped at 03/18/23 1931   • glucose chewable tablet 16-32 g of dextrose  16-32 g of dextrose oral PRN Dixon Borjas CRNP        Or   • dextrose 40 %  oral gel 15-30 g of dextrose  15-30 g of dextrose oral PRN Dixon Borjas CRNP        Or   • glucagon (GLUCAGEN) injection 1 mg  1 mg intramuscular PRN Dixon Borjas CRNP        Or   • dextrose 50 % in water (D50) injection 12.5 g  25 mL intravenous PRN Dixon Borjas CRNP       • furosemide (LASIX) injection 40 mg  40 mg intravenous BID (am, 4p) IobstJamilah CRNP   40 mg at 03/19/23 0938   • insulin lispro U-100 (HumaLOG) pen 3 Units  3 Units subcutaneous TID with meals Dixon Borjas CRNP   3 Units at 03/19/23 0938   • magnesium oxide (MAG-OX) tablet 400 mg  400 mg oral Daily Dixon Borjas CRNP   400 mg at 03/19/23 0939   • nitroglycerin (NITROSTAT) SL tablet 0.4 mg  0.4 mg sublingual q5 min PRN Dixon Borjas CRNP       • potassium chloride (KLOR-CON M) ER tablet (particles/crystals) 20 mEq  20 mEq oral BID (am, 4p) IobstJamilah CRNP   20 mEq at 03/19/23 0939       Medical History: No past medical history on file.    Surgical History: No past surgical history on file.    Social History:   Social History     Socioeconomic History   • Marital status:        Family History: No family history on file.    Objective     Physical Exam  General appearance: well-nourished, well-developed, appears stated age. Lying on bed comfortably.  HEENT:  No tenderness in the scalp. Normal carotid pulse without bruits. Neck is supple.     Neurological Examination  Patient is awake alert and fully oriented.    There is no aphasia.   There is no dysarthria.     Cranial nerves :  Pupils are equal, round, and reactive to light and accommodation bilaterally.    Eye movements are full without nystagmus.   Visual fields are full.    Facial sensation is intact bilaterally    Facial strength is normal.    The palate was midline.    Hearing is intact.     Shoulder shrug is normal.  The tongue is midline.       Motor:   Strength is full in the upper and lower extremities bilaterally.       Sensory  exam:  Sensation is intact to light touch on the upper and lower extremities.        Cerebellar:  No axial or appendicular ataxia     Labs  Admission on 03/18/2023   Component Date Value Ref Range Status   • Sodium 03/18/2023 135 (L)  136 - 144 mEQ/L Final   • Potassium 03/18/2023 4.5  3.6 - 5.1 mEQ/L Final   • Chloride 03/18/2023 99  98 - 109 mEQ/L Final   • CO2 03/18/2023 24  22 - 32 mEQ/L Final   • BUN 03/18/2023 70 (H)  8 - 20 mg/dL Final   • Creatinine 03/18/2023 1.1  0.6 - 1.1 mg/dL Final   • Glucose 03/18/2023 123 (H)  70 - 99 mg/dL Final   • Calcium 03/18/2023 8.2 (L)  8.9 - 10.3 mg/dL Final   • AST (SGOT) 03/18/2023 29  15 - 41 IU/L Final   • ALT (SGPT) 03/18/2023 20  11 - 54 IU/L Final   • Alkaline Phosphatase 03/18/2023 121  35 - 126 IU/L Final   • Total Protein 03/18/2023 5.1 (L)  6.0 - 8.2 g/dL Final   • Albumin 03/18/2023 2.1 (L)  3.4 - 5.0 g/dL Final   • Bilirubin, Total 03/18/2023 0.8  0.3 - 1.2 mg/dL Final   • eGFR 03/18/2023 48.0 (L)  >=60.0 mL/min/1.73m*2 Final   • Anion Gap 03/18/2023 12  3 - 15 mEQ/L Final   • WBC 03/18/2023 34.01 (HH)  3.80 - 10.50 K/uL Final    ALL RESULTS HAVE BEEN CHECKED. This result has been called to PAN GAITAN by Rere Sheth on 03 18 23 at 18:49, and has been read back.    • RBC 03/18/2023 4.28  3.93 - 5.22 M/uL Final   • Hemoglobin 03/18/2023 11.3 (L)  11.8 - 15.7 g/dL Final   • Hematocrit 03/18/2023 36.9  35.0 - 45.0 % Final   • MCV 03/18/2023 86.2  83.0 - 98.0 fL Final   • MCH 03/18/2023 26.4 (L)  28.0 - 33.2 pg Final   • MCHC 03/18/2023 30.6 (L)  32.2 - 35.5 g/dL Final   • RDW 03/18/2023 20.9 (H)  11.7 - 14.4 % Final   • Platelets 03/18/2023 406 (H)  150 - 369 K/uL Final   • MPV 03/18/2023 11.3  9.4 - 12.3 fL Final   • PTT 03/18/2023 44 (H)  23 - 35 sec Final    The Standard Therapeutic Range for Heparin is 68 to 101 seconds.   • PT 03/18/2023 27.2 (H)  12.2 - 14.5 sec Final   • INR 03/18/2023 2.6    Final    Moderate Intensity Anticoagulation = 2.0 to 3.0, High  Intensity = 2.5 to 3.5   • BNP 03/18/2023 912 (H)  <=100 pg/mL Final   • MRSA DNA, Nares 03/18/2023 Negative  Negative Final    No methicillin resistant Staphylococcus aureus (MRSA) detected.   • TSH 03/18/2023 1.41  0.34 - 5.60 mIU/L Final   • Free T4 03/18/2023 1.40  0.58 - 1.64 ng/dL Final   • Hemoglobin A1C 03/18/2023 6.4 (H)  <5.7 % Final    In the absence of an established diagnosis of Diabetes Mellitus, HbA1c levels between 5.7% and 6.4% are indicative of increased risk for developing Diabetes(Pre-Diabetes). Levels of 6.5% or greater are diagnostic for Diabetes Mellitus.   • Estimated Ave Glucose 03/18/2023 137  mg/dL Final    Estimate of average glucose concentration continuously over 24 hours for previous 2 to 3 months(Per ADA Recommendation).   • SARS-CoV-2 (COVID-19) 03/18/2023 Negative  Negative Final   • Ventricular rate 03/18/2023 72   Preliminary   • Atrial rate 03/18/2023 72   Preliminary   • NM Interval 03/18/2023 168   Preliminary   • QRS duration 03/18/2023 96   Preliminary   • QT Interval 03/18/2023 436   Preliminary   • QTC Calculation(Bazett) 03/18/2023 477   Preliminary   • P Axis 03/18/2023 57   Preliminary   • R Axis 03/18/2023 92   Preliminary   • T Wave Axis 03/18/2023 42   Preliminary   • Specimen Expiration 03/18/2023 03/21/2023   Final   • Antibody Screen 03/18/2023 Negative   Final   • ABO 03/18/2023 A   Final   • Rh Factor 03/18/2023 Positive   Final   • History Check 03/18/2023 No type on file   Final   • POCT Bedside Glucose 03/18/2023 119 (H)  70 - 99 mg/dL Final    : KANDY PERLA   • POC Test 03/18/2023 POC   Final   • Differential Type 03/18/2023 Manu   Final   • Neutrophils 03/18/2023 91  % Final   • Lymphocytes 03/18/2023 1  % Final   • Monocytes 03/18/2023 2  % Final   • Eosinophils 03/18/2023 3  % Final   • Basophils 03/18/2023 1  % Final   • Bands 03/18/2023 1  <10 % Final   • Myelocytes 03/18/2023 1  % Final   • Neutrophils, Absolute 03/18/2023 30.95 (H)  1.70 -  7.00 K/uL Final   • Lymphocytes, Absolute 03/18/2023 0.34 (L)  1.20 - 3.50 K/uL Final   • Monocytes, Absolute 03/18/2023 0.68  0.28 - 0.80 K/uL Final   • Eosinophils, Absolute 03/18/2023 1.02 (H)  0.04 - 0.36 K/uL Final   • Basophils, Absolute 03/18/2023 0.34 (H)  0.01 - 0.10 K/uL Final   • Bands, Absolute 03/18/2023 0.34  0.00 - 0.53 K/uL Final   • Myelocytes, Absolute 03/18/2023 0.34 (H)  <=0.00 K/uL Final   • Anisocytosis 03/18/2023 1+   Final   • Ovalocytes 03/18/2023 1+   Final   • Poikilocytes 03/18/2023 1+   Final   • Polychromasia 03/18/2023 Occasional   Final   • Schistocytes 03/18/2023 Occasional   Final   • Teardrop Cells 03/18/2023 Occasional   Final   • Platelet Estimate 03/18/2023 Increased (>400,000)   Final   • PLT Morphology 03/18/2023 Normal   Final   • Sodium 03/19/2023 137  136 - 144 mEQ/L Final   • Potassium 03/19/2023 4.7  3.6 - 5.1 mEQ/L Final   • Chloride 03/19/2023 103  98 - 109 mEQ/L Final   • CO2 03/19/2023 26  22 - 32 mEQ/L Final   • BUN 03/19/2023 68 (H)  8 - 20 mg/dL Final   • Creatinine 03/19/2023 1.2 (H)  0.6 - 1.1 mg/dL Final   • Glucose 03/19/2023 113 (H)  70 - 99 mg/dL Final   • Calcium 03/19/2023 8.1 (L)  8.9 - 10.3 mg/dL Final   • eGFR 03/19/2023 43.4 (L)  >=60.0 mL/min/1.73m*2 Final   • Anion Gap 03/19/2023 8  3 - 15 mEQ/L Final   • WBC 03/19/2023 29.85 (H)  3.80 - 10.50 K/uL Final   • RBC 03/19/2023 4.08  3.93 - 5.22 M/uL Final   • Hemoglobin 03/19/2023 10.7 (L)  11.8 - 15.7 g/dL Final   • Hematocrit 03/19/2023 35.6  35.0 - 45.0 % Final   • MCV 03/19/2023 87.3  83.0 - 98.0 fL Final   • MCH 03/19/2023 26.2 (L)  28.0 - 33.2 pg Final   • MCHC 03/19/2023 30.1 (L)  32.2 - 35.5 g/dL Final   • RDW 03/19/2023 21.0 (H)  11.7 - 14.4 % Final   • Platelets 03/19/2023 380 (H)  150 - 369 K/uL Final   • MPV 03/19/2023 11.5  9.4 - 12.3 fL Final   • Magnesium 03/19/2023 2.0  1.8 - 2.5 mg/dL Final   • POCT Bedside Glucose 03/19/2023 137 (H)  70 - 99 mg/dL Final    : KANDY PERLA   • POC  Test 03/19/2023 POC   Final     No neuroimaging available at this time    Assessment and Plan:    Laureen Persaud is a 77 y.o. right handed woman with history of Afib on Apixaban, DCCV x3, HFpEF, HTN, HLD, IDDM, Severe AS, moderate-severe MS, moderate to severe MR, JAK2 gene mutation, ASHLI with CPAP and TIA (2020) who presents to [facility] [place] as a transfer after initially presenting to an OSH for decompensated HF/PNA and was found to have worsening AS and MS/MR with concern for possible subacute MV endocarditis based on appearance on echo though blood cultures have remained negative. From a Neurological perspective she has a non focal examination and would recommend the following:    #Concern for MV Endocarditis  - Low suspicion for ischemic strokes given her reassuring examination but given the appearance of her valve and concern for endocarditis she is at high risk for future ischemic events, would obtain MRI Brain and MRA Head and Neck to further assess any stroke burden  - While ruling out Endocarditis would refrain from any AP/AC therapy as septic emboli carry a high risk of hemorrhagic conversion. If endocarditis is ruled out by ID can proceed with AP and will make further recommendations on AC based on MRI studies  - Unable to accurately risk stratify surgical risk of stroke at this time without further workup, Neurology to continue to follow  - Recommend Q4H neurochecks and low threshold to rescan for any change in neuro exam, please reach out urgently for any concerns    45 minutes were spent in the care of this critically ill patient      I have personally seen this patient, reviewed the history and all the available data, performed the physical examination as documented above, and formulated the plan of care. Also discussed with patient about the side effects of the medications. All questions the patient and her/his family have are answered.     Thank you for allowing me to participate in the care of your patient.  If  you have any further questions, please do not hesitate to contact me.    Antonio Rivera MD  Neurology

## 2023-03-19 NOTE — CONSULTS
Endocrinology Service -  Inpatient Consultation            Reason for Consultation: Type 2 diabetes    Covering for Dr. Jess Grace, she will start following the patient on 3/20/23     HISTORY OF PRESENT ILLNESS        This is a 77 y.o. female with history of type 2 diabetes, atrial fibrillation status post cardioversion x3, HFpEF, hypertension, hyperlipidemia, aortic stenosis and mitral stenosis, iron deficiency anemia, JAK2 gene mutation, ASHLI with CPAP, CVA/TIA 2021, colon cancer status postresection, who was transferred from outside hospital for further evaluation for severe aortic stenosis and moderate mitral stenosis.    Patient presented to [facility] on 3/10/2023 with overall weakness fatigue fever.  She was treated with empiric antibiotics for pneumonia and treated with IV diuretics.  TTE showed worsening AAS and moderate to severe mitral stenosis and mitral regurgitation.  She then had cardiac catheterization I [facility] which confirmed significant aortic and mitral valve disease.  She was also noted to have ascites and she had ultrasound guided paracentesis.  Echo was concerning for subacute MV endocarditis, blood cultures remain negative.    Patient today reports overall feeling weak, she has decreased appetite but no nausea or vomiting.  She has shortness of breath.    She was diagnosed with diabetes more than 10 years ago.  Her home regimen for diabetes is Bydureon BCise once a week.  She has not taken it in 2 weeks.  She was on treatment with glipizide 5 mg daily, however when she last saw her endocrinologist in January 2023 her A1c was 5.8% and she was having episodes of hypoglycemia, however glipizide was stopped at that visit.  Patient reports that she used to weigh 195 pounds but she lost more than 40 pounds over the last 3 to 5 years.  She has history of mild retinopathy, no history of ocular injections or laser treatment.  She denies numbness or tingling in her feet.  She  "has history of CKD, not followed by nephrology.    PAST MEDICAL AND SURGICAL HISTORY        No past medical history on file.    No past surgical history on file.    Divya Mckinney MD    MEDICATIONS        Home Medications:  No medications prior to admission.       Current inpatient medications were personally reviewed.    ALLERGIES        Neomycin, Polymyxin b, Procaine, and Bacitracin    FAMILY HISTORY        No family history on file.    SOCIAL HISTORY        Social History     Socioeconomic History   • Marital status:        REVIEW OF SYSTEMS        All other systems reviewed and negative except as noted in HPI    PHYSICAL EXAMINATION        Visit Vitals  BP (!) 122/58   Pulse 77   Temp 36.5 °C (97.7 °F) (Temporal)   Resp 18   Ht 1.461 m (4' 9.5\")   Wt 64.3 kg (141 lb 12.8 oz)   SpO2 94%   BMI 30.15 kg/m²     Body mass index is 30.15 kg/m².    Constitutional: She appears chronically ill  HENT: Normocephalic, atraumatic, mucous membranes are moist, hard of hearing  Eyes: EOM are normal. No scleral icterus  Neck: Normal range of motion. Neck supple.   Cardiovascular: Normal rate, regular rhythm, positive systolic murmur 3/6  Pulmonary/Chest: Effort normal and decreased breath sounds with fine crackles.  Abdominal: Soft. non tender  Musculoskeletal: Normal range of motion.  +2 bilateral lower extremity edema.  Neurological:  Alert and oriented to person, place, and time.   Skin: Scattered ecchymoses upper and lower extremities.  Petechial rash over the lower extremities and feet  Psychiatric:  Normal mood and affect. Behavior is normal. Judgment and thought content normal.     LABS / IMAGING          Lab Results   Component Value Date    HGBA1C 6.4 (H) 03/18/2023    CREATININE 1.2 (H) 03/19/2023    K 4.7 03/19/2023     Lab Results   Component Value Date    TSH 1.41 03/18/2023    FREET4 1.40 03/18/2023    ALT 20 03/18/2023     Results from last 7 days   Lab Units 03/19/23  0414 03/18/23  1751   SODIUM " mEQ/L 137 135*   POTASSIUM mEQ/L 4.7 4.5   CHLORIDE mEQ/L 103 99   CO2 mEQ/L 26 24   BUN mg/dL 68* 70*   CREATININE mg/dL 1.2* 1.1   CALCIUM mg/dL 8.1* 8.2*   ALBUMIN g/dL  --  2.1*   BILIRUBIN TOTAL mg/dL  --  0.8   ALK PHOS IU/L  --  121   ALT IU/L  --  20   AST IU/L  --  29   GLUCOSE mg/dL 113* 123*       Insulin/glucose flow-sheet reviewed and noted    Imaging  Narrative & Impression      CLINICAL HISTORY:  Congestive heart failure     COMMENT:  A portable erect AP view of the chest was obtained 3/18/2023     COMPARISON:  None available.     Diffuse interstitial edema, pulmonary vascular congestion and small bilateral  pleural effusions are noted, left greater than right.  Bibasilar airspace  disease is also noted.  No pneumothorax.   Cardiomegaly is noted.     --  IMPRESSION:  Cardiomegaly, interstitial edema, pulmonary vascular congestion and small  effusions, suggesting decompensated heart failure.  Bibasilar airspace disease  is also noted.       ASSESSMENT AND RECOMMENDATIONS         Type 2 diabetes  -Recently well controlled.  On Bydureon BCise only.  No history of insulin therapy as outpatient.  Glipizide was stopped in January 2023 due to episodes of hypoglycemia in the setting of CKD.  -Continue insulin lispro 3 units with meals 3 times daily.  -No basal insulin  -If cardiac surgery is planned, she will need IV insulin per protocol postoperatively  -She will not need insulin therapy at discharge  She should be able to go home on her home treatment of Bydureon BCise once a week only.    Aortic stenosis/mitral stenosis  Management per primary team    Atrial fibrillation  Currently on amiodarone  Thyroid function is normal    Pneumonia   Currently on antibiotics.  Management per primary team and ID    ASHLI  Continue home CPAP    Dyslipidemia  Continue high intensity statin       Discussed with CT surgery team     Sherry Garcia MD  3/19/2023

## 2023-03-19 NOTE — PROGRESS NOTES
Cardiothoracic Intensivist Progress Note       CARDIOTHORACIC   COVID Status: Negative 3/18/23    IV dye allergy: no    Subjective     History of Present Illness: Laureen Persaud is a 77 y.o. cisgender female with history of worsening aortic and mitral valvular disease admitted for surgical evaluation, chronic atrial fibrillation, and ASHLI that are being treated.                              Review of Systems:                                     Constitutional: frail appearing                                                  Eyes: denies difficulty with vision  Ears, nose, mouth, throat, and face: denies oral discomfort                                        Respiratory: mild shortness of breath with exertion                                  Cardiovascular: denies chest discomfort                                  Gastrointestinal: denies abdominal pain                                    Genitourinary: denies difficulty voiding                                      Hematologic: denies bleeding issues                                Musculoskeletal: denies muscle pain                                      Neurological: generalized weakness                                   Integumentary: multiple skin tears    Medical History: No past medical history on file.    Surgical History: No past surgical history on file.    Prior to Admission medications    Not on File       Allergies: Neomycin, Polymyxin b, Procaine, and Bacitracin    Social History:   Social History     Socioeconomic History   • Marital status:        Family History: No family history on file.    Objective     Vital signs in last 24 hours:  Temp:  [36.4 °C (97.6 °F)-37.1 °C (98.7 °F)] 36.5 °C (97.7 °F)  Heart Rate:  [64-86] 70  Resp:  [18-20] 18  BP: (104-122)/(51-62) 117/54      Intake/Output Summary (Last 24 hours) at 3/19/2023 0914  Last data filed at 3/19/2023 0800  Gross per 24 hour   Intake 170 ml   Output 550 ml   Net -380 ml     Intake/Output  this shift:  I/O this shift:  In: -   Out: 150 [Urine:150]    Labs  Lab Results   Component Value Date    WBC 29.85 (H) 03/19/2023    HGB 10.7 (L) 03/19/2023    HCT 35.6 03/19/2023     (H) 03/19/2023    ALT 20 03/18/2023    AST 29 03/18/2023     03/19/2023    K 4.7 03/19/2023     03/19/2023    CREATININE 1.2 (H) 03/19/2023    BUN 68 (H) 03/19/2023    CO2 26 03/19/2023    MG 2.0 03/19/2023    TSH 1.41 03/18/2023    INR 2.6 03/18/2023    HGBA1C 6.4 (H) 03/18/2023    PT 27.2 (H) 03/18/2023    PTT 44 (H) 03/18/2023             Full Code    Head/Ear/Nose/Throat:     NCAT.                               Eyes:     EOMI and PERRL.                     Respiratory:     diminished at the bases b/l.               Cardiovascular:     SR.              Gastrointestinal:     + Bowel sounds and non-tender.                 Genitourinary:     no-deformities.                    Extremities:     +1-2 edema b/l LE.            Musculoskeletal:      No injury or deformity.                   Neurological:     follows commands.       Behavior/Emotional:     appropriate and cooperative.                           Lymph:      No adenopathy noted.                               Skin:      Multiple skin tears and scattered bruising.     Examination of the patient performed at the bedside. Rounded with ICU team and discussed management/plan with surgical staff. Medications, radiological studies and labs reviewed and discussed with attending of record, Dr. Macey Padgett.    Cardiothoracic Assessment and Plan:    Neurologic: A&Ox3 and pain controlled. History of CVA/TIA. Ambulates with a walker at baseline and has had multiple falls. Will require head CT/MRI if plan to proceed with surgery. PT/OT/PM&R to follow.     Cardiovascular: Severe AS and MR/MI with possible endocarditis undergoing surgical evaluation, also with acute (elevated BNP) HFpEF and chronic atrial fibrillation. Off vasoactive medications and meeting BP goals. Will  "continue to optimize cardiac medications. Will f/u with surgeon whether or not proceed with surgery based on co-morbidities. Very high mortality risk based on STS calculation.     Respiratory: I personally reviewed the most recent CXR which small b/l pleural effusions and pulmonary congestion which could be acute pulmonary edema. Will encourage IS, mobilization and wean O2 PRN. CPAP for ASHLI.      Genitourinary: CKD stage 3a (GFR 45-59 ml/min). BUN elevated and will continue to monitor. There could be a cardiorenal component. No davenport. Making adequate urine output. Will avoid nephrotoxic medications.     Endocrine: Follow [facility]. DM management. Appreciate Endocrinology input.     Hematologic: No evidence active bleeding. Was on apixaban for a-fib as outpatient and currently on hold. Will considering resuming anticoagulation once INR drifts down however due to falls and skin tears/bleeding might not be a good candidate moving forward. \"Anemia of chronic disease.History of JAK2 gene mutation and will f/u with Heme/Onc about history of colon CA, weight loss and if concern for recurrence of any cancer.      Infectious Disease: Currently on azithromycin and ceftriaxone for possible endocarditis/pneumonia. Cultures pending. Appreciate Infectious Disease input.      Fluids, Electrolytes: Electrolytes replaced per protocol. Goal for net negative fluid balance.     Gastrointestinal: PO as tolerated. GI ppx. History of ascites but unknown cause and has had paracentesis recently. Will need CT scan and possible GI evaluation. Will send off hepatitis panel.     Skin: OOB, PT when able. Wound care for skin tears.      Tubes, lines and drains: Will remove superfluous invasive monitors PRN.    Disposition: Guarded. Continue close monitoring on stepdown unit.           Carlitos Reddy, DO  3/19/2023           "

## 2023-03-20 ENCOUNTER — APPOINTMENT (INPATIENT)
Dept: RADIOLOGY | Facility: HOSPITAL | Age: 78
DRG: 286 | End: 2023-03-20
Attending: NURSE PRACTITIONER
Payer: MEDICARE

## 2023-03-20 ENCOUNTER — ANESTHESIA (INPATIENT)
Dept: CARDIOLOGY | Facility: HOSPITAL | Age: 78
DRG: 286 | End: 2023-03-20
Payer: MEDICARE

## 2023-03-20 ENCOUNTER — APPOINTMENT (INPATIENT)
Dept: CARDIOLOGY | Facility: HOSPITAL | Age: 78
DRG: 286 | End: 2023-03-20
Attending: NURSE PRACTITIONER
Payer: MEDICARE

## 2023-03-20 ENCOUNTER — ANESTHESIA EVENT (INPATIENT)
Dept: CARDIOLOGY | Facility: HOSPITAL | Age: 78
DRG: 286 | End: 2023-03-20
Payer: MEDICARE

## 2023-03-20 PROBLEM — I35.0 SEVERE AORTIC STENOSIS: Status: ACTIVE | Noted: 2023-03-18

## 2023-03-20 LAB
ANION GAP SERPL CALC-SCNC: 12 MEQ/L (ref 3–15)
AORTIC SINUS VALSALVA: 2.8 CM
AORTIC VALVE VELOCITY TIME INTEGRAL: 85 CM
APTT PPP: 39 SEC (ref 23–35)
ASCENDING AORTA: 2.7 CM
AV MEAN GRADIENT: 24 MMHG
AV PEAK GRADIENT: 49 MMHG
AV PEAK VELOCITY-S: 3.5 M/S
AV VALVE AREA INDEX: 0.46
AV VELOCITY RATIO: 0.2
AVA (VTI): 0.74 CM2
BILIRUB UR QL STRIP.AUTO: NEGATIVE MG/DL
BSA FOR ECHO PROCEDURE: 1.62 M2
BUN SERPL-MCNC: 67 MG/DL (ref 8–20)
CALCIUM SERPL-MCNC: 8.1 MG/DL (ref 8.9–10.3)
CHLORIDE SERPL-SCNC: 100 MEQ/L (ref 98–109)
CHLORIDE UR-SCNC: 57 MEQ/L
CLARITY UR REFRACT.AUTO: CLEAR
CO2 SERPL-SCNC: 24 MEQ/L (ref 22–32)
COLOR UR AUTO: YELLOW
CREAT SERPL-MCNC: 1.3 MG/DL (ref 0.6–1.1)
CREAT UR-MCNC: 52.9 MG/DL
DOP CALC LVOT STROKE VOLUME: 63.07 ML
ERYTHROCYTE [DISTWIDTH] IN BLOOD BY AUTOMATED COUNT: 20.8 % (ref 11.7–14.4)
GFR SERPL CREATININE-BSD FRML MDRD: 39.6 ML/MIN/1.73M*2
GLUCOSE BLD-MCNC: 123 MG/DL (ref 70–99)
GLUCOSE BLD-MCNC: 129 MG/DL (ref 70–99)
GLUCOSE BLD-MCNC: 166 MG/DL (ref 70–99)
GLUCOSE BLD-MCNC: 89 MG/DL (ref 70–99)
GLUCOSE SERPL-MCNC: 75 MG/DL (ref 70–99)
GLUCOSE UR STRIP.AUTO-MCNC: NEGATIVE MG/DL
HCT VFR BLDCO AUTO: 36.7 % (ref 35–45)
HGB BLD-MCNC: 10.9 G/DL (ref 11.8–15.7)
HGB UR QL STRIP.AUTO: NEGATIVE
INR PPP: 1.7
INTERVENTRICULAR SEPTUM: 0.8 CM
KETONES UR STRIP.AUTO-MCNC: NEGATIVE MG/DL
LEFT VENTRICULAR INTERNAL DIMENSION IN DIASTOLE: 4.6 CM (ref 3.82–5.3)
LEUKOCYTE ESTERASE UR QL STRIP.AUTO: NEGATIVE
LVOT 2D: 2.2 CM
LVOT STROKE VOLUME INDEX: 38.93 ML/M2
LVOT VTI: 16.6 CM
MAGNESIUM SERPL-MCNC: 1.9 MG/DL (ref 1.8–2.5)
MCH RBC QN AUTO: 26.4 PG (ref 28–33.2)
MCHC RBC AUTO-ENTMCNC: 29.7 G/DL (ref 32.2–35.5)
MCV RBC AUTO: 88.9 FL (ref 83–98)
MR FLOW: 2.51 ML/S
MR VTI: 164 CM
MV VALVE AREA BY PLANIMETRY: 5.5 CM2
MVA (PISA): 6.28 CM2
NITRITE UR QL STRIP.AUTO: NEGATIVE
PDW BLD AUTO: 11.6 FL (ref 9.4–12.3)
PH UR STRIP.AUTO: 5.5 [PH]
PISA ALIAS VEL MV: 0.4 M/S
PISA MRMAX VEL: 5.6 M/S
PISA RADIUS: 1 CM
PLATELET # BLD AUTO: 375 K/UL (ref 150–369)
POCT TEST: ABNORMAL
POCT TEST: NORMAL
POSTERIOR WALL: 1 CM
POTASSIUM SERPL-SCNC: 4.9 MEQ/L (ref 3.6–5.1)
POTASSIUM UR-SCNC: 27.6 MEQ/L
PROT UR QL STRIP.AUTO: NEGATIVE
PROTHROMBIN TIME: 20 SEC (ref 12.2–14.5)
RBC # BLD AUTO: 4.13 M/UL (ref 3.93–5.22)
SODIUM SERPL-SCNC: 136 MEQ/L (ref 136–144)
SODIUM UR-SCNC: 38 MEQ/L
SP GR UR REFRACT.AUTO: 1.01
UROBILINOGEN UR STRIP-ACNC: 0.2 EU/DL
UUN UR-MCNC: 464 MG/DL
WBC # BLD AUTO: 31.38 K/UL (ref 3.8–10.5)
Z-SCORE OF LEFT VENTRICULAR DIMENSION IN END DIASTOLE: 0.22

## 2023-03-20 PROCEDURE — 93325 DOPPLER ECHO COLOR FLOW MAPG: CPT | Mod: 26 | Performed by: INTERNAL MEDICINE

## 2023-03-20 PROCEDURE — G1004 CDSM NDSC: HCPCS

## 2023-03-20 PROCEDURE — 93312 ECHO TRANSESOPHAGEAL: CPT | Mod: 26 | Performed by: INTERNAL MEDICINE

## 2023-03-20 PROCEDURE — 63700000 HC SELF-ADMINISTRABLE DRUG: Performed by: NURSE PRACTITIONER

## 2023-03-20 PROCEDURE — 84540 ASSAY OF URINE/UREA-N: CPT | Performed by: NURSE PRACTITIONER

## 2023-03-20 PROCEDURE — 81003 URINALYSIS AUTO W/O SCOPE: CPT | Performed by: NURSE PRACTITIONER

## 2023-03-20 PROCEDURE — 80048 BASIC METABOLIC PNL TOTAL CA: CPT | Performed by: NURSE PRACTITIONER

## 2023-03-20 PROCEDURE — 70544 MR ANGIOGRAPHY HEAD W/O DYE: CPT | Mod: MG

## 2023-03-20 PROCEDURE — 93320 DOPPLER ECHO COMPLETE: CPT | Mod: 26 | Performed by: INTERNAL MEDICINE

## 2023-03-20 PROCEDURE — 37000002 HC ANESTHESIA MAC

## 2023-03-20 PROCEDURE — 93320 DOPPLER ECHO COMPLETE: CPT

## 2023-03-20 PROCEDURE — 71000011 HC PACU PHASE 1 EA ADDL MIN

## 2023-03-20 PROCEDURE — 85610 PROTHROMBIN TIME: CPT | Performed by: NURSE PRACTITIONER

## 2023-03-20 PROCEDURE — 63600000 HC DRUGS/DETAIL CODE: Performed by: NURSE PRACTITIONER

## 2023-03-20 PROCEDURE — 71045 X-RAY EXAM CHEST 1 VIEW: CPT

## 2023-03-20 PROCEDURE — 97535 SELF CARE MNGMENT TRAINING: CPT | Mod: GO

## 2023-03-20 PROCEDURE — 99233 SBSQ HOSP IP/OBS HIGH 50: CPT | Performed by: ANESTHESIOLOGY

## 2023-03-20 PROCEDURE — 63600000 HC DRUGS/DETAIL CODE: Mod: JW | Performed by: ANESTHESIOLOGY

## 2023-03-20 PROCEDURE — 97166 OT EVAL MOD COMPLEX 45 MIN: CPT | Mod: GO

## 2023-03-20 PROCEDURE — B246ZZ4 ULTRASONOGRAPHY OF RIGHT AND LEFT HEART, TRANSESOPHAGEAL: ICD-10-PCS | Performed by: INTERNAL MEDICINE

## 2023-03-20 PROCEDURE — 85027 COMPLETE CBC AUTOMATED: CPT | Performed by: NURSE PRACTITIONER

## 2023-03-20 PROCEDURE — 85730 THROMBOPLASTIN TIME PARTIAL: CPT | Performed by: NURSE PRACTITIONER

## 2023-03-20 PROCEDURE — 83735 ASSAY OF MAGNESIUM: CPT | Performed by: THORACIC SURGERY (CARDIOTHORACIC VASCULAR SURGERY)

## 2023-03-20 PROCEDURE — 70486 CT MAXILLOFACIAL W/O DYE: CPT | Mod: MG

## 2023-03-20 PROCEDURE — 71000001 HC PACU PHASE 1 INITIAL 30MIN

## 2023-03-20 PROCEDURE — 21400000 HC ROOM AND CARE CCU/INTERMEDIATE

## 2023-03-20 PROCEDURE — 25800000 HC PHARMACY IV SOLUTIONS: Performed by: ANESTHESIOLOGY

## 2023-03-20 PROCEDURE — 76376 3D RENDER W/INTRP POSTPROCES: CPT | Mod: 26 | Performed by: INTERNAL MEDICINE

## 2023-03-20 PROCEDURE — 63600105 HC IODINE BASED CONTRAST: Mod: JW | Performed by: NURSE PRACTITIONER

## 2023-03-20 PROCEDURE — 84300 ASSAY OF URINE SODIUM: CPT | Performed by: NURSE PRACTITIONER

## 2023-03-20 PROCEDURE — 99223 1ST HOSP IP/OBS HIGH 75: CPT | Performed by: INTERNAL MEDICINE

## 2023-03-20 PROCEDURE — 25000000 HC PHARMACY GENERAL: Performed by: ANESTHESIOLOGY

## 2023-03-20 PROCEDURE — 36415 COLL VENOUS BLD VENIPUNCTURE: CPT | Performed by: NURSE PRACTITIONER

## 2023-03-20 PROCEDURE — 76775 US EXAM ABDO BACK WALL LIM: CPT

## 2023-03-20 PROCEDURE — 82570 ASSAY OF URINE CREATININE: CPT | Performed by: NURSE PRACTITIONER

## 2023-03-20 RX ORDER — PROPOFOL 200MG/20ML
SYRINGE (ML) INTRAVENOUS AS NEEDED
Status: DISCONTINUED | OUTPATIENT
Start: 2023-03-20 | End: 2023-03-20 | Stop reason: SURG

## 2023-03-20 RX ORDER — PROPOFOL 10 MG/ML
INJECTION, EMULSION INTRAVENOUS CONTINUOUS PRN
Status: DISCONTINUED | OUTPATIENT
Start: 2023-03-20 | End: 2023-03-20 | Stop reason: SURG

## 2023-03-20 RX ORDER — EPHEDRINE SULFATE/0.9% NACL/PF 50 MG/5 ML
SYRINGE (ML) INTRAVENOUS AS NEEDED
Status: DISCONTINUED | OUTPATIENT
Start: 2023-03-20 | End: 2023-03-20 | Stop reason: SURG

## 2023-03-20 RX ORDER — HYDROXYUREA 500 MG/1
500 CAPSULE ORAL EVERY OTHER DAY
Status: DISCONTINUED | OUTPATIENT
Start: 2023-03-21 | End: 2023-03-22

## 2023-03-20 RX ORDER — SODIUM CHLORIDE 9 MG/ML
INJECTION, SOLUTION INTRAVENOUS CONTINUOUS PRN
Status: DISCONTINUED | OUTPATIENT
Start: 2023-03-20 | End: 2023-03-20 | Stop reason: SURG

## 2023-03-20 RX ORDER — PHENYLEPHRINE HCL IN 0.9% NACL 1 MG/10 ML
SYRINGE (ML) INTRAVENOUS AS NEEDED
Status: DISCONTINUED | OUTPATIENT
Start: 2023-03-20 | End: 2023-03-20 | Stop reason: SURG

## 2023-03-20 RX ORDER — ATORVASTATIN CALCIUM 40 MG/1
40 TABLET, FILM COATED ORAL
Status: DISCONTINUED | OUTPATIENT
Start: 2023-03-23 | End: 2023-03-28 | Stop reason: HOSPADM

## 2023-03-20 RX ORDER — ATORVASTATIN CALCIUM 80 MG/1
80 TABLET, FILM COATED ORAL
Status: COMPLETED | OUTPATIENT
Start: 2023-03-20 | End: 2023-03-22

## 2023-03-20 RX ADMIN — MAGNESIUM OXIDE TAB 400 MG (241.3 MG ELEMENTAL MG) 400 MG: 400 (241.3 MG) TAB at 14:46

## 2023-03-20 RX ADMIN — Medication 200 MCG: at 13:30

## 2023-03-20 RX ADMIN — Medication 200 MCG: at 13:10

## 2023-03-20 RX ADMIN — SODIUM CHLORIDE: 9 INJECTION, SOLUTION INTRAVENOUS at 12:46

## 2023-03-20 RX ADMIN — Medication 200 MCG: at 13:26

## 2023-03-20 RX ADMIN — Medication 200 MCG: at 13:01

## 2023-03-20 RX ADMIN — PROPOFOL 50 MG: 10 INJECTION, EMULSION INTRAVENOUS at 12:48

## 2023-03-20 RX ADMIN — FUROSEMIDE 40 MG: 10 INJECTION, SOLUTION INTRAMUSCULAR; INTRAVENOUS at 17:23

## 2023-03-20 RX ADMIN — Medication 200 MCG: at 13:05

## 2023-03-20 RX ADMIN — INSULIN LISPRO 3 UNITS: 100 INJECTION, SOLUTION INTRAVENOUS; SUBCUTANEOUS at 17:23

## 2023-03-20 RX ADMIN — PROPOFOL 150 MCG/KG/MIN: 10 INJECTION, EMULSION INTRAVENOUS at 12:48

## 2023-03-20 RX ADMIN — IOHEXOL 80 ML: 350 INJECTION, SOLUTION INTRAVENOUS at 11:16

## 2023-03-20 RX ADMIN — Medication 10 MG: at 13:12

## 2023-03-20 RX ADMIN — ATORVASTATIN CALCIUM 80 MG: 80 TABLET, FILM COATED ORAL at 17:23

## 2023-03-20 RX ADMIN — Medication 200 MCG: at 12:56

## 2023-03-20 RX ADMIN — Medication 200 MCG: at 13:19

## 2023-03-20 RX ADMIN — Medication 200 MCG: at 13:14

## 2023-03-20 RX ADMIN — Medication 10 MG: at 13:26

## 2023-03-20 RX ADMIN — AMIODARONE HYDROCHLORIDE 200 MG: 200 TABLET ORAL at 14:46

## 2023-03-20 ASSESSMENT — ENCOUNTER SYMPTOMS
SHORTNESS OF BREATH: 1
DYSRHYTHMIAS: 1

## 2023-03-20 ASSESSMENT — COGNITIVE AND FUNCTIONAL STATUS - GENERAL
EATING MEALS: 3 - A LITTLE
DRESSING REGULAR LOWER BODY CLOTHING: 2 - A LOT
TOILETING: 2 - A LOT
HELP NEEDED FOR BATHING: 2 - A LOT
AFFECT: FLAT/BLUNTED AFFECT;ANXIOUS
DRESSING REGULAR UPPER BODY CLOTHING: 2 - A LOT
HELP NEEDED FOR PERSONAL GROOMING: 3 - A LITTLE

## 2023-03-20 NOTE — ANESTHESIA PREPROCEDURE EVALUATION
Relevant Problems   CARDIOVASCULAR   (+) Severe mitral regurgitation   77 y.o. female with PMHx of  CKD II-IIIa, HTN, DM II, HLD, ASHLI, CVA/TIA  (2020), HFpEF, PAH, valvular heart disease (severe AS, moderate-severe MS, moderate-severe MR), A-fib, iron deficiency anemia    Anesthesia ROS/MED HX      Pulmonary    Sleep apnea  Neuro/Psych    TIA (symptoms was dizziness)   CVA (2020)  Cardiovascular   Valvular problems/murmurs (moderate-severe MS, moderate-severe MR)   Aortic Stenosis: severe   dyslipidemia  Dysrhythmias and atrial fibrillation   ECG reviewed  Hematological    anemia (Hb 10.3)  Renal Disease   chronic renal insufficiency (Cr 1.3)  ROS/MED HX Comments:    ECG: Normal sinus rhythm   Rightward axis   Borderline ECG   No previous ECGs available   Confirmed by Ediwna Farrell ([id number]) on 3/19/2023 12:26:24 PM    Specimen Collected: 03/18/23 18:16             No past surgical history on file.    Physical Exam    Airway   Mallampati: II   TM distance: >3 FB   Neck ROM: full  Cardiovascular - normal   Rhythm: regular   Rate: normalPulmonary - normal   clear to auscultation        Anesthesia Plan    Plan: MAC    Technique: MAC     Lines and Monitors: PIV   3 ASA  Anesthetic plan and risks discussed with: patient  Induction:    intravenous   Postop Plan:   Pain Management: IV analgesics

## 2023-03-20 NOTE — CONSULTS
Nephrology Consult Note    Subjective     Patient was referred by Dr. Padgett for evaluation and management of GRACE.     Laureen Persaud is a 77 y.o. female with PMHx of  CKD II-IIIa, HTN, DM II, HLD, ASHLI, CVA/TIA  (2020), HFpEF, PAH, valvular heart disease (severe AS, moderate-severe MS, moderate-severe MR), A-fib, iron deficiency anemia, JAK2 gene mutation initially admitted to [facility] on 3/10/2020 with a complaint of weakness, fatigue, and was empirically treated for pneumonia along with CHF.  Patient had a TTE done which revealed worsening AV gradient.  Subsequently, she was transported to [facility] and underwent cardiac catheterization which confirmed significant aortic and mitral valve disease.  She was thought to be high risks by CT surgery at [facility] and subsequently transferred to [facility] for further surgical evaluation on 3/18/2023.    Review of records reveal creatinine of 1.0-1.1 mg/dL as of 2021.  She had a creatinine of 1.2 mg/dL as of 3/18/2023 at [facility].  Urine was bland.  No record from [facility] everywhere to review.    When seen this morning, she was lying comfortably in bed but with a complaint of some shortness of breath.  Denies any pain.  Denies any nausea or vomiting.      Medical History: As above    Surgical History: As above    Social History:   Social History     Socioeconomic History   • Marital status:        Family History: No family history on file.    Allergies: Neomycin, Polymyxin b, Procaine, and Bacitracin    Review of Systems:  A complete review of systems was performed and aside from as mentioned above, was otherwise negative.    Objective   Vital signs in last 24 hours:  Temp:  [35.8 °C (96.4 °F)-37.2 °C (98.9 °F)] 37.1 °C (98.7 °F)  Heart Rate:  [66-77] 76  Resp:  [16-20] 18  BP: (107-123)/(53-58) 123/58     Intake & Output:    Intake/Output Summary (Last 24 hours) at 3/20/2023 0835  Last data filed at 3/19/2023 1600  Gross  per 24 hour   Intake 450 ml   Output 550 ml   Net -100 ml       Physical Exam:    General Appearance:  Awake, alert, not in acute distress    Head:  Normocephalic, without obvious abnormality, atraumatic   Eyes:  Conjunctiva clear, anicteric sclera.      Ears:  No external abnormalities   Throat: Moist oral mucosa   Neck: Supple, symmetrical   Lungs:    Bilateral scattered rales, respirations unlabored   Heart:  S1 and S2 heard.  Systolic murmur present.   Abdomen: Soft, non tender, non distended, bowel sound heard   Genitourinary:  No palpable bladder   Extremities:  Musculoskeletal: No cyanosis. Bilateral lower extremity mild edema  No injury or deformity   Skin: No rashes    Neurologic:  Behavior/  Emotional: Oriented x 3, non focal  Appropriate, cooperative         Current Medications:  Current Facility-Administered Medications   Medication Dose Route Frequency   • amiodarone  200 mg oral Daily   • [Provider Managed Hold] apixaban  5 mg oral BID   • [START ON 3/23/2023] atorvastatin  40 mg oral Daily (6p)   • atorvastatin  80 mg oral Daily (6p)   • atropine  0.5 mg intravenous q5 min PRN   • glucose  16-32 g of dextrose oral PRN    Or   • dextrose  15-30 g of dextrose oral PRN    Or   • glucagon  1 mg intramuscular PRN    Or   • dextrose 50 % in water (D50)  25 mL intravenous PRN   • [Provider Managed Hold] furosemide  40 mg intravenous BID (am, 4p)   • insulin lispro U-100  3 Units subcutaneous TID with meals   • magnesium oxide  400 mg oral Daily   • nitroglycerin  0.4 mg sublingual q5 min PRN   • [Provider Managed Hold] potassium chloride  20 mEq oral BID (am, 4p)       Labs:  Lab Results   Component Value Date    CREATININE 1.3 (H) 03/20/2023    CREATININE 1.2 (H) 03/19/2023    CREATININE 1.1 03/18/2023     CMP Results       03/20/23 03/19/23 03/18/23     0324 0414 1751     137 135    K 4.9 4.7 4.5    Cl 100 103 99    CO2 24 26 24    Glucose 75 113 123    BUN 67 68 70    Creatinine 1.3 1.2 1.1     Calcium 8.1 8.1 8.2    Anion Gap 12 8 12    AST -- -- 29    ALT -- -- 20    Albumin -- -- 2.1    EGFR 39.6 43.4 48.0         Comment for K at 0324 on 03/20/23: SLIGHT HEMOLYSIS, RESULT MAY BE INCREASED.        CBC Results       03/20/23 03/19/23 03/18/23     0324 0414 1751    WBC 31.38 29.85 34.01    RBC 4.13 4.08 4.28    HGB 10.9 10.7 11.3    HCT 36.7 35.6 36.9    MCV 88.9 87.3 86.2    MCH 26.4 26.2 26.4    MCHC 29.7 30.1 30.6     380 406         Comment for WBC at 0324 on 03/20/23: This result has been called to ROSITA ONEILL by Ling Stark on 03 20 23 at 05:36, and has been read back.     Comment for WBC at 1751 on 03/18/23: ALL RESULTS HAVE BEEN CHECKED. This result has been called to PAN GAITAN by Rere Sheth on 03 18 23 at 18:49, and has been read back.         I have reviewed the pertinent patient's labs.    Imaging:  I have reviewed the pertinent patient's imaging results for this admission.     Assessment/Plan     Laureen Persaud is a 77 y.o. female with PMHx of  CKD II-IIIa, HTN, DM II, HLD, ASHLI, CVA/TIA  (2020), HFpEF, PAH, valvular heart disease (severe AAS, moderate-severe MS, moderate-severe MR), A-fib, iron deficiency anemia, JAK2 gene mutation initially admitted to [facility] on 3/10/2020 with a complaint of weakness, fatigue, and was empirically treated for pneumonia along with CHF.  Patient had a TTE done which revealed worsening AV gradient.  Subsequently, she was transported to [facility] and underwent cardiac catheterization which confirmed significant aortic and mitral valve disease.  She was thought to be high risks by CT surgery at [facility] and subsequently transferred to [facility] [place] surgical evaluation on 3/18/2023.      1.  Likely CKD II-IIIa  -Had creatinine of 1.0-1.1 mg/dL as of 2021.  Subsequently, creatinine was 1.2 mg/dL as of 3/18/2023 advised on hospital and was 1.1 mg/dL on 3/18/2020 ([place] to [facility].  -UA on 3/20/2023 bland without any hematuria,  pyuria, proteinuria -ruling out any other alarming GN or interstitial disease  -Likely has some degree of CKD on the basis of hypertension, chronic CRS physiology    2.  Possible GRACE vs CKD  -Likely related to CRS physiology and may have mildly worsened due to contrast exposure.  -Had a cardiac catheterization at [facility] -unsure about date  -Presented with a creatinine of 1.1 mg/dL on 3/18/2023 and up to 1.3 mg/dL on 3/20/2023 -nephrology consulted  -UA on 3/20/2023 bland  -Ultrasound on 3/20/2023 without hydronephrosis    -Plan for TAVR noted.  Patient will be at high risk for JESS given current level of renal function and patient/family state understanding risks before proceeding with plan study.  -Cannot advocate for IVF for prevention of JESS with underlying CHF/valvular heart disease    Recommend:  -Minimize IV contrast dye was as possible.  Also, would increase atorvastatin to 80 mg daily for 3 days  -Continue supportive measures as you are to maintain better hemodynamics.  -Can briefly hold diuretics around time of IV contrast dye exposure.  -Continue to avoid all nephrotoxins like NSAIDs, sodium phosphate enema, vitamin C, etc.  -Avoid hypotension and maintain MAP > 65 mmHg.  -Follow BMP while in hospital    3.  HFpEF, valvular heart disease, PAH  -Noted to have severe AS, moderate-severe MS, moderate-severe MR  -Being work-up for possible surgical intervention  -Management largely as per cardiology, CT surgery.    4.  A-fib  -s/p DCCV x3  -On amiodarone  -As per cardiology, primary team.    5.  History of iron deficiency anemia, JAK2 gene mutation  -Also, noted to have significant leukocytosis  -Further work-up/management as per primary team, hematology/oncology.    6.  Right renal mass  -Renal ultrasound on 3/20/2023 noted to have solid 1.9 cm right renal mass, suspicious for renal malignancy.  -Eventually, will need urology evaluation -defer timing to primary team    7.  Nephrolithiasis.  -Renal  ultrasound on 3/20/2023 noted to have nonobstructing 1 cm right mid renal calculus  -Outpatient follow-up with nephrology and urology upon discharge    8.  Possible pneumonia  -Was covered with antibiotics at outside hospital.  Evaluated by ID and recommended to observe off antibiotics.  -As per ID, primary team.    9.  History of CVA/TIA, ASHLI, HLD  -As per primary team.    -Discussed with CT ICU nurse practitioner this morning.

## 2023-03-20 NOTE — CONSULTS
Wound Ostomy Continence Note    Subjective    HPI Patient is a 77 y.o. female who was admitted on 3/18/2023 with a diagnosis of Severe mitral regurgitation [I34.0].    Problem list Principal Problem:    Severe mitral regurgitation     PMH/PSH No past medical history on file.  No past surgical history on file.   Assessment and               Recommendation C/s for nursing for PTA Pressure injury, POA with:  --Multiple ecchymosis at perineum and B/L medial thighs, no skin loss, TERE  --Full thickness wound or stage 3 PI at R buttock cheek, 2x0.2cm, 100% pink tissue, small serous exudate, no odor; complain sore when she is on it;   --Alert, HHO, pure wick on, not able to help much turning, BMI 30, Juan Francisco score 12-13; under high risk for PIs;    1. Continue Mepilex for R buttock wound   2.  Maintain PI prevent bundle with frequent repositioning, pressure redistribution mattress and air cushion( in place)  3. Sign off, re-consult as needed    Plan of care discussed with pt and nursing and bedside and via message;             Date: 03/20/23  Signature: Chelsie Tamayo RN

## 2023-03-20 NOTE — NURSING NOTE
03/19/23 0907   Final Measurements   FVC 1.46   FEV1 1.27   FEV1/FVC 87%   PEF 4.20   Respiratory Therapy Charges Only   $Was this bedside spirometry done by RT or the PFT Lab? Bedside Flow Volume Loop (PFT Lab)

## 2023-03-20 NOTE — PROGRESS NOTES
Patient: Laureen Persaud  Location: [place] [address] [address]  MRN:  [id number]  Today's date:  3/20/2023    Attempted to see patient for therapy. Unable due to patient at test or procedure. Pt at CT, will follow up as able

## 2023-03-20 NOTE — PROGRESS NOTES
Patient:  Laureen Persaud  Location:  [facility] [address]  MRN:  [id number]  Today's date:  3/20/2023    Laureen is a 77 y.o. female admitted on 3/18/2023 with Severe mitral regurgitation [I34.0]. Principal problem is Severe mitral regurgitation.    Past Medical History  Laureen has no past medical history on file.    History of Present Illness  Amitted to OSH for decompensated heart failure and pneumonia, found to have worsening aortic stenosis and mod-severe MS/MR. Transferred to [facility] for surgical evaluation      OT Vitals    Date/Time Pulse HR Source SpO2 Pt Activity O2 Therapy O2 Del Method O2 Flow Rate BP BP Location BP Method Pt Position Observations [facility]   03/20/23 0828 79 Monitor 97 % At rest Supplemental oxygen Nasal cannula 2 L/min 105/99 Left upper arm Automatic Lying OT Eval ATK   03/20/23 0835 -- -- -- -- -- -- -- 134/59 Left upper arm Automatic Sitting -- ATK   03/20/23 0850 -- -- -- -- -- -- -- 120/58 Left upper arm Automatic Lying Post trendelenberg, reporting mild dizziness -- ATK      OT Pain    Date/Time Pain Type Rating: Rest Rating: Activity [facility]   03/20/23 0828 Pain Assessment 2 - mild pain 2 - mild pain ATK          Prior Living Environment    Flowsheet Row Most Recent Value   People in Home spouse   Living Environment Comment Living in a 1SH, 1STE. +tub shower without SC or GBs.        Prior Level of Function    Flowsheet Row Most Recent Value   Dominant Hand right   Ambulation assistive equipment   Transferring assistive equipment   Toileting independent   Bathing assistive person   Dressing assistive person   Eating independent   Prior Level of Function Comment Pt reports use of RW for transfers, mobility within home. Rarely leaving home PTA. Spouse assisting with LBD, bathing.        Occupational Profile    Flowsheet Row Most Recent Value   Reason for Services/Referral ADL Evaluation/dispo planning   Occupational History/Life Experiences Enjoys spending time with extended family    Environmental Supports and Barriers Lives with spouse, Mode   Patient Goals Get surgery, regain mobility         OT Evaluation and Treatment - 03/20/23 0827        OT Time Calculation    Start Time 0827     Stop Time 0900     Time Calculation (min) 33 min        General Information    Document Type initial evaluation     Mode of Treatment occupational therapy     Position at Start of Session supine     Status at Start of Session no issues or concerns identified by nurse prior to session;agreeable to therapy     General Observations of Patient Rec'd seated upright in bed, anxious but agreeable to participation        Precautions/Limitations/Impairments    Existing Precautions/Restrictions fall;cardiac        Cognition/Psychosocial    Affect/Mental Status (Cognition) flat/blunted affect;anxious     Orientation Status (Cognition) oriented x 3     Follows Commands (Cognition) WFL;follows two-step commands;75-90% accuracy;delayed response/completion;increased processing time needed;verbal cues/prompting required     Cognitive Function executive function deficit     Executive Function Deficit (Cognition) minimal deficit;information processing;initiation     Comment, Cognition AAOx3, engaged in session but expressing increased anxiety with limited mobility. Max verbal cues and encouragement for particiaption. Very fearful of falling.        Hearing Assessment    Hearing Status hearing aid, bilateral   Not present at hospital       Vision Assessment/Intervention    Visual Impairment/Limitations corrective lenses full-time        Sensory Assessment    Sensory Assessment (Somatosensory) UE sensation intact;bilateral UE   Reports mild neuropathy in palms, bottoms of feet       Range of Motion (ROM)    Range of Motion --   Limited B/L shoulder flexion 2/2 previous RTC injury. Able to elevate ~70 degrees, elbow flexion and  strength WFL.       Strength (Manual Muscle Testing)    Strength (Manual Muscle Testing) --   Grossly  3/5 in B/L UEs       Bed Mobility    [place], Supine to Sit minimum assist (75% or more patient effort);increased time to complete     [place], Sit to Supine minimum assist (75% or more patient effort);increased time to complete     Assistive Device bed rails;draw sheet;head of bed elevated     Comment (Bed Mobility) Gail to sit to L EOB, increased time needed and assist to scoot hips forward. Gail to move back to supine.        Sit to Stand Transfer    [place], Sit to Stand Transfer moderate assist (50-74% patient effort);1 person assist;safety considerations     Verbal Cues technique;safety;hand placement;proper use of assistive device     Comment ModA to stand from EOB, very anxious. Benefitting from max cues for trunk extension and hand placement. Taking few steps to HOB        Stand to Sit Transfer    [place], Stand to Sit Transfer moderate assist (50-74% patient effort)     Verbal Cues safety;technique;hand placement     Assistive Device walker, front-wheeled     Comment Sitting back to EOB        Safety Issues, Functional Mobility    Safety Issues Affecting Function (Mobility) insight into deficits/self-awareness;safety precautions follow-through/compliance;sequencing abilities     Impairments Affecting Function (Mobility) balance;endurance/activity tolerance;pain;strength        Balance    Static Sitting Balance WFL;sitting, edge of bed     Dynamic Sitting Balance WFL;sitting, edge of bed     Static Standing Balance mild impairment;supported     Dynamic Standing Balance moderate impairment;supported     Comment, Balance Benefitting from use of RW, Ax1 for stability        Motor Skills    Functional Endurance Pt fatigued with minimal OOB activity, requiring Ax1 for safety        Therapeutic Exercise    Therapeutic Exercise upper extremity;lower extremity        Upper Extremity (Therapeutic Exercise)    Exercise Position/Type seated;AROM (active range of motion)     Range of Motion  Exercises shoulder flexion/extension;elbow flexion/extension     Reps and Sets --   1x10    Comment Written on board        Lower Extremity (Therapeutic Exercise)    Exercise Position/Type seated;AROM (active range of motion)     Range of Motion Exercises knee flexion/extension;hip flexion/extension     Reps and Sets --   1x10    Comment Written on board        Upper Body Dressing    Tasks don;hospital gown     [place] maximum assist (25-49% patient effort)     Position edge of bed sitting        Lower Body Dressing    Tasks don;doff;socks     [place] maximum assist (25-49% patient effort)     Position supine     Comment Pt able to elevate LEs from EOB        Grooming    Comment Offered, declined        Toileting    [place] dependent (less than 25% patient effort)     Comment Dependent via purewick        BADL Safety/Performance    Impairments, BADL Safety/Performance balance;endurance/activity tolerance;pain;strength     Skilled BADL Treatment/Intervention BADL process/adaptation training        ADL Interventions    Energy Conservation Techniques activity adapted to sitting;activity pacing encouraged;asking for necessary assistance promoted     Self-Care (BADL) Promotion best position for BADL determined        AM-PAC (TM) - ADL (Current Function)    Putting on and taking off regular lower body clothing? 2 - A Lot     Bathing? 2 - A Lot     Toileting? 2 - A Lot     Putting on/taking off regular upper body clothing? 2 - A Lot     How much help for taking care of personal grooming? 3 - A Little     Eating meals? 3 - A Little     AM-PAC (TM) ADL Score 14        Session Outcome    Daily Outcome Statement OT Evaluation completed. Pt presenting with severe MR, pending possible OR. Pt very anxious, fearful of falling. Requiring Angel for bed mobility, modA for limited STS transfers with RW. MaxA for ADLs in session. To benefit from further inpatient OT to maximize functional independence and safety  awareness. Recommending SNF pending medical progression.     Position at End of Session upright;in bed     Status at End of Session bed alarm on;call light in reach;personal items in reach     Nursing Notified patient's position;patient's performance;patient's response to therapy/activity        Plan    Rehab Potential good, to achieve stated therapy goals     Therapy Frequency 3-5 times/wk     Planned Therapy Interventions activity tolerance training;BADL retraining;functional balance retraining;IADL retraining;occupation/activity based interventions;patient/caregiver education/training;transfer/mobility retraining               OT Discharge Recommendations    Flowsheet Row Most Recent Value   OT Recommended Discharge Disposition skilled nursing facility at 03/20/2023 0827   Anticipated Equipment Needs At Discharge (OT) --  [TBD] at 03/20/2023 0827   Patient/Family Therapy Goal Statement D/c home, improve independence at 03/20/2023 0827                  Education Documentation  Self-Care, taught by Samara Wilson OT at 3/20/2023  9:52 AM.  Learner: Patient  Readiness: Acceptance  Method: Explanation  Response: Verbalizes Understanding  Comment: Role of OT, OT POC, adapted self care strategies, energy conservation strategies, bed mobility techniques, proper use of RW, d/c planning          OT Goals    Flowsheet Row Most Recent Value   Bed Mobility Goal 1    Activity/Assistive Device bed mobility activities, all at 03/20/2023 0827   [place] modified independence at 03/20/2023 0827   Time Frame by discharge at 03/20/2023 0827   Progress/Outcome goal ongoing at 03/20/2023 0827   Transfer Goal 1    Activity/Assistive Device all transfers at 03/20/2023 0827   [place] modified independence at 03/20/2023 0827   Time Frame by discharge at 03/20/2023 0827   Progress/Outcome goal ongoing at 03/20/2023 0827   Dressing Goal 1    Activity/Adaptive Equipment dressing skills, all at 03/20/2023 0827    [place] modified independence at 03/20/2023 0827   Time Frame by discharge at 03/20/2023 0827   Progress/Outcome goal ongoing at 03/20/2023 0827   Toileting Goal 1    Activity/Assistive Device toileting skills, all at 03/20/2023 0827   [place] modified independence at 03/20/2023 0827   Time Frame by discharge at 03/20/2023 0827   Progress/Outcome goal ongoing at 03/20/2023 0827   Grooming Goal 1    Activity/Assistive Device grooming skills, all at 03/20/2023 0827   [place] modified independence at 03/20/2023 0827   Time Frame by discharge at 03/20/2023 0827   Progress/Outcome goal ongoing at 03/20/2023 0827

## 2023-03-20 NOTE — ANESTHESIA POSTPROCEDURE EVALUATION
Patient: Laureen Persaud    Procedure Summary     Date: 03/20/23 Room / Location: [facility] Cardiology; [facility] Cardiac Cath Lab    Anesthesia Start: 1236 Anesthesia Stop: 1335    Procedure: TRANSESOPHAGEAL ECHO (LINDY) Diagnosis:       Severe aortic stenosis      Mitral valve stenosis, unspecified etiology      Severe mitral regurgitation    Scheduled Providers: Thierry Shen MD Responsible Provider: Thierry Shen MD    Anesthesia Type: MAC ASA Status: 3          Anesthesia Type: MAC  PACU Vitals    No data found in the last 10 encounters.           Anesthesia Post Evaluation    Pain management: adequate  Patient location during evaluation: PACU  Patient participation: complete - patient participated  Level of consciousness: awake and alert  Cardiovascular status: acceptable  Airway Patency: adequate  Respiratory status: acceptable  Hydration status: acceptable  Anesthetic complications: no

## 2023-03-20 NOTE — PLAN OF CARE
Problem: Adult Inpatient Plan of Care  Goal: Plan of Care Review  Outcome: Progressing  Flowsheets (Taken 3/20/2023 7169)  Progress: improving  Plan of Care Reviewed With: patient  Outcome Summary: OT Evaluation completed. Gail for bed mobility, modA for STS transfer using RW. MaxA for ADLs. Recommending SNF pending further hospital course.     Problem: Self-Care Deficit  Goal: Improved Ability to Complete Activities of Daily Living  Outcome: Progressing  Intervention: Promote Activity and Functional [place]  Flowsheets (Taken 3/20/2023 1735)  Activity Assistance Provided: assistance, 1 person

## 2023-03-20 NOTE — HOSPITAL COURSE
Laureen is a 77 y.o. female admitted on 3/18/2023 with Severe mitral regurgitation [I34.0]. Principal problem is Severe mitral regurgitation.    Past Medical History  Laureen has no past medical history on file.  Per CT H&P:  Afib (apixan), DCCV x 3, HFpEF, HTN, HLD, DM II (non-insulin dep), anxiety, Severe AS, mod- sev MS and mod-sev MR, Fe def anemia, JAK2 gene mutation, ASHLI w/ CPAP, CVA/TIA 2020, PAH  Per PMR:  bilateral rotator cuff tears, aortic stenosis, mitral valve stenosis, and mitral valve regurgitation, OA TANIA knees    History of Present Illness  Amitted to OSH for decompensated heart failure and pneumonia, found to have worsening aortic stenosis and mod-severe MS/MR. Transferred to [facility] for surgical evaluation    S/p LINDY 3/20

## 2023-03-20 NOTE — CONSULTS
Infectious Disease Consult Note    Patient Name: Laureen Persaud  MR#: [id number]  : 1945  Admission Date: 3/18/2023  Consult Date: 23 8:47 PM   Consultant: Da Fernandez MD    Reason for Consult: PNA and possible subacute endocarditis  Referring Provider: Macey Padgett MD      History of Present Illness     Laureen Persaud is a 77 y.o. female who was admitted on 3/18/2023. She has CHF, AS, MS/MR, DM and JAK2 gene mutation. She presented to [facility] on 3/10 with progressive dyspnea, PND and a weeks of fatigue. No F/c/NS. She does note a R Upper molar that needs extraction but no other ID issues. She has not had B symptoms. She was placed on ceftriaxone + azithromycin for PNA vs CHF and blood cx x 2 on 3/11 are NGTD (I called lab at [place]). She was transferred to [facility] for cardiac catheterization. TTE showed worsening AS and cardiac cath confirmed the degree of disease. She underwent paracentesis for 1100 cc fluid 3/16. She as continued on ABX. There was no overt evidence of acute or subacute bacterial IE.      Outside records were reviewed.[place] records    Epidemiology:    Allergies:   Allergies   Allergen Reactions   • Neomycin      swelling   • Polymyxin B      swelling   • Procaine      swelling   • Bacitracin Rash       Medical History:  has no past medical history on file.  DM2, Jak2, DCCVx3, HFpEF, HLD, HTN, PAH, AS, MR, MS, CVA/TIA, anxiety  Surgical History: No past surgical history on file.    Hx colon resection  Social History:           Family History: family history is not on file.    Review of Systems  Reports fatigue, swelling in legs, foot rash. No gi, gu complaints. Scant blood streaked white sputum. [place] 10 systems negative. Up to date with colonoscopy.     Medications:       Current Facility-Administered Medications   Medication Dose Route Frequency   • amiodarone  200 mg oral Daily   • [Provider Managed Hold] apixaban  5 mg oral BID   •  "atorvastatin  40 mg oral Daily (6p)   • atropine  0.5 mg intravenous q5 min PRN   • glucose  16-32 g of dextrose oral PRN    Or   • dextrose  15-30 g of dextrose oral PRN    Or   • glucagon  1 mg intramuscular PRN    Or   • dextrose 50 % in water (D50)  25 mL intravenous PRN   • [Provider Managed Hold] furosemide  40 mg intravenous BID (am, 4p)   • insulin lispro U-100  3 Units subcutaneous TID with meals   • magnesium oxide  400 mg oral Daily   • nitroglycerin  0.4 mg sublingual q5 min PRN   • [Provider Managed Hold] potassium chloride  20 mEq oral BID (am, 4p)           Objective     Vital Signs:    Visit Vitals  BP (!) 107/57 (BP Location: Right upper arm, Patient Position: Sitting)   Pulse 68   Temp 36.6 °C (97.9 °F) (Oral)   Resp 16   Ht 1.461 m (4' 9.5\")   Wt 64.3 kg (141 lb 12.8 oz)   SpO2 98%   BMI 30.15 kg/m²     Temp (72hrs), Av.6 °C (97.8 °F), Min:35.8 °C (96.4 °F), Max:37.1 °C (98.7 °F)      Physical exam:   General: non toxic, alert, in chair  HEENT: NC/AT/ anicteric sclera, pharynx clear fair dentition but some maxillary molars with caries.   Neck: supple  Cardiovascular: s1s2 systolic murmur throughout precordium  Respiratory: rales up to mid lung fields  GI/Abdomen: soft, NT/ND,  no peritoneal signs  Extremities: BLE edema and cool LEs  MSK/JOINTS:  DJD of knees. No hardware. Spine non tender  Lymphatics: no cervica lad  Neurology and psych: no focal deficits, cooperative with exam  Skin: no splinter lesions. There are some purple macules on dorsa of feet but not hands. Scattered eccymoses and skin tears. R femoral bruising  G.U.: no davenport      Lines, Drains, Airways, Wounds:  Peripheral IV (Adult) 23 Posterior;Right Forearm (Active)   Number of days: 1       External Urinary Catheter (Active)   Number of days: 0       Wound Pressure Injury Perineum (Active)   Number of days: 1       Wound Skin Tear Right;Medial Buttock (Active)   Number of days: 1       Labs:  CBC Results       23 " 03/18/23 0414 1751    WBC 29.85 34.01    RBC 4.08 4.28    HGB 10.7 11.3    HCT 35.6 36.9    MCV 87.3 86.2    MCH 26.2 26.4    MCHC 30.1 30.6     406         Comment for WBC at 1751 on 03/18/23: ALL RESULTS HAVE BEEN CHECKED. This result has been called to PAN GAITAN by Rere Sheth on 03 18 23 at 18:49, and has been read back.           CMP Results       03/19/23 03/18/23 0414 1751     135    K 4.7 4.5    Cl 103 99    CO2 26 24    Glucose 113 123    BUN 68 70    Creatinine 1.2 1.1    Calcium 8.1 8.2    Anion Gap 8 12    AST -- 29    ALT -- 20    Albumin -- 2.1    EGFR 43.4 48.0          Microbiology Results     Procedure Component Value Units Date/Time    MRSA Screen, Nares Only Nose [[id number]]  (Normal) Collected: 03/18/23 1754    Specimen: Nasal Swab from Nose Updated: 03/18/23 2120     MRSA DNA, Nares Negative    SARS-CoV-2 (COVID-19), PCR Nasopharynx [[id number]]  (Normal) Collected: 03/18/23 1754    Specimen: Nasopharyngeal Swab from Nasopharynx Updated: 03/18/23 2007    Narrative:      The following orders were created for panel order SARS-CoV-2 (COVID-19), PCR Nasopharynx.  Procedure                               Abnormality         Status                     ---------                               -----------         ------                     SARS-CoV-2 (COVID-19), P...[[id number]]  Normal              Final result                 Please view results for these tests on the individual orders.    SARS-CoV-2 (COVID-19), PCR Nasopharynx [[id number]]  (Normal) Collected: 03/18/23 1754    Specimen: Nasopharyngeal Swab from Nasopharynx Updated: 03/18/23 2007     SARS-CoV-2 (COVID-19) Negative    Narrative:      Testing performed using real-time PCR for detection of COVID-19. EUA approved validation studies performed on site.               Imaging:  cxr reviewed      Assessment   This is a 77 y.o. female  · Rule out endocarditis-it is very unlikely that she has acute bacterial IE. Her blcx on  arrival to [place] were NG. She denies other constitutional symptoms.   · AS/MR/MS-being evaluated per CT surgery. Per my discussion with CTS here and at [place] there was not obvious evidence of vegetation on TTE.   · GRACE on CKD  · Rash--she has non-palpable purpuric rash on feet. Etiology unclear. This rash can be seen in IE but also many other entities and would be curious if related to recent procedures using femoral access or related to underlying MPN.               Plan   · D/c abx and observe  · If any fever check blcx x 2  · Consider checking blood cx in a week off abx  · Discussed with CTS  · Keep groins dry and apply antifungal to optimize skin condition prior to any groin access for possible procedures.   · Will follow

## 2023-03-20 NOTE — CONSULTS
Physical Medicine and Rehabilitation Consult Note    Subjective     Laureen Persaud is a very pleasant 77 y.o. female with a PMHx significant for atrial fibrillation on anticoagulation, heart failure preserved ejection fraction, hypertension, ASHLI, anemia, history of CVA, PAH, hyperlipidemia, bilateral rotator cuff tears, type 2 diabetes mellitus, aortic stenosis, mitral valve stenosis, and mitral valve regurgitation who was admitted 3/18/2023 for acute on chronic heart failure exacerbation associated with pneumonia,  progressively worsening aortic stenosis, and progressively worsening mitral valve stenosis and regurgitation.    The patient's chief complaint today is Fatigue and bilateral shoulder pain.  She reports that she has previously gone to outpatient therapy for her bilateral rotator cuff tears and initially noted improvement, but recently she feels as though her shoulder pain started to come back.  She does report that she tends to have terrible knee pain secondary to bone-on-bone osteoarthritis in the bilateral knees.  She reports has been using a walker for several years because her knees often feel like they will give out underneath her without warning.  She denies any new numbness or tingling.  She denies any significant chest pain or shortness of breath.  She reports that she [place] a [place] with  1 small step to enter with her  of 58 years.    Most recent therapy notes reviewed.  Patient was min assist for bed mobility, mod assist x1 for sit to stand transfer, mod assist x1 for stand to sit transfer, max assist for upper and lower body dressing.    Past medical history: Atrial fibrillation on anticoagulation with apixaban, JAK2 gene mutation, heart failure preserved ejection fraction, hypertension, hyperlipidemia, type 2 diabetes mellitus, anxiety, aortic stenosis, moderate to severe mitral stenosis in addition to moderate to severe mitral vegetation, iron deficiency anemia, ASHLI on CPAP, history  "of CVA without residual deficits in 2020, and pulmonary arterial hypertension    No past surgical history on file.    Allergies: Neomycin, Polymyxin b, Procaine, and Bacitracin    Lives with:    in a rancher with one-step to enter    Prior Level of Function  Dominant Hand: right  Ambulation: assistive equipment  Transferring: assistive equipment  Toileting: independent  Bathing: assistive person  Dressing: assistive person  Eating: independent  Prior Level of Function Comment: Pt reports use of RW for transfers, mobility within home. Rarely leaving home PTA. Spouse assisting with LBD, bathing.    History also provided by chart review    Family History: No family history on file.     Meds:    • amiodarone  200 mg oral Daily   • [Provider Managed Hold] apixaban  5 mg oral BID   • [START ON 3/23/2023] atorvastatin  40 mg oral Daily (6p)   • atorvastatin  80 mg oral Daily (6p)   • furosemide  40 mg intravenous BID (am, 4p)   • insulin lispro U-100  3 Units subcutaneous TID with meals   • magnesium oxide  400 mg oral Daily   • potassium chloride  20 mEq oral BID (am, 4p)       Review of Systems  All systems reviewed and negative except as per HPI    Objective     Visit Vitals  BP (!) 117/56 (BP Location: Left upper arm, Patient Position: Lying)   Pulse 76   Temp 36.3 °C (97.3 °F) (Temporal)   Resp 18   Ht 1.461 m (4' 9.5\")   Wt 64.3 kg (141 lb 12.8 oz)   SpO2 96%   BMI 30.15 kg/m²     Physical Exam   Constitutional: Chronically ill-appearing adult female reclining upright in bed who is awake and alert and visiting with family  Head: Normocephalic, atraumatic  Eyes: Conjugate gaze,  anicteric sclera.  Wears glasses.  EOMI.  CV: Trace lower extremity edema up to the mid shins bilaterally.   Respiratory: Breathing comfortably on supplementary oxygen by nasal cannula  MSK: Significantly decreased active range of motion of bilateral shoulders.  Normal active range of motion of bilateral elbows, bilateral wrist, " bilateral hips, bilateral knees and bilateral ankles.  No red, warm, swollen, or tender joints.  Skin: Warm and dry in the bilateral upper and lower extremities.  Scattered ecchymoses of the bilateral wrists of the bilateral shins but no suspicious open lesions.  Neuro: AAOx3.  Speech clear, current, and fluent.  Cranial nerves grossly intact.  3 -/5 shoulder abduction bilaterally, 4+/5 elbow flexion, elbow extension, wrist extension, finger abduction, grasp bilaterally.  4+/5 hip flexion, knee extension, dorsiflexion, hallux extension bilaterally.  Sensation grossly intact throughout the bilateral upper and lower extremities.    Lab Results   Component Value Date    WBC 31.38 (HH) 03/20/2023    HGB 10.9 (L) 03/20/2023    HCT 36.7 03/20/2023     (H) 03/20/2023    ALT 20 03/18/2023    AST 29 03/18/2023     03/20/2023    K 4.9 03/20/2023     03/20/2023    CREATININE 1.3 (H) 03/20/2023    BUN 67 (H) 03/20/2023    CO2 24 03/20/2023    TSH 1.41 03/18/2023    INR 1.7 03/20/2023    HGBA1C 6.4 (H) 03/18/2023       Labs   I reviewed the patient's labs from 3/20/2023.  Result significant for white blood count of 31.3, hemoglobin of 10.9, creatinine 1.3, BUN of 67.  Most recent hemoglobin A1c is from 03/18 3 and is 6.4%.  Compared to prior results, white blood cell count remains grossly elevated.  Hemoglobin hematocrit overall stable.  Creatinine trending up compared to 1.2 from yesterday 1.1 from 03/18/2023.  BUN actually trending down compared to 70 from 03/18/2023.    Imaging  I reviewed the chest x-ray from 03/20/2023.  No significant interval change in pulmonary vascular congestion and diffuse bilateral interstitial prominence compared to prior study from 03/18/2023.  Stable degenerative changes of the bilateral shoulders, right worse than left.    Assessment   1. Acute on chronic diastolic heart failure associated with aortic stenosis and mitral valve stenosis and regurgitation: Primary team monitoring  hemodynamic status closely. Ongoing diuresis and medical management as per primary team. Ongoing preoperative work-up as per primary team.  2. Elevated creatinine: Continue to trend closely, avoid nephrotoxic medications.  3. Type 2 diabetes mellitus: Fairly well controlled with hemoglobin A1c of 6.4%.  Continue to monitor blood glucose closely and titrate insulin as necessary.  Endocrinology following.  Ongoing management as per primary team and endocrinology.  4. Stage III right buttock wound: Wound care consulted, ongoing wound care as per unit protocol.  Patient should have waffle cushion when sitting upright in chair.   5. Osteoarthritis: Patient with bone-on-bone osteoarthritis of the bilateral knees.  Patient should be provided with walker prior to ambulation trials.  Consider addition of lidocaine patches bilaterally to help control knee pain.  6. Bilateral rotator cuff tears: Encourage patient to mobilize bilateral shoulders as able.  Consider use of ice and/or heat for pain control.  Consult Occupational Therapy.  7. Impaired mobility and ADLs: Secondary to acute on chronic diastolic heart failure in addition to multiple medical comorbidities.  Continue to mobilize as able under guidance of physical therapy, Occupational Therapy, and nursing.  8. Dispo: Anticipate discharge to SNF             Plan of care was discussed with primary team        Mariangel Ray MD - pager [phone]  [facility]  3/20/2023 11:40 AM

## 2023-03-20 NOTE — CONSULTS
"Brief Nutrition Note    Recommendations   1. ADAT to NCS, ELENO diet as medically able  2. Boost max QD with advancement  3. Daily MVI + minerals  4. BG goal 140-180 mg.dL       Clinical Course: Patient is a 77 y.o. female who was admitted on 3/18/2023 with a diagnosis of Severe mitral regurgitation [I34.0].     No past medical history on file.  No past surgical history on file.    Reason for Assessment  Reason For Assessment: nurse/nurse practitioner consult     [facility] Nutrition Screen Tool  Has patient lost weight without trying?: 0-->No  Has patient been eating poorly due to decreased appetite?: 1-->Yes  [facility] Nutrition Screen Score: 1     Nutrition/Diet History  Intake (%): 25%    Physical Findings  Last Bowel Movement: 03/19/23  Skin: edema, pressure injury (2-3+ edema, perineum 2)     RETS18 Physical Appearance  Last Bowel Movement: 03/19/23  Skin: edema, pressure injury (2-3+ edema, perineum 2)     Nutrition Order  Nutrition Order: does not meet nutritional requirements  Nutrition Order Comments: NPO     Anthropometrics  Height: 146.1 cm (4' 9.5\")     Current Weight  Weight Method: Bed scale  Weight: 64.3 kg (141 lb 12.8 oz)     Ideal Body Weight (IBW)  Ideal Body Weight (IBW) (kg): 40.15  % Ideal Body Weight: 161.91     Body Mass Index (BMI)  BMI (Calculated): 30.1     Labs/Procedures/Meds  Lab Results Reviewed: reviewed, pertinent     RETS18 Lab Results  Lab Results Reviewed: reviewed, pertinent   BMP Results       03/20/23 03/19/23 03/18/23     0324 0414 1751     137 135    K 4.9 4.7 4.5    Cl 100 103 99    CO2 24 26 24    Glucose 75 113 123    BUN 67 68 70    Creatinine 1.3 1.2 1.1    Calcium 8.1 8.1 8.2    Anion Gap 12 8 12    EGFR 39.6 43.4 48.0         Comment for K at 0324 on 03/20/23: SLIGHT HEMOLYSIS, RESULT MAY BE INCREASED.           Lab Results   Component Value Date    HGBA1C 6.4 (H) 03/18/2023      Medications  Pertinent Medications Reviewed: reviewed, pertinent   Scheduled Meds:  • amiodarone  " "200 mg oral Daily   • [Provider Managed Hold] apixaban  5 mg oral BID   • [START ON 3/23/2023] atorvastatin  40 mg oral Daily (6p)   • atorvastatin  80 mg oral Daily (6p)   • [Provider Managed Hold] furosemide  40 mg intravenous BID (am, 4p)   • insulin lispro U-100  3 Units subcutaneous TID with meals   • magnesium oxide  400 mg oral Daily   • [Provider Managed Hold] potassium chloride  20 mEq oral BID (am, 4p)     Clinical comments: Pre-op consult received. Pt reports decreased appetite in recent weeks. Eats 2 meals/day at baseline, still eating at this frequency, but smaller quantities. Pt reports she was 95# heavier 6-7 years ago, and relates wt loss to medications including Metformin. More recently, reports 20# wt loss in the last 1.5 years. Per wt hx in EMR, current wt is down 13# (8%) from 1 year ago, not significant for timeframe, however edema likely masking further wt loss. No visible signs of wasting observed.    Pt states she eats \"very healthy\" at baseline, limits salt and sugar in diet and doesn't eat \"any junk\". Does not drink supplements. Asking what a nutritious diet would be, reviewed cardiac, DM diet. Diet education materials attached to discharge instructions.     Goals: meet > 75% needs via PO intakes  Monitor: PO intakes, skin, labs, plan of care    Recommendations: See above       Date: 03/20/23  Signature: NANCY Marcano  "

## 2023-03-20 NOTE — PRE-PROCEDURE NOTE
Transesophageal Echo Pre-procedure Note    - Patient was seen and examined at bedside.  - The patient's chart and all data was reviewed.  - The procedure, treatment alternatives, risks and benefits were explained with specific risks discussed.      - H&P: agree.    - Patient NPO, minimum of 6 hours, prior to procedure.  - Patient is appropriate for LINDY procedure.  Pre-sedation assessment:  ASA   3  Mallampati class: III - soft palate, base of uvula visible.  Neck ROM: full      Physical Exam    Cardiovascular    Rhythm: regular   Rate: normal  Pulmonary    Rales  Dental    Teeth Problems: chipped          ROS/MED HX    Anesthesia History - neg  Pulmonary    Shortness of breath  Neuro/Psych - neg  Cardiovascular   Hypertension   Echocardiogram reviewed and ECG reviewed  GI/Hepatic- neg      PTT   Date Value Ref Range Status   03/20/2023 39 (H) 23 - 35 sec Final     Comment:     The Standard Therapeutic Range for Heparin is 68 to 101 seconds.     PT   Date Value Ref Range Status   03/20/2023 20.0 (H) 12.2 - 14.5 sec Final     INR   Date Value Ref Range Status   03/20/2023 1.7   Final     Comment:     Moderate Intensity Anticoagulation = 2.0 to 3.0, High Intensity = 2.5 to 3.5     BUN   Date Value Ref Range Status   03/20/2023 67 (H) 8 - 20 mg/dL Final     Creatinine   Date Value Ref Range Status   03/20/2023 1.3 (H) 0.6 - 1.1 mg/dL Final     Sodium   Date Value Ref Range Status   03/20/2023 136 136 - 144 mEQ/L Final     Chloride   Date Value Ref Range Status   03/20/2023 100 98 - 109 mEQ/L Final     Potassium   Date Value Ref Range Status   03/20/2023 4.9 3.6 - 5.1 mEQ/L Final     Comment:     SLIGHT HEMOLYSIS, RESULT MAY BE INCREASED.     Magnesium   Date Value Ref Range Status   03/20/2023 1.9 1.8 - 2.5 mg/dL Final     Comment:     SLIGHT HEMOLYSIS, RESULT MAY BE INCREASED.       Calcium   Date Value Ref Range Status   03/20/2023 8.1 (L) 8.9 - 10.3 mg/dL Final

## 2023-03-20 NOTE — PLAN OF CARE
Care Coordination Admission Assessment Note  Date: 3/20/2023   Time: 1:40 PM    Patient Name: Laureen Persaud  Medical Record Number: [id number]  YOB: 1945  Room/BED: CATH LAB [facility]    General Information  Patient-Specific Goals (Include Timeframe)     PCP: Divya Mckinney MD   Insurance Coverage: MEDICARE  Readmission Within the last 30 days  no previous admission in last 30 days    Advance Directives (for Healthcare)?  Does patient have advance directive?: No  Patient has Advance Directive: Advance Directive is NOT in chart, requested to bring in  Name of person requested to bring in advance directive: Mode,   Does patient have current OOH DNR form?: No  Patient does not have current OOH DNR form: Patient/Family declines further information  Does patient have current POLST?: No  Patient does not have current POLST: Patient/Family declines further information  Does patient have mental health advance directive?: No  Patient does not have Mental Health Advance Directive: Patient/Family declines further information        Information       Living Arrangements  Arrived From: physician office  Current Living Arrangements: home  People in Home: spouse  Home Accessibility: stairs to enter home (Group)  Living Arrangement Comments: SSH, 1STE with first floor  B&B  Able to Return to Prior Arrangements: yes    Housing Stability and Financial Resources (SDOH)  In the last 12 months, was there a time when you were not able to pay the mortgage or rent on time?: No  In the last 12 months, was there a time when you did not have a steady place to sleep or slept in a shelter (including now)?: No  How hard is it for you to pay for the very basics like food, housing, medical care, and heating?: Not hard at all    Current Outpatient/Agency/Support Group  homecare agency ([facility])    Type of Current Home Care Services  home PT, nursing    Assistive Device/Animal Currently Used at Home  walker,  standard, CPAP    Functional Status Comments  IADL and ambulates with walker    IADL Comments  IADL and ambulates with walker    Employment/ Status  Employment Status: retired    Concerns to be Addressed  denies needs/concerns at this time, adjustment to diagnosis/illness, care coordination/care conferences    Current Discharge Risk       Anticipated Changes Related to Illness  inability to care for someone else    Transportation Concerns (SDOH)  Transportation Concerns: car, none  Transportation Anticipated: family or friend will provide  In the past 12 months, has lack of transportation kept you from medical appointments or from getting medications?: No  In the past 12 months, has lack of transportation kept you from meetings, work, or from getting things needed for daily living?: No    Concerns Comments  Per info received at surgical rounds today, pt transfered from [facility] with worsening aortic and mitral valvular disease admitted for surgical evaluation. Plan for LINDY and CT TAVR for today. Pt was open with [facility] PTA. PT/OT eval pending for dispo plan. Anticipating d/c home [facility]. Will continue to follow until d/c completed.    Anticipated Clinical Needs  Anticipated Clinical Needs: Other (see comments) (HC)    Anticipated Discharge Plan   Anticipated Discharge Disposition: home with home health  Type of Home Care Services: nursing, home PT  Patient/Family Anticipates Transition to: home with help/services  Patient/Family Anticipated Services at Transition: home health care  Met with patient. Provided education and contact information for Care Coordination services.: yes  Screening complete: yes

## 2023-03-20 NOTE — PROGRESS NOTES
Cardiothoracic Intensivist Progress Note       CARDIOTHORACIC   COVID Status: Negative 3/18/23    IV dye allergy: no    Subjective     History of Present Illness: Laureen Persaud is a 77 y.o. cisgender female with history of worsening aortic and mitral valvular disease admitted for surgical evaluation, chronic atrial fibrillation, and ASHLI that are being treated.                              Review of Systems:                                     Constitutional: frail appearing                                                  Eyes: denies difficulty with vision  Ears, nose, mouth, throat, and face: denies oral discomfort                                        Respiratory: mild shortness of breath with exertion                                  Cardiovascular: denies chest discomfort                                  Gastrointestinal: denies abdominal pain                                    Genitourinary: denies difficulty voiding                                      Hematologic: denies bleeding issues                                Musculoskeletal: denies muscle pain                                      Neurological: generalized weakness                                   Integumentary: multiple skin tears    Medical History: No past medical history on file.    Surgical History: No past surgical history on file.    Prior to Admission medications    Not on File       Allergies: Neomycin, Polymyxin b, Procaine, and Bacitracin    Social History:   Social History     Socioeconomic History   • Marital status:        Family History: No family history on file.    Objective     Vital signs in last 24 hours:  Temp:  [35.8 °C (96.4 °F)-37.2 °C (98.9 °F)] 36.3 °C (97.3 °F)  Heart Rate:  [66-79] 76  Resp:  [16-20] 18  BP: (105-134)/(53-99) 117/56      Intake/Output Summary (Last 24 hours) at 3/20/2023 1125  Last data filed at 3/19/2023 1600  Gross per 24 hour   Intake 450 ml   Output 300 ml   Net 150 ml     Intake/Output  this shift:  No intake/output data recorded.    Labs  Lab Results   Component Value Date    WBC 31.38 (HH) 03/20/2023    HGB 10.9 (L) 03/20/2023    HCT 36.7 03/20/2023     (H) 03/20/2023    ALT 20 03/18/2023    AST 29 03/18/2023     03/20/2023    K 4.9 03/20/2023     03/20/2023    CREATININE 1.3 (H) 03/20/2023    BUN 67 (H) 03/20/2023    CO2 24 03/20/2023    MG 1.9 03/20/2023    TSH 1.41 03/18/2023    INR 2.6 03/18/2023    HGBA1C 6.4 (H) 03/18/2023    PT 27.2 (H) 03/18/2023    PTT 44 (H) 03/18/2023             Full Code    Head/Ear/Nose/Throat:     NCAT.                               Eyes:     EOMI and PERRL.                     Respiratory:     diminished at the bases b/l.               Cardiovascular:     SR.              Gastrointestinal:     + Bowel sounds and non-tender.                 Genitourinary:     no-deformities.                    Extremities:     +1-2 edema b/l LE.            Musculoskeletal:      No injury or deformity.                   Neurological:     follows commands.       Behavior/Emotional:     appropriate and cooperative.                           Lymph:      No adenopathy noted.                               Skin:      Multiple skin tears and scattered bruising. Purpuric rash on feet. Stage 2 sacral ulcer, present on admission.     Examination of the patient performed at the bedside. Rounded with ICU team and discussed management/plan with surgical staff. Medications, radiological studies and labs reviewed and discussed with attending of record, Dr. Macey Padgett.    Cardiothoracic Assessment and Plan:    Neurologic: A&Ox3 and pain controlled. History of CVA/TIA. Ambulates with a walker at baseline and has had multiple falls. Will require head CT/MRI if plan to proceed with surgery. PT/OT/PM&R to follow.     Cardiovascular: Severe AS and MR/MI with possible endocarditis undergoing surgical evaluation, also with acute (elevated BNP) HFpEF and chronic atrial fibrillation.  "Off vasoactive medications and meeting BP goals. Will continue to optimize cardiac medications. Very high mortality risk based on STS calculation and is not a candidate for open surgery. TAVR CT and LINDY today to assess if endocarditis present; if not could potentially be a TAVR candidate although still with significant risk for co-morbidities.     Respiratory: I personally reviewed the most recent CXR which small b/l pleural effusions and pulmonary congestion with interval clearing compared to previous exam. Will encourage IS, mobilization and wean O2 PRN. CPAP for ASHLI.      Genitourinary: CKD stage 3a (GFR 45-59 ml/min). BUN elevated and will continue to monitor. There could be a cardiorenal component. No davenport. Making adequate urine output. Will avoid nephrotoxic medications.     Endocrine: Follow [facility]. DM management. Appreciate Endocrinology input.     Hematologic: No evidence active bleeding. Was on apixaban for a-fib as outpatient and currently on hold. Will considering resuming anticoagulation once INR drifts down however due to falls and skin tears/bleeding might not be a good candidate moving forward. \"Anemia of chronic disease.History of JAK2 gene mutation and will f/u with Heme/Onc about history of colon CA, weight loss and if concern for recurrence of any cancer.      Infectious Disease: Was on azithromycin and ceftriaxone for possible endocarditis/pneumonia however ID OK monitoring off antibiotics for now. Cultures pending. Appreciate Infectious Disease input.      Fluids, Electrolytes: Electrolytes replaced per protocol. Goal for net negative fluid balance.     Gastrointestinal: PO as tolerated. GI ppx. History of ascites but unknown cause and has had paracentesis recently. Will need CT scan and possible GI evaluation. Hepatitis panel negative.     Skin: OOB, PT when able. Wound care for skin tears.      Tubes, lines and drains: Will remove superfluous invasive monitors PRN.    Disposition: Guarded. " Continue close monitoring on stepdown unit.           Carlitos Reddy, DO  3/20/2023

## 2023-03-20 NOTE — ANESTHESIOLOGIST PRE-PROCEDURE ATTESTATION
Pre-Procedure Patient Identification:  I am the Primary Anesthesiologist and have identified the patient on 03/20/23 at 12:13 PM.   I have confirmed the procedure(s) will be performed by the following surgeon/proceduralist * No surgeons listed *.

## 2023-03-21 ENCOUNTER — APPOINTMENT (INPATIENT)
Dept: RADIOLOGY | Facility: HOSPITAL | Age: 78
DRG: 286 | End: 2023-03-21
Attending: PHYSICIAN ASSISTANT
Payer: MEDICARE

## 2023-03-21 LAB
ALBUMIN SERPL-MCNC: 2 G/DL (ref 3.4–5)
ALP SERPL-CCNC: 111 IU/L (ref 35–126)
ALT SERPL-CCNC: 16 IU/L (ref 11–54)
ANION GAP SERPL CALC-SCNC: 18 MEQ/L (ref 3–15)
AST SERPL-CCNC: 23 IU/L (ref 15–41)
BILIRUB SERPL-MCNC: 1.2 MG/DL (ref 0.3–1.2)
BUN SERPL-MCNC: 62 MG/DL (ref 8–20)
CALCIUM SERPL-MCNC: 7.8 MG/DL (ref 8.9–10.3)
CHLORIDE SERPL-SCNC: 96 MEQ/L (ref 98–109)
CO2 SERPL-SCNC: 23 MEQ/L (ref 22–32)
CREAT SERPL-MCNC: 1.4 MG/DL (ref 0.6–1.1)
ERYTHROCYTE [DISTWIDTH] IN BLOOD BY AUTOMATED COUNT: 21.2 % (ref 11.7–14.4)
GFR SERPL CREATININE-BSD FRML MDRD: 36.4 ML/MIN/1.73M*2
GLUCOSE BLD-MCNC: 120 MG/DL (ref 70–99)
GLUCOSE BLD-MCNC: 142 MG/DL (ref 70–99)
GLUCOSE BLD-MCNC: 147 MG/DL (ref 70–99)
GLUCOSE SERPL-MCNC: 93 MG/DL (ref 70–99)
HCT VFR BLDCO AUTO: 36.6 % (ref 35–45)
HGB BLD-MCNC: 11 G/DL (ref 11.8–15.7)
MCH RBC QN AUTO: 26.6 PG (ref 28–33.2)
MCHC RBC AUTO-ENTMCNC: 30.1 G/DL (ref 32.2–35.5)
MCV RBC AUTO: 88.6 FL (ref 83–98)
PDW BLD AUTO: 11.4 FL (ref 9.4–12.3)
PLATELET # BLD AUTO: 368 K/UL (ref 150–369)
POCT OXYHGB: 73.7 % (ref 93–98)
POCT OXYHGB: 77.7 % (ref 93–98)
POCT TEST: ABNORMAL
POTASSIUM SERPL-SCNC: 4.3 MEQ/L (ref 3.6–5.1)
PROT SERPL-MCNC: 4.9 G/DL (ref 6–8.2)
RBC # BLD AUTO: 4.13 M/UL (ref 3.93–5.22)
SODIUM SERPL-SCNC: 137 MEQ/L (ref 136–144)
WBC # BLD AUTO: 32.32 K/UL (ref 3.8–10.5)

## 2023-03-21 PROCEDURE — 93451 RIGHT HEART CATH: CPT | Mod: 26 | Performed by: INTERNAL MEDICINE

## 2023-03-21 PROCEDURE — C1894 INTRO/SHEATH, NON-LASER: HCPCS | Performed by: INTERNAL MEDICINE

## 2023-03-21 PROCEDURE — 85027 COMPLETE CBC AUTOMATED: CPT | Performed by: NURSE PRACTITIONER

## 2023-03-21 PROCEDURE — C1769 GUIDE WIRE: HCPCS | Performed by: INTERNAL MEDICINE

## 2023-03-21 PROCEDURE — 25000000 HC PHARMACY GENERAL: Performed by: INTERNAL MEDICINE

## 2023-03-21 PROCEDURE — 97162 PT EVAL MOD COMPLEX 30 MIN: CPT | Mod: GP

## 2023-03-21 PROCEDURE — 99024 POSTOP FOLLOW-UP VISIT: CPT | Performed by: INTERNAL MEDICINE

## 2023-03-21 PROCEDURE — 93451 RIGHT HEART CATH: CPT | Performed by: INTERNAL MEDICINE

## 2023-03-21 PROCEDURE — 63600000 HC DRUGS/DETAIL CODE: Performed by: NURSE PRACTITIONER

## 2023-03-21 PROCEDURE — 99233 SBSQ HOSP IP/OBS HIGH 50: CPT | Performed by: ANESTHESIOLOGY

## 2023-03-21 PROCEDURE — 97530 THERAPEUTIC ACTIVITIES: CPT | Mod: GO

## 2023-03-21 PROCEDURE — 27200000 HC STERILE SUPPLY: Performed by: INTERNAL MEDICINE

## 2023-03-21 PROCEDURE — 63700000 HC SELF-ADMINISTRABLE DRUG: Performed by: NURSE PRACTITIONER

## 2023-03-21 PROCEDURE — 93880 EXTRACRANIAL BILAT STUDY: CPT

## 2023-03-21 PROCEDURE — 21400000 HC ROOM AND CARE CCU/INTERMEDIATE

## 2023-03-21 PROCEDURE — 4A023N6 MEASUREMENT OF CARDIAC SAMPLING AND PRESSURE, RIGHT HEART, PERCUTANEOUS APPROACH: ICD-10-PCS | Performed by: INTERNAL MEDICINE

## 2023-03-21 PROCEDURE — 36415 COLL VENOUS BLD VENIPUNCTURE: CPT | Performed by: NURSE PRACTITIONER

## 2023-03-21 PROCEDURE — 80053 COMPREHEN METABOLIC PANEL: CPT | Performed by: NURSE PRACTITIONER

## 2023-03-21 RX ORDER — AMOXICILLIN 500 MG/1
2000 CAPSULE ORAL ONCE
Status: COMPLETED | OUTPATIENT
Start: 2023-03-22 | End: 2023-03-22

## 2023-03-21 RX ORDER — LIDOCAINE HYDROCHLORIDE 10 MG/ML
INJECTION, SOLUTION INFILTRATION; PERINEURAL
Status: DISCONTINUED | OUTPATIENT
Start: 2023-03-21 | End: 2023-03-21 | Stop reason: HOSPADM

## 2023-03-21 RX ADMIN — FUROSEMIDE 40 MG: 10 INJECTION, SOLUTION INTRAMUSCULAR; INTRAVENOUS at 08:07

## 2023-03-21 RX ADMIN — MAGNESIUM OXIDE TAB 400 MG (241.3 MG ELEMENTAL MG) 400 MG: 400 (241.3 MG) TAB at 15:32

## 2023-03-21 RX ADMIN — HYDROXYUREA 500 MG: 500 CAPSULE ORAL at 21:31

## 2023-03-21 RX ADMIN — AMIODARONE HYDROCHLORIDE 200 MG: 200 TABLET ORAL at 15:32

## 2023-03-21 RX ADMIN — ATORVASTATIN CALCIUM 80 MG: 80 TABLET, FILM COATED ORAL at 19:02

## 2023-03-21 ASSESSMENT — NEW YORK HEART ASSOCIATION (NYHA) CLASSIFICATION: NYHA FUNCTIONAL CLASS: III

## 2023-03-21 ASSESSMENT — COGNITIVE AND FUNCTIONAL STATUS - GENERAL
AFFECT: FLAT/BLUNTED AFFECT
MOVING TO AND FROM BED TO CHAIR: 3 - A LITTLE
EATING MEALS: 3 - A LITTLE
DRESSING REGULAR LOWER BODY CLOTHING: 2 - A LOT
TOILETING: 2 - A LOT
HELP NEEDED FOR BATHING: 2 - A LOT
CLIMB 3 TO 5 STEPS WITH RAILING: 3 - A LITTLE
STANDING UP FROM CHAIR USING ARMS: 3 - A LITTLE
AFFECT: FLAT/BLUNTED AFFECT;ANXIOUS
WALKING IN HOSPITAL ROOM: 3 - A LITTLE
HELP NEEDED FOR PERSONAL GROOMING: 3 - A LITTLE
DRESSING REGULAR UPPER BODY CLOTHING: 2 - A LOT

## 2023-03-21 NOTE — PRE-PROCEDURE NOTE
Cardiac Cath Lab Pre-procedure Note    - Patient was seen and examined at bedside.  - The patient's chart and all data was reviewed.  - The procedure, treatment alternatives, risks and benefits were explained with specific risks discussed.  - Patient was consented for cardiac cath procedure and possible PCI.  - Patient's case was found appropriate for dual antiplatelet therapy.    Indication for procedure: Pre-Op Diagnosis Codes:     * Severe aortic stenosis (I35.0)    Patient's clinical presentation to the cardiac cath lab: HF.      Patient appears to be managing well.             Patient is presenting today with NYHA class III. acute on chronic diastolic heart failure.    Pre-sedation assessment  ASA 3  Mallampati class: III - soft palate, base of uvula visible.

## 2023-03-21 NOTE — PLAN OF CARE
Problem: Adult Inpatient Plan of Care  Goal: Plan of Care Review  Outcome: Progressing  Flowsheets (Taken 3/21/2023 5400)  Progress: improving  Plan of Care Reviewed With: patient  Outcome Summary: PT eval completed. Rec d/c to SNF     Problem: Mobility Impairment  Goal: Optimal Mobility  Outcome: Progressing

## 2023-03-21 NOTE — PROGRESS NOTES
Nephrology Progress Note:    Subjective   Patient seen this morning.  Sitting comfortably in bed.  Denies any worsening shortness of breath.  Denies any nausea, vomiting, chest pain or voiding difficulty. All other ROS negative.    Interval History: No acute overnight event.    Objective   Vital signs in last 24 hours:  Temp:  [36.2 °C (97.1 °F)-36.8 °C (98.3 °F)] 36.2 °C (97.1 °F)  Heart Rate:  [68-90] 77  Resp:  [16-25] 18  BP: ()/(50-58) 113/56     Intake & Output:    Intake/Output Summary (Last 24 hours) at 3/21/2023 0924  Last data filed at 3/21/2023 0600  Gross per 24 hour   Intake 550 ml   Output 400 ml   Net 150 ml     Physical Exam:  General Appearance:  Awake, alert, not in acute distress    Head:  Normocephalic, without obvious abnormality, atraumatic   Eyes:  Conjunctiva clear, anicteric sclera.      Ears:  No external abnormalities   Throat: Moist oral mucosa   Neck: Supple, symmetrical   Lungs:    Bilateral scattered rales, respirations unlabored   Heart:  S1 and S2 heard.  Systolic murmur present.   Abdomen: Soft, non tender, non distended, bowel sound heard   Genitourinary:  No palpable bladder   Extremities:  Musculoskeletal: No cyanosis. Bilateral lower extremity mild edema  No injury or deformity   Skin: No rashes    Neurologic:  Behavior/  Emotional: Oriented x 3, non focal  Appropriate, cooperative         Current Medications:  Current Facility-Administered Medications   Medication Dose Route Frequency   • amiodarone  200 mg oral Daily   • [Provider Managed Hold] apixaban  5 mg oral BID   • [START ON 3/23/2023] atorvastatin  40 mg oral Daily (6p)   • atorvastatin  80 mg oral Daily (6p)   • atropine  0.5 mg intravenous q5 min PRN   • glucose  16-32 g of dextrose oral PRN    Or   • dextrose  15-30 g of dextrose oral PRN    Or   • glucagon  1 mg intramuscular PRN    Or   • dextrose 50 % in water (D50)  25 mL intravenous PRN   • furosemide  40 mg intravenous BID (am, 4p)   • hydroxyurea  500  mg oral Every other day   • insulin lispro U-100  3 Units subcutaneous TID with meals   • magnesium oxide  400 mg oral Daily   • nitroglycerin  0.4 mg sublingual q5 min PRN   • potassium chloride  20 mEq oral BID (am, 4p)       Labs:  BUN   Date Value Ref Range Status   03/21/2023 62 (H) 8 - 20 mg/dL Final     Creatinine   Date Value Ref Range Status   03/21/2023 1.4 (H) 0.6 - 1.1 mg/dL Final     Sodium   Date Value Ref Range Status   03/21/2023 137 136 - 144 mEQ/L Final     Potassium   Date Value Ref Range Status   03/21/2023 4.3 3.6 - 5.1 mEQ/L Final     CO2   Date Value Ref Range Status   03/21/2023 23 22 - 32 mEQ/L Final     Magnesium   Date Value Ref Range Status   03/20/2023 1.9 1.8 - 2.5 mg/dL Final     Comment:     SLIGHT HEMOLYSIS, RESULT MAY BE INCREASED.       Calcium   Date Value Ref Range Status   03/21/2023 7.8 (L) 8.9 - 10.3 mg/dL Final     Albumin   Date Value Ref Range Status   03/21/2023 2.0 (L) 3.4 - 5.0 g/dL Final     WBC   Date Value Ref Range Status   03/21/2023 32.32 (HH) 3.80 - 10.50 K/uL Final     Comment:     This result has been called to TEREZA GROVE by PAMELA VERDIN on 03 21 23 at 04:48, and has been read back.      Hemoglobin   Date Value Ref Range Status   03/21/2023 11.0 (L) 11.8 - 15.7 g/dL Final     Platelets   Date Value Ref Range Status   03/21/2023 368 150 - 369 K/uL Final     Lab Results   Component Value Date    CREATININE 1.4 (H) 03/21/2023    CREATININE 1.3 (H) 03/20/2023    CREATININE 1.2 (H) 03/19/2023    CREATININE 1.1 03/18/2023     I have reviewed the pertinent patient's labs.    Imaging:  I have reviewed the pertinent patient's imaging results.     Assessment/Plan     Laureen Persaud is a 77 y.o. female with PMHx of  CKD II-IIIa, HTN, DM II, HLD, ASHLI, CVA/TIA  (2020), HFpEF, PAH, valvular heart disease (severe AAS, moderate-severe MS, moderate-severe MR), A-fib, iron deficiency anemia, JAK2 gene mutation initially admitted to [facility] on 3/10/2020 with a complaint  of weakness, fatigue, and was empirically treated for pneumonia along with CHF.  Patient had a TTE done which revealed worsening AV gradient.  Subsequently, she was transported to [facility] and underwent cardiac catheterization which confirmed significant aortic and mitral valve disease.  She was thought to be high risks by CT surgery at [facility] and subsequently [place] [facility] [place] evaluation on 3/18/2023.        1.  Likely CKD II-IIIa  -Had creatinine of 1.0-1.1 mg/dL as of 2021.  Subsequently, creatinine was 1.2 mg/dL as of 3/18/2023 at outside hospital and was 1.1 mg/dL on 3/18/2023 when arrived to [facility].  -UA on 3/20/2023 bland without any hematuria, pyuria, proteinuria -ruling out any other alarming GN or interstitial disease  -Likely has some degree of CKD on the basis of hypertension, chronic CRS physiology     2.  GRACE  -Likely related to CRS physiology and may have mildly worsened due to contrast exposure.  -Had a cardiac catheterization at [facility] -unsure about date.  Subsequently, received on 100 cc of IV contrast dye on 3/20/2023 for CT TAVR  -Presented with a creatinine of 1.1 mg/dL on 3/18/2023 and up to 1.3 mg/dL on 3/20/2023 -nephrology consulted  -UA on 3/20/2023 bland  -Ultrasound on 3/20/2023 without hydronephrosis     -Labs from this morning reviewed.  Creatinine of 1.4, BUN of 62, potassium of 4.3 and bicarb 23     Recommend:  -Continue supportive measures as you are.  Can resume back on diuretics if desired by cardiology/CT surgery team at this point  -Continue on higher dose of atorvastatin 80 mg for today and can resume back on prior regular dose starting tomorrow  -Continue to avoid further nephrotoxins including contrast dye and other nephrotoxins like NSAIDs, sodium phosphate enema, vitamin C, etc.  -Avoid hypotension and maintain MAP > 65 mmHg.  -Follow daily BMP while in hospital     3.  HFpEF, valvular heart disease, PAH  -Noted to have  severe AS, moderate-severe MS, moderate-severe MR  -Being work-up for possible surgical intervention  -Management largely as per cardiology, CT surgery.     4.  A-fib  -s/p DCCV x3  -On amiodarone  -As per cardiology, primary team.     5.  History of iron deficiency anemia, JAK2 gene mutation myeloproliferative disorder  -Noted to have significant leukocytosis  -Further work-up/management as per primary team, hematology/oncology.     6.  Right renal mass  -Renal ultrasound on 3/20/2023 noted to have solid 1.9 cm right renal mass, suspicious for renal malignancy.  -Eventually, will need urology evaluation -defer timing to primary team     7.  Nephrolithiasis.  -Renal ultrasound on 3/20/2023 noted to have nonobstructing 1 cm right mid renal calculus  -Outpatient follow-up with nephrology and urology upon discharge     8.  Possible pneumonia  -Was covered with antibiotics at outside hospital.  Evaluated by ID and recommended to observe off antibiotics.  -As per ID, primary team.     9.  History of CVA/TIA, ASHLI, HLD  -As per primary team.     -Discussed with Dr. Reddy this morning

## 2023-03-21 NOTE — PROGRESS NOTES
Cardiothoracic Intensivist Progress Note       CARDIOTHORACIC   COVID Status: Negative 3/18/23    IV dye allergy: no    Subjective     History of Present Illness: Laureen Persaud is a 77 y.o. cisgender female with history of worsening aortic and mitral valvular disease admitted for surgical evaluation, chronic atrial fibrillation, and ASHLI that are being treated.                              Review of Systems:                                     Constitutional: frail appearing                                                  Eyes: denies difficulty with vision  Ears, nose, mouth, throat, and face: denies oral discomfort                                        Respiratory: mild shortness of breath with exertion                                  Cardiovascular: denies chest discomfort                                  Gastrointestinal: denies abdominal pain                                    Genitourinary: denies difficulty voiding                                      Hematologic: denies bleeding issues                                Musculoskeletal: denies muscle pain                                      Neurological: generalized weakness                                   Integumentary: multiple skin tears    Medical History: History reviewed. No pertinent past medical history.    Surgical History: History reviewed. No pertinent surgical history.    Prior to Admission medications    Not on File       Allergies: Neomycin, Polymyxin b, Procaine, and Bacitracin    Social History:   Social History     Socioeconomic History   • Marital status:      Spouse name: None   • Number of children: None   • Years of education: None   • Highest education level: None     Social Determinants of Health     Financial Resource Strain: Low Risk  (3/20/2023)    Overall Financial Resource Strain (CARDIA)    • Difficulty of Paying Living Expenses: Not hard at all   Transportation Needs: No Transportation Needs (3/20/2023)    PRAPARE -  Transportation    • Lack of Transportation (Medical): No    • Lack of Transportation (Non-Medical): No   Housing Stability: Unknown (3/20/2023)    Housing Stability Vital Sign    • Unable to Pay for Housing in the Last Year: No    • Unstable Housing in the Last Year: No       Family History: History reviewed. No pertinent family history.    Objective     Vital signs in last 24 hours:  Temp:  [36.2 °C (97.1 °F)-36.8 °C (98.3 °F)] 36.2 °C (97.1 °F)  Heart Rate:  [68-90] 77  Resp:  [16-25] 18  BP: ()/(50-58) 113/56      Intake/Output Summary (Last 24 hours) at 3/21/2023 1048  Last data filed at 3/21/2023 0600  Gross per 24 hour   Intake 550 ml   Output 400 ml   Net 150 ml     Intake/Output this shift:  No intake/output data recorded.    Labs  Lab Results   Component Value Date    WBC 32.32 (HH) 03/21/2023    HGB 11.0 (L) 03/21/2023    HCT 36.6 03/21/2023     03/21/2023    ALT 16 03/21/2023    AST 23 03/21/2023     03/21/2023    K 4.3 03/21/2023    CL 96 (L) 03/21/2023    CREATININE 1.4 (H) 03/21/2023    BUN 62 (H) 03/21/2023    CO2 23 03/21/2023    MG 1.9 03/20/2023    TSH 1.41 03/18/2023    INR 1.7 03/20/2023    HGBA1C 6.4 (H) 03/18/2023    PT 20.0 (H) 03/20/2023    PTT 39 (H) 03/20/2023             Full Code    Head/Ear/Nose/Throat:     NCAT.                               Eyes:     EOMI and PERRL.                     Respiratory:     diminished at the bases b/l.               Cardiovascular:     SR.              Gastrointestinal:     + Bowel sounds and non-tender.                 Genitourinary:     no-deformities.                    Extremities:     +1-2 edema b/l LE.            Musculoskeletal:      No injury or deformity.                   Neurological:     follows commands.       Behavior/Emotional:     appropriate and cooperative.                           Lymph:      No adenopathy noted.                               Skin:      Multiple skin tears and scattered bruising. Purpuric rash on feet.  "Stage 2 sacral ulcer, present on admission.     Examination of the patient performed at the bedside. Rounded with ICU team and discussed management/plan with surgical staff. Medications, radiological studies and labs reviewed and discussed with attending of record, Dr. Macey Padgett.    Cardiothoracic Assessment and Plan:    Neurologic: A&Ox3 and pain controlled. History of CVA/TIA. Ambulates with a walker at baseline and has had multiple falls. Multiple dental caries and will f/u with Dental. PT/OT/PM&R to follow.     Cardiovascular: Severe AS and MR/MI undergoing surgical evaluation, also with acute (elevated BNP) HFpEF and chronic atrial fibrillation. Off vasoactive medications and meeting BP goals. Will continue to optimize cardiac medications. Very high mortality risk based on STS calculation and is not a candidate for open surgery. Right heart cath today. No endocarditis based on LINDY.     Respiratory: I personally reviewed the most recent CXR which small b/l pleural effusions and pulmonary congestion with interval clearing compared to previous exam. Will encourage IS, mobilization and wean O2 PRN. CPAP for ASHLI.      Genitourinary: CKD stage 3a (GFR 45-59 ml/min). BUN elevated and will continue to monitor. There could be a cardiorenal component. No davenport. Making adequate urine output. Will avoid nephrotoxic medications. Appreciate Nephrology input.     Endocrine: Follow [facility]. DM management. Appreciate Endocrinology input.     Hematologic: No evidence active bleeding. Was on apixaban for a-fib as outpatient and currently on hold. Will considering resuming anticoagulation once INR drifts down however due to falls and skin tears/bleeding might not be a good candidate moving forward. \"Anemia of chronic disease.History of JAK2 gene mutation and appreciate Heme/Onc input; will continue hydroxyurea.      Infectious Disease: Was on azithromycin and ceftriaxone for possible endocarditis/pneumonia however ID OK " monitoring off antibiotics for now since LINDY negative. Cultures pending. Appreciate Infectious Disease input.      Fluids, Electrolytes: Electrolytes replaced per protocol. Goal for net negative fluid balance.     Gastrointestinal: PO as tolerated. GI ppx. History of ascites but unknown cause and has had paracentesis recently. Hepatitis panel negative.     Skin: OOB, PT when able. Wound care for skin tears.      Tubes, lines and drains: Will remove superfluous invasive monitors PRN.    Disposition: Guarded. Continue close monitoring on stepdown unit.           Carlitos Reddy,   3/21/2023

## 2023-03-21 NOTE — CONSULTS
REPORT TYPE: Consult Note    DATE OF CONSULTATION: 03/21/2023    REASON FOR CONSULTATION:  Preoperative dental evaluation and treatment prior to planned cardiac valve surgery.    CONSULTANT:  Ki Barros DDS    HISTORY OF PRESENT ILLNESS:  The patient was examined in room 2002 of the [facility] Cardiac Intensive Care step-down unit on the afternoon of 03/21/2023. A   clinical examination was performed on the patient.  In addition, a recently performed DentaScan was reviewed personally as well as the radiologist's interpretation of the scan and an independent evaluation by Dr. Kush Ramirez earlier in the day was reviewed.  Combination of Dr. Ramirez's evaluation, my personal examination of the DentaScan, the radiologist's interpretation combined with my clinical exam reveals the patient to have abscessed teeth numbers 3, 4, 19, and 30.  The suggestion was made to the patient and the family that these teeth be surgically removed prior to her planned cardiac surgery to enhance the potential safety of the cardiac surgery and minimize the risk of postoperative infection.  The patient expresses a desire to have the teeth surgically removed prior to the surgery.  The extractions will be   planned for Wednesday afternoon, 03/22/2023.      Ki Barros DDS    DD: 03/21/2023 18:01  DT: 03/21/2023 18:20  Voice ID: [id number]/Report ID: [id number]  kp

## 2023-03-21 NOTE — PROGRESS NOTES
Patient: Laureen Persaud  Location:  [facility] 2002  MRN:  [id number]  Today's date:  3/21/2023    Laureen is a 77 y.o. female admitted on 3/18/2023 with Severe mitral regurgitation [I34.0]. Principal problem is Severe mitral regurgitation.    Past Medical History  Laureen has no past medical history on file.    History of Present Illness  Amitted to OSH for decompensated heart failure and pneumonia, found to have worsening aortic stenosis and mod-severe MS/MR. [place] [facility] [place]    S/p LINDY 3/20        PT Vitals    Date/Time Pulse HR Source SpO2 Pt Activity O2 Therapy O2 Del Method O2 Flow Rate BP Pt Position [facility]   03/21/23 0859 77 Monitor 99 % At rest Supplemental oxygen Nasal cannula 2 L/min 113/56 Lying NB      PT Pain    Date/Time Pain Type Rating: Rest Rating: Activity [facility]   03/21/23 0859 Pain Assessment 0 - no pain 0 - no pain NB          Prior Living Environment    Flowsheet Row Most Recent Value   People in Home spouse   Current Living Arrangements home   Home Accessibility stairs to enter home (Group)   Living Environment Comment Living in a 1SH, 1STE. +tub shower without SC or GBs.        Prior Level of Function    Flowsheet Row Most Recent Value   Dominant Hand right   Ambulation assistive equipment   Transferring assistive equipment   Toileting independent   Bathing assistive person   Dressing assistive person   Eating independent   Prior Level of Function Comment Pt reports use of RW for transfers, mobility within home. Rarely leaving home PTA. Spouse assisting with LBD, bathing.   Assistive Device Currently Used at Home walker, front-wheeled           PT Evaluation and Treatment - 03/21/23 0858        PT Time Calculation    Start Time 0858     Stop Time 0918     Time Calculation (min) 20 min        General Information    Document Type initial evaluation     Mode of Treatment physical therapy     Position at Start of Session supine;in bed     Status at Start of  Session agreeable to therapy;no issues or concerns identified by nurse prior to session        Precautions/Limitations/Impairments    Existing Precautions/Restrictions fall;cardiac        Cognition/Psychosocial    Affect/Mental Status (Cognition) flat/blunted affect     Orientation Status (Cognition) oriented x 3     Follows Commands (Cognition) WFL     Comment, Cognition Pt is pleasant and cooperative, noted with increased anxiety with mobility        Sensory    Hearing Status hearing aid, bilateral   not present at hospital       Vision Assessment/Intervention    Visual Impairment/Limitations corrective lenses full-time        Sensory Assessment (Somatosensory)    Sensory Assessment (Somatosensory) LE sensation intact     Sensory Subjective Reports numbness;tingling   neuropathy B feet       Range of Motion (ROM)    Range of Motion ROM is WFL;bilateral lower extremities        Strength (Manual Muscle Testing)    Strength (Manual Muscle Testing) strength is WFL;bilateral lower extremities        Bed Mobility    [place], Supine to Sit minimum assist (75% or more patient effort);1 person assist     [place], Sit to Supine minimum assist (75% or more patient effort);1 person assist     Assistive Device bed rails;draw sheet;head of bed elevated     Comment (Bed Mobility) OOB to L with assist for upright trunk        Sit to Stand Transfer    [place], Sit to Stand Transfer minimum assist (75% or more patient effort);2 person assist     Verbal Cues proper use of assistive device;safety;technique     Assistive Device walker, front-wheeled     Comment from EOB        Stand to Sit Transfer    [place], Stand to Sit Transfer minimum assist (75% or more patient effort);1 person assist     Verbal Cues hand placement;safety;technique     Assistive Device walker, front-wheeled     Comment to EOB        Gait Training    [place], Gait minimum assist (75% or more patient effort);1 person assist     Assistive  Device walker, front-wheeled     Distance in Feet 10 feet   side stepping + fwd/back    Pattern (Gait) step-to     Deviations/Abnormal Patterns (Gait) patricia decreased;gait speed decreased;step length decreased     Comment (Gait/Stairs) Angel for short bout of ambulation - pt self limited due to anxiety and reports of fatigue        Stairs Training    [place], Stairs not tested        Balance    Static Sitting Balance WFL     Dynamic Sitting Balance WFL     Static Standing Balance mild impairment     Dynamic Standing Balance mild impairment     Comment, Balance Angel for OOB mobility with RW        Lower Extremity (Therapeutic Exercise)    Comment educated on LE ther ex to perform t/o day        AM-PAC (TM) - Mobility (Current Function)    Turning from your back to your side while in a flat bed without using bedrails? 3 - A Little     Moving from lying on your back to sitting on the side of a flat bed without using bedrails? 3 - A Little     Moving to and from a bed to a chair? 3 - A Little     Standing up from a chair using your arms? 3 - A Little     To walk in a hospital room? 3 - A Little     Climbing 3-5 steps with a railing? 3 - A Little     AM-PAC (TM) Mobility Score 18        Session Outcome    Daily Outcome Statement PT eval completed. Pt performs bed mobility, transfers and short bout of ambulation with RW at Angel level. Pt is below reported baseline. Pt is limited by reports of fatigue and anxiety with mobility. Rec d/c to SNF to maximize LOF and independence prior to d/c home     Position at End of Session upright;in bed     Status at End of Session bed alarm on;all needs met;personal items in reach     Nursing Notified patient's performance;patient's position;patient's response to therapy/activity        Plan    Rehab Potential good, to achieve stated therapy goals     Therapy Frequency 5 times/wk     Planned Therapy Interventions balance training;bed mobility training;gait training;stair  training;strengthening;transfer training               PT Discharge Recommendations    Flowsheet Row Most Recent Value   PT Recommended Discharge Disposition skilled nursing facility at 03/21/2023 0858   Anticipated Equipment Needs at Discharge (PT) other (see comments)  [TBD next level] at 03/21/2023 0858   Patient/Family Therapy Goals Statement get stronger at 03/21/2023 0858                  Education Documentation  Joint Mobility/Strength, taught by Eileen Mobley, PT at 3/21/2023  2:44 PM.  Learner: Patient  Readiness: Acceptance  Method: Explanation  Response: Verbalizes Understanding  Comment: Role of PT, POC, safe mobility          PT Goals    Flowsheet Row Most Recent Value   Bed Mobility Goal 1    Activity/Assistive Device bed mobility activities, all at 03/21/2023 0858   [place] modified independence at 03/21/2023 0858   Time Frame by discharge at 03/21/2023 0858   Progress/Outcome goal ongoing at 03/21/2023 0858   Transfer Goal 1    Activity/Assistive Device all transfers, walker, front-wheeled at 03/21/2023 0858   [place] modified independence at 03/21/2023 0858   Time Frame by discharge at 03/21/2023 0858   Progress/Outcome goal ongoing at 03/21/2023 0858   Gait Training Goal 1    Activity/Assistive Device gait (walking locomotion), walker, front-wheeled at 03/21/2023 0858   [place] modified independence at 03/21/2023 0858   Distance 65 at 03/21/2023 0858   Time Frame by discharge at 03/21/2023 0858   Progress/Outcome goal ongoing at 03/21/2023 0858   Stairs Goal 1    Activity/Assistive Device stairs, all skills at 03/21/2023 0858   [place] supervision required at 03/21/2023 0858   Number of Stairs 4 at 03/21/2023 0858   Time Frame by discharge at 03/21/2023 0858   Progress/Outcome goal ongoing at 03/21/2023 0858

## 2023-03-21 NOTE — PROGRESS NOTES
CARDIOLOGY INPATIENT FOLLOW-UP NOTE     Patient: Laureen Persaud YOB: 1945   MRN: [id number] Date of Admission: 3/18/2023   Length of Stay: 3      ASSESSMENT AND RECOMMENDATIONS     Assessment:  This is a 77 y.o. female being with PMH Afib (apixan), DCCV x 3, HFpEF, HTN, HLD, DM II (non-insulin dep), anxiety, Severe AS, mod- sev MS and mod-sev MR, Fe def anemia, JAK2 gene mutation, ASHLI w/ CPAP, CVA/TIA 2020, PAH consulted for HFpEF, pHTN.     # HFpEF (EF 55-60%%)   # pHTN  # Severe AS  # Moderate MS  # Moderate MR  - Etiology for exacerbation: most likely progressive valvular disease (AS), MS/MR  - OSH Cath with RA 22/17, /14, /44, PCWP: 39 and CO CI 4.5/2.9  - Volume status: warm & minimally wet  -   - Dry weight = unknown  - TTE (last 3/11/23): showed EF55-60%   - LINDY 3/20/21: at least moderate AS, progressive MS  - CTA TAVR with AV calcium score 1800. Triple vessel disease with calcium score > 3000.  - RHC: RA 8, PA 92/32 (52), PCW 31 with CI 4.9 s/o high output state  Recommend:  - Management per valvular team discussion (planned for 3/21 afternoon)  - May require further investigation into high output state with evaluation of liver pathology  - Strict I/Os, Daily Weights, daily electrolytes replete for goal K > 4.0, Mg > 2.0, Tele  - Supplemental O2 to maintain SpO2 >90%, wean as able  - Admission meds:               - BB: not a home med, not indicated  - ACEi/ARB/ARNi: not a home med, not indicated  - Spironolactone: not a home med, not indicated  - SGLT2i: recommend initiation prior to discharge, but would hold off on initiation pending OR planning  - Diuretic: continue lasix 40 mg IV BID (home med torsemide & metolazone)  - Recommend pharmacy consultation for home medication regimen clarification  - Cardiac rehab on discharge; 7-day HF follow up appointment on discharge     # C/f subacute MV endocarditis, resolved  - No evidence of vegetation on LINDY from 3/20    Thank you  "for the opportunity to participate in this patient's care.  Please call with any questions or concerns. Please note that all recommendations are preliminary until attending attestation.     --  Azra Rivero MD  Fellow in Cardiovascular Medicine      INTERVAL/SUBJECTIVE     - S/p CTA TAVR with AV calcium score 1800. Triple vessel disease with calcium score > 3000.  - LINDY at least moderate AS, progressive MS  - RHC RA 8, PA 92/32 (52), PCW 31 with CI 4.9 s/o high output state    - Today, she reports discomfort to positioning in bed. She otherwise reports being tired from having \"every test possible.\" She is otherwise in good spirits and awaiting the valve team discussions.       PHYSICAL EXAMINATION     Vitals:   Visit Vitals  BP (!) 113/56 (Patient Position: Lying)   Pulse 77   Temp 36.2 °C (97.1 °F) (Temporal)   Resp 18   Ht 1.461 m (4' 9.5\")   Wt 64 kg (141 lb 1.6 oz)   SpO2 99%   BMI 30.01 kg/m²     Temp:  [36.2 °C (97.1 °F)-36.8 °C (98.3 °F)] 36.2 °C (97.1 °F)  Heart Rate:  [68-90] 77  Resp:  [16-25] 18  BP: ()/(50-58) 113/56  I/O last 3 completed shifts:  In: 550 [P.O.:150; I.V.:400]  Out: 600 [Urine:600]  Wt Readings from Last 3 Encounters:   03/21/23 64 kg (141 lb 1.6 oz)       General: No acute distress. Frail elderly female.  HEENT: No corneal arcus or xanthelasmas. Sclerae are anicteric.   Neck: JVP is normal at 80-90 degrees.  Heart: Regular rate and rhythm. No murmurs.  Chest: Symmetrical.  Lungs: Crackles bilaterally.  Abdomen: Minimally distended.  Extremities: No cyanosis or clubbing. No lower extremity edema. Distal pulses are easily palpable.  Skin: Warm and dry and well perfused.  Neurologic: Alert and appropriate.  Psychiatric: Normal affect.      LABORATORY DATA     Results from last 7 days   Lab Units 03/21/23  0339 03/20/23  0324 03/19/23  0414 03/18/23  1751   SODIUM mEQ/L 137 136 137 135*   CO2 mEQ/L 23 24 26 24   BUN mg/dL 62* 67* 68* 70*   CREATININE mg/dL 1.4* 1.3* 1.2* 1.1 "   CALCIUM mg/dL 7.8* 8.1* 8.1* 8.2*   ALT IU/L 16  --   --  20   AST IU/L 23  --   --  29     Results from last 7 days   Lab Units 03/21/23  0339 03/20/23  0324 03/19/23  0414   WBC K/uL 32.32* 31.38* 29.85*   PLATELETS K/uL 368 375* 380*     Results from last 7 days   Lab Units 03/20/23  1032 03/18/23  1751   PTT sec 39* 44*   INR  1.7 2.6   PROTIME sec 20.0* 27.2*           No lab exists for component: CPK  Results from last 7 days   Lab Units 03/18/23  1751   TSH mIU/L 1.41           No lab exists for component: CHOLCALCLDL      Results from last 7 days   Lab Units 03/18/23  1751   BNP pg/mL 912*       ELECTROCARDIOGRAPHIC ASSESSMENTS     ECG   NSR     Telemetry  SR     DIAGNOSTIC IMAGING / PROCEDURES     LINDY 3/20/21 @ [facility] [place]:  The risks of the procedure were discussed with the patient and informed consent was obtained. The patient was sedated under anesthesia guidance. The LINDY probe was inserted without difficulty and the patient tolerated the procedure well.     1. Normal left ventricular cavity size.  Normal wall thickness.  Preserved left ventricular systolic function. LVEF 65%.  Abnormal septal motion, otherwise no regional wall motion abnormalities.   2.  The aortic valve is tricuspid and heavily calcified.  There is a small mobile echodensity on the aortic leaflet that is most likely related to valvular degeneration/Lambl's excrescence (clip 12).  Less likely vegetation.  At least moderate aortic stenosis (peak velocity of 3.5 m/s, mean gradient of 23 mmHg). Correlate with transthoracic echocardiography. Mild aortic regurgitation. Normal visualized thoracic aortic dimensions. Simple atheroma in the visualized portions of the descending thoracic aorta without complex elements.   3. The mitral valve is thickened and sclerotic.  Mitral annular calcification.  The subvalvular apparatus is calcified. Progressive mitral stenosis with mean gradient of 4 mmHg at 72 bpm. Severe mitral regurgitation due to P2 flail and  a restricted P3 scallop.  These lesions resulted in two distinct jets of mitral regurgitation (largest PISA results in an EROA of 43 cm2 and regurgitant volume of 71 cc). There is systolic flow reversal of pulmonary vein flow.   4. Dilated left atrium. No thrombus or mass seen in the left atrium or left atrial appendage. Left atrial appendage peak emptying velocity 72 cm/s. No evidence of ASD/PFO by color Doppler.    5. Mildly dilated right ventricular cavity (4.2 x 3.8 cm) with preserved systolic function. Dilated right atrium. Prominent eustachian valve/renuka terminalis.   6. Thickened tricuspid valve leaflets. Moderate tricuspid regurgitation with estimate right ventricular systolic pressure of 69 mmHg plus right atrial pressure.  7. Grossly normal pulmonic valve. Tace pulmonic regurgitation.   8. No pericardial effusion.   9. No previous study available for comparison.       Transthoracic Echo @ OSH:   3/11/23: EF 55-60%, Severe AS with pgradient 86.9, mean gradient 46.4, trace AI.   Moderate MS, mean gradient 7.6, moderate MR  Mild TR  PAPs: 54/4     Complete Cardiac Cath 3/15/23 @ OSH:  Hemodynamics:  RA 22/17, /14, /44, PCWP: 39  PA sat 79 %  CO CI 4.5/2.9  AV peak to peak grad 34, mean 26, MICHAEL 0.9     MV mean grad 15, valve area 1.1     Coronary angiography @ OSH: mild  Non-obstructive CAD    RHC 3/21/23 @ [facility]:  1. Elevated right and left heart filling pressures (RA 12, PA 92/32 ( 52) , and PCW 31 mm Hg).  2. Elevated cardiac output and index of 7.2 L/min and 4.7 L/min/m2 respectively.   3. PVR 3 Wood Units and  TPG 2 mmHg.   4. No evidence of left to right shunt by sequential venous saturation measurements.         Radiologic Studies:  CXR: The cardiac silhouette and mediastinal contour are stable demonstrating  cardiomegaly. No significant interval change in pulmonary vascular congestion  and diffuse bilateral interstitial prominence due to edema and/or infiltrates.  There is probably  unchanged left-sided pleural parenchymal disease. No evidence  of a pneumothorax.  IMPRESSION:  No significant interval change.    CTA TAVR:  SUMMARY:  1.  Tricuspid aortic valve with thickened and calcified cusps.  Cine and  tomographic features are suggestive of severe aortic stenosis.  Aortic valve  calcium score = 1836.  Aortic valve area by direct planimetry = 1.08 cm2.  2. Normal left ventricular cavity size, wall thickness and systolic function.  Abnormal septal motion consistent with right ventricular pressure overload.  3.  Mildly dilated right ventricle with normal systolic function.  4. Severe biatrial enlargement.  5. Mildly thickened mitral valve leaflets.  Severe posterior mitral annular  calcification.  Bileaflet restriction (posterior > anterior).  Anterior leaflet  override present.  Mitral valve area = 5.9 cm2 by direct planimetry.  Recommend  correlation with echocardiography for the presence of mitral regurgitation.  6. Three-vessel coronary atherosclerosis.  Coronary calcium score = 3134.    Signed:      Azra Rivero MD, Fellow in Cardiovascular Medicine

## 2023-03-21 NOTE — CONSULTS
REPORT TYPE: Consult Note    DATE OF CONSULTATION: 03/18/2023    DentaScan results are as follows:  There is a periapical lucency about the root tips of teeth #s 18 and 19 as well as about tooth #30.  Carious lesions appeared to involve teeth #s 3, 4, 13 and 15.  There are also likely carious lesions in teeth #s 20, 29, 30 and 31.  I reviewed the scan and noted that teeth #s 3, 4, 19 and 30, all show signs of periapical lucency and are at high risk for infection.  These teeth should be extracted prior to surgery.  I will contact Dr. Barros regarding extractions of teeth #s 3, 4, 19 and 30.    MEDICATIONS:      Kush Ramirez DMD    DD: 03/21/2023 15:59  DT: 03/21/2023 16:29  Voice ID: [id number]/Report ID: [id number]  bs

## 2023-03-21 NOTE — CONSULTS
"              Hematology & Medical Oncology Inpatient Consultation Note        Indication for Consultation:  \"JAK2 mutation, FE def anemia (hx IV Fe transfucions)\"    Requesting Physician:  Dr. Macey Padgett    Consulting Physician:  Dr. Elly Bland    History of Present Illness:  Laureen Persaud is a 77 year old female with PMH of JAK2 positive MPN (formerly on Hydrea from June 2021 to October of 2022, Hydrea stopped due to anemia with Hgb of 8.4, chronic leukocytosis and thrombocytosis), severe aortic stenosis, moderate to severe mitral regurge, a-fib (on apixaban), HFpEF, HTN, HLD, type 2 DM who was admitted to OS for shortness of breath.    Found to have acute on chronic heart failure.  On TTE evaluation, worsening aortic stenosis and moderate to severe mitral regurge was noted.  Thus, [place] [facility] [place]    At the time of our evaluation, evaluation for cardiac valve surgery was ongoing.    With regards to her JAK2 positive MPN history, she has been followed by an outside hospital hematologist for at least the last 3 years.  She was on Hydrea, at dose of 500 mg daily, from June 2021 to October 2022.  Her leukocytosis was better controlled on this medication, running in the 10-15 range.  However, her Hgb in October 2022 dropped to 8.4, and Hydrea was stopped.  Of note, she was noted to be iron deficient at that time as well.  She has been off Hydrea for the last 6 months, during which her WBC count has run in the 20-30 range.    Hematology is consulted for further recommendations regarding her JAK2 positive MPN in the pre-operative setting.      Past Medical History:  History reviewed. No pertinent past medical history.    Surgical History:  History reviewed. No pertinent surgical history.    Family History:  History reviewed. No pertinent family history.    Social History:  Social History     Socioeconomic History   • Marital status:      Spouse name: None   • Number of " children: None   • Years of education: None   • Highest education level: None     Social Determinants of Health     Financial Resource Strain: Low Risk  (3/20/2023)    Overall Financial Resource Strain (CARDIA)    • Difficulty of Paying Living Expenses: Not hard at all   Transportation Needs: No Transportation Needs (3/20/2023)    PRAPARE - Transportation    • Lack of Transportation (Medical): No    • Lack of Transportation (Non-Medical): No   Housing Stability: Unknown (3/20/2023)    Housing Stability Vital Sign    • Unable to Pay for Housing in the Last Year: No    • Unstable Housing in the Last Year: No       Home Medications:      Current Medications:    Current Facility-Administered Medications:   •  amiodarone (PACERONE) tablet 200 mg, 200 mg, oral, Daily, Dixon Borjas CRNP, 200 mg at 03/20/23 1446  •  [Provider Managed Hold] apixaban (ELIQUIS) tablet 5 mg, 5 mg, oral, BID, Dixon Borjas CRNP  •  [START ON 3/23/2023] atorvastatin (LIPITOR) tablet 40 mg, 40 mg, oral, Daily (6p), Shannan Olivares CRNP  •  atorvastatin (LIPITOR) tablet 80 mg, 80 mg, oral, Daily (6p), Shannan Olivares CRNP, 80 mg at 03/20/23 1723  •  atropine injection 0.5 mg, 0.5 mg, intravenous, q5 min PRN, Dixon Borjas CRNP  •  glucose chewable tablet 16-32 g of dextrose, 16-32 g of dextrose, oral, PRN **OR** dextrose 40 % oral gel 15-30 g of dextrose, 15-30 g of dextrose, oral, PRN **OR** glucagon (GLUCAGEN) injection 1 mg, 1 mg, intramuscular, PRN **OR** dextrose 50 % in water (D50) injection 12.5 g, 25 mL, intravenous, PRN, Dixon Borjas CRNP  •  furosemide (LASIX) injection 40 mg, 40 mg, intravenous, BID (am, 4p), IobsJamilah gauthier CRNP, 40 mg at 03/20/23 1723  •  [START ON 3/21/2023] hydroxyurea (HYDREA) capsule 500 mg, 500 mg, oral, Every other day, Shannan Olivares CRNP  •  insulin lispro U-100 (HumaLOG) pen 3 Units, 3 Units, subcutaneous, TID with meals, Dixon Borjas CRNP, 3 Units at 03/20/23 1723  •   magnesium oxide (MAG-OX) tablet 400 mg, 400 mg, oral, Daily, Dixon Borjas CRNP, 400 mg at 03/20/23 1446  •  nitroglycerin (NITROSTAT) SL tablet 0.4 mg, 0.4 mg, sublingual, q5 min PRN, Dixon Borjas CRNP  •  potassium chloride (KLOR-CON M) ER tablet (particles/crystals) 20 mEq, 20 mEq, oral, BID (am, 4p), Iobst, Jamilah CRNP, 20 mEq at 03/19/23 0939    Allergies: Neomycin, Polymyxin b, Procaine, and Bacitracin    Review of Systems:  As per HPI.    Physical Examination:    Physical Exam  Constitutional:       Appearance: She is normal weight.   Eyes:      Extraocular Movements: Extraocular movements intact.      Conjunctiva/sclera: Conjunctivae normal.      Pupils: Pupils are equal, round, and reactive to light.   Cardiovascular:      Rate and Rhythm: Normal rate.      Pulses: Normal pulses.   Pulmonary:      Effort: Pulmonary effort is normal. No respiratory distress.      Comments: On NC  Abdominal:      General: Abdomen is flat. There is no distension.   Musculoskeletal:      Right lower leg: Edema present.      Left lower leg: Edema present.   Skin:     General: Skin is warm.      Capillary Refill: Capillary refill takes less than 2 seconds.   Neurological:      General: No focal deficit present.      Mental Status: She is alert.   Psychiatric:         Mood and Affect: Mood normal.           Diagnostic Data:    Labs:  I have personally reviewed all pertinent patient laboratory results. Labs of note discussed below:    CMP Results       03/20/23 03/19/23 03/18/23     0324 0414 1751     137 135    K 4.9 4.7 4.5    Cl 100 103 99    CO2 24 26 24    Glucose 75 113 123    BUN 67 68 70    Creatinine 1.3 1.2 1.1    Calcium 8.1 8.1 8.2    Anion Gap 12 8 12    AST -- -- 29    ALT -- -- 20    Albumin -- -- 2.1    EGFR 39.6 43.4 48.0         Comment for K at 0324 on 03/20/23: SLIGHT HEMOLYSIS, RESULT MAY BE INCREASED.        CBC Results       03/20/23 03/19/23 03/18/23     0324 0414 1751    WBC 31.38 29.85  34.01    RBC 4.13 4.08 4.28    HGB 10.9 10.7 11.3    HCT 36.7 35.6 36.9    MCV 88.9 87.3 86.2    MCH 26.4 26.2 26.4    MCHC 29.7 30.1 30.6     380 406         Comment for WBC at 0324 on 03/20/23: This result has been called to ROSITA ONEILL by Ling Stark on 03 20 23 at 05:36, and has been read back.     Comment for WBC at 1751 on 03/18/23: ALL RESULTS HAVE BEEN CHECKED. This result has been called to PAN GAITAN by Rere Sheth on 03 18 23 at 18:49, and has been read back.               Imaging:  I have reviewed all pertinent imaging      ASSESSMENT AND PLAN        Laureen Persaud is a 77 year old female with PMH of JAK2 positive MPN (formerly on Hydrea from June 2021 to October of 2022, Hydrea stopped due to anemia with Hgb of 8.4, chronic leukocytosis and thrombocytosis), severe aortic stenosis, moderate to severe mitral regurge, a-fib (on apixaban), HFpEF, HTN, HLD, type 2 DM who was admitted to OSH for shortness of breath.  Found to have acute on chronic heart failure 2/2 worsening valvular heart disease (aortic stenosis and mitral regurge).  Hematology is consulted for recommendations regarding management of her JAK2 positive MPN.    #JAK2 positive MPN  #severe aortic stenosis  #moderate to severe mitral regurge  #a-fib  #possible endocarditis   #HFpEF  #type 2 DM  #HTN  #HLD    Recommendations/Plan:  - as mentioned above, she was on Hydrea, 500 mg daily from June 2021 to October 2022 with appropriate control of her leukocytosis  - Hydrea was discontinued due to anemia, Hgb of 8.4, noted in October 2022  *of note, she was noted to be iron deficient at that time as well  - when she was on Hydrea, her WBC has been in the 10-15 range  - when she was off Hydrea, her WBC has been in 25-30 range  - now that she has valvular heart disease, and may be undergoing a valvular procedure which carries an inherent post-operative thrombotic risk, it is important to control her WBC count in the setting of MPN  *a higher WBC  count in MPN tends to correlate with increased thrombotic risk  - thus, recommend starting Hydrea while inpatient, at dose of 500 mg every other day, to help control the patient's WBC count in the pre-operative setting  - will monitor counts of all 3 of her cell lines for cytopenias after initiation of Hydrea      Please page with questions. Attending attestation to follow.     Marky Laureano  Hematology and Medical Oncology Fellow   Pager: [phone]

## 2023-03-21 NOTE — PLAN OF CARE
LINDY negative for Infective Endocarditis and per ID low suspicion for infective endocarditis and Abx discontinued. From a Neurological perspective since Infective endocarditis has been ruled out it would be appropriate to proceed with antiplatelet/anticoagulation per indications determined by Primary Team/CT Surgery.    Antonio Rivera MD  Neurology

## 2023-03-21 NOTE — PLAN OF CARE
Problem: Adult Inpatient Plan of Care  Goal: Plan of Care Review  Flowsheets (Taken 3/21/2023 0655)  Progress: no change  Plan of Care Reviewed With: patient  Outcome Summary: Pt needs assistance to ambulate   Plan of Care Review  Plan of Care Reviewed With: patient  Progress: no change  Outcome Summary: Pt needs assistance to ambulate

## 2023-03-21 NOTE — PLAN OF CARE
Care Coordination Discharge Plan Note   Date: 3/21/2023    Time: 12:31 PM    Patient Name: Laureen Persaud  Medical Record Number: [id number]  YOB: 1945  Room/BED: CATH/EP    Discharge Assessment  Current Discharge Risk: chronically ill  Concerns to be Addressed: denies needs/concerns at this time, adjustment to diagnosis/illness, care coordination/care conferences  Anticipated Changes Related to Illness: inability to care for someone else    Concerns Comments: Per EMR PT is rec for SNF by PMR. PT and OT pending.    Anticipated Discharge Plan  Anticipated Discharge Disposition: skilled nursing facility  Type of Home Care Services: nursing, home PT  Type of Skilled Nursing Care Services: OT, PT, nursing  Patient/Family Anticipates Transition to: home with help/services  Patient/Family Anticipated Services at Transition: skilled nursing, rehabilitation services      Patient Choice  Offered/Gave Vendor List: yes  Patient's Choice of Community Agency(s): [facility] s/w Pt's Spouse and [facility] is top choice. Agreeable to referrals being submitted for second choice options.    Patient and/or patient guardian/advocate was made aware of their right to choose a provider. A list of eligible providers was presented and reviewed with the patient and/or patient guardian/advocate in written and/or verbal form. The list delineates providers in the patient’s desired geographic area who are participating in the Medicare program and/or providers contracted with the patient’s primary insurance. The Medicare list and quality ratings were obtained from the Medicare.gov [medicare.gov] website.    Discharge Transportation   Does the patient need discharge transport arranged?: Yes        Discharge Barriers   Participants: social work/services,       Continued Care and Services - Admitted Since 3/18/2023    Coordination has not been started for this encounter.       Addendum 14:35 [facility] received update that [facility] is  able to offer bed and will hold it for Thursday vs Friday. Updates provided to spouse, he confirmed this is his top and only choice.

## 2023-03-21 NOTE — PROGRESS NOTES
Patient:  Laureen Persaud  Location:  [facility]  MRN:  [id number]  Today's date:  3/21/2023    Laureen is a 77 y.o. female admitted on 3/18/2023 with Severe mitral regurgitation [I34.0]. Principal problem is Severe mitral regurgitation.    Past Medical History  Laureen has no past medical history on file.    History of Present Illness  Amitted to OSH for decompensated heart failure and pneumonia, found to have worsening aortic stenosis and mod-severe MS/MR. Transferred to [facility] for surgical evaluation      OT Vitals    Date/Time Pulse HR Source SpO2 Pt Activity O2 Therapy O2 Del Method O2 Flow Rate BP Pt Position [facility]   03/21/23 0859 77 Monitor 99 % At rest Supplemental oxygen Nasal cannula 2 L/min 113/56 Lying NB      OT Pain    Date/Time Pain Type Rating: Rest Rating: Activity [facility]   03/21/23 0859 Pain Assessment 0 - no pain 0 - no pain NB          Prior Living Environment    Flowsheet Row Most Recent Value   People in Home spouse   Current Living Arrangements home   Home Accessibility stairs to enter home (Group)   Living Environment Comment Living in a 1SH, 1STE. +tub shower without SC or GBs.        Prior Level of Function    Flowsheet Row Most Recent Value   Dominant Hand right   Ambulation assistive equipment   Transferring assistive equipment   Toileting independent   Bathing assistive person   Dressing assistive person   Eating independent   Prior Level of Function Comment Pt reports use of RW for transfers, mobility within home. Rarely leaving home PTA. Spouse assisting with LBD, bathing.   Assistive Device Currently Used at Home walker, front-wheeled        Occupational Profile    Flowsheet Row Most Recent Value   Reason for Services/Referral ADL Evaluation/dispo planning   Occupational History/Life Experiences Enjoys spending time with extended family   Environmental Supports and Barriers Lives with spouse, Mode   Patient Goals Get surgery, regain mobility         OT Evaluation and  Treatment - 03/21/23 0859        OT Time Calculation    Start Time 0859     Stop Time 0919     Time Calculation (min) 20 min        General Information    Document Type daily treatment/progress note     Mode of Treatment occupational therapy     Unable to Perform patient at test or procedure     Position at Start of Session upright;in bed     Status at Start of Session agreeable to therapy;no issues or concerns identified by nurse prior to session     General Observations of Patient Rec'd supine in bed, pleasant but increasingly anxious with mobility        Precautions/Limitations/Impairments    Existing Precautions/Restrictions fall;cardiac        Cognition/Psychosocial    Affect/Mental Status (Cognition) flat/blunted affect;anxious     Orientation Status (Cognition) oriented x 3     Follows Commands (Cognition) WFL;follows multi-step commands;increased processing time needed     Cognitive Function executive function deficit     Executive Function Deficit (Cognition) minimal deficit;information processing;insight/awareness of deficits     Comment, Cognition AAOx3, able to follow multistep commands with cues and encouragement. Increasingly anxious with mobility        Bed Mobility    Long, Supine to Sit minimum assist (75% or more patient effort)     Long, Sit to Supine minimum assist (75% or more patient effort)     Assistive Device bed rails;draw sheet;head of bed elevated     Comment (Bed Mobility) Gail to sit to L EOB        Sit to Stand Transfer    Long, Sit to Stand Transfer minimum assist (75% or more patient effort);2 person assist     Verbal Cues safety;technique     Assistive Device walker, front-wheeled     Comment MinAx2 to stand from EOB, take more steps forward and to HOB as compared to previous session        Stand to Sit Transfer    Long, Stand to Sit Transfer minimum assist (75% or more patient effort)     Verbal Cues safety;technique     Assistive Device walker,  front-wheeled     Comment Sitting back to EOB        Safety Issues, Functional Mobility    Safety Issues Affecting Function (Mobility) awareness of need for assistance;insight into deficits/self-awareness     Impairments Affecting Function (Mobility) balance;endurance/activity tolerance;pain;strength        Balance    Static Sitting Balance WFL;sitting, edge of bed     Dynamic Sitting Balance WFL;sitting, edge of bed     Static Standing Balance mild impairment;supported     Dynamic Standing Balance mild impairment;supported     Comment, Balance Use of RW and Ax1 for stability        Motor Skills    Functional Endurance Improved mobility this session, taking more steps at EOBA        Upper Extremity (Therapeutic Exercise)    Comment Reinforced UE therex        Lower Extremity (Therapeutic Exercise)    Comment Reinforced LE therex        Upper Body Dressing    Tasks don;doff;[facility] gown     [place] moderate assist (50-74% patient effort)     Position edge of bed sitting        Lower Body Dressing    Tasks don;doff     [place] maximum assist (25-49% patient effort)     Position supine        Toileting    [place]ottle dependent (less than 25% patient effort)     Comment Via melissa        BADL Safety/Performance    Impairments, BADL Safety/Performance balance;endurance/activity tolerance;pain;strength     Skilled BADL Treatment/Intervention BADL process/adaptation training        ADL Interventions    Energy Conservation Techniques activity adapted to sitting;activity pacing encouraged;asking for necessary assistance promoted     Self-Care (BADL) Promotion best position for BADL determined        AM-PAC (TM) - ADL (Current Function)    Putting on and taking off regular lower body clothing? 2 - A Lot     Bathing? 2 - A Lot     Toileting? 2 - A Lot     Putting on/taking off regular upper body clothing? 2 - A Lot     How much help for taking care of personal grooming? 3 - A Little     Eating meals? 3 - A Little      AM-PAC (TM) ADL Score 14        Session Outcome    Daily Outcome Statement OT tx session completed. Pt continues to require minAx1-2 for all bed mobiltiy and functional transfers. Improved tolerance for OOB mobility using RW. Mod-maxA for ADLs completed in session. Recommending d/c to SNF pending possible OR.     Position at End of Session upright;in bed     Status at End of Session bed alarm on;personal items in reach     Nursing Notified patient's performance;patient's position        Plan    Rehab Potential good, to achieve stated therapy goals     Therapy Frequency 3-5 times/wk     Planned Therapy Interventions activity tolerance training;BADL retraining;functional balance retraining;IADL retraining;occupation/activity based interventions;patient/caregiver education/training;strengthening exercise               OT Discharge Recommendations    Flowsheet Row Most Recent Value   OT Recommended Discharge Disposition skilled nursing facility at 03/21/2023 0859   Anticipated Equipment Needs At Discharge (OT) --  [TBD] at 03/21/2023 0859   Patient/Family Therapy Goal Statement Improve strength at 03/21/2023 0859                  Education Documentation  Self-Care, taught by Samara Wilson, OT at 3/21/2023  2:02 PM.  Learner: Patient  Readiness: Acceptance  Method: Explanation  Response: Verbalizes Understanding  Comment: Role of OT, OT POC, adpated self care strategies, energy conservation techniques, d/c planning          OT Goals    Flowsheet Row Most Recent Value   Bed Mobility Goal 1    Activity/Assistive Device bed mobility activities, all at 03/20/2023 0827   [place] modified independence at 03/20/2023 0827   Time Frame by discharge at 03/20/2023 0827   Progress/Outcome goal ongoing at 03/21/2023 0859   Transfer Goal 1    Activity/Assistive Device all transfers at 03/20/2023 0827   [place] modified independence at 03/20/2023 0827   Time Frame by discharge at 03/20/2023 0827    Progress/Outcome goal ongoing at 03/21/2023 0859   Dressing Goal 1    Activity/Adaptive Equipment dressing skills, all at 03/20/2023 0827   [place] modified independence at 03/20/2023 0827   Time Frame by discharge at 03/20/2023 0827   Progress/Outcome goal ongoing at 03/21/2023 0859   Toileting Goal 1    Activity/Assistive Device toileting skills, all at 03/20/2023 0827   [place] modified independence at 03/20/2023 0827   Time Frame by discharge at 03/20/2023 0827   Progress/Outcome goal ongoing at 03/21/2023 0859   Grooming Goal 1    Activity/Assistive Device grooming skills, all at 03/20/2023 0827   [place] modified independence at 03/20/2023 0827   Time Frame by discharge at 03/20/2023 0827   Progress/Outcome goal ongoing at 03/21/2023 0859

## 2023-03-22 ENCOUNTER — DOCUMENTATION (OUTPATIENT)
Dept: CARDIOTHORACIC SURGERY | Facility: CLINIC | Age: 78
End: 2023-03-22
Payer: MEDICARE

## 2023-03-22 PROBLEM — G89.29 PAIN, CHRONIC: Status: ACTIVE | Noted: 2023-03-22

## 2023-03-22 PROBLEM — R53.81 DEBILITY: Status: ACTIVE | Noted: 2023-03-22

## 2023-03-22 PROBLEM — Z51.5 PALLIATIVE CARE BY SPECIALIST: Status: ACTIVE | Noted: 2023-03-22

## 2023-03-22 LAB
ANION GAP SERPL CALC-SCNC: 10 MEQ/L (ref 3–15)
BACTERIA BLD CULT: NORMAL
BACTERIA BLD CULT: NORMAL
BUN SERPL-MCNC: 64 MG/DL (ref 8–20)
CALCIUM SERPL-MCNC: 8.3 MG/DL (ref 8.9–10.3)
CHLORIDE SERPL-SCNC: 102 MEQ/L (ref 98–109)
CO2 SERPL-SCNC: 25 MEQ/L (ref 22–32)
CREAT SERPL-MCNC: 1.4 MG/DL (ref 0.6–1.1)
ERYTHROCYTE [DISTWIDTH] IN BLOOD BY AUTOMATED COUNT: 21.1 % (ref 11.7–14.4)
GFR SERPL CREATININE-BSD FRML MDRD: 36.4 ML/MIN/1.73M*2
GLUCOSE BLD-MCNC: 113 MG/DL (ref 70–99)
GLUCOSE BLD-MCNC: 118 MG/DL (ref 70–99)
GLUCOSE BLD-MCNC: 118 MG/DL (ref 70–99)
GLUCOSE BLD-MCNC: 99 MG/DL (ref 70–99)
GLUCOSE SERPL-MCNC: 94 MG/DL (ref 70–99)
HCT VFR BLDCO AUTO: 37.7 % (ref 35–45)
HGB BLD-MCNC: 10.9 G/DL (ref 11.8–15.7)
INR PPP: 1.5
MCH RBC QN AUTO: 25.9 PG (ref 28–33.2)
MCHC RBC AUTO-ENTMCNC: 28.9 G/DL (ref 32.2–35.5)
MCV RBC AUTO: 89.5 FL (ref 83–98)
PDW BLD AUTO: 11.3 FL (ref 9.4–12.3)
PLATELET # BLD AUTO: 410 K/UL (ref 150–369)
POCT TEST: ABNORMAL
POCT TEST: NORMAL
POTASSIUM SERPL-SCNC: 4.7 MEQ/L (ref 3.6–5.1)
PROTHROMBIN TIME: 18.3 SEC (ref 12.2–14.5)
RBC # BLD AUTO: 4.21 M/UL (ref 3.93–5.22)
SODIUM SERPL-SCNC: 137 MEQ/L (ref 136–144)
WBC # BLD AUTO: 36.35 K/UL (ref 3.8–10.5)

## 2023-03-22 PROCEDURE — 0CDWXZ1 EXTRACTION OF UPPER TOOTH, MULTIPLE, EXTERNAL APPROACH: ICD-10-PCS | Performed by: DENTIST

## 2023-03-22 PROCEDURE — 63700000 HC SELF-ADMINISTRABLE DRUG: Performed by: NURSE PRACTITIONER

## 2023-03-22 PROCEDURE — 99232 SBSQ HOSP IP/OBS MODERATE 35: CPT | Performed by: INTERNAL MEDICINE

## 2023-03-22 PROCEDURE — 0CDXXZ1 EXTRACTION OF LOWER TOOTH, MULTIPLE, EXTERNAL APPROACH: ICD-10-PCS | Performed by: DENTIST

## 2023-03-22 PROCEDURE — 99223 1ST HOSP IP/OBS HIGH 75: CPT | Mod: 25 | Performed by: NURSE PRACTITIONER

## 2023-03-22 PROCEDURE — 21400000 HC ROOM AND CARE CCU/INTERMEDIATE

## 2023-03-22 PROCEDURE — 63600000 HC DRUGS/DETAIL CODE

## 2023-03-22 PROCEDURE — 99497 ADVNCD CARE PLAN 30 MIN: CPT | Mod: 25 | Performed by: NURSE PRACTITIONER

## 2023-03-22 PROCEDURE — 63700000 HC SELF-ADMINISTRABLE DRUG

## 2023-03-22 PROCEDURE — 36415 COLL VENOUS BLD VENIPUNCTURE: CPT | Performed by: NURSE PRACTITIONER

## 2023-03-22 PROCEDURE — 85610 PROTHROMBIN TIME: CPT | Performed by: NURSE PRACTITIONER

## 2023-03-22 PROCEDURE — 80048 BASIC METABOLIC PNL TOTAL CA: CPT | Performed by: NURSE PRACTITIONER

## 2023-03-22 PROCEDURE — 85027 COMPLETE CBC AUTOMATED: CPT | Performed by: NURSE PRACTITIONER

## 2023-03-22 PROCEDURE — 99233 SBSQ HOSP IP/OBS HIGH 50: CPT | Performed by: ANESTHESIOLOGY

## 2023-03-22 RX ORDER — HYDROXYUREA 500 MG/1
500 CAPSULE ORAL DAILY
Status: DISCONTINUED | OUTPATIENT
Start: 2023-03-23 | End: 2023-03-28 | Stop reason: HOSPADM

## 2023-03-22 RX ORDER — ENOXAPARIN SODIUM 100 MG/ML
40 INJECTION SUBCUTANEOUS
Status: DISCONTINUED | OUTPATIENT
Start: 2023-03-22 | End: 2023-03-22

## 2023-03-22 RX ORDER — TORSEMIDE 20 MG/1
20 TABLET ORAL DAILY
Status: DISCONTINUED | OUTPATIENT
Start: 2023-03-22 | End: 2023-03-28

## 2023-03-22 RX ORDER — ENOXAPARIN SODIUM 100 MG/ML
30 INJECTION SUBCUTANEOUS
Status: DISCONTINUED | OUTPATIENT
Start: 2023-03-22 | End: 2023-03-28

## 2023-03-22 RX ADMIN — ENOXAPARIN SODIUM 30 MG: 30 INJECTION SUBCUTANEOUS at 20:32

## 2023-03-22 RX ADMIN — INSULIN LISPRO 3 UNITS: 100 INJECTION, SOLUTION INTRAVENOUS; SUBCUTANEOUS at 18:10

## 2023-03-22 RX ADMIN — TORSEMIDE 20 MG: 20 TABLET ORAL at 11:56

## 2023-03-22 RX ADMIN — AMIODARONE HYDROCHLORIDE 200 MG: 200 TABLET ORAL at 10:46

## 2023-03-22 RX ADMIN — ATORVASTATIN CALCIUM 80 MG: 80 TABLET, FILM COATED ORAL at 18:29

## 2023-03-22 RX ADMIN — MAGNESIUM OXIDE TAB 400 MG (241.3 MG ELEMENTAL MG) 400 MG: 400 (241.3 MG) TAB at 10:46

## 2023-03-22 RX ADMIN — AMOXICILLIN 2000 MG: 500 CAPSULE ORAL at 12:58

## 2023-03-22 NOTE — PROGRESS NOTES
A family conversation was held on 3/21 to discuss surgical options. Dr. Padgett, Shannan Olivares NP, Mari Macias PA-C, the patient, her  and 3 daughters were all present.     Dr. Padgett discussed the indications for a TAVR procedure. He reviewed the risks and possible complications associated with it. He also reviewed the [place] trial and the possibility of the patient being accepted into that. He noted that her information would have to be reviewed by the [facility] and that she is not the strongest candidate. He reviewed the risks and possible complications associated with this procedure. The [facility] trial consent papers were left with the patient and her family to review and make a final decision. Dr. Padgett discussed that the TAVR procedure could be completed without being accepted into the [facility] trial. He noted that a final operative decision will wait until it is know if she is accepted into the [facility] trial.     The patient and her family verbalized understanding of these options. All questions were answered by Dr. Padgett.

## 2023-03-22 NOTE — OP NOTE
REPORT TYPE: Operative Note    DATE OF OPERATION: 03/22/2023    PREOPERATIVE DIAGNOSIS:  Abscess teeth numbers 3, 4, 19 and 30.    POSTOPERATIVE DIAGNOSIS:  Abscess teeth numbers 3, 4, 19 and 30.    PROCEDURE:  Surgical removal of abscessed teeth numbers 3, 4, 19 and 30 under a combination of local anesthesia with intravenous sedation.    DESCRIPTION OF PROCEDURE: The patient was seen in room 2002 at the [facility] step-down unit on the afternoon of 03/22/2023.  The proposed surgical procedure was discussed and all questions answered.  A surgical consent had previously been executed. Dr. Carlitos Ventura was in attendance for the delivery of intravenous sedation.  A timeout was taken. The nature of the procedure and the anesthesia were discussed, the order of the extractions.  A total of 5.1 mL of 3% Carbocaine with no vasoconstrictor was injected in infiltrative fashion adjacent to teeth numbers 3 and 4 on both the medial and lateral aspects of the maxillary teeth.  In addition, bilateral mandibular inferior alveolar and long buccal blocks were delivered. A suitable amount of time was allowed to elapse for the local anesthesia to take effect and the gingiva surrounding the teeth were surgically tested and suitable levels of local anesthesia found to exist.  Utilizing a series of surgical elevators and   forceps, tooth #3 was delivered from its bony crypt intact, followed by the roots of tooth #4.  Attention was then directed to the mandible where tooth #30 was delivered intact and finally tooth #19 was delivered in a couple of pieces.  Closure was carried out utilizing 3-0 chromic gut suture in interrupted fashion.  Blood loss was approximately 6 mL.  There were no complications.  The patient tolerated the procedure well.  No drains were placed and no implants were placed.  At this juncture, the active dental disease has been eradicated and the patient is cleared from a dental  standpoint for her planned cardiac surgery.      Ki Barros DDS    DD: 03/22/2023 15:35  DT: 03/22/2023 16:39  Voice ID: [id number]/Report ID: [id number]  nik

## 2023-03-22 NOTE — PROGRESS NOTES
Cardiothoracic Intensivist Progress Note       CARDIOTHORACIC   COVID Status: Negative 3/18/23    IV dye allergy: no    Subjective     History of Present Illness: Laureen Persaud is a 77 y.o. cisgender female with history of worsening aortic and mitral valvular disease admitted for surgical evaluation, chronic atrial fibrillation, and ASHLI that are being treated.                              Review of Systems:                                     Constitutional: frail appearing                                                  Eyes: denies difficulty with vision  Ears, nose, mouth, throat, and face: denies oral discomfort                                        Respiratory: mild shortness of breath with exertion                                  Cardiovascular: denies chest discomfort                                  Gastrointestinal: denies abdominal pain                                    Genitourinary: denies difficulty voiding                                      Hematologic: denies bleeding issues                                Musculoskeletal: denies muscle pain                                      Neurological: generalized weakness                                   Integumentary: multiple skin tears    Medical History: History reviewed. No pertinent past medical history.    Surgical History: History reviewed. No pertinent surgical history.    Prior to Admission medications    Not on File       Allergies: Neomycin, Polymyxin b, Procaine, and Bacitracin    Social History:   Social History     Socioeconomic History   • Marital status:      Spouse name: None   • Number of children: None   • Years of education: None   • Highest education level: None     Social Determinants of Health     Financial Resource Strain: Low Risk  (3/20/2023)    Overall Financial Resource Strain (CARDIA)    • Difficulty of Paying Living Expenses: Not hard at all   Transportation Needs: No Transportation Needs (3/20/2023)    PRAPARE -  Transportation    • Lack of Transportation (Medical): No    • Lack of Transportation (Non-Medical): No   Housing Stability: Unknown (3/20/2023)    Housing Stability Vital Sign    • Unable to Pay for Housing in the Last Year: No    • Unstable Housing in the Last Year: No       Family History: History reviewed. No pertinent family history.    Objective     Vital signs in last 24 hours:  Temp:  [36.3 °C (97.3 °F)-36.9 °C (98.5 °F)] 36.3 °C (97.3 °F)  Heart Rate:  [72-81] 77  Resp:  [16-18] 16  BP: ()/(45-60) 98/45      Intake/Output Summary (Last 24 hours) at 3/22/2023 1102  Last data filed at 3/22/2023 0700  Gross per 24 hour   Intake --   Output 505 ml   Net -505 ml     Intake/Output this shift:  I/O this shift:  In: -   Out: 500 [Urine:500]    Labs  Lab Results   Component Value Date    WBC 36.35 (HH) 03/22/2023    HGB 10.9 (L) 03/22/2023    HCT 37.7 03/22/2023     (H) 03/22/2023    ALT 16 03/21/2023    AST 23 03/21/2023     03/22/2023    K 4.7 03/22/2023     03/22/2023    CREATININE 1.4 (H) 03/22/2023    BUN 64 (H) 03/22/2023    CO2 25 03/22/2023    MG 1.9 03/20/2023    TSH 1.41 03/18/2023    INR 1.5 03/22/2023    HGBA1C 6.4 (H) 03/18/2023    PT 18.3 (H) 03/22/2023    PTT 39 (H) 03/20/2023             Full Code    Head/Ear/Nose/Throat:     NCAT.                               Eyes:     EOMI and PERRL.                     Respiratory:     diminished at the bases b/l.               Cardiovascular:     SR.              Gastrointestinal:     + Bowel sounds and non-tender.                 Genitourinary:     no-deformities.                    Extremities:     +1-2 edema b/l LE.            Musculoskeletal:      No injury or deformity.                   Neurological:     follows commands.       Behavior/Emotional:     appropriate and cooperative.                           Lymph:      No adenopathy noted.                               Skin:      Multiple skin tears and scattered bruising. Purpuric  "rash on feet. Stage 2 sacral ulcer, present on admission.     Examination of the patient performed at the bedside. Rounded with ICU team and discussed management/plan with surgical staff. Medications, radiological studies and labs reviewed and discussed with attending of record, Dr. Macey Padgett.    Cardiothoracic Assessment and Plan:    Neurologic: A&Ox3 and pain controlled. History of CVA/TIA. Ambulates with a walker at baseline and has had multiple falls. Plan for dental extractions today. PT/OT/PM&R to follow.     Cardiovascular: Severe AS and MR/MI undergoing surgical evaluation, also with acute (elevated BNP) HFpEF and chronic atrial fibrillation. Off vasoactive medications and meeting BP goals. Will continue to optimize cardiac medications. Very high mortality risk based on STS calculation and is not a candidate for open surgery. Right heart cath today. No endocarditis based on LINDY.     Respiratory: I personally reviewed the most recent CXR which small b/l pleural effusions and pulmonary congestion with interval clearing compared to previous exam. Will encourage IS, mobilization and wean O2 PRN. CPAP for ASHLI.      Genitourinary: CKD stage 3a (GFR 45-59 ml/min). BUN elevated and will continue to monitor. There could be a cardiorenal component. No davenport. Making adequate urine output. Will avoid nephrotoxic medications. Appreciate Nephrology input.     Endocrine: Follow [facility]. DM management. Appreciate Endocrinology input.     Hematologic: No evidence active bleeding. Loveno for DVT ppx. Was on apixaban for a-fib as outpatient and currently on hold. Will considering resuming anticoagulation once INR drifts down however due to falls and skin tears/bleeding might not be a good candidate moving forward. \"Anemia of chronic disease.History of JAK2 gene mutation and appreciate Heme/Onc input; will continue hydroxyurea.      Infectious Disease: Was on azithromycin and ceftriaxone for possible endocarditis/pneumonia " however ID OK monitoring off antibiotics for now since LINDY negative. Cultures pending. Appreciate Infectious Disease input.      Fluids, Electrolytes: Electrolytes replaced per protocol. Goal for net negative fluid balance.     Gastrointestinal: PO as tolerated. GI ppx. History of ascites but unknown cause and has had paracentesis recently. Hepatitis panel negative.     Skin: OOB, PT when able. Wound care for skin tears.      Tubes, lines and drains: Will remove superfluous invasive monitors PRN.    Disposition: Guarded. Continue close monitoring on stepdown unit.           Carlitos Reddy,   3/22/2023

## 2023-03-22 NOTE — PROGRESS NOTES
Nephrology Progress Note:    Subjective   Patient seen this morning. His fatigue.  Denies any nausea, vomiting, was not shortness of breath or chest pain. All other ROS negative.    Interval History: No acute overnight event.    Objective   Vital signs in last 24 hours:  Temp:  [36.4 °C (97.5 °F)-36.9 °C (98.5 °F)] 36.6 °C (97.8 °F)  Heart Rate:  [72-81] 77  Resp:  [16-18] 16  BP: (102-123)/(46-60) 123/60     Intake & Output:    Intake/Output Summary (Last 24 hours) at 3/22/2023 0929  Last data filed at 3/22/2023 0700  Gross per 24 hour   Intake --   Output 505 ml   Net -505 ml     Physical Exam:  General Appearance:  Awake, alert, not in acute distress    Head:  Normocephalic, without obvious abnormality, atraumatic   Eyes:  Conjunctiva clear, anicteric sclera.      Ears:  No external abnormalities   Throat: Moist oral mucosa   Neck: Supple, symmetrical   Lungs:    Bilateral scattered rales, respirations unlabored   Heart:  S1 and S2 heard.  Systolic murmur present.   Abdomen: Soft, non tender, non distended, bowel sound heard   Genitourinary:  No palpable bladder   Extremities:  Musculoskeletal: No cyanosis. Bilateral lower extremity mild edema  No injury or deformity   Skin: No rashes    Neurologic:  Behavior/  Emotional: Oriented x 3, non focal  Appropriate, cooperative         Current Medications:  Current Facility-Administered Medications   Medication Dose Route Frequency   • amiodarone  200 mg oral Daily   • amoxicillin  2,000 mg oral Once   • [Provider Managed Hold] apixaban  5 mg oral BID   • [START ON 3/23/2023] atorvastatin  40 mg oral Daily (6p)   • atorvastatin  80 mg oral Daily (6p)   • atropine  0.5 mg intravenous q5 min PRN   • glucose  16-32 g of dextrose oral PRN    Or   • dextrose  15-30 g of dextrose oral PRN    Or   • glucagon  1 mg intramuscular PRN    Or   • dextrose 50 % in water (D50)  25 mL intravenous PRN   • enoxaparin  40 mg subcutaneous Daily (6p)   • [Provider Managed Hold] furosemide   40 mg intravenous BID (am, 4p)   • hydroxyurea  500 mg oral Every other day   • insulin lispro U-100  3 Units subcutaneous TID with meals   • magnesium oxide  400 mg oral Daily   • nitroglycerin  0.4 mg sublingual q5 min PRN   • [Provider Managed Hold] potassium chloride  20 mEq oral BID (am, 4p)   • torsemide  20 mg oral Daily       Labs:  BUN   Date Value Ref Range Status   03/22/2023 64 (H) 8 - 20 mg/dL Final     Creatinine   Date Value Ref Range Status   03/22/2023 1.4 (H) 0.6 - 1.1 mg/dL Final     Sodium   Date Value Ref Range Status   03/22/2023 137 136 - 144 mEQ/L Final     Potassium   Date Value Ref Range Status   03/22/2023 4.7 3.6 - 5.1 mEQ/L Final     CO2   Date Value Ref Range Status   03/22/2023 25 22 - 32 mEQ/L Final     Magnesium   Date Value Ref Range Status   03/20/2023 1.9 1.8 - 2.5 mg/dL Final     Comment:     SLIGHT HEMOLYSIS, RESULT MAY BE INCREASED.       Calcium   Date Value Ref Range Status   03/22/2023 8.3 (L) 8.9 - 10.3 mg/dL Final     Albumin   Date Value Ref Range Status   03/21/2023 2.0 (L) 3.4 - 5.0 g/dL Final     WBC   Date Value Ref Range Status   03/22/2023 36.35 (HH) 3.80 - 10.50 K/uL Final     Comment:     ALL RESULTS HAVE BEEN CHECKED. This result has been called to MARIA DEL CARMEN MARIA by Naomie Montes on 03 22 23 at 04:12, and has been read back.      Hemoglobin   Date Value Ref Range Status   03/22/2023 10.9 (L) 11.8 - 15.7 g/dL Final     Platelets   Date Value Ref Range Status   03/22/2023 410 (H) 150 - 369 K/uL Final     Lab Results   Component Value Date    CREATININE 1.4 (H) 03/22/2023    CREATININE 1.4 (H) 03/21/2023    CREATININE 1.3 (H) 03/20/2023    CREATININE 1.2 (H) 03/19/2023    CREATININE 1.1 03/18/2023     I have reviewed the pertinent patient's labs.    Imaging:  I have reviewed the pertinent patient's imaging results.     Assessment/Plan     Laureen Persaud is a 77 y.o. female with PMHx of  CKD II-IIIa, HTN, DM II, HLD, ASHLI, CVA/TIA  (2020), HFpEF, PAH, valvular heart  disease (severe AAS, moderate-severe MS, moderate-severe MR), A-fib, iron deficiency anemia, JAK2 gene mutation initially admitted to [facility] on 3/10/2020 with a complaint of weakness, fatigue, and was empirically treated for pneumonia along with CHF.  Patient had a TTE done which revealed worsening AV gradient.  Subsequently, she was transported to [facility] and underwent cardiac catheterization which confirmed significant aortic and mitral valve disease.  She was thought to be high risks by CT surgery at [facility] and subsequently transferred to [facility] for further surgical evaluation on 3/18/2023.        1.  Likely CKD II-IIIa  -Had creatinine of 1.0-1.1 mg/dL as of 2021.  Subsequently, creatinine was 1.2 mg/dL as of 3/18/2023 at outside hospital and was 1.1 mg/dL on 3/18/2023 when arrived to [facility].  -UA on 3/20/2023 bland without any hematuria, pyuria, proteinuria -ruling out any other alarming GN or interstitial disease  -Likely has some degree of CKD on the basis of hypertension, chronic CRS physiology     2.  GRACE  -Likely related to CRS physiology and may have mildly worsened due to contrast exposure.  -Had a cardiac catheterization at [facility] -unsure about date.  Subsequently, received on 100 cc of IV contrast dye on 3/20/2023 for CT TAVR  -Presented with a creatinine of 1.1 mg/dL on 3/18/2023 and up to 1.3 mg/dL on 3/20/2023 -nephrology consulted  -UA on 3/20/2023 bland  -Ultrasound on 3/20/2023 without hydronephrosis  - RHC on 3/21/2023: RA 8, PA 92/32 (52), PCW 31 with CI 4.9      -Labs from this morning reviewed. Creatinine remained stable at 1.4.  Electrolytes stable.  -No accurate urine output recorded from yesterday.     Recommend:  -Continue supportive measures as you are.    -No objection to resume back on diuretics if desired by cardiology on CT surgery.  -Can decrease dose of atorvastatin to prior regular dose.  -Continue to avoid further nephrotoxins including  contrast dye and other nephrotoxins like NSAIDs, sodium phosphate enema, vitamin C, etc.  -Avoid hypotension and maintain MAP > 65 mmHg.  -Follow daily BMP while in hospital     3.  HFpEF, valvular heart disease, PAH  -Noted to have severe AS, moderate-severe MS, moderate-severe MR  -Being work-up for possible surgical intervention  -Management largely as per cardiology, CT surgery.     4.  A-fib  -s/p DCCV x3  -On amiodarone  -As per cardiology, primary team.     5.  History of iron deficiency anemia, JAK2 gene mutation myeloproliferative disorder  -Noted to have significant leukocytosis  -Started on Hydrea 500 mg every other day  -Further work-up/management as per primary team, hematology/oncology.     6.  Right renal mass  -Renal ultrasound on 3/20/2023 noted to have solid 1.9 cm right renal mass, suspicious for renal malignancy.    Recommend:  -Will need urology evaluation -defer timing to primary team     7.  Nephrolithiasis.  -Renal ultrasound on 3/20/2023 noted to have nonobstructing 1 cm right mid renal calculus  -Outpatient follow-up with nephrology and urology upon discharge     8.  Possible pneumonia  -Was covered with antibiotics at outside hospital.  Evaluated by ID and recommended to observe off antibiotics.  -As per ID, primary team.     9.  History of CVA/TIA, ASHLI, HLD  -As per primary team.     -Discussed with Dr. Reddy this morning

## 2023-03-22 NOTE — PROGRESS NOTES
"Infectious Disease Progress Note    Patient Name: Laureen Persaud  MR#: [id number]  : 1945  Admission Date: 3/18/2023  Date: 23   Time: 10:17 PM   Author: Da Fernandez MD      Antibiotics:    Anti-infectives (From admission, onward)    Start     Dose/Rate Route Frequency Ordered Stop    23 1230  amoxicillin (AMOXIL) capsule 2,000 mg         2,000 mg oral Once 23 0029          Subjective   Delayed entry  Patient seen around 6 pm, family at bedside  She has no new complaints    Objective     Vital Signs:    Visit Vitals  BP (!) 122/58 (BP Location: Right upper arm, Patient Position: Lying)   Pulse 81   Temp 36.4 °C (97.5 °F) (Temporal)   Resp 16   Ht 1.461 m (4' 9.5\")   Wt 64 kg (141 lb 1.6 oz)   SpO2 97%   BMI 30.01 kg/m²       Temp (72hrs), Av.6 °C (97.8 °F), Min:35.8 °C (96.4 °F), Max:37.2 °C (98.9 °F)      Physical Exam:  General: non toxic, alert  HEENT: NC/AT/ anicteric sclera,  Neck: supple  Cardiovascular:systolic murmur  Respiratory: scattered rales  GI/Abdomen: soft, NT/ND,  no peritoneal signs  Extremities: ble edema  MSK/JOINTS:  No swelling, erythema, or pain  Lymphatics:   Neurology and psych: no focal deficits, cooperative with exam  Skin: purpuric macules scattered on dorsa of both feet. Splinter lesions on both 3rd digits on hand  G.U.: no davenport    .  Lines, Drains, Airways, Wounds:  External Urinary Catheter (Active)   Number of days: 2       Wound Pressure Injury Perineum (Active)   Number of days: 3       Wound Skin Tear Right;Medial Buttock (Active)   Number of days: 3       Labs:    CBC Results       23     0339 0324 0414    WBC 32.32 31.38 29.85    RBC 4.13 4.13 4.08    HGB 11.0 10.9 10.7    HCT 36.6 36.7 35.6    MCV 88.6 88.9 87.3    MCH 26.6 26.4 26.2    MCHC 30.1 29.7 30.1     375 380         Comment for WBC at 0339 on 23: This result has been called to TEREZA GROVE by PAMELA VERDIN on 23 at 04:48, and " has been read back.     Comment for WBC at 0324 on 03/20/23: This result has been called to ROSITA ONEILL by Ling Stark on 03 20 23 at 05:36, and has been read back.           CMP Results       03/21/23 03/20/23 03/19/23     0339 0324 0414     136 137    K 4.3 4.9 4.7    Cl 96 100 103    CO2 23 24 26    Glucose 93 75 113    BUN 62 67 68    Creatinine 1.4 1.3 1.2    Calcium 7.8 8.1 8.1    Anion Gap 18 12 8    AST 23 -- --    ALT 16 -- --    Albumin 2.0 -- --    EGFR 36.4 39.6 43.4         Comment for K at 0324 on 03/20/23: SLIGHT HEMOLYSIS, RESULT MAY BE INCREASED.          MICRO: Reviewed    Imaging: REVIEWED      Assessment   This is a 77 y.o. female  · JAK2 mutation/MPN  · Leukocytosis likely due to above  · MS, AS, MR  · AV echodensity. No symptoms of IE. She was given ceftriaxone at OSH. She had blcx on 3/11 at [facility] which I confirmed were [id number].    I suspect the vasculitic appearing lesions on the feet are related to the MPN>infection or her 's claims of this being due to the SARS COV2 vaccines.   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 She denies classic epidemiology for agents of Cx negative IE. She denied abx prior to the 3/11 blood cx.  · Please obtain records from [facility]--namely the MAR for date and time first abx doses given and times of blood cx collection to make sure that this was not negative cx due to prior abx treatment. I would be happy to review these when available. Otherwise will see prn. Call if any clinical change. blcx if febrile.

## 2023-03-22 NOTE — CONSULTS
Palliative Care Consult      Patient Name: Laureen Persaud                                                                                        Patient MRN: [id number]  Date / Time Note Created: 3/22/2023 1:35 PM  Attending Physician: Macey Padgett MD  Referring Physician: No ref. provider found  Primary Care Physician: Divya Mckinney MD   This is Hospital Day: 5    Conversation/Goals of Care:    Goals are restorative with priority of improving QOL and returning home.See ACP note today 3/22 for details of GOC discussion with patient and spouse. Plan is to proceed with TAVR likely this week per spouse. They await to learn if the patient is a candidate for the [place] Trial.    Assessment/Plan  Pain, chronic  Assessment & Plan  -unstable  -in the setting of bilateral rotator cuff injury s/p fall several years ago and bilateral knee pain 2/2 arthritic  -worsening with immobility      Plan:    -tylenol 975 mg every 8 hours PRN pain, can schedule ATC if pt having severe pain   -lidocaine patch to affected area  -will continue to follow     Debility  Assessment & Plan  new , unstable   - Debility likely multifactorial in the setting of  SOB 2/2 severe AS, severe mitral regurgitation and bilateral knee and shoulder pain.    -Resides at home w/ spouse. Baseline functional status is ambulatory short distances with walker, has had. Mentation alert , Frailty-> yes  - Pt remains high risk for further deterioration and functional decline and death due to her complex, severe cardiac issues and frailty.     Palliative Assessment    Current Palliative Performance Status: 30%  Baseline Palliative Performance Status: 50%  Preferred Setting for Care: ultimately home  Patient Status: Disease state: controlled w/ current  treatments.  Nutritional status: albumin 2.0 g/dL  Sarcopenia, hypoalbuminemia    Plan:  - Continue supportive interventions per primary team  - PT/OT/SLT as appropriate  - ongoing GOC as clinical course  evolves        Palliative care by specialist  Assessment & Plan  - Code status: FULL CODE  - Prognosis:  variable  - Capacity: intact  - Advance Directives: not on file in EMR  - Goals of care: Goals are  restorative / life prolonging with high priority to return home  - Surrogate Decision Maker: spouse, Pavel Persaud  - Support system/caregivers: spouse and 3 daughters  - Spiritual & Existential Needs:c/s spiritual care, patient is Amish  - See also debility  - Pt and family require ongoing information surrounding clinical status and prognostic awareness as available and willing to accept  - Will continue to follow to assist with  goals of care          Reason for Consult:  Other (Specify) Goals of Care    HPI      Source: patient, spouse, primary team, bedside RN    Laureen Persaud is an 77 y.o. female with a PMH of atrial fibrillation on AC, HFpEF, AS, Mitral stenosis and MR,  HTN, ASHLI, anemia, CVA, PAH, HLD, DM2, bilateral rotator cuff tears, arthritis of knees, admitted with CHF exacerbation and PNA. She is found to have progressively worsening AS and mitral valve stenosis and regurgitation. Patient and family have had discussions with CT surgery regarding possible TAVR (aortic valve)  and evaluation for candidacy for [place] trial. Palliative care is c/s to assist w/ goals of care.    Patient is sitting out of bed in the recliner w/ legs elevated. Her  is at bedside. She is not in any acute distress and denies headache dizziness,  CP, SOB, nausea, vomiting, diarrhea. Pt reports that she came to the hospital for increasing dyspnea on exertion. She was becoming more SOB w/ very short distances. Pt reports bilateral  knee pain that is worse with activity and has limited her mobility in addition to her dyspnea. Pt reports chronic bilateral shoulder pain worse with raising her arms. She previously had physical therapy which helped but the pain has worsened recently with her decreasing mobility,. Pt reports  that she does not like to take medications. We discussed low risk of regular tylenol. Pt reports anxiety regarding the dental extraction today.     Chart Review:  The following portions of the patient’s history were reviewed and updated as appropriate:      Past Medical History    Atrial fibrillation on anticoagulation with apixaban, JAK2 gene mutation, HFpEF,  hypertension, hyperlipidemia, type 2 diabetes mellitus, anxiety, aortic stenosis, moderate to severe mitral stenosis in addition to moderate to severe mitral vegetation, iron deficiency anemia, ASHLI on CPAP, history of CVA without residual deficits in 2020, and pulmonary arterial hypertension    Past Surgical History    No past surgical history on file.    Christian: Temple  Islam / Spiritual:   c/s spiritual care, chula expressed desire for visit from Fr. Burnham to receive communion    Review of Systems:  All other systems reviewed and negative except as noted in the HPI.    [facility] (Outside [facility] query reviewed with no concerns.    Current Medications:  •  amiodarone, 200 mg, oral, Daily  •  [Provider Managed Hold] apixaban, 5 mg, oral, BID  •  [START ON 3/23/2023] atorvastatin, 40 mg, oral, Daily (6p)  •  atorvastatin, 80 mg, oral, Daily (6p)  •  atropine, 0.5 mg, intravenous, q5 min PRN  •  glucose, 16-32 g of dextrose, oral, PRN **OR** dextrose, 15-30 g of dextrose, oral, PRN **OR** glucagon, 1 mg, intramuscular, PRN **OR** dextrose 50 % in water (D50), 25 mL, intravenous, PRN  •  enoxaparin, 30 mg, subcutaneous, Daily (6p)  •  [Provider Managed Hold] furosemide, 40 mg, intravenous, BID (am, 4p)  •  hydroxyurea, 500 mg, oral, Every other day  •  insulin lispro U-100, 3 Units, subcutaneous, TID with meals  •  magnesium oxide, 400 mg, oral, Daily  •  nitroglycerin, 0.4 mg, sublingual, q5 min PRN  •  [Provider Managed Hold] potassium chloride, 20 mEq, oral, BID (am, 4p)  •  torsemide, 20 mg, oral,  Daily    Allergies:  Allergies   Allergen Reactions   • Neomycin      swelling   • Polymyxin B      swelling   • Procaine      swelling   • Bacitracin Rash         Objective    Physical Exam:   General:   No Acute Distress, frail appearing  Eyes:  ancteric sclera  ENMT:  Mucus Membranes Dry  CV:  Radial Pulses intact  Lungs:  Respirations unlabored, no conversational dyspnea  Musculosketetal: No deformity  Psych:  Insight good, not agitated, good eye contact  Neuro:  Oriented x3, alert, speech clear and fluent  Skin:  Rash absent      Vitals:  Vitals:    03/22/23 1259   BP: (!) 118/59   Pulse: 70   Resp: 16   Temp:    SpO2: 95%       Laboratory Studies:  CBC Results       03/22/23 03/21/23 03/20/23     0341 0339 0324    WBC 36.35 32.32 31.38    RBC 4.21 4.13 4.13    HGB 10.9 11.0 10.9    HCT 37.7 36.6 36.7    MCV 89.5 88.6 88.9    MCH 25.9 26.6 26.4    MCHC 28.9 30.1 29.7     368 375         Comment for WBC at 0341 on 03/22/23: ALL RESULTS HAVE BEEN CHECKED. This result has been called to MARIA DEL CARMEN MARIA by Naomie Montes on 03 22 23 at 04:12, and has been read back.     Comment for WBC at 0339 on 03/21/23: This result has been called to TEREZA GROVE by PAMELA VERDIN on 03 21 23 at 04:48, and has been read back.     Comment for WBC at 0324 on 03/20/23: This result has been called to ROSITA ONEILL by Ling Stark on 03 20 23 at 05:36, and has been read back.         CMP Results       03/22/23 03/21/23 03/20/23     0341 0339 0324     137 136    K 4.7 4.3 4.9    Cl 102 96 100    CO2 25 23 24    Glucose 94 93 75    BUN 64 62 67    Creatinine 1.4 1.4 1.3    Calcium 8.3 7.8 8.1    Anion Gap 10 18 12    AST -- 23 --    ALT -- 16 --    Albumin -- 2.0 --    EGFR 36.4 36.4 39.6         Comment for K at 0324 on 03/20/23: SLIGHT HEMOLYSIS, RESULT MAY BE INCREASED.        Troponin I Results    No lab values to display.       ABG Results    No lab values to display.             Imaging and Other Studies:     MRI BRAIN  WITHOUT CONTRAST    Result Date: 3/21/2023  IMPRESSION: 1.  No evidence of major vessel occlusion or high grade stenosis in the [place]. 2.  No evidence of acute infarction, hemorrhage, or mass effect. 3.  Minimal microangiopathic and involutional changes.     MRI ANGIOGRAM HEAD WITHOUT CONTRAST    Result Date: 3/21/2023  IMPRESSION: 1.  No evidence of major vessel occlusion or high grade stenosis in the [place]. 2.  No evidence of acute infarction, hemorrhage, or mass effect. 3.  Minimal microangiopathic and involutional changes.     CTA TAVR WITH AND WITHOUT IV CONTRAST    Result Date: 3/21/2023  IMPRESSION: 1. CTA TAVR assessments and measurements as described above. 2. Bilateral 11 mm renal soft tissue masses.     Ultrasound carotid bilateral    Result Date: 3/21/2023  IMPRESSION: 1. Right:  Velocities correlating to less than 50% stenosis. 2. Left:  Velocities correlating to less than 50% stenosis. 3. Bilateral antegrade flow within the vertebral arteries. [facility] Vascular Testing Communication Updated Recommendations for Carotid Stenosis Interpretation criteria Recommended Modification of the SRU Consensus Conference Criteria for Internal Carotid Artery Stenosis for Implementation in [facility]-Accredited Vascular Laboratories Primary Parameters Additional parameters -------------------------------------------------------------------------------- -------------------------------------------------------------------------------- -------------------------------------------------------------------- Degree of stenosis, %                                 ICA PSV, cm/sec Plaque estimate, %                         ICA/CCA PSV ratio            ICA, EDV cm/sec Normal                                                          <180 None                                                  <2.0 <40 <50                                                               <180 <50 <2.0                                    <40 50-69%                                                          180-230 >50                                                     2.0-4.0  > 70 but less than near occlusion                >230 >50                                                     >4.0 >100 Near occlusion                                         high, low, or undetected Visible                                                Variable Variable Total Occlusion                                            Undetectable Visible, no lumen                               N/A N/A Modified from diagnostic criteria proposed by SRU Consensus conference    CT DENTAL SCAN WITHOUT IV CONTRAST    Result Date: 3/20/2023  IMPRESSION: Please see above.     X-RAY CHEST 1 VIEW    Result Date: 3/20/2023  IMPRESSION: No significant interval change.     ULTRASOUND KIDNEYS    Result Date: 3/20/2023  IMPRESSION: 1.  No hydronephrosis 2.  Solid 1.9 cm right renal mass, suspicious for renal malignancy. Contrast-enhanced MRI can be performed for further characterization. 3.  Additional indeterminate exophytic 0.7 cm complex cyst versus solid mass. This would also be more optimally characterized with MRI. 4.  Nonobstructing 1 cm right mid renal calculus. 5.  Splenomegaly. Ascites Actionable Finding: Follow up should be considered.     X-RAY CHEST 1 VIEW    Result Date: 3/19/2023  IMPRESSION: Cardiomegaly, interstitial edema, pulmonary vascular congestion and small effusions, suggesting decompensated heart failure.  Bibasilar airspace disease is also noted.                                                                    Cardiac Imaging    TRANSESOPHAGEAL ECHO (LINDY) 03/20/2023    Interpretation Summary  The risks of the procedure were discussed with the patient and informed consent was obtained. The patient was sedated under anesthesia guidance. The LINDY probe was inserted without difficulty and the patient tolerated the procedure well.    1. Normal left ventricular cavity size.  Normal  wall thickness.  Preserved left ventricular systolic function. LVEF 65%.  Abnormal septal motion, otherwise no regional wall motion abnormalities.  2.  The aortic valve is tricuspid and heavily calcified.  There is a small mobile echodensity on the aortic leaflet that is most likely related to valvular degeneration/Lambl's excrescence (clip 12).  Less likely vegetation.  At least moderate aortic stenosis (peak velocity of 3.5 m/s, mean gradient of 23 mmHg). Correlate with transthoracic echocardiography. Mild aortic regurgitation. Normal visualized thoracic aortic dimensions. Simple atheroma in the visualized portions of the descending thoracic aorta without complex elements.  3. The mitral valve is thickened and sclerotic.  Mitral annular calcification.  The subvalvular apparatus is calcified. Progressive mitral stenosis with mean gradient of 4 mmHg at 72 bpm. Severe mitral regurgitation due to P2 flail and a restricted P3 scallop.  These lesions resulted in two distinct jets of mitral regurgitation (largest PISA results in an EROA of 43 cm2 and regurgitant volume of 71 cc). There is systolic flow reversal of pulmonary vein flow.  4. Dilated left atrium. No thrombus or mass seen in the left atrium or left atrial appendage. Left atrial appendage peak emptying velocity 72 cm/s. No evidence of ASD/PFO by color Doppler.  5. Mildly dilated right ventricular cavity (4.2 x 3.8 cm) with preserved systolic function. Dilated right atrium. Prominent eustachian valve/renuka terminalis.  6. Thickened tricuspid valve leaflets. Moderate tricuspid regurgitation with estimate right ventricular systolic pressure of 69 mmHg plus right atrial pressure.  7. Grossly normal pulmonic valve. Tace pulmonic regurgitation.  8. No pericardial effusion.  9. No previous study available for comparison.           I have reviewed the patient's pertinent labs to the time of note.  Significant abnormals are :   Hgb 10.9 - anemia  WBC 36.35  -leukocytosis  Creat  1.4  -GRACE/CKD  Albumin  2.0 - hypoalbuminemia    I have independently reviewed the pertinent imaging to the time of note and agree with reported results.  CXR reviewed independently, agree with reported results. No  AICD/ pacer leads noted.      I have independently reviewed the pertinent cardiac studies to the time of note and agree with reported results.  EKG reviewed: NSR  ventricular rate 72 Qtc 477      Thank you for the opportunity to participate in this patient's hospital plan of care.     PATRICIA Cueva  Office [phone]

## 2023-03-22 NOTE — ASSESSMENT & PLAN NOTE
- Code status: FULL CODE  - Prognosis:  variable  - Capacity: intact  - Advance Directives: not on file in EMR  - Goals of care: Goals are  restorative / life prolonging with high priority to return home  - Surrogate Decision Maker: spouse, Pavel Cori  - Support system/caregivers: spouse and 3 daughters  - Spiritual & Existential Needs:c/s spiritual care, patient is Alevism  - See also debility  - Pt and family require ongoing information surrounding clinical status and prognostic awareness as available and willing to accept  - Will continue to follow to assist with  goals of care  -patient currently has palliative RN visits at home through [facility] and would like to continue. She has an established relationship with the agency over the last year and states the nurse remians in close communication with her cardiologist

## 2023-03-22 NOTE — ASSESSMENT & PLAN NOTE
new , unstable   - Debility likely multifactorial in the setting of  SOB 2/2 severe AS, severe mitral regurgitation and bilateral knee and shoulder pain.    -Resides at home w/ spouse. Baseline functional status is ambulatory short distances with walker, has had. Mentation alert , Frailty-> yes  - Pt remains high risk for further deterioration and functional decline and death due to her complex, severe cardiac issues and frailty.     Palliative Assessment    Current Palliative Performance Status: 30%  Baseline Palliative Performance Status: 50%  Preferred Setting for Care: ultimately home  Patient Status: Disease state: controlled w/ current  treatments.  Nutritional status: albumin 2.0 g/dL  Sarcopenia, hypoalbuminemia    Plan:  - Continue supportive interventions per primary team  - PT/OT/SLT as appropriate  - ongoing GOC as clinical course evolves

## 2023-03-22 NOTE — ASSESSMENT & PLAN NOTE
-unstable  -in the setting of bilateral rotator cuff injury s/p fall several years ago and bilateral knee pain 2/2 arthritic  -worsening with immobility      Plan:    -tylenol 975 mg every 8 hours PRN pain, can schedule ATC if pt having severe pain   -lidocaine patch to affected area  -will continue to follow

## 2023-03-22 NOTE — PROGRESS NOTES
Cardiology Clinical Trials:  Pt reviewed on 3/21/2023 Structural Heart Case Review.   Dr Padgett has consented pt to [email or web address] [place] Clinical Trial.     Informed consent picked up.   CT TAVR uploaded for Core Lab review.   Will obtain LINDY disc from [facility] to include raw 3D data for upload and upload that today, too.     Typical Core Lab pre-screening CT results take approx 3-5 days for Intrepid Valve fit.  CT results are hard Pass/Fail to continue Screening efforts.     Pt also has several conditions that may screen fail her.   Plan to discuss w pt and Structural Heart Team.     Please call [phone] for concerns/questions regarding Screening efforts or questions related to the [place] Clinical Trial or send EPIC communications.

## 2023-03-22 NOTE — ACP (ADVANCE CARE PLANNING)
Palliative Care Advance Care Planning Note      Patient Name: Laureen Persaud                                                                                        Patient MRN: [id number]  Discussion Date: 03/22/23   Discussion Participants: patient and spouse     Today participants wished to discuss Advance Care Planning. The following summarizes our discussion.    During our visit today, we discussed:     Code Status  Full Code        Health Care Agent/Surrogate Decision Maker   SpouseCori      Medical Status/Prognosis  Variable Dependent on Goals of Care         Goals of Care  Restorative.    Face to face discussion at bedside with pt and her . Introduced palliative services and our role in the hospital setting. Patient states that she currently receives visits from a palliative nurse through [facility]. She endorses the visits are very helpful and her palliative nurse is in close communication with her cardiologist.     Patient and  articulate clear understanding of her cardiac valvular disease . Pt states today she is having teeth extraction at the bedside. Pt endorses that she does have an adv directive and her  is her surrogate medical decision maker. Encouraged Mrs. Persaud to share her wishes with her  so that he understands how to speak for her in the event she declines and is unable to make her own medical decisions. Mr. Persaud endorses that they have spoken already and he understands her wishes.  Pt and spouse endorse understanding that pt is not a candidate for open chest cardiac procedure and there are risks to TAVR. At this time, the patient states she wants to proceed with TAVR which is likely this week. They await to hear regarding [place] candidacy.     Pateint states she is Religion and attends services virtually. We will consult spiritual care for Fr. Burnham to visit. Pt and her  have been  for 58 years. They have 3 daughters that live locally and  are supportive. Pt and  are hopeful that she tolerates the TAVR and her QOL improves. They expressed appreciation for our discussion. I provided our  contact information.        Advance Care Documents  Yes, not on file in EMR      Patient Capacity  Patient has capacity to make their own medical decisions.  She collaborates with her .         Plan  FULL CODE  Goals are life prolonging  Pt and  endorse plan to proceed with TAVR  Will continue to follow to assist w/ GOC as needed      Attestation Statement:  I have spent a total of 30 minutes in face-to-face discussion of patient advance care planning with the patient and/or surrogate decision makers.  No active management of the patient’s problem(s) was undertaken during the time period reported      PATRICIA Cueva  3/22/2023 2:13 PM

## 2023-03-22 NOTE — PROGRESS NOTES
[facility] Adult Cardiac Surgery Database Version 4.20  RISK SCORES    Procedure: Isolated AVR  Risk of Mortality: 16.144%  Renal Failure: 11.048%  Permanent Stroke: 4.326%  Prolonged Ventilation: 43.568%  DSW Infection: 0.207%  Reoperation: 5.726%  Morbidity or Mortality: 50.035%  Short Length of Stay: 7.763%  Long Length of Stay: 37.267%    Procedure: Isolated MVR  Risk of Mortality: 23.769%  Renal Failure: 14.724%  Permanent Stroke: 7.748%  Prolonged Ventilation: 52.531%  DSW Infection: 0.234%  Reoperation: 7.852%  Morbidity or Mortality: 52.194%  Short Length of Stay: 5.671%  Long Length of Stay: 37.907%

## 2023-03-22 NOTE — PROGRESS NOTES
CARDIOLOGY INPATIENT FOLLOW-UP NOTE     Patient: Laureen Persaud YOB: 1945   MRN: [id number] Date of Admission: 3/18/2023   Length of Stay: 4      ASSESSMENT AND RECOMMENDATIONS     Assessment:  This is a 77 y.o. female being with PMH Afib (apixan), DCCV x 3, HFpEF, HTN, HLD, DM II (non-insulin dep), anxiety, Severe AS, Progressive Mitral Stenosis, Severe Mitral Regurgitation, Fe def anemia, JAK2 gene mutation, ASHLI w/ CPAP, CVA/TIA 2020, PAH consulted for HFpEF, pHTN.     # HFpEF (EF 55-60%%)   # pHTN  # Severe AS  # Progressive MS  # Severe MR  - Etiology for exacerbation: most likely progressive valvular disease (MS/MR > AS).  - OSH Cath with RA 22/17, /14, /44, PCWP: 39 and CO CI 4.5/2.9  - Repeat after diuresis here: RA: 8, PA: 92/32 (52), PCWP: 31, CI: 4.7   Severe MR. Progressive MS. Severe AS. Post-capillary Pulmonary HTN. Preserved LVEF.   - Volume status now warm & near euvolemic  -  at presentation  - TTE (last 3/11/23): showed EF55-60%   - LINDY 3/20/21: at least moderate AS, progressive MS  - CTA TAVR with AV calcium score 1800. Triple vessel disease with calcium score > 3000.  Recommend:  - Discussed with Structural Heart Team: options include TAVR plus investigation of [place] candidacy vs. TAVR then possible TAVR in MAC.   - Strict I/Os, Daily Weights, daily electrolytes replete for goal K > 4.0, Mg > 2.0, Tele  - Supplemental O2 to maintain SpO2 >90%, wean as able  - Admission meds:               - BB: not a home med, not indicated  - ACEi/ARB/ARNi: not a home med, not indicated  - Spironolactone: not a home med, not indicated  - SGLT2i: recommend initiation prior to discharge, but would hold off on initiation pending OR planning  - Diuretic: Would attempt maintenance diuretic with Torsemide 20 mg daily starting today    # C/f subacute MV endocarditis, resolved  - No evidence of vegetation on LINDY from 3/20    --  Bear Barros, DO    INTERVAL/SUBJECTIVE     - S/p CTA  "TAVR with AV calcium score 1800. Triple vessel disease with calcium score > 3000.  - LINDY at least moderate AS, progressive MS  - RHC RA 8, PA 92/32 (52), PCW 31 with CI 4.9     Seen and examined sitting up in bedside chair. She reports feeling a little fatigued today. She denies chest pain/pressure, shortness of breath or light-headedness at rest or with minimal exertion such as transfer from bed to chair. She reports speaking with Dr. Padgett last night, feeling overwhelmed, but feeling like she will want to proceed with TAVR.       PHYSICAL EXAMINATION     Vitals:   Visit Vitals  /60 (BP Location: Right upper arm)   Pulse 77   Temp 36.6 °C (97.8 °F) (Temporal)   Resp 16   Ht 1.461 m (4' 9.5\")   Wt 64 kg (141 lb 1.6 oz)   SpO2 94%   BMI 30.01 kg/m²     Temp:  [36.4 °C (97.5 °F)-36.9 °C (98.5 °F)] 36.6 °C (97.8 °F)  Heart Rate:  [72-81] 77  Resp:  [16-18] 16  BP: (102-123)/(46-60) 123/60  I/O last 3 completed shifts:  In: 150 [P.O.:150]  Out: 305 [Urine:300; Blood:5]  Wt Readings from Last 3 Encounters:   03/21/23 64 kg (141 lb 1.6 oz)       General: No acute distress. Frail elderly female.  HEENT: No corneal arcus or xanthelasmas. Sclerae are anicteric.   Neck: JVP is normal at 80-90 degrees.  Heart: Regular rate and rhythm. No murmurs.  Chest: Symmetrical.  Lungs: Crackles bilaterally.  Abdomen: Minimally distended.  Extremities: No cyanosis or clubbing. No lower extremity edema. Distal pulses are easily palpable.  Skin: Warm and dry and well perfused.  Neurologic: Alert and appropriate.  Psychiatric: Normal affect.      LABORATORY DATA     Results from last 7 days   Lab Units 03/22/23  0341 03/21/23  0339 03/20/23  0324 03/19/23  0414 03/18/23  1751   SODIUM mEQ/L 137 137 136   < > 135*   CO2 mEQ/L 25 23 24   < > 24   BUN mg/dL 64* 62* 67*   < > 70*   CREATININE mg/dL 1.4* 1.4* 1.3*   < > 1.1   CALCIUM mg/dL 8.3* 7.8* 8.1*   < > 8.2*   ALT IU/L  --  16  --   --  20   AST IU/L  --  23  --   --  29    < > = " values in this interval not displayed.     Results from last 7 days   Lab Units 03/22/23  0341 03/21/23  0339 03/20/23  0324   WBC K/uL 36.35* 32.32* 31.38*   PLATELETS K/uL 410* 368 375*     Results from last 7 days   Lab Units 03/22/23  0341 03/20/23  1032 03/18/23  1751   PTT sec  --  39* 44*   INR  1.5 1.7 2.6   PROTIME sec 18.3* 20.0* 27.2*           No lab exists for component: CPK  Results from last 7 days   Lab Units 03/18/23  1751   TSH mIU/L 1.41           No lab exists for component: CHOLCALCLDL      Results from last 7 days   Lab Units 03/18/23  1751   BNP pg/mL 912*       ELECTROCARDIOGRAPHIC ASSESSMENTS     ECG   NSR     Telemetry  SR     DIAGNOSTIC IMAGING / PROCEDURES     LINDY 3/20/21 @ [facility]:  The risks of the procedure were discussed with the patient and informed consent was obtained. The patient was sedated under anesthesia guidance. The LINDY probe was inserted without difficulty and the patient tolerated the procedure well.     1. Normal left ventricular cavity size.  Normal wall thickness.  Preserved left ventricular systolic function. LVEF 65%.  Abnormal septal motion, otherwise no regional wall motion abnormalities.   2.  The aortic valve is tricuspid and heavily calcified.  There is a small mobile echodensity on the aortic leaflet that is most likely related to valvular degeneration/Lambl's excrescence (clip 12).  Less likely vegetation.  At least moderate aortic stenosis (peak velocity of 3.5 m/s, mean gradient of 23 mmHg). Correlate with transthoracic echocardiography. Mild aortic regurgitation. Normal visualized thoracic aortic dimensions. Simple atheroma in the visualized portions of the descending thoracic aorta without complex elements.   3. The mitral valve is thickened and sclerotic.  Mitral annular calcification.  The subvalvular apparatus is calcified. Progressive mitral stenosis with mean gradient of 4 mmHg at 72 bpm. Severe mitral regurgitation due to P2 flail and a restricted P3  scallop.  These lesions resulted in two distinct jets of mitral regurgitation (largest PISA results in an EROA of 43 cm2 and regurgitant volume of 71 cc). There is systolic flow reversal of pulmonary vein flow.   4. Dilated left atrium. No thrombus or mass seen in the left atrium or left atrial appendage. Left atrial appendage peak emptying velocity 72 cm/s. No evidence of ASD/PFO by color Doppler.    5. Mildly dilated right ventricular cavity (4.2 x 3.8 cm) with preserved systolic function. Dilated right atrium. Prominent eustachian valve/renuka terminalis.   6. Thickened tricuspid valve leaflets. Moderate tricuspid regurgitation with estimate right ventricular systolic pressure of 69 mmHg plus right atrial pressure.  7. Grossly normal pulmonic valve. Tace pulmonic regurgitation.   8. No pericardial effusion.   9. No previous study available for comparison.       Transthoracic Echo @ OSH:   3/11/23: EF 55-60%, Severe AS with pgradient 86.9, mean gradient 46.4, trace AI.   Moderate MS, mean gradient 7.6, moderate MR  Mild TR  PAPs: 54/4     Complete Cardiac Cath 3/15/23 @ OSH:  Hemodynamics:  RA 22/17, /14, /44, PCWP: 39  PA sat 79 %  CO CI 4.5/2.9  AV peak to peak grad 34, mean 26, MICHAEL 0.9     MV mean grad 15, valve area 1.1     Coronary angiography @ OSH: mild  Non-obstructive CAD    RHC 3/21/23 @ [facility]:  1. Elevated right and left heart filling pressures (RA 12, PA 92/32 ( 52) , and PCW 31 mm Hg).  2. Elevated cardiac output and index of 7.2 L/min and 4.7 L/min/m2 respectively.   3. PVR 3 Wood Units and  TPG 2 mmHg.   4. No evidence of left to right shunt by sequential venous saturation measurements.         Radiologic Studies:  CXR: The cardiac silhouette and mediastinal contour are stable demonstrating  cardiomegaly. No significant interval change in pulmonary vascular congestion  and diffuse bilateral interstitial prominence due to edema and/or infiltrates.  There is probably unchanged left-sided  pleural parenchymal disease. No evidence  of a pneumothorax.  IMPRESSION:  No significant interval change.    CTA TAVR:  SUMMARY:  1.  Tricuspid aortic valve with thickened and calcified cusps.  Cine and  tomographic features are suggestive of severe aortic stenosis.  Aortic valve  calcium score = 1836.  Aortic valve area by direct planimetry = 1.08 cm2.  2. Normal left ventricular cavity size, wall thickness and systolic function.  Abnormal septal motion consistent with right ventricular pressure overload.  3.  Mildly dilated right ventricle with normal systolic function.  4. Severe biatrial enlargement.  5. Mildly thickened mitral valve leaflets.  Severe posterior mitral annular  calcification.  Bileaflet restriction (posterior > anterior).  Anterior leaflet  override present.  Mitral valve area = 5.9 cm2 by direct planimetry.  Recommend  correlation with echocardiography for the presence of mitral regurgitation.  6. Three-vessel coronary atherosclerosis.  Coronary calcium score = 3134.    Signed:      Bear Barros DO

## 2023-03-22 NOTE — PROGRESS NOTES
Brief Hematology/Oncology Progress Note:    Noted WBC count of 36 today.  Stable Hgb and platelets.    Recommending increasing Hydroxyurea to 500 mg daily for better control of leukocytosis 2/2 known JAK2 positive MPN.    Noted plans for valve surgery are ongoing.    We will continue to follow.      Marky Laureano DO  Hematology/Oncology Fellow  Pager: [phone]

## 2023-03-23 LAB
ANION GAP SERPL CALC-SCNC: 8 MEQ/L (ref 3–15)
BUN SERPL-MCNC: 74 MG/DL (ref 8–20)
CALCIUM SERPL-MCNC: 8.2 MG/DL (ref 8.9–10.3)
CHLORIDE SERPL-SCNC: 103 MEQ/L (ref 98–109)
CO2 SERPL-SCNC: 26 MEQ/L (ref 22–32)
CREAT SERPL-MCNC: 1.7 MG/DL (ref 0.6–1.1)
ERYTHROCYTE [DISTWIDTH] IN BLOOD BY AUTOMATED COUNT: 21.6 % (ref 11.7–14.4)
GFR SERPL CREATININE-BSD FRML MDRD: 29.1 ML/MIN/1.73M*2
GLUCOSE BLD-MCNC: 110 MG/DL (ref 70–99)
GLUCOSE BLD-MCNC: 115 MG/DL (ref 70–99)
GLUCOSE BLD-MCNC: 151 MG/DL (ref 70–99)
GLUCOSE BLD-MCNC: 153 MG/DL (ref 70–99)
GLUCOSE BLD-MCNC: 99 MG/DL (ref 70–99)
GLUCOSE SERPL-MCNC: 91 MG/DL (ref 70–99)
HCT VFR BLDCO AUTO: 38 % (ref 35–45)
HGB BLD-MCNC: 11.1 G/DL (ref 11.8–15.7)
MCH RBC QN AUTO: 26.4 PG (ref 28–33.2)
MCHC RBC AUTO-ENTMCNC: 29.2 G/DL (ref 32.2–35.5)
MCV RBC AUTO: 90.5 FL (ref 83–98)
PDW BLD AUTO: 11.8 FL (ref 9.4–12.3)
PLATELET # BLD AUTO: 366 K/UL (ref 150–369)
POCT TEST: ABNORMAL
POCT TEST: NORMAL
POTASSIUM SERPL-SCNC: 5 MEQ/L (ref 3.6–5.1)
RBC # BLD AUTO: 4.2 M/UL (ref 3.93–5.22)
SODIUM SERPL-SCNC: 137 MEQ/L (ref 136–144)
WBC # BLD AUTO: 35.38 K/UL (ref 3.8–10.5)

## 2023-03-23 PROCEDURE — 63600000 HC DRUGS/DETAIL CODE

## 2023-03-23 PROCEDURE — 97530 THERAPEUTIC ACTIVITIES: CPT | Mod: GP,CQ

## 2023-03-23 PROCEDURE — 80048 BASIC METABOLIC PNL TOTAL CA: CPT | Performed by: NURSE PRACTITIONER

## 2023-03-23 PROCEDURE — 99233 SBSQ HOSP IP/OBS HIGH 50: CPT | Performed by: ANESTHESIOLOGY

## 2023-03-23 PROCEDURE — 21400000 HC ROOM AND CARE CCU/INTERMEDIATE

## 2023-03-23 PROCEDURE — 63700000 HC SELF-ADMINISTRABLE DRUG

## 2023-03-23 PROCEDURE — 63700000 HC SELF-ADMINISTRABLE DRUG: Performed by: ANESTHESIOLOGY

## 2023-03-23 PROCEDURE — 85027 COMPLETE CBC AUTOMATED: CPT | Performed by: NURSE PRACTITIONER

## 2023-03-23 PROCEDURE — 99232 SBSQ HOSP IP/OBS MODERATE 35: CPT | Performed by: INTERNAL MEDICINE

## 2023-03-23 PROCEDURE — 63700000 HC SELF-ADMINISTRABLE DRUG: Performed by: NURSE PRACTITIONER

## 2023-03-23 PROCEDURE — 36415 COLL VENOUS BLD VENIPUNCTURE: CPT | Performed by: NURSE PRACTITIONER

## 2023-03-23 RX ADMIN — ENOXAPARIN SODIUM 30 MG: 30 INJECTION SUBCUTANEOUS at 17:58

## 2023-03-23 RX ADMIN — MAGNESIUM OXIDE TAB 400 MG (241.3 MG ELEMENTAL MG) 400 MG: 400 (241.3 MG) TAB at 10:51

## 2023-03-23 RX ADMIN — AMIODARONE HYDROCHLORIDE 200 MG: 200 TABLET ORAL at 10:51

## 2023-03-23 RX ADMIN — INSULIN LISPRO 3 UNITS: 100 INJECTION, SOLUTION INTRAVENOUS; SUBCUTANEOUS at 14:55

## 2023-03-23 RX ADMIN — INSULIN LISPRO 3 UNITS: 100 INJECTION, SOLUTION INTRAVENOUS; SUBCUTANEOUS at 08:23

## 2023-03-23 RX ADMIN — HYDROXYUREA 500 MG: 500 CAPSULE ORAL at 10:50

## 2023-03-23 RX ADMIN — ATORVASTATIN CALCIUM 40 MG: 40 TABLET, FILM COATED ORAL at 17:58

## 2023-03-23 RX ADMIN — TORSEMIDE 20 MG: 20 TABLET ORAL at 10:50

## 2023-03-23 ASSESSMENT — COGNITIVE AND FUNCTIONAL STATUS - GENERAL
AFFECT: WFL;ANXIOUS
CLIMB 3 TO 5 STEPS WITH RAILING: 2 - A LOT
MOVING TO AND FROM BED TO CHAIR: 3 - A LITTLE
WALKING IN HOSPITAL ROOM: 3 - A LITTLE
STANDING UP FROM CHAIR USING ARMS: 3 - A LITTLE

## 2023-03-23 NOTE — PROGRESS NOTES
Cardiothoracic Intensivist Progress Note       CARDIOTHORACIC   COVID Status: Negative 3/18/23    IV dye allergy: no    Subjective     History of Present Illness: Laureen Persaud is a 77 y.o. cisgender female with history of worsening aortic and mitral valvular disease admitted for surgical evaluation, chronic atrial fibrillation, and ASHLI that are being treated.                              Review of Systems:                                     Constitutional: frail appearing                                                  Eyes: denies difficulty with vision  Ears, nose, mouth, throat, and face: denies oral discomfort                                        Respiratory: mild shortness of breath with exertion                                  Cardiovascular: denies chest discomfort                                  Gastrointestinal: denies abdominal pain                                    Genitourinary: denies difficulty voiding                                      Hematologic: denies bleeding issues                                Musculoskeletal: denies muscle pain                                      Neurological: generalized weakness                                   Integumentary: multiple skin tears    Medical History: History reviewed. No pertinent past medical history.    Surgical History: History reviewed. No pertinent surgical history.    Prior to Admission medications    Not on File       Allergies: Neomycin, Polymyxin b, Procaine, and Bacitracin    Social History:   Social History     Socioeconomic History   • Marital status:      Spouse name: None   • Number of children: None   • Years of education: None   • Highest education level: None     Social Determinants of Health     Financial Resource Strain: Low Risk  (3/20/2023)    Overall Financial Resource Strain (CARDIA)    • Difficulty of Paying Living Expenses: Not hard at all   Transportation Needs: No Transportation Needs (3/20/2023)    PRAPARE -  Transportation    • Lack of Transportation (Medical): No    • Lack of Transportation (Non-Medical): No   Housing Stability: Unknown (3/20/2023)    Housing Stability Vital Sign    • Unable to Pay for Housing in the Last Year: No    • Unstable Housing in the Last Year: No       Family History: History reviewed. No pertinent family history.    Objective     Vital signs in last 24 hours:  Temp:  [36.4 °C (97.5 °F)-36.6 °C (97.8 °F)] 36.6 °C (97.8 °F)  Heart Rate:  [67-78] 78  Resp:  [16-18] 18  BP: (106-143)/(55-70) 143/65      Intake/Output Summary (Last 24 hours) at 3/23/2023 1034  Last data filed at 3/23/2023 0647  Gross per 24 hour   Intake 118 ml   Output 900 ml   Net -782 ml     Intake/Output this shift:  No intake/output data recorded.    Labs  Lab Results   Component Value Date    WBC 35.38 (HH) 03/23/2023    HGB 11.1 (L) 03/23/2023    HCT 38.0 03/23/2023     03/23/2023    ALT 16 03/21/2023    AST 23 03/21/2023     03/23/2023    K 5.0 03/23/2023     03/23/2023    CREATININE 1.7 (H) 03/23/2023    BUN 74 (H) 03/23/2023    CO2 26 03/23/2023    MG 1.9 03/20/2023    TSH 1.41 03/18/2023    INR 1.5 03/22/2023    HGBA1C 6.4 (H) 03/18/2023    PT 18.3 (H) 03/22/2023    PTT 39 (H) 03/20/2023             Full Code    Head/Ear/Nose/Throat:     NCAT.                               Eyes:     EOMI and PERRL.                     Respiratory:     diminished at the bases b/l.               Cardiovascular:     SR.              Gastrointestinal:     + Bowel sounds and non-tender.                 Genitourinary:     no-deformities.                    Extremities:     +1-2 edema b/l LE.            Musculoskeletal:      No injury or deformity.                   Neurological:     follows commands.       Behavior/Emotional:     appropriate and cooperative.                           Lymph:      No adenopathy noted.                               Skin:      Multiple skin tears and scattered bruising. Purpuric rash on feet.  "Stage 2-3 sacral ulcer (present on admission)     Examination of the patient performed at the bedside. Rounded with ICU team and discussed management/plan with surgical staff. Medications, radiological studies and labs reviewed and discussed with attending of record, Dr. Macey Padgett.    Cardiothoracic Assessment and Plan:    Neurologic: A&Ox3 and pain controlled. History of CVA/TIA. Ambulates with a walker at baseline and has had multiple falls. S/P dental extractions. PT/OT/PM&R to follow.     Cardiovascular: Severe AS and MR/MI undergoing surgical evaluation, also with acute (elevated BNP) HFpEF and chronic atrial fibrillation. Off vasoactive medications and meeting BP goals. Will continue to optimize cardiac medications. Very high mortality risk based on STS calculation and is not a candidate for open surgery. No endocarditis based on LINDY. Awaiting input from [employer] study whether or not to proceed. Appreciate HF input.     Respiratory: I personally reviewed the most recent CXR which small b/l pleural effusions and pulmonary congestion with interval clearing compared to previous exam. Will encourage IS, mobilization and wean O2 PRN. CPAP for ASHLI.      Genitourinary: CKD stage 3a (GFR 45-59 ml/min). BUN elevated and will continue to monitor. There could be a cardiorenal component vs contrast involvement. No davenport. Making adequate urine output. Will avoid nephrotoxic medications. Appreciate Nephrology input.     Endocrine: Follow [facility]. DM management. Appreciate Endocrinology input.     Hematologic: No evidence active bleeding. Loveno for DVT ppx. Was on apixaban for a-fib as outpatient and currently on hold. Will considering resuming anticoagulation once INR drifts down however due to falls and skin tears/bleeding might not be a good candidate moving forward. \"Anemia of chronic disease.History of JAK2 gene mutation and appreciate Heme/Onc input; will continue hydroxyurea.      Infectious Disease: Was on " azithromycin and ceftriaxone for possible endocarditis/pneumonia however ID OK monitoring off antibiotics for now since LINDY negative. Cultures pending. Appreciate Infectious Disease input.      Fluids, Electrolytes: Electrolytes replaced per protocol. Goal for net negative fluid balance.     Gastrointestinal: PO as tolerated. GI ppx. History of ascites but unknown cause and has had paracentesis recently. Hepatitis panel negative.     Skin: OOB, PT when able. Wound care for skin tears and sacral ulcer.      Tubes, lines and drains: Will remove superfluous invasive monitors PRN.    Disposition: Guarded. Continue close monitoring on stepdown unit.           Carlitos Reddy,   3/23/2023

## 2023-03-23 NOTE — PROGRESS NOTES
Ms. Persaud would like a visit from the [facility]. Consult placed for [facility] visit. - Rev. Kee Momin, Spiritual Care Coordinator   [id number] [phone]

## 2023-03-23 NOTE — PROGRESS NOTES
CARDIOLOGY INPATIENT FOLLOW-UP NOTE     Patient: Laureen Persaud YOB: 1945   MRN: [id number] Date of Admission: 3/18/2023   Length of Stay: 5      ASSESSMENT AND RECOMMENDATIONS     Assessment:  This is a 77 y.o. female being with PMH Afib (apixan), DCCV x 3, HFpEF, HTN, HLD, DM II (non-insulin dep), anxiety, Severe AS, Progressive Mitral Stenosis, Severe Mitral Regurgitation, Fe def anemia, JAK2 gene mutation, ASHLI w/ CPAP, CVA/TIA 2020, PAH consulted for HFpEF, pHTN.     # HFpEF (EF 55-60%%)   # pHTN  # Severe AS  # Progressive MS  # Severe MR  - Etiology for exacerbation: most likely progressive valvular disease (MS/MR > AS).  - OSH Cath with RA 22/17, /14, /44, PCWP: 39 and CO CI 4.5/2.9  - Repeat after diuresis here: RA: 8, PA: 92/32 (52), PCWP: 31, CI: 4.7   Severe MR. Progressive MS. Severe AS. Post-capillary Pulmonary HTN. Preserved LVEF.   - Volume status now warm & mildly hypovolemic  -  at presentation  - TTE (last 3/11/23): showed EF55-60%   - LINDY 3/20/21: at least moderate AS, progressive MS  - CTA TAVR with AV calcium score 1800. Triple vessel disease with calcium score > 3000.  Recommend:  - Discussed with Structural Heart Team: options include TAVR plus investigation of [place] candidacy vs. TAVR then possible TAVR in MAC.   - Strict I/Os, Daily Weights, daily electrolytes replete for goal K > 4.0, Mg > 2.0, Tele  - Supplemental O2 to maintain SpO2 >90%, wean as able  - Admission meds:   - SGLT2i: recommend initiation prior to discharge, but would hold off on initiation pending OR planning  - Diuretic: Would hold diuretic today    # C/f subacute MV endocarditis, resolved  - No evidence of vegetation on LINDY from 3/20    --  Bear Barros, DO    INTERVAL/SUBJECTIVE     - S/p CTA TAVR with AV calcium score 1800. Triple vessel disease with calcium score > 3000.  - LINDY at least moderate AS, progressive MS  - RHC RA 8, PA 92/32 (52), PCW 31 with CI 4.9     Seen and examined  "sitting up in bedside chair. She reports feeling a little more short of breath since her supplemental oxygen was turned down. She reports that she hasn't worked with PT/OT today or yesterday. She denies chest pain/pressure, or light-headedness at rest or with minimal exertion such as transfer from bed to chair.       PHYSICAL EXAMINATION     Vitals:   Visit Vitals  /61 (BP Location: Right upper arm, Patient Position: Sitting)   Pulse 76   Temp 36.6 °C (97.8 °F) (Temporal)   Resp 18   Ht 1.461 m (4' 9.5\")   Wt 63.6 kg (140 lb 4.8 oz)   SpO2 95%   BMI 29.83 kg/m²     Temp:  [36.3 °C (97.3 °F)-36.6 °C (97.8 °F)] 36.6 °C (97.8 °F)  Heart Rate:  [68-78] 76  Resp:  [16-18] 18  BP: ()/(45-65) 122/61  I/O last 3 completed shifts:  In: 118 [P.O.:118]  Out: 1400 [Urine:1400]  Wt Readings from Last 3 Encounters:   03/23/23 63.6 kg (140 lb 4.8 oz)       General: No acute distress. Frail elderly female.  HEENT: No corneal arcus or xanthelasmas. Sclerae are anicteric.   Neck: JVP: < 6 cm H2O  Heart: Regular rate and rhythm. No murmurs.  Chest: Symmetrical.  Lungs: Crackles bilaterally.  Abdomen: Minimally distended.  Extremities: No cyanosis or clubbing. No lower extremity edema. Distal pulses are easily palpable.  Skin: Warm and dry and well perfused.  Neurologic: Alert and appropriate.  Psychiatric: Normal affect.      LABORATORY DATA     Results from last 7 days   Lab Units 03/23/23  0355 03/22/23  0341 03/21/23  0339 03/19/23  0414 03/18/23  1751   SODIUM mEQ/L 137 137 137   < > 135*   CO2 mEQ/L 26 25 23   < > 24   BUN mg/dL 74* 64* 62*   < > 70*   CREATININE mg/dL 1.7* 1.4* 1.4*   < > 1.1   CALCIUM mg/dL 8.2* 8.3* 7.8*   < > 8.2*   ALT IU/L  --   --  16  --  20   AST IU/L  --   --  23  --  29    < > = values in this interval not displayed.     Results from last 7 days   Lab Units 03/23/23  0355 03/22/23  0341 03/21/23  0339   WBC K/uL 35.38* 36.35* 32.32*   PLATELETS K/uL 366 410* 368     Results from last 7 days "   Lab Units 03/22/23  0341 03/20/23  1032 03/18/23  1751   PTT sec  --  39* 44*   INR  1.5 1.7 2.6   PROTIME sec 18.3* 20.0* 27.2*           No lab exists for component: CPK  Results from last 7 days   Lab Units 03/18/23  1751   TSH mIU/L 1.41           No lab exists for component: CHOLCALCLDL      Results from last 7 days   Lab Units 03/18/23  1751   BNP pg/mL 912*       ELECTROCARDIOGRAPHIC ASSESSMENTS     ECG   NSR     Telemetry  SR     DIAGNOSTIC IMAGING / PROCEDURES     LINDY 3/20/21 @ [facility] [place]:  The risks of the procedure were discussed with the patient and informed consent was obtained. The patient was sedated under anesthesia guidance. The LINDY probe was inserted without difficulty and the patient tolerated the procedure well.     1. Normal left ventricular cavity size.  Normal wall thickness.  Preserved left ventricular systolic function. LVEF 65%.  Abnormal septal motion, otherwise no regional wall motion abnormalities.   2.  The aortic valve is tricuspid and heavily calcified.  There is a small mobile echodensity on the aortic leaflet that is most likely related to valvular degeneration/Lambl's excrescence (clip 12).  Less likely vegetation.  At least moderate aortic stenosis (peak velocity of 3.5 m/s, mean gradient of 23 mmHg). Correlate with transthoracic echocardiography. Mild aortic regurgitation. Normal visualized thoracic aortic dimensions. Simple atheroma in the visualized portions of the descending thoracic aorta without complex elements.   3. The mitral valve is thickened and sclerotic.  Mitral annular calcification.  The subvalvular apparatus is calcified. Progressive mitral stenosis with mean gradient of 4 mmHg at 72 bpm. Severe mitral regurgitation due to P2 flail and a restricted P3 scallop.  These lesions resulted in two distinct jets of mitral regurgitation (largest PISA results in an EROA of 43 cm2 and regurgitant volume of 71 cc). There is systolic flow reversal of pulmonary vein flow.   4.  Dilated left atrium. No thrombus or mass seen in the left atrium or left atrial appendage. Left atrial appendage peak emptying velocity 72 cm/s. No evidence of ASD/PFO by color Doppler.    5. Mildly dilated right ventricular cavity (4.2 x 3.8 cm) with preserved systolic function. Dilated right atrium. Prominent eustachian valve/renuka terminalis.   6. Thickened tricuspid valve leaflets. Moderate tricuspid regurgitation with estimate right ventricular systolic pressure of 69 mmHg plus right atrial pressure.  7. Grossly normal pulmonic valve. Tace pulmonic regurgitation.   8. No pericardial effusion.   9. No previous study available for comparison.       Transthoracic Echo @ OSH:   3/11/23: EF 55-60%, Severe AS with pgradient 86.9, mean gradient 46.4, trace AI.   Moderate MS, mean gradient 7.6, moderate MR  Mild TR  PAPs: 54/4     Complete Cardiac Cath 3/15/23 @ OSH:  Hemodynamics:  RA 22/17, /14, /44, PCWP: 39  PA sat 79 %  CO CI 4.5/2.9  AV peak to peak grad 34, mean 26, MICHAEL 0.9     MV mean grad 15, valve area 1.1     Coronary angiography @ OSH: mild  Non-obstructive CAD    RHC 3/21/23 @ [facility]:  1. Elevated right and left heart filling pressures (RA 12, PA 92/32 ( 52) , and PCW 31 mm Hg).  2. Elevated cardiac output and index of 7.2 L/min and 4.7 L/min/m2 respectively.   3. PVR 3 Wood Units and  TPG 2 mmHg.   4. No evidence of left to right shunt by sequential venous saturation measurements.         Radiologic Studies:  CXR: The cardiac silhouette and mediastinal contour are stable demonstrating  cardiomegaly. No significant interval change in pulmonary vascular congestion  and diffuse bilateral interstitial prominence due to edema and/or infiltrates.  There is probably unchanged left-sided pleural parenchymal disease. No evidence  of a pneumothorax.  IMPRESSION:  No significant interval change.    CTA TAVR:  SUMMARY:  1.  Tricuspid aortic valve with thickened and calcified cusps.  Cine and  tomographic  features are suggestive of severe aortic stenosis.  Aortic valve  calcium score = 1836.  Aortic valve area by direct planimetry = 1.08 cm2.  2. Normal left ventricular cavity size, wall thickness and systolic function.  Abnormal septal motion consistent with right ventricular pressure overload.  3.  Mildly dilated right ventricle with normal systolic function.  4. Severe biatrial enlargement.  5. Mildly thickened mitral valve leaflets.  Severe posterior mitral annular  calcification.  Bileaflet restriction (posterior > anterior).  Anterior leaflet  override present.  Mitral valve area = 5.9 cm2 by direct planimetry.  Recommend  correlation with echocardiography for the presence of mitral regurgitation.  6. Three-vessel coronary atherosclerosis.  Coronary calcium score = 3134.    Signed:      Bear Barros DO

## 2023-03-23 NOTE — PROGRESS NOTES
Nephrology Progress Note:    Subjective   Patient seen this morning. Complains of some cough and mucus.  Also, has some bleeding after removal of teeth. Denies any worsening shortness of breath, voiding difficulty or chest pain. All other ROS negative.    Interval History: No acute overnight event.    Objective   Vital signs in last 24 hours:  Temp:  [36.3 °C (97.3 °F)-36.6 °C (97.8 °F)] 36.6 °C (97.8 °F)  Heart Rate:  [68-78] 76  Resp:  [16-18] 18  BP: ()/(45-65) 122/61     Intake & Output:    Intake/Output Summary (Last 24 hours) at 3/23/2023 0917  Last data filed at 3/23/2023 0647  Gross per 24 hour   Intake 118 ml   Output 900 ml   Net -782 ml     Physical Exam:  General Appearance:  Awake, alert, not in acute distress    Head:  Normocephalic, without obvious abnormality, atraumatic   Eyes:  Conjunctiva clear, anicteric sclera.      Ears:  No external abnormalities   Throat: Moist oral mucosa   Neck: Supple, symmetrical   Lungs:    Bilateral scattered rales, respirations unlabored   Heart:  S1 and S2 heard.  Systolic murmur present.   Abdomen: Soft, non tender, non distended, bowel sound heard   Genitourinary:  No palpable bladder   Extremities:  Musculoskeletal: No cyanosis. Bilateral lower extremity mild edema  No injury or deformity   Skin: No rashes    Neurologic:  Behavior/  Emotional: Oriented x 3, non focal  Appropriate, cooperative         Current Medications:  Current Facility-Administered Medications   Medication Dose Route Frequency   • amiodarone  200 mg oral Daily   • [Provider Managed Hold] apixaban  5 mg oral BID   • atorvastatin  40 mg oral Daily (6p)   • atropine  0.5 mg intravenous q5 min PRN   • glucose  16-32 g of dextrose oral PRN    Or   • dextrose  15-30 g of dextrose oral PRN    Or   • glucagon  1 mg intramuscular PRN    Or   • dextrose 50 % in water (D50)  25 mL intravenous PRN   • enoxaparin  30 mg subcutaneous Daily (6p)   • [Provider Managed Hold] furosemide  40 mg intravenous  BID (am, 4p)   • hydroxyurea  500 mg oral Daily   • insulin lispro U-100  3 Units subcutaneous TID with meals   • magnesium oxide  400 mg oral Daily   • nitroglycerin  0.4 mg sublingual q5 min PRN   • [Provider Managed Hold] potassium chloride  20 mEq oral BID (am, 4p)   • torsemide  20 mg oral Daily       Labs:  BUN   Date Value Ref Range Status   03/23/2023 74 (H) 8 - 20 mg/dL Final     Creatinine   Date Value Ref Range Status   03/23/2023 1.7 (H) 0.6 - 1.1 mg/dL Final     Sodium   Date Value Ref Range Status   03/23/2023 137 136 - 144 mEQ/L Final     Potassium   Date Value Ref Range Status   03/23/2023 5.0 3.6 - 5.1 mEQ/L Final     CO2   Date Value Ref Range Status   03/23/2023 26 22 - 32 mEQ/L Final     Magnesium   Date Value Ref Range Status   03/20/2023 1.9 1.8 - 2.5 mg/dL Final     Comment:     SLIGHT HEMOLYSIS, RESULT MAY BE INCREASED.       Calcium   Date Value Ref Range Status   03/23/2023 8.2 (L) 8.9 - 10.3 mg/dL Final     Albumin   Date Value Ref Range Status   03/21/2023 2.0 (L) 3.4 - 5.0 g/dL Final     WBC   Date Value Ref Range Status   03/23/2023 35.38 (HH) 3.80 - 10.50 K/uL Final     Comment:     This result has been called to GABRIEL PATEL by Ling Stark on 03 23 23 at 04:20, and has been read back.      Hemoglobin   Date Value Ref Range Status   03/23/2023 11.1 (L) 11.8 - 15.7 g/dL Final     Platelets   Date Value Ref Range Status   03/23/2023 366 150 - 369 K/uL Final     Lab Results   Component Value Date    CREATININE 1.7 (H) 03/23/2023    CREATININE 1.4 (H) 03/22/2023    CREATININE 1.4 (H) 03/21/2023    CREATININE 1.3 (H) 03/20/2023    CREATININE 1.2 (H) 03/19/2023    CREATININE 1.1 03/18/2023     I have reviewed the pertinent patient's labs.    Imaging:  I have reviewed the pertinent patient's imaging results.     Assessment/Plan     Laureen Persaud is a 77 y.o. female with PMHx of  CKD II-IIIa, HTN, DM II, HLD, ASHLI, CVA/TIA  (2020), HFpEF, PAH, valvular heart disease (severe AAS,  moderate-severe MS, moderate-severe MR), A-fib, iron deficiency anemia, JAK2 gene mutation initially admitted to [facility] on 3/10/2020 with a complaint of weakness, fatigue, and was empirically treated for pneumonia along with CHF.  Patient had a TTE done which revealed worsening AV gradient.  Subsequently, she was transported to [facility] and underwent cardiac catheterization which confirmed significant aortic and mitral valve disease.  She was thought to be high risks by CT surgery at [facility] and subsequently transferred to [facility] [place] for further surgical evaluation on 3/18/2023.        1.  Likely CKD II-IIIa  -Had creatinine of 1.0-1.1 mg/dL as of 2021.  Subsequently, creatinine was 1.2 mg/dL as of 3/18/2023 at outside hospital and was 1.1 mg/dL on 3/18/2023 when arrived to [facility] [place].  -UA on 3/20/2023 bland without any hematuria, pyuria, proteinuria -ruling out any other alarming GN or interstitial disease  -Likely has some degree of CKD on the basis of hypertension, chronic CRS physiology     2.  GRACE  -Likely related to CRS physiology and likely worsened from IV contrast exposure.  -Had a cardiac catheterization at [facility] -unsure about date.  Subsequently, received on 100 cc of IV contrast dye on 3/20/2023 for CT TAVR  -Presented with a creatinine of 1.1 mg/dL on 3/18/2023 and up to 1.3 mg/dL on 3/20/2023 -nephrology consulted  -UA on 3/20/2023 bland  -Ultrasound on 3/20/2023 without hydronephrosis  - RHC on 3/21/2023: RA 8, PA 92/32 (52), PCW 31 with CI 4.9      -Labs from this morning reviewed. Creatinine up to 1.7. Electrolytes reasonable  -Urine output of 1.4 L from yesterday recorded     Recommend:  -Continue supportive measures as you are.    -Can continue on prn diuretics - defer to cardiology  -Can decrease dose of atorvastatin to prior regular dose.  -Continue to avoid further nephrotoxins including contrast dye and other nephrotoxins like NSAIDs, sodium phosphate enema,  vitamin C, etc.  -Avoid hypotension and maintain MAP > 65 mmHg.  -Follow daily BMP while in hospital  -No indication for renal replacement therapy.     3.  HFpEF, valvular heart disease, PAH  -Noted to have severe AS, moderate-severe MS, moderate-severe MR  -Being work-up for possible surgical intervention - ? option includes TAVR + investigation of [place] candidacy vs. TAVR then possible TAVR in MAC as per structural heart team.   -As per cardiology, CT surgery.     4.  A-fib  -s/p DCCV x3  -On amiodarone  -As per cardiology, primary team.     5.  History of iron deficiency anemia, JAK2 gene mutation myeloproliferative disorder  -Noted to have significant leukocytosis  -Started on Hydrea 500 mg every other day  -Further work-up/management as per primary team, hematology/oncology.     6.  Right renal mass  -Renal ultrasound on 3/20/2023 noted to have solid 1.9 cm right renal mass, suspicious for renal malignancy.    Recommend:  -Will need urology evaluation -defer timing to primary team     7.  Nephrolithiasis.  -Renal ultrasound on 3/20/2023 noted to have nonobstructing 1 cm right mid renal calculus  -Outpatient follow-up with nephrology and urology upon discharge     8.  Possible pneumonia  -Was covered with antibiotics at outside hospital.  Evaluated by ID and recommended to observe off antibiotics.  -As per ID, primary team.     9.  History of CVA/TIA, ASHLI, HLD  -As per primary team.     -Discussed with Dr. Reddy this morning

## 2023-03-24 ENCOUNTER — APPOINTMENT (INPATIENT)
Dept: RADIOLOGY | Facility: HOSPITAL | Age: 78
DRG: 286 | End: 2023-03-24
Attending: NURSE PRACTITIONER
Payer: MEDICARE

## 2023-03-24 LAB
ANION GAP SERPL CALC-SCNC: 12 MEQ/L (ref 3–15)
BUN SERPL-MCNC: 76 MG/DL (ref 8–20)
C DIFF TOX GENS STL QL NAA+PROBE: NEGATIVE
CALCIUM SERPL-MCNC: 8.2 MG/DL (ref 8.9–10.3)
CHLORIDE SERPL-SCNC: 101 MEQ/L (ref 98–109)
CO2 SERPL-SCNC: 23 MEQ/L (ref 22–32)
CREAT SERPL-MCNC: 2.1 MG/DL (ref 0.6–1.1)
ERYTHROCYTE [DISTWIDTH] IN BLOOD BY AUTOMATED COUNT: 21.7 % (ref 11.7–14.4)
GFR SERPL CREATININE-BSD FRML MDRD: 22.8 ML/MIN/1.73M*2
GLUCOSE BLD-MCNC: 134 MG/DL (ref 70–99)
GLUCOSE BLD-MCNC: 138 MG/DL (ref 70–99)
GLUCOSE BLD-MCNC: 139 MG/DL (ref 70–99)
GLUCOSE BLD-MCNC: 170 MG/DL (ref 70–99)
GLUCOSE BLD-MCNC: 96 MG/DL (ref 70–99)
GLUCOSE SERPL-MCNC: 75 MG/DL (ref 70–99)
HCT VFR BLDCO AUTO: 37.7 % (ref 35–45)
HGB BLD-MCNC: 11.4 G/DL (ref 11.8–15.7)
LABORATORY COMMENT REPORT: NORMAL
MCH RBC QN AUTO: 26.8 PG (ref 28–33.2)
MCHC RBC AUTO-ENTMCNC: 30.2 G/DL (ref 32.2–35.5)
MCV RBC AUTO: 88.7 FL (ref 83–98)
PDW BLD AUTO: 11.8 FL (ref 9.4–12.3)
PLATELET # BLD AUTO: 409 K/UL (ref 150–369)
POCT TEST: ABNORMAL
POCT TEST: NORMAL
POTASSIUM SERPL-SCNC: 5.3 MEQ/L (ref 3.6–5.1)
RBC # BLD AUTO: 4.25 M/UL (ref 3.93–5.22)
SODIUM SERPL-SCNC: 136 MEQ/L (ref 136–144)
WBC # BLD AUTO: 37.56 K/UL (ref 3.8–10.5)

## 2023-03-24 PROCEDURE — 21400000 HC ROOM AND CARE CCU/INTERMEDIATE

## 2023-03-24 PROCEDURE — 80048 BASIC METABOLIC PNL TOTAL CA: CPT | Performed by: NURSE PRACTITIONER

## 2023-03-24 PROCEDURE — 25000000 HC PHARMACY GENERAL: Performed by: ANESTHESIOLOGY

## 2023-03-24 PROCEDURE — 99232 SBSQ HOSP IP/OBS MODERATE 35: CPT | Performed by: INTERNAL MEDICINE

## 2023-03-24 PROCEDURE — 71045 X-RAY EXAM CHEST 1 VIEW: CPT

## 2023-03-24 PROCEDURE — 99233 SBSQ HOSP IP/OBS HIGH 50: CPT | Performed by: ANESTHESIOLOGY

## 2023-03-24 PROCEDURE — 87493 C DIFF AMPLIFIED PROBE: CPT | Performed by: NURSE PRACTITIONER

## 2023-03-24 PROCEDURE — 63700000 HC SELF-ADMINISTRABLE DRUG: Performed by: ANESTHESIOLOGY

## 2023-03-24 PROCEDURE — 63600000 HC DRUGS/DETAIL CODE: Performed by: NURSE PRACTITIONER

## 2023-03-24 PROCEDURE — 85027 COMPLETE CBC AUTOMATED: CPT | Performed by: NURSE PRACTITIONER

## 2023-03-24 PROCEDURE — 63700000 HC SELF-ADMINISTRABLE DRUG: Performed by: NURSE PRACTITIONER

## 2023-03-24 PROCEDURE — 63600000 HC DRUGS/DETAIL CODE

## 2023-03-24 PROCEDURE — 36415 COLL VENOUS BLD VENIPUNCTURE: CPT | Performed by: NURSE PRACTITIONER

## 2023-03-24 RX ORDER — BUMETANIDE 0.25 MG/ML
2 INJECTION, SOLUTION INTRAMUSCULAR; INTRAVENOUS 2 TIMES DAILY
Status: DISCONTINUED | OUTPATIENT
Start: 2023-03-24 | End: 2023-03-28

## 2023-03-24 RX ADMIN — INSULIN LISPRO 3 UNITS: 100 INJECTION, SOLUTION INTRAVENOUS; SUBCUTANEOUS at 19:01

## 2023-03-24 RX ADMIN — ATORVASTATIN CALCIUM 40 MG: 40 TABLET, FILM COATED ORAL at 17:10

## 2023-03-24 RX ADMIN — HYDROXYUREA 500 MG: 500 CAPSULE ORAL at 09:22

## 2023-03-24 RX ADMIN — AMIODARONE HYDROCHLORIDE 200 MG: 200 TABLET ORAL at 09:22

## 2023-03-24 RX ADMIN — INSULIN LISPRO 3 UNITS: 100 INJECTION, SOLUTION INTRAVENOUS; SUBCUTANEOUS at 13:12

## 2023-03-24 RX ADMIN — MAGNESIUM OXIDE TAB 400 MG (241.3 MG ELEMENTAL MG) 400 MG: 400 (241.3 MG) TAB at 09:22

## 2023-03-24 RX ADMIN — BUMETANIDE 2 MG: 0.25 INJECTION INTRAMUSCULAR; INTRAVENOUS at 14:15

## 2023-03-24 RX ADMIN — ENOXAPARIN SODIUM 30 MG: 30 INJECTION SUBCUTANEOUS at 17:11

## 2023-03-24 RX ADMIN — INSULIN LISPRO 3 UNITS: 100 INJECTION, SOLUTION INTRAVENOUS; SUBCUTANEOUS at 07:56

## 2023-03-24 NOTE — PROGRESS NOTES
Nephrology Progress Note:    Subjective   Patient seen this morning. Appetite was poor and did not feel like eating breakfast.  Denies any worsening shortness of breath.  Denies any vomiting, diarrhea, chest pain. All other ROS negative.    Interval History: No acute overnight event.    Objective   Vital signs in last 24 hours:  Temp:  [36.3 °C (97.3 °F)-36.8 °C (98.2 °F)] 36.3 °C (97.3 °F)  Heart Rate:  [66-88] 72  Resp:  [16-18] 18  BP: (113-136)/(56-63) 136/63     Intake & Output:    Intake/Output Summary (Last 24 hours) at 3/24/2023 1252  Last data filed at 3/24/2023 0500  Gross per 24 hour   Intake 780 ml   Output 300 ml   Net 480 ml     Physical Exam:  General Appearance:  Awake, alert, not in acute distress    Head:  Normocephalic, without obvious abnormality, atraumatic   Eyes:  Conjunctiva clear, anicteric sclera.      Ears:  No external abnormalities   Throat: Moist oral mucosa   Neck: Supple, symmetrical   Lungs:    Bilateral scattered rales, respirations unlabored   Heart:  S1 and S2 heard.  Systolic murmur present.   Abdomen: Soft, non tender, non distended, bowel sound heard   Genitourinary:  No palpable bladder   Extremities:  Musculoskeletal: No cyanosis. Bilateral lower extremity mild edema  No injury or deformity   Skin: No rashes    Neurologic:  Behavior/  Emotional: Oriented x 3, non focal  Appropriate, cooperative         Current Medications:  Current Facility-Administered Medications   Medication Dose Route Frequency   • amiodarone  200 mg oral Daily   • [Provider Managed Hold] apixaban  5 mg oral BID   • atorvastatin  40 mg oral Daily (6p)   • atropine  0.5 mg intravenous q5 min PRN   • glucose  16-32 g of dextrose oral PRN    Or   • dextrose  15-30 g of dextrose oral PRN    Or   • glucagon  1 mg intramuscular PRN    Or   • dextrose 50 % in water (D50)  25 mL intravenous PRN   • enoxaparin  30 mg subcutaneous Daily (6p)   • hydroxyurea  500 mg oral Daily   • insulin lispro U-100  3 Units  subcutaneous TID with meals   • magnesium oxide  400 mg oral Daily   • nitroglycerin  0.4 mg sublingual q5 min PRN   • [Provider Managed Hold] potassium chloride  20 mEq oral BID (am, 4p)   • [Provider Managed Hold] torsemide  20 mg oral Daily       Labs:  BUN   Date Value Ref Range Status   03/24/2023 76 (H) 8 - 20 mg/dL Final     Creatinine   Date Value Ref Range Status   03/24/2023 2.1 (H) 0.6 - 1.1 mg/dL Final     Sodium   Date Value Ref Range Status   03/24/2023 136 136 - 144 mEQ/L Final     Potassium   Date Value Ref Range Status   03/24/2023 5.3 (H) 3.6 - 5.1 mEQ/L Final     CO2   Date Value Ref Range Status   03/24/2023 23 22 - 32 mEQ/L Final     Magnesium   Date Value Ref Range Status   03/20/2023 1.9 1.8 - 2.5 mg/dL Final     Comment:     SLIGHT HEMOLYSIS, RESULT MAY BE INCREASED.       Calcium   Date Value Ref Range Status   03/24/2023 8.2 (L) 8.9 - 10.3 mg/dL Final     Albumin   Date Value Ref Range Status   03/21/2023 2.0 (L) 3.4 - 5.0 g/dL Final     WBC   Date Value Ref Range Status   03/24/2023 37.56 (HH) 3.80 - 10.50 K/uL Final     Comment:     This result has been called to ROSITA ONEILL by Alondra Arguello on 03 24 23 at 04:42, and has been read back.      Hemoglobin   Date Value Ref Range Status   03/24/2023 11.4 (L) 11.8 - 15.7 g/dL Final     Platelets   Date Value Ref Range Status   03/24/2023 409 (H) 150 - 369 K/uL Final     Lab Results   Component Value Date    CREATININE 2.1 (H) 03/24/2023    CREATININE 1.7 (H) 03/23/2023    CREATININE 1.4 (H) 03/22/2023    CREATININE 1.4 (H) 03/21/2023    CREATININE 1.3 (H) 03/20/2023    CREATININE 1.2 (H) 03/19/2023    CREATININE 1.1 03/18/2023     I have reviewed the pertinent patient's labs.    Imaging:  I have reviewed the pertinent patient's imaging results.     Assessment/Plan     Laureen Persaud is a 77 y.o. female with PMHx of  CKD II-IIIa, HTN, DM II, HLD, ASHLI, CVA/TIA  (2020), HFpEF, PAH, valvular heart disease (severe AAS, moderate-severe MS, moderate-severe  DIEGO, A-fib, iron deficiency anemia, JAK2 gene mutation initially admitted to [facility] on 3/10/2020 with a complaint of weakness, fatigue, and was empirically treated for pneumonia along with CHF.  Patient had a TTE done which revealed worsening AV gradient.  Subsequently, she was transported to [facility] and underwent cardiac catheterization which confirmed significant aortic and mitral valve disease.  She was thought to be high risks by CT surgery at [facility] and subsequently transferred to [facility] [place] [facility] for further surgical evaluation on 3/18/2023.        1.  Likely CKD II-IIIa  -Had creatinine of 1.0-1.1 mg/dL as of 2021.  Subsequently, creatinine was 1.2 mg/dL as of 3/18/2023 at outside hospital and was 1.1 mg/dL on 3/18/2023 when arrived to [facility].  -UA on 3/20/2023 bland without any hematuria, pyuria, proteinuria -ruling out any other alarming GN or interstitial disease  -Likely has some degree of CKD on the basis of hypertension, chronic CRS physiology     2.  GRACE  -Likely related to CRS physiology and worsened from IV contrast exposure.  -Had a cardiac catheterization at [facility] -unsure about date.  Subsequently, received on 100 cc of IV contrast dye on 3/20/2023 for CT TAVR  -Presented with a creatinine of 1.1 mg/dL on 3/18/2023 and up to 1.3 mg/dL on 3/20/2023 -nephrology consulted  -UA on 3/20/2023 bland  -Ultrasound on 3/20/2023 without hydronephrosis  - RHC on 3/21/2023: RA 8, PA 92/32 (52), PCW 31 with CI 4.9      -Labs from this morning reviewed. Creatinine slowly continues to rise and up to 2.1 mg/dL.  Potassium mildly elevated at 5.3.  -Urine output of 300 ml from overnight and had 1 unmeasured during daytime.  -Currently saturating well on 2 L/min via nasal cannula     Recommend:  -Continue supportive measures as you are.    -No objection to continue on prn diuretics - defer to cardiology  -Keep her on 2 g potassium restricted diet.  Can use Lokelma prn for K >  5.4  -Continue to avoid further nephrotoxins including contrast dye and other nephrotoxins like NSAIDs, sodium phosphate enema, vitamin C, etc.  -Avoid hypotension and maintain MAP > 65 mmHg.  -Follow daily BMP while in hospital  -No indication for renal replacement therapy.     3.  HFpEF, valvular heart disease, PAH  -Noted to have severe AS, moderate-severe MS, moderate-severe MR  -Being work-up for possible surgical intervention - ? option includes TAVR + investigation of [place] candidacy vs. TAVR then possible TAVR in MAC as per structural heart team.   -As per cardiology, CT surgery.     4.  A-fib  -s/p DCCV x3  -On amiodarone  -As per cardiology, primary team.     5.  History of iron deficiency anemia, JAK2 gene mutation myeloproliferative disorder  -Noted to have significant leukocytosis  -Started on Hydrea 500 mg every other day  -Further work-up/management as per primary team, hematology/oncology.     6.  Right renal mass  -Renal ultrasound on 3/20/2023 noted to have solid 1.9 cm right renal mass, suspicious for renal malignancy.    Recommend:  -Will need urology evaluation -defer timing to primary team     7.  Nephrolithiasis.  -Renal ultrasound on 3/20/2023 noted to have nonobstructing 1 cm right mid renal calculus  -Outpatient follow-up with nephrology and urology upon discharge     8.  Possible pneumonia  -Was covered with antibiotics at outside hospital.  Evaluated by ID and recommended to observe off antibiotics.  -As per ID, primary team.     9.  History of CVA/TIA, ASHLI, HLD  -As per primary team.     -Discussed with Dr. Reddy this morning  -Dr. Mckeon covering over the weekend

## 2023-03-24 NOTE — PROGRESS NOTES
CARDIOLOGY INPATIENT FOLLOW-UP NOTE     Patient: Laureen Persaud YOB: 1945   MRN: [id number] Date of Admission: 3/18/2023   Length of Stay: 6      ASSESSMENT AND RECOMMENDATIONS     Assessment:  This is a 77 y.o. female being with PMH Afib (Apixan), DCCV x 3, HFpEF, HTN, HLD, DM II (non-insulin dep), anxiety, Severe AS, Progressive Mitral Stenosis, Severe Mitral Regurgitation, Fe def anemia, JAK2 gene mutation, ASHLI w/ CPAP, CVA/TIA 2020, PAH consulted for HFpEF, pHTN.     # HFpEF (EF 55-60%%)   # pHTN  # Severe AS  # Progressive MS  # Severe MR  - Etiology for exacerbation: most likely progressive valvular disease (MS/MR > AS).  - OSH Cath with RA 22/17, /14, /44, PCWP: 39 and CO CI 4.5/2.9  - Repeat after diuresis here: RA: 8, PA: 92/32 (52), PCWP: 31, CI: 4.7   Severe MR. Progressive MS. Severe AS. Post-capillary Pulmonary HTN. Preserved LVEF.   - Volume status now warm & mildly hypervolemic  -  at presentation  - TTE (last 3/11/23): showed EF55-60%   - LINDY 3/20/21: at least moderate AS, progressive MS  - CTA TAVR with AV calcium score 1800. Triple vessel disease with calcium score > 3000.  Recommend:  - Discussed with Structural Heart Team: Not candidate for Apollo trial. TAVR + TAVR valve in mitral position vs. TAVR then possible TAVR in MAC.   - Strict I/Os, Daily Weights, daily electrolytes replete for goal K > 4.0, Mg > 2.0, Tele  - Supplemental O2 to maintain SpO2 >90%, wean as able  - Admission meds:   - SGLT2i: recommend initiation prior to discharge, but would hold off on initiation pending OR planning  - Diuretic: Would dose IV diuretic today    # C/f subacute MV endocarditis, resolved  - No evidence of vegetation on LINDY from 3/20    # GRACE on CKD  - Suspect Contrast-associated Nephropathy given timing  - GRACE doesn't appear to be related to cardiac output or filling pressures at this time    --  Bear Barros, DO    INTERVAL/SUBJECTIVE     - S/p CTA TAVR with AV calcium  "score 1800. Triple vessel disease with calcium score > 3000.  - LINDY at least moderate AS, progressive MS  - RHC RA 8, PA 92/32 (52), PCW 31 with CI 4.9     Seen and examined sitting up in bed. She reports feeling upset about options for her cardiac valves. She denies chest pain/pressure, or light-headedness at rest.      PHYSICAL EXAMINATION     Vitals:   Visit Vitals  /63 (BP Location: Right upper arm, Patient Position: Sitting)   Pulse 72   Temp 36.3 °C (97.3 °F) (Temporal)   Resp 18   Ht 1.461 m (4' 9.5\")   Wt 63.9 kg (140 lb 14.4 oz)   SpO2 99%   BMI 29.96 kg/m²     Temp:  [36.3 °C (97.3 °F)-36.8 °C (98.2 °F)] 36.3 °C (97.3 °F)  Heart Rate:  [66-88] 72  Resp:  [16-18] 18  BP: (113-136)/(56-63) 136/63  I/O last 3 completed shifts:  In: 980 [P.O.:980]  Out: 600 [Urine:600]  Wt Readings from Last 3 Encounters:   03/24/23 63.9 kg (140 lb 14.4 oz)       General: No acute distress. Frail elderly female.  HEENT: No corneal arcus or xanthelasmas. Sclerae are anicteric.   Neck: JVP: 10-11 cm H2O  Heart: Regular rate and rhythm. No murmurs.  Chest: Symmetrical.  Lungs: Crackles bilaterally.  Abdomen: Minimally distended.  Extremities: No cyanosis or clubbing. 1+ pitting edema b/l LE. Distal pulses are easily palpable.  Skin: Warm and dry and well perfused.  Neurologic: Alert and appropriate.  Psychiatric: Normal affect.      LABORATORY DATA     Results from last 7 days   Lab Units 03/24/23  0327 03/23/23  0355 03/22/23  0341 03/21/23  0339 03/19/23  0414 03/18/23  1751   SODIUM mEQ/L 136 137 137 137   < > 135*   CO2 mEQ/L 23 26 25 23   < > 24   BUN mg/dL 76* 74* 64* 62*   < > 70*   CREATININE mg/dL 2.1* 1.7* 1.4* 1.4*   < > 1.1   CALCIUM mg/dL 8.2* 8.2* 8.3* 7.8*   < > 8.2*   ALT IU/L  --   --   --  16  --  20   AST IU/L  --   --   --  23  --  29    < > = values in this interval not displayed.     Results from last 7 days   Lab Units 03/24/23  0327 03/23/23  0355 03/22/23  0341   WBC K/uL 37.56* 35.38* 36.35* "   PLATELETS K/uL 409* 366 410*     Results from last 7 days   Lab Units 03/22/23  0341 03/20/23  1032 03/18/23  1751   PTT sec  --  39* 44*   INR  1.5 1.7 2.6   PROTIME sec 18.3* 20.0* 27.2*           No lab exists for component: CPK  Results from last 7 days   Lab Units 03/18/23  1751   TSH mIU/L 1.41           No lab exists for component: CHOLCALCLDL      Results from last 7 days   Lab Units 03/18/23  1751   BNP pg/mL 912*       ELECTROCARDIOGRAPHIC ASSESSMENTS     ECG   NSR     Telemetry  SR     DIAGNOSTIC IMAGING / PROCEDURES     LINDY 3/20/21 @ [facility] [place]:  The risks of the procedure were discussed with the patient and informed consent was obtained. The patient was sedated under anesthesia guidance. The LINDY probe was inserted without difficulty and the patient tolerated the procedure well.     1. Normal left ventricular cavity size.  Normal wall thickness.  Preserved left ventricular systolic function. LVEF 65%.  Abnormal septal motion, otherwise no regional wall motion abnormalities.   2.  The aortic valve is tricuspid and heavily calcified.  There is a small mobile echodensity on the aortic leaflet that is most likely related to valvular degeneration/Lambl's excrescence (clip 12).  Less likely vegetation.  At least moderate aortic stenosis (peak velocity of 3.5 m/s, mean gradient of 23 mmHg). Correlate with transthoracic echocardiography. Mild aortic regurgitation. Normal visualized thoracic aortic dimensions. Simple atheroma in the visualized portions of the descending thoracic aorta without complex elements.   3. The mitral valve is thickened and sclerotic.  Mitral annular calcification.  The subvalvular apparatus is calcified. Progressive mitral stenosis with mean gradient of 4 mmHg at 72 bpm. Severe mitral regurgitation due to P2 flail and a restricted P3 scallop.  These lesions resulted in two distinct jets of mitral regurgitation (largest PISA results in an EROA of 43 cm2 and regurgitant volume of 71 cc). There  is systolic flow reversal of pulmonary vein flow.   4. Dilated left atrium. No thrombus or mass seen in the left atrium or left atrial appendage. Left atrial appendage peak emptying velocity 72 cm/s. No evidence of ASD/PFO by color Doppler.    5. Mildly dilated right ventricular cavity (4.2 x 3.8 cm) with preserved systolic function. Dilated right atrium. Prominent eustachian valve/renuka terminalis.   6. Thickened tricuspid valve leaflets. Moderate tricuspid regurgitation with estimate right ventricular systolic pressure of 69 mmHg plus right atrial pressure.  7. Grossly normal pulmonic valve. Tace pulmonic regurgitation.   8. No pericardial effusion.   9. No previous study available for comparison.       Transthoracic Echo @ OSH:   3/11/23: EF 55-60%, Severe AS with pgradient 86.9, mean gradient 46.4, trace AI.   Moderate MS, mean gradient 7.6, moderate MR  Mild TR  PAPs: 54/4     Complete Cardiac Cath 3/15/23 @ OSH:  Hemodynamics:  RA 22/17, /14, /44, PCWP: 39  PA sat 79 %  CO CI 4.5/2.9  AV peak to peak grad 34, mean 26, MICHAEL 0.9     MV mean grad 15, valve area 1.1     Coronary angiography @ OSH: mild  Non-obstructive CAD    RHC 3/21/23 @ [facility]:  1. Elevated right and left heart filling pressures (RA 12, PA 92/32 ( 52) , and PCW 31 mm Hg).  2. Elevated cardiac output and index of 7.2 L/min and 4.7 L/min/m2 respectively.   3. PVR 3 Wood Units and  TPG 2 mmHg.   4. No evidence of left to right shunt by sequential venous saturation measurements.         Radiologic Studies:  CXR: The cardiac silhouette and mediastinal contour are stable demonstrating  cardiomegaly. No significant interval change in pulmonary vascular congestion  and diffuse bilateral interstitial prominence due to edema and/or infiltrates.  There is probably unchanged left-sided pleural parenchymal disease. No evidence  of a pneumothorax.  IMPRESSION:  No significant interval change.    CTA TAVR:  SUMMARY:  1.  Tricuspid aortic valve with  thickened and calcified cusps.  Cine and  tomographic features are suggestive of severe aortic stenosis.  Aortic valve  calcium score = 1836.  Aortic valve area by direct planimetry = 1.08 cm2.  2. Normal left ventricular cavity size, wall thickness and systolic function.  Abnormal septal motion consistent with right ventricular pressure overload.  3.  Mildly dilated right ventricle with normal systolic function.  4. Severe biatrial enlargement.  5. Mildly thickened mitral valve leaflets.  Severe posterior mitral annular  calcification.  Bileaflet restriction (posterior > anterior).  Anterior leaflet  override present.  Mitral valve area = 5.9 cm2 by direct planimetry.  Recommend  correlation with echocardiography for the presence of mitral regurgitation.  6. Three-vessel coronary atherosclerosis.  Coronary calcium score = 3134.    Signed:      Bear Barros DO

## 2023-03-24 NOTE — PROGRESS NOTES
Cardiothoracic Intensivist Progress Note       CARDIOTHORACIC   COVID Status: Negative 3/18/23    IV dye allergy: no    Subjective     History of Present Illness: Laureen Persaud is a 77 y.o. cisgender female with history of worsening aortic and mitral valvular disease admitted for surgical evaluation, chronic atrial fibrillation, and ASHLI that are being treated.                              Review of Systems:                                     Constitutional: frail appearing                                                  Eyes: denies difficulty with vision  Ears, nose, mouth, throat, and face: denies oral discomfort                                        Respiratory: mild shortness of breath with exertion                                  Cardiovascular: denies chest discomfort                                  Gastrointestinal: denies abdominal pain                                    Genitourinary: denies difficulty voiding                                      Hematologic: denies bleeding issues                                Musculoskeletal: denies muscle pain                                      Neurological: generalized weakness                                   Integumentary: multiple skin tears    Medical History: History reviewed. No pertinent past medical history.    Surgical History: History reviewed. No pertinent surgical history.    Prior to Admission medications    Not on File       Allergies: Neomycin, Polymyxin b, Procaine, and Bacitracin    Social History:   Social History     Socioeconomic History   • Marital status:      Spouse name: None   • Number of children: None   • Years of education: None   • Highest education level: None     Social Determinants of Health     Financial Resource Strain: Low Risk  (3/20/2023)    Overall Financial Resource Strain (CARDIA)    • Difficulty of Paying Living Expenses: Not hard at all   Transportation Needs: No Transportation Needs (3/20/2023)    PRAPARE -  Transportation    • Lack of Transportation (Medical): No    • Lack of Transportation (Non-Medical): No   Housing Stability: Unknown (3/20/2023)    Housing Stability Vital Sign    • Unable to Pay for Housing in the Last Year: No    • Unstable Housing in the Last Year: No       Family History: History reviewed. No pertinent family history.    Objective     Vital signs in last 24 hours:  Temp:  [36.3 °C (97.3 °F)-36.8 °C (98.2 °F)] 36.3 °C (97.3 °F)  Heart Rate:  [66-88] 72  Resp:  [16-18] 18  BP: (113-136)/(56-63) 136/63      Intake/Output Summary (Last 24 hours) at 3/24/2023 1326  Last data filed at 3/24/2023 0500  Gross per 24 hour   Intake 780 ml   Output 300 ml   Net 480 ml     Intake/Output this shift:  No intake/output data recorded.    Labs  Lab Results   Component Value Date    WBC 37.56 (HH) 03/24/2023    HGB 11.4 (L) 03/24/2023    HCT 37.7 03/24/2023     (H) 03/24/2023    ALT 16 03/21/2023    AST 23 03/21/2023     03/24/2023    K 5.3 (H) 03/24/2023     03/24/2023    CREATININE 2.1 (H) 03/24/2023    BUN 76 (H) 03/24/2023    CO2 23 03/24/2023    MG 1.9 03/20/2023    TSH 1.41 03/18/2023    INR 1.5 03/22/2023    HGBA1C 6.4 (H) 03/18/2023    PT 18.3 (H) 03/22/2023    PTT 39 (H) 03/20/2023             Full Code    Head/Ear/Nose/Throat:     NCAT.                               Eyes:     EOMI and PERRL.                     Respiratory:     diminished at the bases b/l.               Cardiovascular:     SR.              Gastrointestinal:     + Bowel sounds and non-tender.                 Genitourinary:     no-deformities.                    Extremities:     +1-2 edema b/l LE.            Musculoskeletal:      No injury or deformity.                   Neurological:     follows commands.       Behavior/Emotional:     appropriate and cooperative.                           Lymph:      No adenopathy noted.                               Skin:      Multiple skin tears and scattered bruising. Purpuric rash on  "feet. Stage 2-3 sacral ulcer (present on admission)     Examination of the patient performed at the bedside. Rounded with ICU team and discussed management/plan with surgical staff. Medications, radiological studies and labs reviewed and discussed with attending of record, Dr. Macey Padgett.    Cardiothoracic Assessment and Plan:    Neurologic: A&Ox3 and pain controlled. History of CVA/TIA. Ambulates with a walker at baseline and has had multiple falls. S/P dental extractions. PT/OT/PM&R to follow.     Cardiovascular: Severe AS and MR/MI undergoing surgical evaluation, also with acute (elevated BNP) HFpEF and chronic atrial fibrillation. Off vasoactive medications and meeting BP goals. Will continue to optimize cardiac medications. Very high mortality risk based on STS calculation and is not a candidate for open surgery. No endocarditis based on LINDY. Not a candidate for the Apollo valve. TAVR valve will not likely improve overall condition. Appreciate HF input.     Respiratory: I personally reviewed the most recent CXR which small b/l pleural effusions and pulmonary congestion with interval clearing compared to previous exam. Will encourage IS, mobilization and wean O2 PRN. CPAP for ASHLI.      Genitourinary: CKD stage 3a (GFR 45-59 ml/min). BUN elevated and will continue to monitor. There could be a cardiorenal component vs contrast involvement however BUN was elevated upon admission. No davenport. Will avoid nephrotoxic medications. Increased risk for transition to renal failure. Appreciate Nephrology input.     Endocrine: Follow [facility]. DM management. Appreciate Endocrinology input.     Hematologic: No evidence active bleeding. Loveno for DVT ppx. Was on apixaban for a-fib as outpatient and currently on hold. Will considering resuming anticoagulation once INR drifts down however due to falls and skin tears/bleeding might not be a good candidate moving forward. \"Anemia of chronic disease.History of JAK2 gene mutation and " appreciate Heme/Onc input; will continue hydroxyurea.      Infectious Disease: Was on azithromycin and ceftriaxone for possible endocarditis/pneumonia however ID OK monitoring off antibiotics for now since LINDY negative. Cultures pending. Appreciate Infectious Disease input.      Fluids, Electrolytes: Electrolytes replaced per protocol. Goal for net negative fluid balance. Potassium levels climbing and could be related to worsening renal function.     Gastrointestinal: PO as tolerated. GI ppx. History of ascites but unknown cause and has had paracentesis recently. Hepatitis panel negative.     Skin: OOB, PT when able. Wound care for skin tears and sacral ulcer.      Tubes, lines and drains: Will remove superfluous invasive monitors PRN.    Disposition: Guarded. Continue close monitoring on stepdown unit. Family discussion ongoing with surgeon.           Carlitos Reddy, DO  3/24/2023

## 2023-03-24 NOTE — PLAN OF CARE
Problem: Adult Inpatient Plan of Care  Goal: Plan of Care Review  Flowsheets (Taken 3/24/2023 9423)  Progress: declining  Plan of Care Reviewed With: patient       Per info received at surgical rounds today, pt is not a surgical candidate, pt/family for notification of this and further discussions with the Dr. Padgett. Pt likely to d/c to home with [facility]  on Monday 3/27, SW updated them via [facility]. SW also s/w Desiree from [facility] and she reported that if pt were to come to them on hospice, their room and board cost is $630/day. SW will continue to follow for dispo planning and remain available for emotional support. -PETER Meyer, [facility], LCSW [phone]/secure chat

## 2023-03-25 LAB
ANION GAP SERPL CALC-SCNC: 12 MEQ/L (ref 3–15)
APTT PPP: 38 SEC (ref 23–35)
BNP SERPL-MCNC: 1080 PG/ML
BUN SERPL-MCNC: 81 MG/DL (ref 8–20)
CALCIUM SERPL-MCNC: 8.3 MG/DL (ref 8.9–10.3)
CHLORIDE SERPL-SCNC: 102 MEQ/L (ref 98–109)
CO2 SERPL-SCNC: 23 MEQ/L (ref 22–32)
CREAT SERPL-MCNC: 2.4 MG/DL (ref 0.6–1.1)
ERYTHROCYTE [DISTWIDTH] IN BLOOD BY AUTOMATED COUNT: 22 % (ref 11.7–14.4)
GFR SERPL CREATININE-BSD FRML MDRD: 19.5 ML/MIN/1.73M*2
GLUCOSE BLD-MCNC: 133 MG/DL (ref 70–99)
GLUCOSE BLD-MCNC: 136 MG/DL (ref 70–99)
GLUCOSE BLD-MCNC: 153 MG/DL (ref 70–99)
GLUCOSE BLD-MCNC: 95 MG/DL (ref 70–99)
GLUCOSE SERPL-MCNC: 87 MG/DL (ref 70–99)
HCT VFR BLDCO AUTO: 37.7 % (ref 35–45)
HGB BLD-MCNC: 11.5 G/DL (ref 11.8–15.7)
INR PPP: 1.4
MCH RBC QN AUTO: 26.9 PG (ref 28–33.2)
MCHC RBC AUTO-ENTMCNC: 30.5 G/DL (ref 32.2–35.5)
MCV RBC AUTO: 88.1 FL (ref 83–98)
PDW BLD AUTO: 11.7 FL (ref 9.4–12.3)
PLATELET # BLD AUTO: 396 K/UL (ref 150–369)
POCT TEST: ABNORMAL
POCT TEST: NORMAL
POTASSIUM SERPL-SCNC: 4.8 MEQ/L (ref 3.6–5.1)
PROTHROMBIN TIME: 17.2 SEC (ref 12.2–14.5)
RBC # BLD AUTO: 4.28 M/UL (ref 3.93–5.22)
SODIUM SERPL-SCNC: 137 MEQ/L (ref 136–144)
WBC # BLD AUTO: 34.91 K/UL (ref 3.8–10.5)

## 2023-03-25 PROCEDURE — 63600000 HC DRUGS/DETAIL CODE

## 2023-03-25 PROCEDURE — 21400000 HC ROOM AND CARE CCU/INTERMEDIATE

## 2023-03-25 PROCEDURE — 83880 ASSAY OF NATRIURETIC PEPTIDE: CPT | Performed by: NURSE PRACTITIONER

## 2023-03-25 PROCEDURE — 63700000 HC SELF-ADMINISTRABLE DRUG: Performed by: NURSE PRACTITIONER

## 2023-03-25 PROCEDURE — 25000000 HC PHARMACY GENERAL: Performed by: ANESTHESIOLOGY

## 2023-03-25 PROCEDURE — 85730 THROMBOPLASTIN TIME PARTIAL: CPT | Performed by: NURSE PRACTITIONER

## 2023-03-25 PROCEDURE — 85027 COMPLETE CBC AUTOMATED: CPT | Performed by: NURSE PRACTITIONER

## 2023-03-25 PROCEDURE — 63700000 HC SELF-ADMINISTRABLE DRUG: Performed by: ANESTHESIOLOGY

## 2023-03-25 PROCEDURE — 63600000 HC DRUGS/DETAIL CODE: Performed by: NURSE PRACTITIONER

## 2023-03-25 PROCEDURE — 85610 PROTHROMBIN TIME: CPT | Performed by: NURSE PRACTITIONER

## 2023-03-25 PROCEDURE — 36415 COLL VENOUS BLD VENIPUNCTURE: CPT | Performed by: NURSE PRACTITIONER

## 2023-03-25 PROCEDURE — 80048 BASIC METABOLIC PNL TOTAL CA: CPT | Performed by: NURSE PRACTITIONER

## 2023-03-25 RX ORDER — IBUPROFEN 200 MG
16-32 TABLET ORAL AS NEEDED
Status: DISCONTINUED | OUTPATIENT
Start: 2023-03-25 | End: 2023-03-25

## 2023-03-25 RX ORDER — DEXTROSE 40 %
15-30 GEL (GRAM) ORAL AS NEEDED
Status: DISCONTINUED | OUTPATIENT
Start: 2023-03-25 | End: 2023-03-25

## 2023-03-25 RX ORDER — DEXTROSE 50 % IN WATER (D50W) INTRAVENOUS SYRINGE
25 AS NEEDED
Status: DISCONTINUED | OUTPATIENT
Start: 2023-03-25 | End: 2023-03-25

## 2023-03-25 RX ORDER — HYDROMORPHONE HYDROCHLORIDE 1 MG/ML
0.5 INJECTION, SOLUTION INTRAMUSCULAR; INTRAVENOUS; SUBCUTANEOUS
Status: DISCONTINUED | OUTPATIENT
Start: 2023-03-25 | End: 2023-03-26

## 2023-03-25 RX ORDER — ONDANSETRON HYDROCHLORIDE 2 MG/ML
4 INJECTION, SOLUTION INTRAVENOUS
Status: DISCONTINUED | OUTPATIENT
Start: 2023-03-25 | End: 2023-03-26

## 2023-03-25 RX ORDER — FENTANYL CITRATE 50 UG/ML
50 INJECTION, SOLUTION INTRAMUSCULAR; INTRAVENOUS EVERY 5 MIN PRN
Status: DISCONTINUED | OUTPATIENT
Start: 2023-03-25 | End: 2023-03-26

## 2023-03-25 RX ADMIN — ATORVASTATIN CALCIUM 40 MG: 40 TABLET, FILM COATED ORAL at 17:10

## 2023-03-25 RX ADMIN — AMIODARONE HYDROCHLORIDE 200 MG: 200 TABLET ORAL at 08:42

## 2023-03-25 RX ADMIN — BUMETANIDE 2 MG: 0.25 INJECTION INTRAMUSCULAR; INTRAVENOUS at 20:03

## 2023-03-25 RX ADMIN — INSULIN LISPRO 3 UNITS: 100 INJECTION, SOLUTION INTRAVENOUS; SUBCUTANEOUS at 17:10

## 2023-03-25 RX ADMIN — INSULIN LISPRO 3 UNITS: 100 INJECTION, SOLUTION INTRAVENOUS; SUBCUTANEOUS at 08:38

## 2023-03-25 RX ADMIN — BUMETANIDE 2 MG: 0.25 INJECTION INTRAMUSCULAR; INTRAVENOUS at 08:42

## 2023-03-25 RX ADMIN — MAGNESIUM OXIDE TAB 400 MG (241.3 MG ELEMENTAL MG) 400 MG: 400 (241.3 MG) TAB at 08:42

## 2023-03-25 RX ADMIN — HYDROXYUREA 500 MG: 500 CAPSULE ORAL at 08:41

## 2023-03-25 RX ADMIN — INSULIN LISPRO 3 UNITS: 100 INJECTION, SOLUTION INTRAVENOUS; SUBCUTANEOUS at 12:50

## 2023-03-25 RX ADMIN — ENOXAPARIN SODIUM 30 MG: 30 INJECTION SUBCUTANEOUS at 17:10

## 2023-03-25 NOTE — PLAN OF CARE
Patient oriented x4 this shift without complaints. Sleeping with home CPAP device. Tentative plan for discharge home with home health Monday. Weight shift assistance provided. Alarms audible. VSS. NSR (HR 60-80s) on tele.     Problem: Adult Inpatient Plan of Care  Goal: Plan of Care Review  Outcome: Progressing  Goal: Patient-Specific Goal (Individualized)  Outcome: Progressing  Goal: Absence of Hospital-Acquired Illness or Injury  Outcome: Progressing  Goal: Optimal Comfort and Wellbeing  Outcome: Progressing  Goal: Readiness for Transition of Care  Outcome: Progressing     Problem: Skin Injury Risk Increased  Goal: Skin Health and Integrity  Outcome: Progressing     Problem: Fall Injury Risk  Goal: Absence of Fall and Fall-Related Injury  Outcome: Progressing     Problem: Self-Care Deficit  Goal: Improved Ability to Complete Activities of Daily Living  Outcome: Progressing     Problem: Mobility Impairment  Goal: Optimal Mobility  Outcome: Progressing     Problem: Infection  Goal: Absence of Infection Signs and Symptoms  Outcome: Progressing     Problem: Adjustment to Illness (Heart Failure)  Goal: Optimal Coping  Outcome: Progressing     Problem: Cardiac Output Decreased (Heart Failure)  Goal: Optimal Cardiac Output  Outcome: Progressing     Problem: Fluid Imbalance (Heart Failure)  Goal: Fluid Balance  Outcome: Progressing     Problem: Functional Ability Impaired (Heart Failure)  Goal: Optimal Functional Ability  Outcome: Progressing     Problem: Respiratory Compromise (Heart Failure)  Goal: Effective Oxygenation and Ventilation  Outcome: Progressing     Problem: Sleep Disordered Breathing (Heart Failure)  Goal: Effective Breathing Pattern During Sleep  Outcome: Progressing

## 2023-03-25 NOTE — PROGRESS NOTES
"Daily Progress Note      Assessment/Plan      Neurologic: A&Ox3 and pain controlled. History of CVA/TIA. Ambulates with a walker at baseline and has had multiple falls. S/P dental extractions. PT/OT/PM&R to follow.     Cardiovascular: Severe AS and MR/MI undergoing surgical evaluation, also with acute (elevated BNP) HFpEF and chronic atrial fibrillation. Off vasoactive medications and meeting BP goals. Will continue to optimize cardiac medications. Very high mortality risk based on STS calculation and is not a candidate for open surgery. No endocarditis based on LINDY. Not a candidate for the Apollo valve. Appreciate HF input.     Respiratory: I personally reviewed the most recent CXR which reveals small b/l pleural effusions and pulmonary congestion with interval clearing compared to previous exam. Will encourage IS, mobilization and wean O2 PRN. CPAP for ASHLI.      Genitourinary: CKD stage 3a (GFR 45-59 ml/min). BUN elevated and will continue to monitor. There could be a cardiorenal component vs contrast involvement however BUN was elevated upon admission. No davenport. Will avoid nephrotoxic medications. Increased risk for transition to renal failure. Appreciate Nephrology input.     Endocrine: Follow [facility]. DM management. Appreciate Endocrinology input.     Hematologic: No evidence active bleeding. Lovenox for DVT ppx. Was on apixaban for a-fib as outpatient and currently on hold. Will considering resuming anticoagulation once INR drifts down however due to falls and skin tears/bleeding might not be a good candidate moving forward. \"Anemia of chronic disease\" .History of JAK2 gene mutation and appreciate Heme/Onc input; will continue hydroxyurea.      Infectious Disease: Was on azithromycin and ceftriaxone for possible endocarditis/pneumonia however ID OK monitoring off antibiotics for now since LINDY negative. Appreciate Infectious Disease input.      Fluids/ Electrolytes: Electrolytes replaced per protocol. Goal for net " negative fluid balance.      Gastrointestinal: PO as tolerated. GI ppx. History of ascites but unknown cause and has had paracentesis recently. Hepatitis panel negative.     Skin: OOB, PT when able. Wound care for skin tears and sacral ulcer.              Subjective    Interval History:  Pt  c/o poor appetite.  Some loose stool yesterday. No new events overnight.  Pt denies Cp, SOB, Abd. Pain, N/V.  Pt admits to tolerating PO diet, voiding, and flatus.       Current Facility-Administered Medications   Medication Dose Route Frequency Provider Last Rate Last Admin   • amiodarone (PACERONE) tablet 200 mg  200 mg oral Daily Dixon Borjas CRNP   200 mg at 03/24/23 0922   • [Provider Managed Hold] apixaban (ELIQUIS) tablet 5 mg  5 mg oral BID Dixon Borjas CRNP       • atorvastatin (LIPITOR) tablet 40 mg  40 mg oral Daily (6p) Shannan Olivares CRNP   40 mg at 03/24/23 1710   • atropine injection 0.5 mg  0.5 mg intravenous q5 min PRN Dixon Borjas CRNP       • bumetanide (BUMEX) injection 2 mg  2 mg intravenous BID Carlitos Reddy, DO   2 mg at 03/24/23 1415   • glucose chewable tablet 16-32 g of dextrose  16-32 g of dextrose oral PRN Dixon Borjas CRNP        Or   • dextrose 40 % oral gel 15-30 g of dextrose  15-30 g of dextrose oral PRN Dixon Borjas CRNP        Or   • glucagon (GLUCAGEN) injection 1 mg  1 mg intramuscular PRN Dixon Borjas CRNP        Or   • dextrose 50 % in water (D50) injection 12.5 g  25 mL intravenous PRN Dixon Borjas CRNP       • enoxaparin (LOVENOX) syringe 30 mg  30 mg subcutaneous Daily (6p) Mari Macias PA C   30 mg at 03/24/23 1711   • hydroxyurea (HYDREA) capsule 500 mg  500 mg oral Daily Carlitos Reddy B, DO   500 mg at 03/24/23 0922   • insulin lispro U-100 (HumaLOG) pen 3 Units  3 Units subcutaneous TID with meals Dixon Borjas CRNP   3 Units at 03/24/23 1901   • magnesium oxide (MAG-OX) tablet 400 mg  400 mg oral Daily Suad  "PATRICIA Waldron   400 mg at 03/24/23 0922   • nitroglycerin (NITROSTAT) SL tablet 0.4 mg  0.4 mg sublingual q5 min PRN Dixon Borjas CRNP       • [Provider Managed Hold] potassium chloride (KLOR-CON M) ER tablet (particles/crystals) 20 mEq  20 mEq oral BID (am, 4p) Jamilah Toure CRNP   20 mEq at 03/19/23 0939   • [Provider Managed Hold] torsemide (DEMADEX) tablet 20 mg  20 mg oral Daily Mari Macias PA C   20 mg at 03/23/23 1050     Allergies   Allergen Reactions   • Neomycin      swelling   • Polymyxin B      swelling   • Procaine      swelling   • Bacitracin Rash         Objective    Vital signs in last 24 hours:  Temp:  [36.1 °C (97 °F)-36.4 °C (97.5 °F)] 36.1 °C (97 °F)  Heart Rate:  [] 70  Resp:  [16-20] 18  BP: (111-136)/(50-63) 111/57      Intake/Output Summary (Last 24 hours) at 3/25/2023 0017  Last data filed at 3/24/2023 0500  Gross per 24 hour   Intake --   Output 200 ml   Net -200 ml                             Physical Exam:  Visit Vitals  BP (!) 111/57 (BP Location: Right upper arm, Patient Position: Lying)   Pulse 70   Temp 36.1 °C (97 °F) (Temporal)   Resp 18   Ht 1.461 m (4' 9.5\")   Wt 63.9 kg (140 lb 14.4 oz)   SpO2 97%   BMI 29.96 kg/m²       General Appearance:   Neuro: Alert, cooperative, no distress, appears stated age    Normal strength, sensation and reflexes throughout   Lungs:   Decreased BS at bases bilat   Heart:  Regular rate and rhythm, S1 and S2 normal, no murmur, rub or gallop   Abdomen:   Soft, non-tender, bowel sounds active all four quadrants, no masses, no organomegaly   Extremities: +1 LE edema bilat   Pulses: 2+ and symmetric all extremities   Skin:   multiple skin tears, Stage 2-3 sacral ulcer (present on admission)          Labs  Lab Results   Component Value Date    WBC 37.56 (HH) 03/24/2023    HGB 11.4 (L) 03/24/2023    HCT 37.7 03/24/2023     (H) 03/24/2023    ALT 16 03/21/2023    AST 23 03/21/2023     03/24/2023    K 5.3 (H) 03/24/2023    CL " 101 03/24/2023    CREATININE 2.1 (H) 03/24/2023    BUN 76 (H) 03/24/2023    CO2 23 03/24/2023    TSH 1.41 03/18/2023    INR 1.5 03/22/2023    HGBA1C 6.4 (H) 03/18/2023       Imaging  I have independently reviewed the pertinent imaging from the last 24 hrs.    ECG/Telemetry  I have independently reviewed the telemetry. No events for the last 24 hours.    VTE Assessment: I have reassessed and the patient's VTE risk and treatment plan is appropriate.      Expected Discharge Date:  3/25/2023 No discharge date for patient encounter.         HALIE Jama  3/25/2023

## 2023-03-25 NOTE — PROGRESS NOTES
Nephrology Progress Note:    Subjective        Patient seen this morning.   Awake alert and oriented  Resting comfortably in bed  No acute events overnight  Appetite poor    Denies any worsening shortness of breath.  Denies any vomiting, diarrhea, chest pain.   All other ROS negative.        Objective   Vital signs in last 24 hours:  Temp:  [36.1 °C (97 °F)-36.6 °C (97.9 °F)] 36.6 °C (97.9 °F)  Heart Rate:  [] 68  Resp:  [16-20] 16  BP: (111-142)/(50-62) 120/58     Intake & Output:    Intake/Output Summary (Last 24 hours) at 3/25/2023 1129  Last data filed at 3/25/2023 0830  Gross per 24 hour   Intake 480 ml   Output 700 ml   Net -220 ml     Physical Exam:  General Appearance:  Awake, alert, not in acute distress    Head:  Normocephalic, without obvious abnormality, atraumatic   Eyes:  Conjunctiva clear, anicteric sclera.      Ears:  No external abnormalities   Throat: Moist oral mucosa   Neck: Supple, symmetrical   Lungs:    Bilateral scattered rales, respirations unlabored   Heart:  S1 and S2 heard.  Systolic murmur present.   Abdomen: Soft, non tender, non distended, bowel sound heard   Genitourinary:  No palpable bladder   Extremities:  Musculoskeletal: No cyanosis. Bilateral lower extremity mild edema  No injury or deformity   Skin: No rashes    Neurologic:  Behavior/  Emotional: Oriented x 3, non focal  Appropriate, cooperative         Current Medications:  Current Facility-Administered Medications   Medication Dose Route Frequency   • amiodarone  200 mg oral Daily   • [Provider Managed Hold] apixaban  5 mg oral BID   • atorvastatin  40 mg oral Daily (6p)   • atropine  0.5 mg intravenous q5 min PRN   • bumetanide  2 mg intravenous BID   • glucose  16-32 g of dextrose oral PRN    Or   • dextrose  15-30 g of dextrose oral PRN    Or   • glucagon  1 mg intramuscular PRN    Or   • dextrose 50 % in water (D50)  25 mL intravenous PRN   • enoxaparin  30 mg subcutaneous Daily (6p)   • hydroxyurea  500 mg oral  Daily   • insulin lispro U-100  3 Units subcutaneous TID with meals   • magnesium oxide  400 mg oral Daily   • nitroglycerin  0.4 mg sublingual q5 min PRN   • [Provider Managed Hold] potassium chloride  20 mEq oral BID (am, 4p)   • [Provider Managed Hold] torsemide  20 mg oral Daily       Labs:  BUN   Date Value Ref Range Status   03/25/2023 81 (HH) 8 - 20 mg/dL Final     Comment:     Consistent with previous results       Creatinine   Date Value Ref Range Status   03/25/2023 2.4 (H) 0.6 - 1.1 mg/dL Final     Sodium   Date Value Ref Range Status   03/25/2023 137 136 - 144 mEQ/L Final     Potassium   Date Value Ref Range Status   03/25/2023 4.8 3.6 - 5.1 mEQ/L Final     CO2   Date Value Ref Range Status   03/25/2023 23 22 - 32 mEQ/L Final     Magnesium   Date Value Ref Range Status   03/20/2023 1.9 1.8 - 2.5 mg/dL Final     Comment:     SLIGHT HEMOLYSIS, RESULT MAY BE INCREASED.       Calcium   Date Value Ref Range Status   03/25/2023 8.3 (L) 8.9 - 10.3 mg/dL Final     Albumin   Date Value Ref Range Status   03/21/2023 2.0 (L) 3.4 - 5.0 g/dL Final     WBC   Date Value Ref Range Status   03/25/2023 34.91 (HH) 3.80 - 10.50 K/uL Final     Comment:     ALL RESULTS HAVE BEEN CHECKED. This result has been called to ROSITA ONEILL by Rere Sheth on 03 25 23 at 06:46, and has been read back.      Hemoglobin   Date Value Ref Range Status   03/25/2023 11.5 (L) 11.8 - 15.7 g/dL Final     Platelets   Date Value Ref Range Status   03/25/2023 396 (H) 150 - 369 K/uL Final     Lab Results   Component Value Date    CREATININE 2.4 (H) 03/25/2023    CREATININE 2.1 (H) 03/24/2023    CREATININE 1.7 (H) 03/23/2023    CREATININE 1.4 (H) 03/22/2023    CREATININE 1.4 (H) 03/21/2023    CREATININE 1.3 (H) 03/20/2023    CREATININE 1.2 (H) 03/19/2023    CREATININE 1.1 03/18/2023     I have reviewed the pertinent patient's labs.    Imaging:  I have reviewed the pertinent patient's imaging results.     Assessment/Plan     Laureen Persaud is a 77 y.o.  female with PMHx of  CKD II-IIIa, HTN, DM II, HLD, ASHLI, CVA/TIA  (2020), HFpEF, PAH, valvular heart disease (severe AAS, moderate-severe MS, moderate-severe MR), A-fib, iron deficiency anemia, JAK2 gene mutation initially admitted to [facility] on 3/10/2020 with a complaint of weakness, fatigue, and was empirically treated for pneumonia along with CHF.  Patient had a TTE done which revealed worsening AV gradient.  Subsequently, she was transported to [facility] and underwent cardiac catheterization which confirmed significant aortic and mitral valve disease.  She was thought to be high risks by CT surgery at [facility] and subsequently transferred to [facility] for further surgical evaluation on 3/18/2023.        1.  Likely CKD II-IIIa  -Had creatinine of 1.0-1.1 mg/dL as of 2021.  Subsequently, creatinine was 1.2 mg/dL as of 3/18/2023 at outside hospital and was 1.1 mg/dL on 3/18/2023 when arrived to [facility].  -UA on 3/20/2023 bland without any hematuria, pyuria, proteinuria -ruling out any other alarming GN or interstitial disease  -Likely has some degree of CKD on the basis of hypertension, chronic CRS physiology     2.  GRACE  -Likely related to CRS physiology and worsened from IV contrast exposure.  -Had a cardiac catheterization at [facility] -unsure about date.  Subsequently, received on 100 cc of IV contrast dye on 3/20/2023 for CT TAVR  -Presented with a creatinine of 1.1 mg/dL on 3/18/2023 and up to 1.3 mg/dL on 3/20/2023 -nephrology consulted  -UA on 3/20/2023 bland  -Ultrasound on 3/20/2023 without hydronephrosis  - RHC on 3/21/2023: RA 8, PA 92/32 (52), PCW 31 with CI 4.9      -Labs reviewed. Creatinine slowly continues to rise and up to 2.1 > 2.4 mg/dL.    Potassium mildly elevated at 5.3. Treated > 4.8  -Urine output of 300 ml from overnight and had 700 today so far.  -Currently saturating well on 1 L/min via nasal cannula     Recommend:  -Continue supportive measures as you are.     -No objection to continue on prn diuretics - defer to cardiology  -Keep her on 2 g potassium restricted diet.  Can use Lokelma prn for K > 5.4  -Continue to avoid further nephrotoxins including contrast dye and other nephrotoxins like NSAIDs, sodium phosphate enema, vitamin C, etc.  -Avoid hypotension and maintain MAP > 65 mmHg.  -Follow daily BMP while in hospital  -No indication for renal replacement therapy.     3.  HFpEF, valvular heart disease, PAH  -Noted to have severe AS, moderate-severe MS, moderate-severe MR  -Being work-up for possible surgical intervention - ? option includes TAVR + investigation of [place] candidacy vs. TAVR then possible TAVR in MAC as per structural heart team.   -As per cardiology, CT surgery.     4.  A-fib  -s/p DCCV x3  -On amiodarone  -As per cardiology, primary team.     5.  History of iron deficiency anemia, JAK2 gene mutation myeloproliferative disorder  -Noted to have significant leukocytosis  -Started on Hydrea 500 mg every other day  -Further work-up/management as per primary team, hematology/oncology.     6.  Right renal mass  -Renal ultrasound on 3/20/2023 noted to have solid 1.9 cm right renal mass, suspicious for renal malignancy.    Recommend:  -Will need urology evaluation -defer timing to primary team     7.  Nephrolithiasis.  -Renal ultrasound on 3/20/2023 noted to have nonobstructing 1 cm right mid renal calculus  -Outpatient follow-up with nephrology and urology upon discharge     8.  Possible pneumonia  -Was covered with antibiotics at outside hospital.  Evaluated by ID and recommended to observe off antibiotics.  -As per ID, primary team.     9.  History of CVA/TIA, ASHLI, HLD  -As per primary team.

## 2023-03-26 LAB
ANION GAP SERPL CALC-SCNC: 10 MEQ/L (ref 3–15)
BUN SERPL-MCNC: 83 MG/DL (ref 8–20)
CALCIUM SERPL-MCNC: 8.2 MG/DL (ref 8.9–10.3)
CHLORIDE SERPL-SCNC: 100 MEQ/L (ref 98–109)
CO2 SERPL-SCNC: 25 MEQ/L (ref 22–32)
CREAT SERPL-MCNC: 2.3 MG/DL (ref 0.6–1.1)
ERYTHROCYTE [DISTWIDTH] IN BLOOD BY AUTOMATED COUNT: 22.1 % (ref 11.7–14.4)
GFR SERPL CREATININE-BSD FRML MDRD: 20.5 ML/MIN/1.73M*2
GLUCOSE BLD-MCNC: 112 MG/DL (ref 70–99)
GLUCOSE BLD-MCNC: 122 MG/DL (ref 70–99)
GLUCOSE BLD-MCNC: 149 MG/DL (ref 70–99)
GLUCOSE BLD-MCNC: 190 MG/DL (ref 70–99)
GLUCOSE SERPL-MCNC: 93 MG/DL (ref 70–99)
HCT VFR BLDCO AUTO: 37 % (ref 35–45)
HGB BLD-MCNC: 11.2 G/DL (ref 11.8–15.7)
MAGNESIUM SERPL-MCNC: 2.1 MG/DL (ref 1.8–2.5)
MCH RBC QN AUTO: 27.1 PG (ref 28–33.2)
MCHC RBC AUTO-ENTMCNC: 30.3 G/DL (ref 32.2–35.5)
MCV RBC AUTO: 89.4 FL (ref 83–98)
PDW BLD AUTO: 11.1 FL (ref 9.4–12.3)
PLATELET # BLD AUTO: 379 K/UL (ref 150–369)
POCT TEST: ABNORMAL
POTASSIUM SERPL-SCNC: 4.4 MEQ/L (ref 3.6–5.1)
RBC # BLD AUTO: 4.14 M/UL (ref 3.93–5.22)
SODIUM SERPL-SCNC: 135 MEQ/L (ref 136–144)
WBC # BLD AUTO: 32.73 K/UL (ref 3.8–10.5)

## 2023-03-26 PROCEDURE — 80048 BASIC METABOLIC PNL TOTAL CA: CPT | Performed by: NURSE PRACTITIONER

## 2023-03-26 PROCEDURE — 63600000 HC DRUGS/DETAIL CODE

## 2023-03-26 PROCEDURE — 85027 COMPLETE CBC AUTOMATED: CPT | Performed by: NURSE PRACTITIONER

## 2023-03-26 PROCEDURE — 63700000 HC SELF-ADMINISTRABLE DRUG: Performed by: NURSE PRACTITIONER

## 2023-03-26 PROCEDURE — 83735 ASSAY OF MAGNESIUM: CPT

## 2023-03-26 PROCEDURE — 36415 COLL VENOUS BLD VENIPUNCTURE: CPT | Performed by: NURSE PRACTITIONER

## 2023-03-26 PROCEDURE — 25000000 HC PHARMACY GENERAL: Performed by: ANESTHESIOLOGY

## 2023-03-26 PROCEDURE — 63700000 HC SELF-ADMINISTRABLE DRUG: Performed by: ANESTHESIOLOGY

## 2023-03-26 PROCEDURE — 21400000 HC ROOM AND CARE CCU/INTERMEDIATE

## 2023-03-26 RX ORDER — ACETAMINOPHEN 325 MG/1
650 TABLET ORAL EVERY 4 HOURS PRN
Status: DISCONTINUED | OUTPATIENT
Start: 2023-03-26 | End: 2023-03-28 | Stop reason: HOSPADM

## 2023-03-26 RX ADMIN — INSULIN LISPRO 3 UNITS: 100 INJECTION, SOLUTION INTRAVENOUS; SUBCUTANEOUS at 08:11

## 2023-03-26 RX ADMIN — INSULIN LISPRO 3 UNITS: 100 INJECTION, SOLUTION INTRAVENOUS; SUBCUTANEOUS at 12:15

## 2023-03-26 RX ADMIN — ENOXAPARIN SODIUM 30 MG: 30 INJECTION SUBCUTANEOUS at 18:51

## 2023-03-26 RX ADMIN — ATORVASTATIN CALCIUM 40 MG: 40 TABLET, FILM COATED ORAL at 18:51

## 2023-03-26 RX ADMIN — MAGNESIUM OXIDE TAB 400 MG (241.3 MG ELEMENTAL MG) 400 MG: 400 (241.3 MG) TAB at 08:17

## 2023-03-26 RX ADMIN — BUMETANIDE 2 MG: 0.25 INJECTION INTRAMUSCULAR; INTRAVENOUS at 08:17

## 2023-03-26 RX ADMIN — HYDROXYUREA 500 MG: 500 CAPSULE ORAL at 08:17

## 2023-03-26 RX ADMIN — BUMETANIDE 2 MG: 0.25 INJECTION INTRAMUSCULAR; INTRAVENOUS at 20:19

## 2023-03-26 RX ADMIN — AMIODARONE HYDROCHLORIDE 200 MG: 200 TABLET ORAL at 08:17

## 2023-03-26 RX ADMIN — INSULIN LISPRO 3 UNITS: 100 INJECTION, SOLUTION INTRAVENOUS; SUBCUTANEOUS at 16:50

## 2023-03-26 NOTE — PLAN OF CARE
Problem: Adult Inpatient Plan of Care  Goal: Plan of Care Review  Flowsheets (Taken 3/26/2023 0612)  Progress: no change  Plan of Care Reviewed With: patient  Outcome Summary: Pt needs assistance to ambulate   Plan of Care Review  Plan of Care Reviewed With: patient  Progress: no change  Outcome Summary: Pt needs assistance to ambulate

## 2023-03-26 NOTE — PROGRESS NOTES
"Daily Progress Note      Assessment/Plan      Neurologic: A&Ox3 and pain controlled. History of CVA/TIA. Ambulates with a walker at baseline and has had multiple falls. S/P dental extractions. PT/OT/PM&R to follow.     Cardiovascular: Severe AS and MR/MI undergoing surgical evaluation, also with acute (elevated BNP) HFpEF and chronic atrial fibrillation. Off vasoactive medications and meeting BP goals. Will continue to optimize cardiac medications. Very high mortality risk based on STS calculation and is not a candidate for open surgery. No endocarditis based on LINDY. Not a candidate for the Apollo valve. Appreciate HF input.   Plan for family meeting today to discuss further treatment plan/options with Dr. Padgett.     Respiratory: I personally reviewed the most recent CXR which reveals small b/l pleural effusions and pulmonary congestion with interval clearing compared to previous exam. Will encourage IS, mobilization and wean O2 PRN. CPAP for ASHLI.      Genitourinary: CKD stage 3a (GFR 45-59 ml/min). BUN elevated and will continue to monitor. There could be a cardiorenal component vs contrast involvement however BUN was elevated upon admission. No davenport. Will avoid nephrotoxic medications. Increased risk for transition to renal failure. Appreciate Nephrology input.     Endocrine: Follow [facility]. DM management. Appreciate Endocrinology input.     Hematologic: No evidence active bleeding. Lovenox for DVT ppx. Was on apixaban for a-fib as outpatient and currently on hold until further surgical planning is decided today with family meeting. Will considering resuming anticoagulation once INR drifts down however due to falls and skin tears/bleeding might not be a good candidate moving forward. \"Anemia of chronic disease\" .History of JAK2 gene mutation and appreciate Heme/Onc input; will continue hydroxyurea.      Infectious Disease: Was on azithromycin and ceftriaxone for possible endocarditis/pneumonia however ID OK " monitoring off antibiotics for now since LINDY negative. Appreciate Infectious Disease input.      Fluids/ Electrolytes: Electrolytes replaced per protocol. Goal for net negative fluid balance.      Gastrointestinal: PO as tolerated. GI ppx. History of ascites but unknown cause and has had paracentesis recently. Hepatitis panel negative.     Skin: OOB, PT when able. Wound care for skin tears and sacral ulcer.         Subjective    Interval History: No new events overnight.  Pt denies Cp, SOB, Abd. Pain, N/V.  Pt admits to tolerating PO diet, voiding, and flatus.       Current Facility-Administered Medications   Medication Dose Route Frequency Provider Last Rate Last Admin   • amiodarone (PACERONE) tablet 200 mg  200 mg oral Daily Dixon Borjas CRNP   200 mg at 03/25/23 0842   • [Provider Managed Hold] apixaban (ELIQUIS) tablet 5 mg  5 mg oral BID Dixon Borjas CRNP       • atorvastatin (LIPITOR) tablet 40 mg  40 mg oral Daily (6p) Shannan Olivares CRNP   40 mg at 03/25/23 1710   • atropine injection 0.5 mg  0.5 mg intravenous q5 min PRN Dixon Borjas CRNP       • bumetanide (BUMEX) injection 2 mg  2 mg intravenous BID Carlitos Reddy DO   2 mg at 03/25/23 2003   • glucose chewable tablet 16-32 g of dextrose  16-32 g of dextrose oral PRN Dixon Borjas CRNP        Or   • dextrose 40 % oral gel 15-30 g of dextrose  15-30 g of dextrose oral PRN Dixon Borjas CRNP        Or   • glucagon (GLUCAGEN) injection 1 mg  1 mg intramuscular PRN Dixon Borjas CRNP        Or   • dextrose 50 % in water (D50) injection 12.5 g  25 mL intravenous PRN Dixon Borjas CRNP       • enoxaparin (LOVENOX) syringe 30 mg  30 mg subcutaneous Daily (6p) Mari Macias PA C   30 mg at 03/25/23 1710   • fentaNYL (PF) (SUBLIMAZE) injection 50 mcg  50 mcg intravenous q5 min PRN Thierry Shen MD       • HYDROmorphone (DILAUDID) injection 0.5 mg  0.5 mg intravenous q15 min PRN Shen, Thierry L, MD      "  • hydroxyurea (HYDREA) capsule 500 mg  500 mg oral Daily Carlitos Reddy DO   500 mg at 03/25/23 0841   • insulin lispro U-100 (HumaLOG) pen 3 Units  3 Units subcutaneous TID with meals Dixon Borjas CRNP   3 Units at 03/25/23 1710   • magnesium oxide (MAG-OX) tablet 400 mg  400 mg oral Daily Dixon Borjas CRNP   400 mg at 03/25/23 0842   • nitroglycerin (NITROSTAT) SL tablet 0.4 mg  0.4 mg sublingual q5 min PRN Dixon Borjas CRNP       • ondansetron (ZOFRAN) injection 4 mg  4 mg intravenous q15 min PRN Thierry Shen MD       • [Provider Managed Hold] potassium chloride (KLOR-CON M) ER tablet (particles/crystals) 20 mEq  20 mEq oral BID (am, 4p) Jamilah Toure CRNP   20 mEq at 03/19/23 0939   • [Provider Managed Hold] torsemide (DEMADEX) tablet 20 mg  20 mg oral Daily Mari Macias PA C   20 mg at 03/23/23 1050     Allergies   Allergen Reactions   • Neomycin      swelling   • Polymyxin B      swelling   • Procaine      swelling   • Bacitracin Rash         Objective    Vital signs in last 24 hours:  Temp:  [36.1 °C (97 °F)-36.8 °C (98.3 °F)] 36.8 °C (98.3 °F)  Heart Rate:  [68-76] 70  Resp:  [16-18] 18  BP: (114-142)/(56-62) 124/61      Intake/Output Summary (Last 24 hours) at 3/26/2023 0323  Last data filed at 3/25/2023 2000  Gross per 24 hour   Intake 1920 ml   Output 1800 ml   Net 120 ml            Physical Exam:  Visit Vitals  /61 (BP Location: Right upper arm, Patient Position: Lying)   Pulse 70   Temp 36.8 °C (98.3 °F) (Oral)   Resp 18   Ht 1.461 m (4' 9.5\")   Wt 63.8 kg (140 lb 11.2 oz)   SpO2 96%   BMI 29.92 kg/m²         Labs  Lab Results   Component Value Date    WBC 34.91 (HH) 03/25/2023    HGB 11.5 (L) 03/25/2023    HCT 37.7 03/25/2023     (H) 03/25/2023    ALT 16 03/21/2023    AST 23 03/21/2023     03/25/2023    K 4.8 03/25/2023     03/25/2023    CREATININE 2.4 (H) 03/25/2023    BUN 81 (HH) 03/25/2023    CO2 23 03/25/2023    TSH 1.41 03/18/2023    " INR 1.4 03/25/2023    HGBA1C 6.4 (H) 03/18/2023       Imaging  I have independently reviewed the pertinent imaging from the last 24 hrs.    ECG/Telemetry  I have independently reviewed the telemetry. No events for the last 24 hours.    VTE Assessment: I have reassessed and the patient's VTE risk and treatment plan is appropriate.      Expected Discharge Date:  3/25/2023 No discharge date for patient encounter.    PATRICIA Lynn  3/26/2023

## 2023-03-27 LAB
ANION GAP SERPL CALC-SCNC: 13 MEQ/L (ref 3–15)
APTT PPP: 30 SEC (ref 23–35)
BUN SERPL-MCNC: 78 MG/DL (ref 8–20)
CALCIUM SERPL-MCNC: 8.5 MG/DL (ref 8.9–10.3)
CHLORIDE SERPL-SCNC: 98 MEQ/L (ref 98–109)
CO2 SERPL-SCNC: 24 MEQ/L (ref 22–32)
CREAT SERPL-MCNC: 2.1 MG/DL (ref 0.6–1.1)
ERYTHROCYTE [DISTWIDTH] IN BLOOD BY AUTOMATED COUNT: 22.3 % (ref 11.7–14.4)
GFR SERPL CREATININE-BSD FRML MDRD: 22.8 ML/MIN/1.73M*2
GLUCOSE BLD-MCNC: 109 MG/DL (ref 70–99)
GLUCOSE BLD-MCNC: 128 MG/DL (ref 70–99)
GLUCOSE BLD-MCNC: 132 MG/DL (ref 70–99)
GLUCOSE BLD-MCNC: 157 MG/DL (ref 70–99)
GLUCOSE SERPL-MCNC: 75 MG/DL (ref 70–99)
HCT VFR BLDCO AUTO: 39.9 % (ref 35–45)
HGB BLD-MCNC: 12.2 G/DL (ref 11.8–15.7)
INR PPP: 1.3
MAGNESIUM SERPL-MCNC: 1.9 MG/DL (ref 1.8–2.5)
MCH RBC QN AUTO: 27.1 PG (ref 28–33.2)
MCHC RBC AUTO-ENTMCNC: 30.6 G/DL (ref 32.2–35.5)
MCV RBC AUTO: 88.7 FL (ref 83–98)
PDW BLD AUTO: 11.4 FL (ref 9.4–12.3)
PLATELET # BLD AUTO: 403 K/UL (ref 150–369)
POCT TEST: ABNORMAL
POTASSIUM SERPL-SCNC: 4.3 MEQ/L (ref 3.6–5.1)
PROTHROMBIN TIME: 16.2 SEC (ref 12.2–14.5)
RBC # BLD AUTO: 4.5 M/UL (ref 3.93–5.22)
SARS-COV-2 RNA RESP QL NAA+PROBE: NEGATIVE
SODIUM SERPL-SCNC: 135 MEQ/L (ref 136–144)
WBC # BLD AUTO: 32.75 K/UL (ref 3.8–10.5)

## 2023-03-27 PROCEDURE — 97530 THERAPEUTIC ACTIVITIES: CPT | Mod: GO

## 2023-03-27 PROCEDURE — 83735 ASSAY OF MAGNESIUM: CPT

## 2023-03-27 PROCEDURE — 85027 COMPLETE CBC AUTOMATED: CPT | Performed by: NURSE PRACTITIONER

## 2023-03-27 PROCEDURE — 99232 SBSQ HOSP IP/OBS MODERATE 35: CPT | Performed by: INTERNAL MEDICINE

## 2023-03-27 PROCEDURE — 97535 SELF CARE MNGMENT TRAINING: CPT | Mod: GO

## 2023-03-27 PROCEDURE — 63700000 HC SELF-ADMINISTRABLE DRUG: Performed by: NURSE PRACTITIONER

## 2023-03-27 PROCEDURE — 63600000 HC DRUGS/DETAIL CODE: Performed by: NURSE PRACTITIONER

## 2023-03-27 PROCEDURE — 25000000 HC PHARMACY GENERAL

## 2023-03-27 PROCEDURE — 25000000 HC PHARMACY GENERAL: Performed by: NURSE PRACTITIONER

## 2023-03-27 PROCEDURE — 82310 ASSAY OF CALCIUM: CPT | Performed by: NURSE PRACTITIONER

## 2023-03-27 PROCEDURE — 85730 THROMBOPLASTIN TIME PARTIAL: CPT | Performed by: NURSE PRACTITIONER

## 2023-03-27 PROCEDURE — 21400000 HC ROOM AND CARE CCU/INTERMEDIATE

## 2023-03-27 PROCEDURE — 85610 PROTHROMBIN TIME: CPT | Performed by: NURSE PRACTITIONER

## 2023-03-27 PROCEDURE — 25000000 HC PHARMACY GENERAL: Performed by: ANESTHESIOLOGY

## 2023-03-27 PROCEDURE — U0002 COVID-19 LAB TEST NON-CDC: HCPCS | Performed by: ANESTHESIOLOGY

## 2023-03-27 PROCEDURE — 97110 THERAPEUTIC EXERCISES: CPT | Mod: GP,CQ

## 2023-03-27 PROCEDURE — 36415 COLL VENOUS BLD VENIPUNCTURE: CPT | Performed by: NURSE PRACTITIONER

## 2023-03-27 PROCEDURE — 63700000 HC SELF-ADMINISTRABLE DRUG: Performed by: ANESTHESIOLOGY

## 2023-03-27 PROCEDURE — 99233 SBSQ HOSP IP/OBS HIGH 50: CPT | Performed by: ANESTHESIOLOGY

## 2023-03-27 PROCEDURE — 97116 GAIT TRAINING THERAPY: CPT | Mod: GP,CQ

## 2023-03-27 RX ORDER — CLINDAMYCIN PHOSPHATE 600 MG/50ML
600 INJECTION, SOLUTION INTRAVENOUS ONCE
Status: COMPLETED | OUTPATIENT
Start: 2023-03-27 | End: 2023-03-27

## 2023-03-27 RX ORDER — LIDOCAINE HYDROCHLORIDE AND EPINEPHRINE 10; 10 UG/ML; MG/ML
10 INJECTION, SOLUTION INFILTRATION; PERINEURAL ONCE
Status: COMPLETED | OUTPATIENT
Start: 2023-03-27 | End: 2023-03-27

## 2023-03-27 RX ADMIN — INSULIN LISPRO 3 UNITS: 100 INJECTION, SOLUTION INTRAVENOUS; SUBCUTANEOUS at 12:20

## 2023-03-27 RX ADMIN — BUMETANIDE 2 MG: 0.25 INJECTION INTRAMUSCULAR; INTRAVENOUS at 21:10

## 2023-03-27 RX ADMIN — INSULIN LISPRO 3 UNITS: 100 INJECTION, SOLUTION INTRAVENOUS; SUBCUTANEOUS at 09:01

## 2023-03-27 RX ADMIN — BUMETANIDE 2 MG: 0.25 INJECTION INTRAMUSCULAR; INTRAVENOUS at 08:29

## 2023-03-27 RX ADMIN — CLINDAMYCIN PHOSPHATE 600 MG: 600 INJECTION, SOLUTION INTRAVENOUS at 21:11

## 2023-03-27 RX ADMIN — Medication 10 ML: at 17:37

## 2023-03-27 RX ADMIN — MAGNESIUM SULFATE HEPTAHYDRATE 2 G: 40 INJECTION, SOLUTION INTRAVENOUS at 06:31

## 2023-03-27 RX ADMIN — HYDROXYUREA 500 MG: 500 CAPSULE ORAL at 09:01

## 2023-03-27 RX ADMIN — AMIODARONE HYDROCHLORIDE 200 MG: 200 TABLET ORAL at 09:01

## 2023-03-27 RX ADMIN — MAGNESIUM OXIDE TAB 400 MG (241.3 MG ELEMENTAL MG) 400 MG: 400 (241.3 MG) TAB at 09:01

## 2023-03-27 ASSESSMENT — COGNITIVE AND FUNCTIONAL STATUS - GENERAL
AFFECT: WFL;ANXIOUS
STANDING UP FROM CHAIR USING ARMS: 3 - A LITTLE
MOVING TO AND FROM BED TO CHAIR: 3 - A LITTLE
DRESSING REGULAR UPPER BODY CLOTHING: 2 - A LOT
TOILETING: 2 - A LOT
HELP NEEDED FOR PERSONAL GROOMING: 3 - A LITTLE
DRESSING REGULAR LOWER BODY CLOTHING: 2 - A LOT
CLIMB 3 TO 5 STEPS WITH RAILING: 2 - A LOT
WALKING IN HOSPITAL ROOM: 3 - A LITTLE
EATING MEALS: 3 - A LITTLE
AFFECT: WFL;ANXIOUS
HELP NEEDED FOR BATHING: 2 - A LOT

## 2023-03-27 NOTE — PROGRESS NOTES
Nephrology Progress Note:    Subjective   Patient seen this morning.  Denies any shortness of breath at rest.  Denies any nausea, vomiting, abdomen pain, voiding difficulty. All other ROS negative.    Interval History: No acute overnight event.    Objective   Vital signs in last 24 hours:  Temp:  [36 °C (96.8 °F)-36.7 °C (98.1 °F)] 36.7 °C (98.1 °F)  Heart Rate:  [64-77] 68  Resp:  [16-18] 18  BP: (122-147)/(58-92) 130/63     Intake & Output:    Intake/Output Summary (Last 24 hours) at 3/27/2023 1113  Last data filed at 3/27/2023 0900  Gross per 24 hour   Intake 960 ml   Output 2350 ml   Net -1390 ml     Physical Exam:  General Appearance:  Awake, alert, not in acute distress    Head:  Normocephalic, without obvious abnormality, atraumatic   Eyes:  Conjunctiva clear, anicteric sclera.      Ears:  No external abnormalities   Throat: Moist oral mucosa   Neck: Supple, symmetrical   Lungs:    Bilateral scattered rales, respirations unlabored   Heart:  S1 and S2 heard.  Systolic murmur present.   Abdomen: Soft, non tender, non distended, bowel sound heard   Genitourinary:  No palpable bladder   Extremities:  Musculoskeletal: No cyanosis. Bilateral lower extremity mild edema  No injury or deformity   Skin: No rashes    Neurologic:  Behavior/  Emotional: Oriented x 3, non focal  Appropriate, cooperative         Current Medications:  Current Facility-Administered Medications   Medication Dose Route Frequency   • acetaminophen  650 mg oral q4h PRN   • amiodarone  200 mg oral Daily   • [Provider Managed Hold] apixaban  5 mg oral BID   • atorvastatin  40 mg oral Daily (6p)   • atropine  0.5 mg intravenous q5 min PRN   • bumetanide  2 mg intravenous BID   • enoxaparin  30 mg subcutaneous Daily (6p)   • hydroxyurea  500 mg oral Daily   • insulin lispro U-100  3 Units subcutaneous TID with meals   • magnesium oxide  400 mg oral Daily   • nitroglycerin  0.4 mg sublingual q5 min PRN   • [Provider Managed Hold] potassium chloride   20 mEq oral BID (am, 4p)   • [Provider Managed Hold] torsemide  20 mg oral Daily       Labs:  BUN   Date Value Ref Range Status   03/27/2023 78 (H) 8 - 20 mg/dL Final     Creatinine   Date Value Ref Range Status   03/27/2023 2.1 (H) 0.6 - 1.1 mg/dL Final     Sodium   Date Value Ref Range Status   03/27/2023 135 (L) 136 - 144 mEQ/L Final     Potassium   Date Value Ref Range Status   03/27/2023 4.3 3.6 - 5.1 mEQ/L Final     CO2   Date Value Ref Range Status   03/27/2023 24 22 - 32 mEQ/L Final     Magnesium   Date Value Ref Range Status   03/27/2023 1.9 1.8 - 2.5 mg/dL Final     Calcium   Date Value Ref Range Status   03/27/2023 8.5 (L) 8.9 - 10.3 mg/dL Final     Albumin   Date Value Ref Range Status   03/21/2023 2.0 (L) 3.4 - 5.0 g/dL Final     WBC   Date Value Ref Range Status   03/27/2023 32.75 (HH) 3.80 - 10.50 K/uL Final     Comment:     ALL RESULTS HAVE BEEN CHECKED. This result has been called to PAVEL NAIDU by Rere Sheth on 03 27 23 at 06:33, and has been read back.      Hemoglobin   Date Value Ref Range Status   03/27/2023 12.2 11.8 - 15.7 g/dL Final     Platelets   Date Value Ref Range Status   03/27/2023 403 (H) 150 - 369 K/uL Final     Lab Results   Component Value Date    CREATININE 2.1 (H) 03/27/2023    CREATININE 2.3 (H) 03/26/2023    CREATININE 2.4 (H) 03/25/2023    CREATININE 2.1 (H) 03/24/2023    CREATININE 1.7 (H) 03/23/2023    CREATININE 1.4 (H) 03/22/2023    CREATININE 1.4 (H) 03/21/2023    CREATININE 1.3 (H) 03/20/2023    CREATININE 1.2 (H) 03/19/2023    CREATININE 1.1 03/18/2023     I have reviewed the pertinent patient's labs.    Imaging:  I have reviewed the pertinent patient's imaging results.     Assessment/Plan     Laureen Persaud is a 77 y.o. female with PMHx of  CKD II-IIIa, HTN, DM II, HLD, ASHLI, CVA/TIA  (2020), HFpEF, PAH, valvular heart disease (severe AAS, moderate-severe MS, moderate-severe MR), A-fib, iron deficiency anemia, JAK2 gene mutation initially admitted to [facility]  on 3/10/2020 with a complaint of weakness, fatigue, and was empirically treated for pneumonia along with CHF.  Patient had a TTE done which revealed worsening AV gradient.  Subsequently, she was transported to [facility] and underwent cardiac catheterization which confirmed significant aortic and mitral valve disease.  She was thought to be high risks by CT surgery at [facility] and subsequently [place] [facility] [place] on 3/18/2023.        1.  Likely CKD II-IIIa  -Had creatinine of 1.0-1.1 mg/dL as of 2021.  Subsequently, creatinine was 1.2 mg/dL as of 3/18/2023 at outside hospital and was 1.1 mg/dL on 3/18/2023 when [place] [facility]  -UA on 3/20/2023 bland without any hematuria, pyuria, proteinuria -ruling out any other alarming GN or interstitial disease  -Likely has some degree of CKD on the basis of hypertension, chronic CRS physiology     2.  GRACE  -Likely related to CRS physiology and worsened from IV contrast exposure.  -Had a cardiac catheterization at [facility] -unsure about date.  Subsequently, received on 100 cc of IV contrast dye on 3/20/2023 for CT TAVR  -Presented with a creatinine of 1.1 mg/dL on 3/18/2023 and up to 1.3 mg/dL on 3/20/2023 -nephrology consulted  -UA on 3/20/2023 bland  -Ultrasound on 3/20/2023 without hydronephrosis  - RHC on 3/21/2023: RA 8, PA 92/32 (52), PCW 31 with CI 4.9      -Creatinine peaked up to 2.4 mg/dL on 2/25/2023  -Labs from this morning reviewed.  Creatinine down to 2.1 mg/dL.  Electrolytes remains stable.     Recommend:  -Continue supportive measures as you are.    -No objection to continue on prn diuretics - defer to cardiology  -If not emergent, avoid re-exposure for IV contrast dye or any surgical procedure until renal function is at around baseline.      -Continue to avoid further nephrotoxins including contrast dye and other nephrotoxins like NSAIDs, sodium phosphate enema, vitamin C, etc.  -Avoid hypotension  and maintain MAP > 65 mmHg.  -Follow daily BMP while in hospital  -No indication for renal replacement therapy.     3.  HFpEF, valvular heart disease, PAH  -Noted to have severe AS, moderate-severe MS, moderate-severe MR  -Being work-up for possible surgical intervention - ? option includes TAVR + investigation of [place] candidacy vs. TAVR then possible TAVR in MAC as per structural heart team.   -As per cardiology, CT surgery.     4.  A-fib  -s/p DCCV x3  -On amiodarone  -As per cardiology, primary team.     5.  History of iron deficiency anemia, JAK2 gene mutation myeloproliferative disorder  -Noted to have significant leukocytosis  -Started on Hydrea 500 mg every other day  -Further work-up/management as per primary team, hematology/oncology.     6.  Right renal mass  -Renal ultrasound on 3/20/2023 noted to have solid 1.9 cm right renal mass, suspicious for renal malignancy.    Recommend:  -Will need urology evaluation -defer timing to primary team     7.  Nephrolithiasis.  -Renal ultrasound on 3/20/2023 noted to have nonobstructing 1 cm right mid renal calculus  -Outpatient follow-up with nephrology and urology upon discharge     8.  Possible pneumonia  -Was covered with antibiotics at outside hospital.  Evaluated by ID and recommended to observe off antibiotics.  -As per ID, primary team.     9.  History of CVA/TIA, ASHLI, HLD  -As per primary team.    -Called her  and updated from renal perspective at [phone]

## 2023-03-27 NOTE — PROGRESS NOTES
Patient:  Laureen Persaud  Location:  [facility]  MRN:  [id number]  Today's date:  3/27/2023    Laureen is a 77 y.o. female admitted on 3/18/2023 with Severe mitral regurgitation [I34.0]. Principal problem is Severe mitral regurgitation.    Past Medical History  Laureen has no past medical history on file.  Per CT H&P:  Afib (apixan), DCCV x 3, HFpEF, HTN, HLD, DM II (non-insulin dep), anxiety, Severe AS, mod- sev MS and mod-sev MR, Fe def anemia, JAK2 gene mutation, ASHLI w/ CPAP, CVA/TIA 2020, PAH  Per PMR:  bilateral rotator cuff tears, aortic stenosis, mitral valve stenosis, and mitral valve regurgitation, OA TANIA knees    History of Present Illness  Amitted to OSH for decompensated heart failure and pneumonia, found to have worsening aortic stenosis and mod-severe MS/MR. Transferred to [facility] for surgical evaluation    S/p LINDY 3/20        OT Vitals    Date/Time Pulse HR Source SpO2 Pt Activity O2 Therapy O2 Del Method O2 Flow Rate BP Pt Position [employer]   03/21/23 0859 77 Monitor 99 % At rest Supplemental oxygen Nasal cannula 2 L/min 113/56 Lying NB      OT Pain    Date/Time Pain Type Rating: Rest Rating: Activity [employer]   03/21/23 0859 Pain Assessment 0 - no pain 0 - no pain NB          Prior Living Environment    Flowsheet Row Most Recent Value   People in Home spouse   Current Living Arrangements home   Home Accessibility stairs to enter home (Group)   Living Environment Comment Living in a 1SH, 1STE. +tub shower without SC or GBs.        Prior Level of Function    Flowsheet Row Most Recent Value   Dominant Hand right   Ambulation assistive equipment   Transferring assistive equipment   Toileting independent   Bathing assistive person   Dressing assistive person   Eating independent   Prior Level of Function Comment Pt reports use of RW for transfers, mobility within home. Rarely leaving home PTA. Spouse assisting with LBD, bathing.   Assistive Device Currently Used at Home walker, front-wheeled         Occupational Profile    Flowsheet Row Most Recent Value   Reason for Services/Referral ADL Evaluation/dispo planning   Occupational History/Life Experiences Enjoys spending time with extended family   Environmental Supports and Barriers Lives with spouse, Mode   Patient Goals Get surgery, regain mobility         OT Evaluation and Treatment - 03/27/23 9283        General Information    Document Type daily treatment/progress note     Mode of Treatment occupational therapy     Position at Start of Session upright;in bed     Status at Start of Session agreeable to therapy;no issues or concerns identified by nurse prior to session     General Observations of Patient Supine in bed        Precautions/Limitations/Impairments    Existing Precautions/Restrictions fall;cardiac        Cognition/Psychosocial    Affect/Mental Status (Cognition) WFL;anxious     Behavioral Issues (Cognition) difficulty managing stress     Orientation Status (Cognition) oriented x 4     Follows Commands (Cognition) WFL;follows multi-step commands     Cognitive Function safety deficit     Executive Function Deficit (Cognition) minimal deficit;information processing;insight/awareness of deficits     Comment, Cognition AAOx4, pleasant and engaged in session. Contiues to be anxious with all functional transfers, benefitting from cues and encouragement. Motivated to d/c to rehab.        Bed Mobility    [place], Roll Left minimum assist (75% or more patient effort);2 person assist     [place], Supine to Sit minimum assist (75% or more patient effort);2 person assist     Assistive Device bed rails;head of bed elevated     Comment (Bed Mobility) MinAx2 to sit to L EOB, cues to shift hips forward prior to standing        Mobility Belt    Mobility Belt Used for All Out of Bed Activity no     Reason Mobility Belt Not Used other (see comments)     Reason Mobility Belt Not Used Ax2 available        Sit to Stand Transfer    [place], Sit to Stand  Transfer minimum assist (75% or more patient effort);2 person assist     Verbal Cues hand placement;safety;technique     Assistive Device walker, front-wheeled     Comment Standing from EOB, taking steps within room using RW. Cues for hand placement        Stand to Sit Transfer    [place], Stand to Sit Transfer minimum assist (75% or more patient effort);2 person assist     Verbal Cues technique;safety;hand placement     Assistive Device walker, front-wheeled     Comment Sitting back to recliner        Safety Issues, Functional Mobility    Safety Issues Affecting Function (Mobility) awareness of need for assistance;insight into deficits/self-awareness;sequencing abilities     Impairments Affecting Function (Mobility) balance;endurance/activity tolerance;pain;strength        Balance    Static Sitting Balance mild impairment;sitting, edge of bed     Dynamic Sitting Balance mild impairment;sitting, edge of bed     Static Standing Balance mild impairment;supported     Dynamic Standing Balance mild impairment;supported     Comment, Balance Requiring Ax1 and use of RW        Motor Skills    Functional Endurance Making improvements each session, continues to be limited by anxiety        Therapeutic Exercise    Therapeutic Exercise upper extremity        Upper Extremity (Therapeutic Exercise)    Exercise Position/Type seated;AROM (active range of motion)     General Exercise bilateral     Range of Motion Exercises shoulder flexion/extension;elbow flexion/extension     Reps and Sets 1x10        Lower Extremity (Therapeutic Exercise)    Exercise Position/Type seated;AROM (active range of motion)     General Exercise bilateral     Range of Motion Exercises bilateral;knee flexion/extension;ankle dorsiflexion/plantarflexion     Reps and Sets 1x10     Comment Written on board        Upper Body Dressing    Tasks don;doff;[facility] gown     [place] moderate assist (50-74% patient effort)     Position edge of bed sitting         Lower Body Dressing    Tasks don;doff;socks     [place] maximum assist (25-49% patient effort)     Position supine        Grooming    Comment Offered, declined        Toileting    Comment +purewireta        BADL Safety/Performance    Impairments, BADL Safety/Performance balance;endurance/activity tolerance;pain;strength     Skilled BADL Treatment/Intervention BADL process/adaptation training        ADL Interventions    Energy Conservation Techniques activity adapted to sitting;activity pacing encouraged;asking for necessary assistance promoted     Self-Care (BADL) Promotion best position for BADL determined        AM-PAC (TM) - ADL (Current Function)    Putting on and taking off regular lower body clothing? 2 - A Lot     Bathing? 2 - A Lot     Toileting? 2 - A Lot     Putting on/taking off regular upper body clothing? 2 - A Lot     How much help for taking care of personal grooming? 3 - A Little     Eating meals? 3 - A Little     AM-PAC (TM) ADL Score 14        Session Outcome    Daily Outcome Statement OT tx session completed. Making slow progress toward established functional goals, continued to be limited by anxiety. [place] for bed mobility, minAx2 to stand and ambulate within room. Mod-maxA for seated ADLs. Continue to recommend d/c to SNF.     Position at End of Session reclined;in chair     Status at End of Session chair alarm on;call light in reach;personal items in reach     Nursing Notified patient's performance;patient's position        Plan    Rehab Potential good, to achieve stated therapy goals     Therapy Frequency 3-5 times/wk     Planned Therapy Interventions activity tolerance training;BADL retraining;functional balance retraining;IADL retraining;occupation/activity based interventions;patient/caregiver education/training;strengthening exercise;transfer/mobility retraining               OT Discharge Recommendations    Flowsheet Row Most Recent Value   OT Recommended Discharge Disposition  skilled nursing facility at 03/27/2023 0917   Anticipated Equipment Needs At Discharge (OT) --  [TBD] at 03/27/2023 0917   Patient/Family Therapy Goal Statement Go to rehab at 03/27/2023 0917                  Education Documentation  Self-Care, taught by Samara Wilson, OT at 3/27/2023  2:08 PM.  Learner: Patient  Readiness: Acceptance  Method: Explanation  Response: Verbalizes Understanding  Comment: Role of OT, OT POC, adapted self care strategies, energy conservation techniques, d/c planning          OT Goals    Flowsheet Row Most Recent Value   Bed Mobility Goal 1    Activity/Assistive Device bed mobility activities, all at 03/20/2023 0827   [place] modified independence at 03/20/2023 0827   Time Frame by discharge at 03/20/2023 0827   Progress/Outcome goal ongoing at 03/27/2023 0917   Transfer Goal 1    Activity/Assistive Device all transfers at 03/20/2023 0827   [place] modified independence at 03/20/2023 0827   Time Frame by discharge at 03/20/2023 0827   Progress/Outcome goal ongoing at 03/27/2023 0917   Dressing Goal 1    Activity/Adaptive Equipment dressing skills, all at 03/20/2023 0827   [place] modified independence at 03/20/2023 0827   Time Frame by discharge at 03/20/2023 0827   Progress/Outcome goal ongoing at 03/27/2023 0917   Toileting Goal 1    Activity/Assistive Device toileting skills, all at 03/20/2023 0827   [place] modified independence at 03/20/2023 0827   Time Frame by discharge at 03/20/2023 0827   Progress/Outcome goal ongoing at 03/27/2023 0917   Grooming Goal 1    Activity/Assistive Device grooming skills, all at 03/20/2023 0827   [place] modified independence at 03/20/2023 0827   Time Frame by discharge at 03/20/2023 0827   Progress/Outcome goal ongoing at 03/27/2023 0917

## 2023-03-27 NOTE — PROGRESS NOTES
Patient: Laureen Persaud  Location:  [facility]  MRN:  [id number]  Today's date:  3/27/2023    Laureen is a 77 y.o. female admitted on 3/18/2023 with Severe mitral regurgitation [I34.0]. Principal problem is Severe mitral regurgitation.    Past Medical History  Laureen has no past medical history on file.  Per CT H&P:  Afib (apixan), DCCV x 3, HFpEF, HTN, HLD, DM II (non-insulin dep), anxiety, Severe AS, mod- sev MS and mod-sev MR, Fe def anemia, JAK2 gene mutation, ASHLI w/ CPAP, CVA/TIA 2020, PAH  Per PMR:  bilateral rotator cuff tears, aortic stenosis, mitral valve stenosis, and mitral valve regurgitation, OA TANIA knees    History of Present Illness  Amitted to OSH for decompensated heart failure and pneumonia, found to have worsening aortic stenosis and mod-severe MS/MR. [place] [facility] [place]    S/p LINDY 3/20        PT Vitals    Date/Time Pulse HR Source SpO2 Pt Activity O2 Therapy O2 Del Method O2 Flow Rate BP MAP BP Location BP Method Pt Position [facility]   03/23/23 1008 67 Monitor 99 % At rest Supplemental oxygen Nasal cannula 1 L/min 109/70 85 mmHg Right upper arm Automatic Sitting DS   03/23/23 1032 78 Monitor 93 % Walking None (Room air) -- -- 143/65 94 mmHg Left upper arm Automatic Sitting DS      PT Pain    Date/Time Pain Type Rating: Rest Rating: Activity [facility]   03/23/23 1008 Pain Reassessment 0 0 DS          Prior Living Environment    Flowsheet Row Most Recent Value   People in Home spouse   Current Living Arrangements home   Home Accessibility stairs to enter home (Group)   Living Environment Comment Living in a 1SH, 1STE. +tub shower without SC or GBs.        Prior Level of Function    Flowsheet Row Most Recent Value   Dominant Hand right   Ambulation assistive equipment   Transferring assistive equipment   Toileting independent   Bathing assistive person   Dressing assistive person   Eating independent   Prior Level of Function Comment Pt reports use of RW for  transfers, mobility within home. Rarely leaving home PTA. Spouse assisting with LBD, bathing.   Assistive Device Currently Used at Home walker, front-wheeled           PT Evaluation and Treatment - 03/23/23 1008        PT Time Calculation    Start Time 1008     Stop Time 1041     Time Calculation (min) 33 min        General Information    Document Type daily treatment/progress note     Mode of Treatment individual therapy;physical therapy     Position at Start of Session upright;in chair     Status at Start of Session agreeable to therapy;no issues or concerns identified by nurse prior to session        Precautions/Limitations/Impairments    Existing Precautions/Restrictions fall;cardiac        Cognition/Psychosocial    Affect/Mental Status (Cognition) WFL;anxious     Orientation Status (Cognition) oriented x 4        Mobility Belt    Mobility Belt Used for All Out of Bed Activity yes        Sit to Stand Transfer    Jack, Sit to Stand Transfer minimum assist (75% or more patient effort)     Verbal Cues preparatory posture;hand placement;technique        Stand to Sit Transfer    Jack, Stand to Sit Transfer minimum assist (75% or more patient effort)     Comment cued for contacting sitting surface before transfer        Gait Training    [place], Gait minimum assist (75% or more patient effort)     Assistive Device walker, front-wheeled     Distance in Feet 17 feet     Pattern (Gait) step-to     Deviations/Abnormal Patterns (Gait) base of support, narrow;festinating/shuffling;step length decreased;stride length decreased     Comment (Gait/Stairs) pt anxious with mobility, standing break required        Stairs Training    [place], Stairs not tested        Balance    Static Sitting Balance WFL;sitting in chair     Dynamic Sitting Balance WFL;sitting in chair     Static Standing Balance mild impairment;supported     Dynamic Standing Balance mild impairment;supported        AM-PAC (TM) - Mobility  (Current Function)    Turning from your back to your side while in a flat bed without using bedrails? 3 - A Little     Moving from lying on your back to sitting on the side of a flat bed without using bedrails? 3 - A Little     Moving to and from a bed to a chair? 3 - A Little     Standing up from a chair using your arms? 3 - A Little     To walk in a hospital room? 3 - A Little     Climbing 3-5 steps with a railing? 2 - A Lot     AM-PAC (TM) Mobility Score 17        Session Outcome    Daily Outcome Statement Pt requires minimal assist for transfer.  Pt ambulating brief distance with walker and minimal assist, gait belt used.  Pt very frail per CT frailty screen.  Signficant endurance and strength deficits can be addressed by continued therapy in skilled nursing facility.     Position at End of Session upright;in chair     Status at End of Session chair alarm on;all needs met;call light in reach;personal items in reach     Nursing Notified patient's performance;patient's response to therapy/activity        Plan    Rehab Potential good, to achieve stated therapy goals     Therapy Frequency 5 times/wk     Planned Therapy Interventions balance training;bed mobility training;gait training;stair training;strengthening;transfer training               PT Discharge Recommendations    Flowsheet Row Most Recent Value   PT Recommended Discharge Disposition skilled nursing facility at 03/23/2023 1008   Anticipated Equipment Needs at Discharge (PT) other (see comments)  [DME needs TBD at d/c to home] at 03/23/2023 1008   Patient/Family Therapy Goals Statement to get stronger at 03/23/2023 1008                  Education Documentation  No documentation found.    Patient educated in safe transfer, ambulation and elevation technique.  Patient demonstrated need for reinforcement.    PT Goals    Flowsheet Row Most Recent Value   Bed Mobility Goal 1    Activity/Assistive Device bed mobility activities, all at 03/21/2023 0858    [place] modified independence at 03/21/2023 0858   Time Frame by discharge at 03/21/2023 0858   Progress/Outcome goal ongoing at 03/21/2023 0858   Transfer Goal 1    Activity/Assistive Device all transfers, walker, front-wheeled at 03/21/2023 0858   [place] modified independence at 03/21/2023 0858   Time Frame by discharge at 03/21/2023 0858   Progress/Outcome goal ongoing at 03/21/2023 0858   Gait Training Goal 1    Activity/Assistive Device gait (walking locomotion), walker, front-wheeled at 03/21/2023 0858   [place] modified independence at 03/21/2023 0858   Distance 65 at 03/21/2023 0858   Time Frame by discharge at 03/21/2023 0858   Progress/Outcome goal ongoing at 03/21/2023 0858   Stairs Goal 1    Activity/Assistive Device stairs, all skills at 03/21/2023 0858   [place] supervision required at 03/21/2023 0858   Number of Stairs 4 at 03/21/2023 0858   Time Frame by discharge at 03/21/2023 0858   Progress/Outcome goal ongoing at 03/21/2023 0858

## 2023-03-27 NOTE — PROGRESS NOTES
Brief Hematology/Oncology Progress Note:    Noted that valve surgery is currently being deferred.      From hematology standpoint, recommend continuing Hydrea 500 mg daily to help control leukocytosis and reduce thrombotic risk.  Continue Hydrea as long as Hgb and platelet count remain stable and do not drop too low, below the normal range.      Marky Laureano DO  Hematology/Oncology Fellow  Pager: [phone]

## 2023-03-27 NOTE — PROGRESS NOTES
Cardiothoracic Intensivist Progress Note       CARDIOTHORACIC   COVID Status: Negative 3/18/23    IV dye allergy: no    Subjective     History of Present Illness: Laureen Persaud is a 77 y.o. cisgender female with history of worsening aortic and mitral valvular disease admitted for surgical evaluation, chronic atrial fibrillation, and ASHLI that are being treated.                              Review of Systems:                                     Constitutional: frail appearing                                                  Eyes: denies difficulty with vision  Ears, nose, mouth, throat, and face: denies oral discomfort                                        Respiratory: mild shortness of breath with exertion                                  Cardiovascular: denies chest discomfort                                  Gastrointestinal: denies abdominal pain                                    Genitourinary: denies difficulty voiding                                      Hematologic: denies bleeding issues                                Musculoskeletal: denies muscle pain                                      Neurological: generalized weakness                                   Integumentary: multiple skin tears    Medical History: History reviewed. No pertinent past medical history.    Surgical History: History reviewed. No pertinent surgical history.    Prior to Admission medications    Not on File       Allergies: Neomycin, Polymyxin b, Procaine, and Bacitracin    Social History:   Social History     Socioeconomic History   • Marital status:      Spouse name: None   • Number of children: None   • Years of education: None   • Highest education level: None     Social Determinants of Health     Financial Resource Strain: Low Risk  (3/20/2023)    Overall Financial Resource Strain (CARDIA)    • Difficulty of Paying Living Expenses: Not hard at all   Transportation Needs: No Transportation Needs (3/20/2023)    PRAPARE -  Transportation    • Lack of Transportation (Medical): No    • Lack of Transportation (Non-Medical): No   Housing Stability: Unknown (3/20/2023)    Housing Stability Vital Sign    • Unable to Pay for Housing in the Last Year: No    • Unstable Housing in the Last Year: No       Family History: History reviewed. No pertinent family history.    Objective     Vital signs in last 24 hours:  Temp:  [36 °C (96.8 °F)-36.7 °C (98 °F)] 36 °C (96.8 °F)  Heart Rate:  [64-77] 77  Resp:  [16-18] 16  BP: (122-147)/(58-92) 128/63      Intake/Output Summary (Last 24 hours) at 3/27/2023 1015  Last data filed at 3/27/2023 0900  Gross per 24 hour   Intake 960 ml   Output 2350 ml   Net -1390 ml     Intake/Output this shift:  I/O this shift:  In: -   Out: 250 [Urine:250]    Labs  Lab Results   Component Value Date    WBC 32.75 (HH) 03/27/2023    HGB 12.2 03/27/2023    HCT 39.9 03/27/2023     (H) 03/27/2023    ALT 16 03/21/2023    AST 23 03/21/2023     (L) 03/27/2023    K 4.3 03/27/2023    CL 98 03/27/2023    CREATININE 2.1 (H) 03/27/2023    BUN 78 (H) 03/27/2023    CO2 24 03/27/2023    MG 1.9 03/27/2023    TSH 1.41 03/18/2023    INR 1.4 03/25/2023    HGBA1C 6.4 (H) 03/18/2023    PT 17.2 (H) 03/25/2023    PTT 38 (H) 03/25/2023             Full Code    Head/Ear/Nose/Throat:     NCAT.                               Eyes:     EOMI and PERRL.                     Respiratory:     diminished at the bases b/l.               Cardiovascular:     SR.              Gastrointestinal:     + Bowel sounds and non-tender.                 Genitourinary:     no-deformities.                    Extremities:     +1-2 edema b/l LE.            Musculoskeletal:      No injury or deformity.                   Neurological:     follows commands.       Behavior/Emotional:     appropriate and cooperative.                           Lymph:      No adenopathy noted.                               Skin:      Multiple skin tears and scattered bruising. Purpuric  rash on feet. Stage 2-3 sacral ulcer (present on admission)     Examination of the patient performed at the bedside. Rounded with ICU team and discussed management/plan with surgical staff. Medications, radiological studies and labs reviewed and discussed with attending of record, Dr. Macey Padgett.    Cardiothoracic Assessment and Plan:    Neurologic: A&Ox3 and pain controlled. History of CVA/TIA. Ambulates with a walker at baseline and has had multiple falls. S/P dental extractions. PT/OT/PM&R to follow.     Cardiovascular: Severe AS and MR/MI undergoing surgical evaluation, also with acute (elevated BNP) HFpEF and chronic atrial fibrillation. Off vasoactive medications and meeting BP goals. Will continue to optimize cardiac medications. Very high mortality risk based on STS calculation and is not a candidate for open surgery. No endocarditis based on LINDY. Not a candidate for the Apollo valve. TAVR valve will not likely improve overall condition and after surgeon discussion with patient and family, will not proceed with any surgery. Appreciate HF input.     Respiratory: I personally reviewed the most recent CXR which portrayed small b/l pleural effusions and pulmonary congestion compared to previous exam. Will encourage IS, mobilization and wean O2 PRN. CPAP for ASHLI.      Genitourinary: CKD stage 3a (GFR 45-59 ml/min). BUN elevated and will continue to monitor. There could be a cardiorenal component vs contrast involvement however BUN was elevated upon admission. No davenport. Will avoid nephrotoxic medications. Increased risk for transition to renal failure. Appreciate Nephrology input.     Endocrine: Follow [facility]. DM management. Appreciate Endocrinology input.     Hematologic: No evidence active bleeding. Loveno for DVT ppx. Was on apixaban for a-fib as outpatient and currently on hold. Will considering resuming anticoagulation once INR drifts down however due to falls and skin tears/bleeding might not be a good  "candidate moving forward. \"Anemia of chronic disease.History of JAK2 gene mutation and appreciate Heme/Onc input; will continue hydroxyurea.      Infectious Disease: Was on azithromycin and ceftriaxone for possible endocarditis/pneumonia however ID OK monitoring off antibiotics for now since LINDY negative. Cultures pending. Appreciate Infectious Disease input.      Fluids, Electrolytes: Electrolytes replaced per protocol. Goal for net negative fluid balance.     Gastrointestinal: PO as tolerated. GI ppx. History of ascites but unknown cause and has had paracentesis recently. Hepatitis panel negative.     Skin: OOB, PT when able. Wound care for skin tears and sacral ulcer.      Tubes, lines and drains: Will remove superfluous invasive monitors PRN.    Disposition: Guarded. Continue close monitoring on stepdown unit. Plan for SNF when bed available.           Carlitos Reddy, DO  3/27/2023           "

## 2023-03-27 NOTE — PROGRESS NOTES
Patient: Laureen Persaud  Location:  [facility]  MRN:  [id number]  Today's date:  3/27/2023    Laureen is a 77 y.o. female admitted on 3/18/2023 with Severe mitral regurgitation [I34.0]. Principal problem is Severe mitral regurgitation.    Past Medical History  Laureen has no past medical history on file.  Per CT H&P:  Afib (apixan), DCCV x 3, HFpEF, HTN, HLD, DM II (non-insulin dep), anxiety, Severe AS, mod- sev MS and mod-sev MR, Fe def anemia, JAK2 gene mutation, ASHLI w/ CPAP, CVA/TIA 2020, PAH  Per PMR:  bilateral rotator cuff tears, aortic stenosis, mitral valve stenosis, and mitral valve regurgitation, OA TANIA knees    History of Present Illness  Amitted to OSH for decompensated heart failure and pneumonia, found to have worsening aortic stenosis and mod-severe MS/MR. Transferred to [facility] [place] evaluation    S/p LINDY 3/20        PT Vitals    Date/Time Pulse HR Source SpO2 Pt Activity O2 Therapy O2 Del Method O2 Flow Rate BP MAP BP Location BP Method Pt Position [facility]   03/27/23 0920 73 NSR Monitor 99 % At rest Supplemental oxygen Nasal cannula 2 L/min 137/92 107 mmHg Right upper arm Automatic Sitting DS   03/27/23 0943 77 NSR Monitor 99 % Walking Supplemental oxygen Nasal cannula 2 L/min 128/63 91 mmHg Right upper arm Automatic Sitting DS      PT Pain    Date/Time Pain Type Side/Orientation Location Rating: Rest Rating: Activity Interventions [facility]   03/27/23 0920 Pain Reassessment bilateral shoulder known TANIA RTC tears 0 6 position adjusted DS          Prior Living Environment    Flowsheet Row Most Recent Value   People in Home spouse   Current Living Arrangements home   Home Accessibility stairs to enter home (Group)   Living Environment Comment Living in a 1SH, 1STE. +tub shower without SC or GBs.        Prior Level of Function    Flowsheet Row Most Recent Value   Dominant Hand right   Ambulation assistive equipment   Transferring assistive equipment   Toileting independent   Bathing  assistive person   Dressing assistive person   Eating independent   Prior Level of Function Comment Pt reports use of RW for transfers, mobility within home. Rarely leaving home PTA. Spouse assisting with LBD, bathing.   Assistive Device Currently Used at Home walker, front-wheeled           PT Evaluation and Treatment - 03/27/23 0920        PT Time Calculation    Start Time 0920     Stop Time 0950     Time Calculation (min) 30 min        General Information    Document Type daily treatment/progress note     Mode of Treatment co-treatment;physical therapy     Position at Start of Session upright;in bed     Status at Start of Session agreeable to therapy;no issues or concerns identified by nurse prior to session   finishing breakfast       Precautions/Limitations/Impairments    Existing Precautions/Restrictions fall;cardiac        Cognition/Psychosocial    Affect/Mental Status (Cognition) WFL;anxious     Orientation Status (Cognition) oriented x 4        Bed Mobility    [place], Roll Left 2 person assist;minimum assist (75% or more patient effort)     [place], Supine to Sit 2 person assist;minimum assist (75% or more patient effort)     Comment (Bed Mobility) cued for hand placement and sequencing        Mobility Belt    Mobility Belt Used for All Out of Bed Activity no     Reason Mobility Belt Not Used other (see comments)     Reason Mobility Belt Not Used assist of two available        Sit to Stand Transfer    [place], Sit to Stand Transfer 2 person assist;minimum assist (75% or more patient effort)     Verbal Cues preparatory posture;hand placement;technique        Stand to Sit Transfer    [place], Stand to Sit Transfer 2 person assist;minimum assist (75% or more patient effort)     Comment cued to contact sitting surface before transfer        Gait Training    [place], Gait 1 person assist;minimum assist (75% or more patient effort);1 person to manage equipment     Assistive Device  walker, front-wheeled     Distance in Feet 31 feet     Pattern (Gait) step-to     Deviations/Abnormal Patterns (Gait) base of support, narrow;patricia decreased;festinating/shuffling;step length decreased;stride length decreased     Comment (Gait/Stairs) pt performed pre-gait activity at bedside with close supervision        Stairs Training    [place], Stairs not tested        Balance    Static Sitting Balance mild impairment;unsupported;sitting, edge of bed     Dynamic Sitting Balance mild impairment;unsupported;sitting, edge of bed     Static Standing Balance mild impairment;supported     Dynamic Standing Balance mild impairment;supported        Lower Extremity (Therapeutic Exercise)    Exercise Position/Type seated;AROM (active range of motion)     General Exercise bilateral;ankle pumps;LAQ (long arc quad);marching while seated     Reps and Sets 1x10     Comment good AROM        AM-PAC (TM) - Mobility (Current Function)    Turning from your back to your side while in a flat bed without using bedrails? 3 - A Little     Moving from lying on your back to sitting on the side of a flat bed without using bedrails? 3 - A Little     Moving to and from a bed to a chair? 3 - A Little     Standing up from a chair using your arms? 3 - A Little     To walk in a hospital room? 3 - A Little     Climbing 3-5 steps with a railing? 2 - A Lot     AM-PAC (TM) Mobility Score 17        Session Outcome    Daily Outcome Statement Pt requires minimal assist for bed mobility and transfers.  Pt ambulating brief distance with walker and minimal assist.  Significant deconditioning can be addressed by continued therapy in skilled nursing facility.     Position at End of Session reclined;in chair     Status at End of Session all needs met;call light in reach;personal items in reach     Nursing Notified patient's performance;patient's response to therapy/activity        Plan    Rehab Potential good, to achieve stated therapy goals      Therapy Frequency 5 times/wk     Planned Therapy Interventions balance training;bed mobility training;gait training;stair training;strengthening;transfer training               PT Discharge Recommendations    Flowsheet Row Most Recent Value   PT Recommended Discharge Disposition skilled nursing facility at 03/27/2023 0920   Anticipated Equipment Needs at Discharge (PT) other (see comments)  [DME needs TBD at d/c to home] at 03/27/2023 0920   Patient/Family Therapy Goals Statement to go to rehab at 03/27/2023 0920                  Education Documentation  Treatment Plan, taught by Nick Ash PTA at 3/27/2023 10:12 AM.  Learner: Patient  Readiness: Acceptance  Method: Explanation  Response: Verbalizes Understanding  Comment: Pt instructed in safe transfer/ambulation technique          PT Goals    Flowsheet Row Most Recent Value   Bed Mobility Goal 1    Activity/Assistive Device bed mobility activities, all at 03/21/2023 0858   [place] modified independence at 03/21/2023 0858   Time Frame by discharge at 03/21/2023 0858   Progress/Outcome goal ongoing at 03/21/2023 0858   Transfer Goal 1    Activity/Assistive Device all transfers, walker, front-wheeled at 03/21/2023 0858   [place] modified independence at 03/21/2023 0858   Time Frame by discharge at 03/21/2023 0858   Progress/Outcome goal ongoing at 03/21/2023 0858   Gait Training Goal 1    Activity/Assistive Device gait (walking locomotion), walker, front-wheeled at 03/21/2023 0858   [place] modified independence at 03/21/2023 0858   Distance 65 at 03/21/2023 0858   Time Frame by discharge at 03/21/2023 0858   Progress/Outcome goal ongoing at 03/21/2023 0858   Stairs Goal 1    Activity/Assistive Device stairs, all skills at 03/21/2023 0858   [place] supervision required at 03/21/2023 0858   Number of Stairs 4 at 03/21/2023 0858   Time Frame by discharge at 03/21/2023 0858   Progress/Outcome goal ongoing at 03/21/2023 0858

## 2023-03-27 NOTE — PROGRESS NOTES
"Brief Nutrition Note    Recommendations      Reg, NCS diet, add 2gm K+ diet as needed   Boost diabetic BID     Clinical Course: Patient is a 77 y.o. female who was admitted on 3/18/2023 with a diagnosis of Severe mitral regurgitation [I34.0].     History reviewed. No pertinent past medical history.  History reviewed. No pertinent surgical history.    Reason for Assessment  Reason For Assessment: nurse/nurse practitioner consult  Patient Already Seen On: 03/20/23     [facility] Nutrition Screen Tool  Has patient lost weight without trying?: 0-->No  Has patient been eating poorly due to decreased appetite?: 1-->Yes  [facility] Nutrition Screen Score: 1     Nutrition/Diet History  Intake (%): 75%    Physical Findings  Last Bowel Movement: 03/26/23  Oral/Mouth Cavity: other (see comments) (some teeth pulled)  Skin: edema, pressure injury (2-3+ edema, perineum 2)     RETS18 Physical Appearance  Last Bowel Movement: 03/26/23  Oral/Mouth Cavity: other (see comments) (some teeth pulled)  Skin: edema, pressure injury (2-3+ edema, perineum 2)     Nutrition Order  Nutrition Order: meets nutritional requirements  Nutrition Order Comments: 2200cal, 2gm K+ diet     Anthropometrics  Height: 146.1 cm (4' 9.5\")       Current Weight  Weight Method: Standing scale  Weight: 61.6 kg (135 lb 11.2 oz)     Ideal Body Weight (IBW)  Ideal Body Weight (IBW) (kg): 40.15  % Ideal Body Weight: 161.91        Body Mass Index (BMI)  BMI (Calculated): 28.8     RETS18 Lab Results  Lab Results Reviewed: reviewed, pertinent   BMP Results       03/27/23 03/26/23 03/25/23     0456 0500 0612     135 137    K 4.3 4.4 4.8    Cl 98 100 102    CO2 24 25 23    Glucose 75 93 87    BUN 78 83 81    Creatinine 2.1 2.3 2.4    Calcium 8.5 8.2 8.3    Anion Gap 13 10 12    EGFR 22.8 20.5 19.5         Comment for BUN at 0500 on 03/26/23: Consistent with previous results      Comment for BUN at 0612 on 03/25/23: Consistent with previous results          Mg 1.9     , 149, " 122, 190 mg/dl       Medications  Pertinent Medications Reviewed: reviewed, pertinent   • amiodarone  200 mg oral Daily   • [Provider Managed Hold] apixaban  5 mg oral BID   • atorvastatin  40 mg oral Daily (6p)   • bumetanide  2 mg intravenous BID   • enoxaparin  30 mg subcutaneous Daily (6p)   • hydroxyurea  500 mg oral Daily   • insulin lispro U-100  3 Units subcutaneous TID with meals   • magnesium oxide  400 mg oral Daily   • [Provider Managed Hold] potassium chloride  20 mEq oral BID (am, 4p)   • [Provider Managed Hold] torsemide  20 mg oral Daily             Skin: stage 1 perineum     Clinical comments:  Follow up. Pt in chair, states appetite improving, ate most of her breakfast. Did not receive her oral supplement this am, will investigate- will change it to an appropriate diabetic supplement.   Rec Reg, NCS diet, 2gm K+ as needed  Boost diabetic BID     Goals:> 75% meals , oral supplements   Monitor: labs, skin, po diet stephanie, wts, BG     Recommendations: See above       Date: 03/27/23  Signature: Ghazala Page RD

## 2023-03-27 NOTE — PLAN OF CARE
Plan of Care Review  Plan of Care Reviewed With: patient  Progress: no change  Outcome Summary: VSS on 1L nasal cannla, sats 100%. Pt feeling better on 1L. Complaints of rotator cuff pain, position adjusted. Magnesium repleted. Given IV bumex, purewick in place.

## 2023-03-27 NOTE — PROGRESS NOTES
Patient:  Laureen Persaud  Location:  [facility]  MRN:  [id number]  Today's date:  3/27/2023    Laureen is a 77 y.o. female admitted on 3/18/2023 with Severe mitral regurgitation [I34.0]. Principal problem is Severe mitral regurgitation.    Past Medical History  Laureen has no past medical history on file.  Per CT H&P:  Afib (apixan), DCCV x 3, HFpEF, HTN, HLD, DM II (non-insulin dep), anxiety, Severe AS, mod- sev MS and mod-sev MR, Fe def anemia, JAK2 gene mutation, ASHLI w/ CPAP, CVA/TIA 2020, PAH  Per PMR:  bilateral rotator cuff tears, aortic stenosis, mitral valve stenosis, and mitral valve regurgitation, OA TANIA knees    History of Present Illness  Amitted to OSH for decompensated heart failure and pneumonia, found to have worsening aortic stenosis and mod-severe MS/MR. Transferred to [facility] [place] for surgical evaluation    S/p LINDY 3/20        OT Vitals    Date/Time Pulse HR Source SpO2 Pt Activity O2 Therapy O2 Del Method O2 Flow Rate BP MAP BP Location BP Method Pt Position [facility]   03/27/23 0917 -- -- 99 % -- Supplemental oxygen -- 2 L/min -- -- -- -- -- DS   03/27/23 0917 73 Monitor -- At rest -- Nasal cannula -- 137/92 -- Right upper arm Automatic Lying ATK   03/27/23 0920 73 NSR Monitor 99 % At rest Supplemental oxygen Nasal cannula 2 L/min 137/92 107 mmHg Right upper arm Automatic Sitting DS   03/27/23 0943 77 NSR Monitor 99 % Walking Supplemental oxygen Nasal cannula 2 L/min 128/63 91 mmHg Right upper arm Automatic Sitting DS      OT Pain    Date/Time Pain Type Side/Orientation Location Rating: Rest Rating: Activity Interventions [facility]   03/27/23 0917 Pain Assessment bilateral shoulder 0 6 position adjusted ATK   03/27/23 0920 Pain Reassessment bilateral shoulder known TANIA RTC tears 0 6 position adjusted DS          Prior Living Environment    Flowsheet Row Most Recent Value   People in Home spouse   Current Living Arrangements home   Home Accessibility stairs to enter home (Group)   Living  Environment Comment Living in a 1SH, 1STE. +tub shower without SC or GBs.        Prior Level of Function    Flowsheet Row Most Recent Value   Dominant Hand right   Ambulation assistive equipment   Transferring assistive equipment   Toileting independent   Bathing assistive person   Dressing assistive person   Eating independent   Prior Level of Function Comment Pt reports use of RW for transfers, mobility within home. Rarely leaving home PTA. Spouse assisting with LBD, bathing.   Assistive Device Currently Used at Home walker, front-wheeled        Occupational Profile    Flowsheet Row Most Recent Value   Reason for Services/Referral ADL Evaluation/dispo planning   Occupational History/Life Experiences Enjoys spending time with extended family   Environmental Supports and Barriers Lives with spouse, Mode   Patient Goals Get surgery, regain mobility         OT Evaluation and Treatment - 03/27/23 0917        OT Time Calculation    Start Time 0917     Stop Time 0947     Time Calculation (min) 30 min        General Information    Document Type daily treatment/progress note     Mode of Treatment occupational therapy     Position at Start of Session upright;in bed     Status at Start of Session agreeable to therapy;no issues or concerns identified by nurse prior to session     General Observations of Patient Supine in bed        Precautions/Limitations/Impairments    Existing Precautions/Restrictions fall;cardiac        Cognition/Psychosocial    Affect/Mental Status (Cognition) WFL;anxious     Behavioral Issues (Cognition) difficulty managing stress     Orientation Status (Cognition) oriented x 4     Follows Commands (Cognition) WFL;follows multi-step commands     Cognitive Function safety deficit     Executive Function Deficit (Cognition) minimal deficit;information processing;insight/awareness of deficits     Comment, Cognition AAOx4, pleasant and engaged in session. Contiues to be anxious with all functional transfers,  benefitting from cues and encouragement. Motivated to d/c to rehab.        Bed Mobility    Hopkins, Roll Left minimum assist (75% or more patient effort);2 person assist     Hopkins, Supine to Sit minimum assist (75% or more patient effort);2 person assist     Assistive Device bed rails;head of bed elevated     Comment (Bed Mobility) MinAx2 to sit to L EOB, cues to shift hips forward prior to standing        Mobility Belt    Mobility Belt Used for All Out of Bed Activity no     Reason Mobility Belt Not Used other (see comments)     Reason Mobility Belt Not Used Ax2 available        Sit to Stand Transfer    Hopkins, Sit to Stand Transfer minimum assist (75% or more patient effort);2 person assist     Verbal Cues hand placement;safety;technique     Assistive Device walker, front-wheeled     Comment Standing from EOB, taking steps within room using RW. Cues for hand placement        Stand to Sit Transfer    Hopkins, Stand to Sit Transfer minimum assist (75% or more patient effort);2 person assist     Verbal Cues technique;safety;hand placement     Assistive Device walker, front-wheeled     Comment Sitting back to recliner        Safety Issues, Functional Mobility    Safety Issues Affecting Function (Mobility) awareness of need for assistance;insight into deficits/self-awareness;sequencing abilities     Impairments Affecting Function (Mobility) balance;endurance/activity tolerance;pain;strength        Balance    Static Sitting Balance mild impairment;sitting, edge of bed     Dynamic Sitting Balance mild impairment;sitting, edge of bed     Static Standing Balance mild impairment;supported     Dynamic Standing Balance mild impairment;supported     Comment, Balance Requiring Ax1 and use of RW        Motor Skills    Functional Endurance Making improvements each session, continues to be limited by anxiety        Therapeutic Exercise    Therapeutic Exercise upper extremity        Upper Extremity  (Therapeutic Exercise)    Exercise Position/Type seated;AROM (active range of motion)     General Exercise bilateral     Range of Motion Exercises shoulder flexion/extension;elbow flexion/extension     Reps and Sets 1x10        Lower Extremity (Therapeutic Exercise)    Exercise Position/Type seated;AROM (active range of motion)     General Exercise bilateral     Range of Motion Exercises bilateral;knee flexion/extension;ankle dorsiflexion/plantarflexion     Reps and Sets 1x10     Comment Written on board        Upper Body Dressing    Tasks don;doff;hospital gown     [place] moderate assist (50-74% patient effort)     Position edge of bed sitting        Lower Body Dressing    Tasks don;doff;socks     [place] maximum assist (25-49% patient effort)     Position supine        Grooming    Comment Offered, declined        Toileting    Comment +purewick        BADL Safety/Performance    Impairments, BADL Safety/Performance balance;endurance/activity tolerance;pain;strength     Skilled BADL Treatment/Intervention BADL process/adaptation training        ADL Interventions    Energy Conservation Techniques activity adapted to sitting;activity pacing encouraged;asking for necessary assistance promoted     Self-Care (BADL) Promotion best position for BADL determined        AM-PAC (TM) - ADL (Current Function)    Putting on and taking off regular lower body clothing? 2 - A Lot     Bathing? 2 - A Lot     Toileting? 2 - A Lot     Putting on/taking off regular upper body clothing? 2 - A Lot     How much help for taking care of personal grooming? 3 - A Little     Eating meals? 3 - A Little     AM-PAC (TM) ADL Score 14        Session Outcome    Daily Outcome Statement OT tx session completed. Making slow progress toward established functional goals, continued to be limited by anxiety. Gail for bed mobility, minAx2 to stand and ambulate within room. Mod-maxA for seated ADLs. Continue to recommend d/c to SNF.     Position at  End of Session reclined;in chair     Status at End of Session chair alarm on;call light in reach;personal items in reach     Nursing Notified patient's performance;patient's position        Plan    Rehab Potential good, to achieve stated therapy goals     Therapy Frequency 3-5 times/wk     Planned Therapy Interventions activity tolerance training;BADL retraining;functional balance retraining;IADL retraining;occupation/activity based interventions;patient/caregiver education/training;strengthening exercise;transfer/mobility retraining               OT Discharge Recommendations    Flowsheet Row Most Recent Value   OT Recommended Discharge Disposition skilled nursing facility at 03/27/2023 0917   Anticipated Equipment Needs At Discharge (OT) --  [TBD] at 03/27/2023 0917   Patient/Family Therapy Goal Statement Go to rehab at 03/27/2023 0917                  Education Documentation  Self-Care, taught by Samara Wilson, OT at 3/27/2023  2:08 PM.  Learner: Patient  Readiness: Acceptance  Method: Explanation  Response: Verbalizes Understanding  Comment: Role of OT, OT POC, adapted self care strategies, energy conservation techniques, d/c planning          OT Goals    Flowsheet Row Most Recent Value   Bed Mobility Goal 1    Activity/Assistive Device bed mobility activities, all at 03/20/2023 0827   [place] modified independence at 03/20/2023 0827   Time Frame by discharge at 03/20/2023 0827   Progress/Outcome goal ongoing at 03/27/2023 0917   Transfer Goal 1    Activity/Assistive Device all transfers at 03/20/2023 0827   [place] modified independence at 03/20/2023 0827   Time Frame by discharge at 03/20/2023 0827   Progress/Outcome goal ongoing at 03/27/2023 0917   Dressing Goal 1    Activity/Adaptive Equipment dressing skills, all at 03/20/2023 0827   [place] modified independence at 03/20/2023 0827   Time Frame by discharge at 03/20/2023 0827   Progress/Outcome goal ongoing at 03/27/2023 0917    Toileting Goal 1    Activity/Assistive Device toileting skills, all at 03/20/2023 0827   [place] modified independence at 03/20/2023 0827   Time Frame by discharge at 03/20/2023 0827   Progress/Outcome goal ongoing at 03/27/2023 0917   Grooming Goal 1    Activity/Assistive Device grooming skills, all at 03/20/2023 0827   [place] modified independence at 03/20/2023 0827   Time Frame by discharge at 03/20/2023 0827   Progress/Outcome goal ongoing at 03/27/2023 0917

## 2023-03-27 NOTE — PROGRESS NOTES
CARDIOLOGY INPATIENT FOLLOW-UP NOTE     Patient: Laureen Persaud YOB: 1945   MRN: [id number] Date of Admission: 3/18/2023   Length of Stay: 9      ASSESSMENT AND RECOMMENDATIONS     Assessment:  This is a 77 y.o. female being with PMH Afib (Apixan), DCCV x 3, HFpEF, HTN, HLD, DM II (non-insulin dep), anxiety, Severe AS, Progressive Mitral Stenosis, Severe Mitral Regurgitation, Fe def anemia, JAK2 gene mutation, ASHLI w/ CPAP, CVA/TIA 2020, PAH consulted for HFpEF, pHTN.     # HFpEF (EF 55-60%%)   # pHTN  # Severe AS  # Progressive MS  # Severe MR  - Etiology for decompensation: most likely progressive valvular disease (MS/MR > AS).  - OSH Cath with RA 22/17, /14, /44, PCWP: 39 and CO CI 4.5/2.9  - Repeat after diuresis here: RA: 8, PA: 92/32 (52), PCWP: 31, CI: 4.7   Severe MR. Progressive MS. Severe AS. Post-capillary Pulmonary HTN. Preserved LVEF.   - Volume status now warm & mildly hypervolemic  -  at presentation  - TTE (last 3/11/23): showed EF55-60%   - LINDY 3/20/21: at least moderate AS, progressive MS, severe MR  - CTA TAVR with AV calcium score 1800. Triple vessel disease with calcium score > 3000.  Recommend:  - Dr. Padgett discussed with patient and family over the weekend and they have determined not to proceed with TAVR at this time  - Strict I/Os, Daily Weights, daily electrolytes replete for goal K > 4.0, Mg > 2.0, Tele  - Supplemental O2 to maintain SpO2 >90%, wean as able  - Admission meds:   - SGLT2i: she is ultimately candidate for this but would wait until she is more ambulatory. She does have some urinary incontinence at baseline  - Diuretic: Would dose IV diuretic today  - Wean supplemental O2    # C/f subacute MV endocarditis, resolved  - No evidence of vegetation on LINDY from 3/20    # GRACE on CKD  - Suspect Contrast-associated Nephropathy given timing - Renal indices appear to have peaked and are coming down  - GRACE doesn't appear to be related to cardiac output  "or filling pressures at this time    --  Bear Barros, DO    INTERVAL/SUBJECTIVE     Seen and examined sitting up in bed. She denies chest pain/pressure, or light-headedness at rest.      PHYSICAL EXAMINATION     Vitals:   Visit Vitals  BP (!) 147/63 (BP Location: Right upper arm, Patient Position: Lying)   Pulse 64   Temp (!) 36 °C (96.8 °F) (Temporal)   Resp 16   Ht 1.461 m (4' 9.5\")   Wt 61.6 kg (135 lb 11.2 oz)   SpO2 100%   BMI 28.86 kg/m²     Temp:  [36 °C (96.8 °F)-36.7 °C (98 °F)] 36 °C (96.8 °F)  Heart Rate:  [64-77] 64  Resp:  [16-18] 16  BP: (122-147)/(58-63) 147/63  I/O last 3 completed shifts:  In: 1658 [P.O.:1658]  Out: 4100 [Urine:4100]  Wt Readings from Last 3 Encounters:   03/27/23 61.6 kg (135 lb 11.2 oz)       General: No acute distress. Frail elderly female.  HEENT: No corneal arcus or xanthelasmas. Sclerae are anicteric.   Neck: JVP: 9-10 cm H2O  Heart: Regular rate and rhythm. No murmurs.  Chest: Symmetrical.  Lungs: Rales at right lung base  Abdomen: Minimally distended.  Extremities: No cyanosis or clubbing. 1+ pitting edema b/l LE. Distal pulses are easily palpable.  Skin: Warm and dry and well perfused.  Neurologic: Alert and appropriate.  Psychiatric: Normal affect.      LABORATORY DATA     Results from last 7 days   Lab Units 03/27/23  0456 03/26/23  0500 03/25/23  0612 03/22/23  0341 03/21/23  0339   SODIUM mEQ/L 135* 135* 137   < > 137   CO2 mEQ/L 24 25 23   < > 23   BUN mg/dL 78* 83* 81*   < > 62*   CREATININE mg/dL 2.1* 2.3* 2.4*   < > 1.4*   CALCIUM mg/dL 8.5* 8.2* 8.3*   < > 7.8*   ALT IU/L  --   --   --   --  16   AST IU/L  --   --   --   --  23    < > = values in this interval not displayed.     Results from last 7 days   Lab Units 03/27/23  0456 03/26/23  0500 03/25/23  0612   WBC K/uL 32.75* 32.73* 34.91*   PLATELETS K/uL 403* 379* 396*     Results from last 7 days   Lab Units 03/25/23  0612 03/22/23  0341 03/20/23  1032   PTT sec 38*  --  39*   INR  1.4 1.5 1.7   PROTIME sec " 17.2* 18.3* 20.0*           No lab exists for component: CPK            No lab exists for component: CHOLCALCLDL      Results from last 7 days   Lab Units 03/25/23  0612   BNP pg/mL 1,080*       ELECTROCARDIOGRAPHIC ASSESSMENTS     ECG   NSR     Telemetry  SR     DIAGNOSTIC IMAGING / PROCEDURES     LINDY 3/20/21 @ [facility] [place]:  The risks of the procedure were discussed with the patient and informed consent was obtained. The patient was sedated under anesthesia guidance. The LINDY probe was inserted without difficulty and the patient tolerated the procedure well.     1. Normal left ventricular cavity size.  Normal wall thickness.  Preserved left ventricular systolic function. LVEF 65%.  Abnormal septal motion, otherwise no regional wall motion abnormalities.   2.  The aortic valve is tricuspid and heavily calcified.  There is a small mobile echodensity on the aortic leaflet that is most likely related to valvular degeneration/Lambl's excrescence (clip 12).  Less likely vegetation.  At least moderate aortic stenosis (peak velocity of 3.5 m/s, mean gradient of 23 mmHg). Correlate with transthoracic echocardiography. Mild aortic regurgitation. Normal visualized thoracic aortic dimensions. Simple atheroma in the visualized portions of the descending thoracic aorta without complex elements.   3. The mitral valve is thickened and sclerotic.  Mitral annular calcification.  The subvalvular apparatus is calcified. Progressive mitral stenosis with mean gradient of 4 mmHg at 72 bpm. Severe mitral regurgitation due to P2 flail and a restricted P3 scallop.  These lesions resulted in two distinct jets of mitral regurgitation (largest PISA results in an EROA of 43 cm2 and regurgitant volume of 71 cc). There is systolic flow reversal of pulmonary vein flow.   4. Dilated left atrium. No thrombus or mass seen in the left atrium or left atrial appendage. Left atrial appendage peak emptying velocity 72 cm/s. No evidence of ASD/PFO by color Doppler.     5. Mildly dilated right ventricular cavity (4.2 x 3.8 cm) with preserved systolic function. Dilated right atrium. Prominent eustachian valve/renuka terminalis.   6. Thickened tricuspid valve leaflets. Moderate tricuspid regurgitation with estimate right ventricular systolic pressure of 69 mmHg plus right atrial pressure.  7. Grossly normal pulmonic valve. Tace pulmonic regurgitation.   8. No pericardial effusion.   9. No previous study available for comparison.       Transthoracic Echo @ OSH:   3/11/23: EF 55-60%, Severe AS with pgradient 86.9, mean gradient 46.4, trace AI.   Moderate MS, mean gradient 7.6, moderate MR  Mild TR  PAPs: 54/4     Complete Cardiac Cath 3/15/23 @ OSH:  Hemodynamics:  RA 22/17, /14, /44, PCWP: 39  PA sat 79 %  CO CI 4.5/2.9  AV peak to peak grad 34, mean 26, MICHAEL 0.9     MV mean grad 15, valve area 1.1     Coronary angiography @ OSH: mild  Non-obstructive CAD    RHC 3/21/23 @ [facility]:  1. Elevated right and left heart filling pressures (RA 12, PA 92/32 ( 52) , and PCW 31 mm Hg).  2. Elevated cardiac output and index of 7.2 L/min and 4.7 L/min/m2 respectively.   3. PVR 3 Wood Units and  TPG 2 mmHg.   4. No evidence of left to right shunt by sequential venous saturation measurements.         Radiologic Studies:  CXR: The cardiac silhouette and mediastinal contour are stable demonstrating  cardiomegaly. No significant interval change in pulmonary vascular congestion  and diffuse bilateral interstitial prominence due to edema and/or infiltrates.  There is probably unchanged left-sided pleural parenchymal disease. No evidence  of a pneumothorax.  IMPRESSION:  No significant interval change.    CTA TAVR:  SUMMARY:  1.  Tricuspid aortic valve with thickened and calcified cusps.  Cine and  tomographic features are suggestive of severe aortic stenosis.  Aortic valve  calcium score = 1836.  Aortic valve area by direct planimetry = 1.08 cm2.  2. Normal left ventricular cavity size, wall  thickness and systolic function.  Abnormal septal motion consistent with right ventricular pressure overload.  3.  Mildly dilated right ventricle with normal systolic function.  4. Severe biatrial enlargement.  5. Mildly thickened mitral valve leaflets.  Severe posterior mitral annular  calcification.  Bileaflet restriction (posterior > anterior).  Anterior leaflet  override present.  Mitral valve area = 5.9 cm2 by direct planimetry.  Recommend  correlation with echocardiography for the presence of mitral regurgitation.  6. Three-vessel coronary atherosclerosis.  Coronary calcium score = 3134.    Signed:      Bear Barros DO

## 2023-03-27 NOTE — PLAN OF CARE
Care Coordination Discharge Plan Note   Date: 3/27/2023    Time: 12:01 PM    Patient Name: Laureen Persaud  Medical Record Number: [id number]  YOB: 1945  Room/BED: 2002    Discharge Assessment  Current Discharge Risk: terminally ill, chronically ill, dependent with mobility/activities of daily living  Concerns to be Addressed: care coordination/care conferences, discharge planning  Anticipated Changes Related to Illness: inability to work, inability to return to school, inability to care for someone else, inability to care for self    Concerns Comments: - Per info received at surgical rounds today, pt is for continued IV diruesis for the next 24hrs and plan for d/c tomorrow. Pt remains rec'd for SNF. ISAIAH attempted to met with pt this AM and she was working with PT/OT. ISAIAH then s/w pt's spouse, Pavel who reported that he is comfortable with pt discharging tomorrow, requested [facility] specifically and requested, as recommended that pt be transported via BLS. ISAIAH provided updated clinicals to [facility] via ECIN, pt was accepted for admission. BLS transport arranged with [facility] via [facility]. Team updated via secure chat. -    Anticipated Discharge Plan  Anticipated Discharge Disposition: skilled nursing facility  Type of Home Care Services: nursing, home PT  Type of Skilled Nursing Care Services: OT, PT, nursing  Patient/Family Anticipates Transition to: other (see comments)  Patient/Family Anticipated Services at Transition: rehabilitation services, skilled nursing    Patient Choice  Offered/Gave Vendor List: yes  Patient's Choice of Community Agency(s): [facility]    Patient and/or patient guardian/advocate was made aware of their right to choose a provider. A list of eligible providers was presented and reviewed with the patient and/or patient guardian/advocate in written and/or verbal form. The list delineates providers in the patient’s desired geographic area who are participating in the Medicare  program and/or providers contracted with the patient’s primary insurance. The Medicare list and quality ratings were obtained from the Medicare.gov [medicare.gov] website.    Discharge Transportation   Does the patient need discharge transport arranged?: Yes  Has discharge transport been arranged?: Yes  Discharge Transportation Vendor: [facility] ([phone], option 5)  Type of Transportation: basic life support  What day is the transport expected?: 03/28/23  What time is the transport expected?: 1300   RN report#      Discharge Barriers   Barriers to Discharge: Medical issues not resolved  Comment: -  Participants: advanced practice provider, physician, , social work/services, nursing, physical therapy      Continued Care and Services - Admitted Since 3/18/2023     Destination Coordination complete.    Service Provider Request Status Selected Services Address Phone Fax Patient Preferred    [facility]  Selected Skilled Nursing [address], [place] [id number] [phone] --

## 2023-03-28 VITALS
HEIGHT: 58 IN | TEMPERATURE: 98.2 F | WEIGHT: 131.8 LBS | RESPIRATION RATE: 17 BRPM | SYSTOLIC BLOOD PRESSURE: 117 MMHG | BODY MASS INDEX: 27.66 KG/M2 | DIASTOLIC BLOOD PRESSURE: 56 MMHG | HEART RATE: 71 BPM | OXYGEN SATURATION: 99 %

## 2023-03-28 LAB
ANION GAP SERPL CALC-SCNC: 11 MEQ/L (ref 3–15)
APTT PPP: 38 SEC (ref 23–35)
BUN SERPL-MCNC: 76 MG/DL (ref 8–20)
CALCIUM SERPL-MCNC: 8.6 MG/DL (ref 8.9–10.3)
CHLORIDE SERPL-SCNC: 98 MEQ/L (ref 98–109)
CO2 SERPL-SCNC: 29 MEQ/L (ref 22–32)
CREAT SERPL-MCNC: 1.9 MG/DL (ref 0.6–1.1)
ERYTHROCYTE [DISTWIDTH] IN BLOOD BY AUTOMATED COUNT: 22.3 % (ref 11.7–14.4)
GFR SERPL CREATININE-BSD FRML MDRD: 25.6 ML/MIN/1.73M*2
GLUCOSE BLD-MCNC: 145 MG/DL (ref 70–99)
GLUCOSE BLD-MCNC: 95 MG/DL (ref 70–99)
GLUCOSE SERPL-MCNC: 96 MG/DL (ref 70–99)
HCT VFR BLDCO AUTO: 38 % (ref 35–45)
HGB BLD-MCNC: 11.4 G/DL (ref 11.8–15.7)
INR PPP: 1.4
MAGNESIUM SERPL-MCNC: 2 MG/DL (ref 1.8–2.5)
MCH RBC QN AUTO: 26.9 PG (ref 28–33.2)
MCHC RBC AUTO-ENTMCNC: 30 G/DL (ref 32.2–35.5)
MCV RBC AUTO: 89.6 FL (ref 83–98)
PDW BLD AUTO: 11.4 FL (ref 9.4–12.3)
PLATELET # BLD AUTO: 369 K/UL (ref 150–369)
POCT TEST: ABNORMAL
POCT TEST: NORMAL
POTASSIUM SERPL-SCNC: 3.9 MEQ/L (ref 3.6–5.1)
PROTHROMBIN TIME: 17.1 SEC (ref 12.2–14.5)
RBC # BLD AUTO: 4.24 M/UL (ref 3.93–5.22)
SODIUM SERPL-SCNC: 138 MEQ/L (ref 136–144)
WBC # BLD AUTO: 31.05 K/UL (ref 3.8–10.5)

## 2023-03-28 PROCEDURE — 80048 BASIC METABOLIC PNL TOTAL CA: CPT | Performed by: NURSE PRACTITIONER

## 2023-03-28 PROCEDURE — 99233 SBSQ HOSP IP/OBS HIGH 50: CPT | Performed by: ANESTHESIOLOGY

## 2023-03-28 PROCEDURE — 36415 COLL VENOUS BLD VENIPUNCTURE: CPT | Performed by: NURSE PRACTITIONER

## 2023-03-28 PROCEDURE — 63700000 HC SELF-ADMINISTRABLE DRUG: Performed by: NURSE PRACTITIONER

## 2023-03-28 PROCEDURE — 85027 COMPLETE CBC AUTOMATED: CPT | Performed by: NURSE PRACTITIONER

## 2023-03-28 PROCEDURE — 85610 PROTHROMBIN TIME: CPT | Performed by: NURSE PRACTITIONER

## 2023-03-28 PROCEDURE — 83735 ASSAY OF MAGNESIUM: CPT

## 2023-03-28 PROCEDURE — 63700000 HC SELF-ADMINISTRABLE DRUG: Performed by: ANESTHESIOLOGY

## 2023-03-28 PROCEDURE — 99232 SBSQ HOSP IP/OBS MODERATE 35: CPT | Performed by: INTERNAL MEDICINE

## 2023-03-28 PROCEDURE — 85730 THROMBOPLASTIN TIME PARTIAL: CPT | Performed by: NURSE PRACTITIONER

## 2023-03-28 RX ORDER — BUMETANIDE 1 MG/1
2 TABLET ORAL DAILY
Status: DISCONTINUED | OUTPATIENT
Start: 2023-03-28 | End: 2023-03-28

## 2023-03-28 RX ORDER — LANOLIN ALCOHOL/MO/W.PET/CERES
400 CREAM (GRAM) TOPICAL DAILY
Qty: 30 TABLET | Refills: 0
Start: 2023-03-29 | End: 2023-04-28

## 2023-03-28 RX ORDER — BUMETANIDE 2 MG/1
2 TABLET ORAL DAILY
Qty: 30 TABLET | Refills: 0
Start: 2023-03-29 | End: 2023-04-28

## 2023-03-28 RX ORDER — BUMETANIDE 1 MG/1
2 TABLET ORAL DAILY
Status: DISCONTINUED | OUTPATIENT
Start: 2023-03-29 | End: 2023-03-28

## 2023-03-28 RX ORDER — BUMETANIDE 1 MG/1
2 TABLET ORAL DAILY
Status: COMPLETED | OUTPATIENT
Start: 2023-03-28 | End: 2023-03-28

## 2023-03-28 RX ORDER — ATORVASTATIN CALCIUM 40 MG/1
40 TABLET, FILM COATED ORAL
Qty: 30 TABLET | Refills: 0
Start: 2023-03-28 | End: 2023-04-27

## 2023-03-28 RX ORDER — INSULIN LISPRO 100 [IU]/ML
3 INJECTION, SOLUTION INTRAVENOUS; SUBCUTANEOUS
Qty: 15 ML | Refills: 5
Start: 2023-03-28 | End: 2023-04-27

## 2023-03-28 RX ORDER — ACETAMINOPHEN 325 MG/1
650 TABLET ORAL EVERY 4 HOURS PRN
Start: 2023-03-28 | End: 2023-04-27

## 2023-03-28 RX ORDER — HYDROXYUREA 500 MG/1
500 CAPSULE ORAL DAILY
Qty: 30 CAPSULE | Refills: 0
Start: 2023-03-29 | End: 2023-04-28

## 2023-03-28 RX ORDER — AMIODARONE HYDROCHLORIDE 200 MG/1
200 TABLET ORAL DAILY
Qty: 30 TABLET | Refills: 0
Start: 2023-03-29 | End: 2023-04-28

## 2023-03-28 RX ORDER — BUMETANIDE 1 MG/1
2 TABLET ORAL DAILY
Status: DISCONTINUED | OUTPATIENT
Start: 2023-03-29 | End: 2023-03-28 | Stop reason: HOSPADM

## 2023-03-28 RX ORDER — TORSEMIDE 20 MG/1
20 TABLET ORAL DAILY
Status: DISCONTINUED | OUTPATIENT
Start: 2023-03-29 | End: 2023-03-28 | Stop reason: HOSPADM

## 2023-03-28 RX ADMIN — HYDROXYUREA 500 MG: 500 CAPSULE ORAL at 08:37

## 2023-03-28 RX ADMIN — MAGNESIUM OXIDE TAB 400 MG (241.3 MG ELEMENTAL MG) 400 MG: 400 (241.3 MG) TAB at 08:36

## 2023-03-28 RX ADMIN — INSULIN LISPRO 3 UNITS: 100 INJECTION, SOLUTION INTRAVENOUS; SUBCUTANEOUS at 07:40

## 2023-03-28 RX ADMIN — BUMETANIDE 2 MG: 1 TABLET ORAL at 08:36

## 2023-03-28 RX ADMIN — AMIODARONE HYDROCHLORIDE 200 MG: 200 TABLET ORAL at 08:36

## 2023-03-28 RX ADMIN — INSULIN LISPRO 3 UNITS: 100 INJECTION, SOLUTION INTRAVENOUS; SUBCUTANEOUS at 11:50

## 2023-03-28 NOTE — PROGRESS NOTES
CARDIOLOGY INPATIENT FOLLOW-UP NOTE     Patient: Laureen Persaud YOB: 1945   MRN: [id number] Date of Admission: 3/18/2023   Length of Stay: 10      ASSESSMENT AND RECOMMENDATIONS     Assessment:  This is a 77 y.o. female being with PMH Afib (Apixan), DCCV x 3, HFpEF, HTN, HLD, DM II (non-insulin dep), anxiety, Severe AS, Progressive Mitral Stenosis, Severe Mitral Regurgitation, Fe def anemia, JAK2 gene mutation, ASHLI w/ CPAP, CVA/TIA 2020, PAH consulted for HFpEF, pHTN.     # HFpEF (EF 55-60%%)   # pHTN  # Severe AS  # Progressive MS  # Severe MR  - Etiology for decompensation: most likely progressive valvular disease (MS/MR > AS).  - OSH Cath with RA 22/17, /14, /44, PCWP: 39 and CO CI 4.5/2.9  - Repeat after diuresis here: RA: 8, PA: 92/32 (52), PCWP: 31, CI: 4.7   Severe MR. Progressive MS. Severe AS. Post-capillary Pulmonary HTN. Preserved LVEF.   - Volume status now warm & near euvolemic  -  at presentation  - TTE (last 3/11/23): showed EF55-60%   - LINDY 3/20/21: at least moderate AS, progressive MS, severe MR  - CTA TAVR with AV calcium score 1800. Triple vessel disease with calcium score > 3000.  Recommend:  - Dr. Padgett discussed with patient and family over the weekend and they have determined not to proceed with TAVR at this time  - Strict I/Os, Daily Weights, daily electrolytes replete for goal K > 4.0, Mg > 2.0, Tele  - Supplemental O2 to maintain SpO2 >90%, wean as able  - Admission meds:   - SGLT2i: she is ultimately candidate for this but would wait until she is more ambulatory. She does have some urinary incontinence at baseline  - Diuretic: Agree with Bumetanide 2 mg PO daily  - Wean supplemental O2    # C/f subacute MV endocarditis, resolved  - No evidence of vegetation on LINDY from 3/20    # GRACE on CKD  - Suspect Contrast-associated Nephropathy given timing - Renal indices are continuing to trend down after peaking  - GRACE doesn't appear to be related to cardiac  "output or filling pressures at this time    #Atrial Fibrillation  - Therapeutic anticoagulation with Apixaban unless fall / bleed risk felt to be prohibitive and exceeding her markedly elevated stroke risk.    --  Bear Barros, DO    INTERVAL/SUBJECTIVE     Seen and examined sitting up on bedside commode. She reports overall weakness. She denies chest pain/pressure, or light-headedness at rest. She reports that she will be going to rehab today. She asks about her labs and exam findings.       PHYSICAL EXAMINATION     Vitals:   Visit Vitals  BP (!) 115/55   Pulse 69   Temp 36.3 °C (97.3 °F) (Temporal)   Resp 16   Ht 1.461 m (4' 9.5\")   Wt 59.8 kg (131 lb 12.8 oz)   SpO2 98%   BMI 28.03 kg/m²     Temp:  [36.1 °C (97 °F)-36.7 °C (98.1 °F)] 36.3 °C (97.3 °F)  Heart Rate:  [64-75] 69  Resp:  [16-18] 16  BP: (115-129)/(55-60) 115/55  I/O last 3 completed shifts:  In: 960 [P.O.:960]  Out: 3100 [Urine:3100]  Wt Readings from Last 3 Encounters:   03/28/23 59.8 kg (131 lb 12.8 oz)       General: No acute distress. Frail elderly female.  HEENT: No corneal arcus or xanthelasmas. Sclerae are anicteric.   Neck: JVP: 9-10 cm H2O  Heart: Regular rate and rhythm. No murmurs.  Chest: Symmetrical.  Lungs: Rales at right lung base  Abdomen: Minimally distended.  Extremities: No cyanosis or clubbing. 1+ pitting edema b/l LE. Distal pulses are easily palpable.  Skin: Warm and dry and well perfused.  Neurologic: Alert and appropriate.  Psychiatric: Normal affect.      LABORATORY DATA     Results from last 7 days   Lab Units 03/28/23  0453 03/27/23  0456 03/26/23  0500   SODIUM mEQ/L 138 135* 135*   CO2 mEQ/L 29 24 25   BUN mg/dL 76* 78* 83*   CREATININE mg/dL 1.9* 2.1* 2.3*   CALCIUM mg/dL 8.6* 8.5* 8.2*     Results from last 7 days   Lab Units 03/28/23  0453 03/27/23  0456 03/26/23  0500   WBC K/uL 31.05* 32.75* 32.73*   PLATELETS K/uL 369 403* 379*     Results from last 7 days   Lab Units 03/28/23  0453 03/27/23  1716 03/25/23  0612 "   PTT sec 38* 30 38*   INR  1.4 1.3 1.4   PROTIME sec 17.1* 16.2* 17.2*           No lab exists for component: CPK            No lab exists for component: CHOLCALCLDL      Results from last 7 days   Lab Units 03/25/23  0612   BNP pg/mL 1,080*       ELECTROCARDIOGRAPHIC ASSESSMENTS     ECG   NSR     Telemetry  SR     DIAGNOSTIC IMAGING / PROCEDURES     LINDY 3/20/21 @ [facility]:  The risks of the procedure were discussed with the patient and informed consent was obtained. The patient was sedated under anesthesia guidance. The LINDY probe was inserted without difficulty and the patient tolerated the procedure well.     1. Normal left ventricular cavity size.  Normal wall thickness.  Preserved left ventricular systolic function. LVEF 65%.  Abnormal septal motion, otherwise no regional wall motion abnormalities.   2.  The aortic valve is tricuspid and heavily calcified.  There is a small mobile echodensity on the aortic leaflet that is most likely related to valvular degeneration/Lambl's excrescence (clip 12).  Less likely vegetation.  At least moderate aortic stenosis (peak velocity of 3.5 m/s, mean gradient of 23 mmHg). Correlate with transthoracic echocardiography. Mild aortic regurgitation. Normal visualized thoracic aortic dimensions. Simple atheroma in the visualized portions of the descending thoracic aorta without complex elements.   3. The mitral valve is thickened and sclerotic.  Mitral annular calcification.  The subvalvular apparatus is calcified. Progressive mitral stenosis with mean gradient of 4 mmHg at 72 bpm. Severe mitral regurgitation due to P2 flail and a restricted P3 scallop.  These lesions resulted in two distinct jets of mitral regurgitation (largest PISA results in an EROA of 43 cm2 and regurgitant volume of 71 cc). There is systolic flow reversal of pulmonary vein flow.   4. Dilated left atrium. No thrombus or mass seen in the left atrium or left atrial appendage. Left atrial appendage peak emptying  velocity 72 cm/s. No evidence of ASD/PFO by color Doppler.    5. Mildly dilated right ventricular cavity (4.2 x 3.8 cm) with preserved systolic function. Dilated right atrium. Prominent eustachian valve/renuka terminalis.   6. Thickened tricuspid valve leaflets. Moderate tricuspid regurgitation with estimate right ventricular systolic pressure of 69 mmHg plus right atrial pressure.  7. Grossly normal pulmonic valve. Tace pulmonic regurgitation.   8. No pericardial effusion.   9. No previous study available for comparison.       Transthoracic Echo @ OSH:   3/11/23: EF 55-60%, Severe AS with pgradient 86.9, mean gradient 46.4, trace AI.   Moderate MS, mean gradient 7.6, moderate MR  Mild TR  PAPs: 54/4     Complete Cardiac Cath 3/15/23 @ OSH:  Hemodynamics:  RA 22/17, /14, /44, PCWP: 39  PA sat 79 %  CO CI 4.5/2.9  AV peak to peak grad 34, mean 26, MICHAEL 0.9     MV mean grad 15, valve area 1.1     Coronary angiography @ OSH: mild  Non-obstructive CAD    RHC 3/21/23 @ [facility]:  1. Elevated right and left heart filling pressures (RA 12, PA 92/32 ( 52) , and PCW 31 mm Hg).  2. Elevated cardiac output and index of 7.2 L/min and 4.7 L/min/m2 respectively.   3. PVR 3 Wood Units and  TPG 2 mmHg.   4. No evidence of left to right shunt by sequential venous saturation measurements.         Radiologic Studies:  CXR: The cardiac silhouette and mediastinal contour are stable demonstrating  cardiomegaly. No significant interval change in pulmonary vascular congestion  and diffuse bilateral interstitial prominence due to edema and/or infiltrates.  There is probably unchanged left-sided pleural parenchymal disease. No evidence  of a pneumothorax.  IMPRESSION:  No significant interval change.    CTA TAVR:  SUMMARY:  1.  Tricuspid aortic valve with thickened and calcified cusps.  Cine and  tomographic features are suggestive of severe aortic stenosis.  Aortic valve  calcium score = 1836.  Aortic valve area by direct planimetry  = 1.08 cm2.  2. Normal left ventricular cavity size, wall thickness and systolic function.  Abnormal septal motion consistent with right ventricular pressure overload.  3.  Mildly dilated right ventricle with normal systolic function.  4. Severe biatrial enlargement.  5. Mildly thickened mitral valve leaflets.  Severe posterior mitral annular  calcification.  Bileaflet restriction (posterior > anterior).  Anterior leaflet  override present.  Mitral valve area = 5.9 cm2 by direct planimetry.  Recommend  correlation with echocardiography for the presence of mitral regurgitation.  6. Three-vessel coronary atherosclerosis.  Coronary calcium score = 3134.    Signed:      Bear Barros DO

## 2023-03-28 NOTE — NURSING NOTE
I.V. and heart monitor removed. Pt verbalized understanding of discharge instructions. Pt picked up by [facility] and will be dropped off at [facility].

## 2023-03-28 NOTE — NURSING NOTE
03/27/23 5710   NIV Settings   Mode of Delivery   (pt declined home unit and our machine/ no unit on standby)

## 2023-03-28 NOTE — PLAN OF CARE
Care Coordination Discharge Plan Note   Date: 3/28/2023    Time: 10:10 AM    Patient Name: Laureen Persaud  Medical Record Number: [id number]  YOB: 1945  Room/BED: 2002    Discharge Assessment  Current Discharge Risk: chronically ill  Concerns to be Addressed: care coordination/care conferences, discharge planning  Anticipated Changes Related to Illness: inability to work, inability to return to school, inability to care for someone else, inability to care for self    Concerns Comments: - Per info received at surgical rounds today, pt is stable for d/c today. SW confirmed that pt will d/c to [facility] today with her own CPAP. SW attempted to stop by to see pt but she was utilizing the commode. Pt's  plans to visit her here prior to her discharging and then will meet her at [facility] when she arrives there. -    Anticipated Discharge Plan  Anticipated Discharge Disposition: skilled nursing facility  Type of Home Care Services: nursing, home PT  Type of Skilled Nursing Care Services: OT, PT, nursing  Patient/Family Anticipates Transition to: other (see comments)  Patient/Family Anticipated Services at Transition: skilled nursing, rehabilitation services      Patient Choice  Offered/Gave Vendor List: yes  Patient's Choice of Community Agency(s): [facility]    Patient and/or patient guardian/advocate was made aware of their right to choose a provider. A list of eligible providers was presented and reviewed with the patient and/or patient guardian/advocate in written and/or verbal form. The list delineates providers in the patient’s desired geographic area who are participating in the Medicare program and/or providers contracted with the patient’s primary insurance. The Medicare list and quality ratings were obtained from the Medicare.gov [medicare.gov] website.    Discharge Transportation   Does the patient need discharge transport arranged?: Yes  Has discharge transport been  arranged?: Yes  Discharge Transportation Vendor: [facility] ([phone], option 5)  Type of Transportation: basic life support  What day is the transport expected?: 03/28/23  What time is the transport expected?: 1400    5333, Report# [phone]       Discharge Barriers   Barriers to Discharge: None  Comment: -  Participants: advanced practice provider, physician, , social work/services, nursing, physical therapy      Continued Care and Services - Admitted Since 3/18/2023     Destination Coordination complete.    Service Provider Request Status Selected Services Address Phone Fax Patient Preferred    [facility]  Selected Skilled Nursing [facility] [address], [place] [id number] [phone] --

## 2023-03-28 NOTE — PROGRESS NOTES
Nephrology Progress Note:    Subjective   Patient seen this morning. Sitting comfortably.  Breathing better.  Denies any nausea, vomiting, shortness of breath at rest or voiding difficulty. All other ROS negative.    Interval History: No acute overnight event.    Objective   Vital signs in last 24 hours:  Temp:  [36.1 °C (97 °F)-36.7 °C (98.1 °F)] 36.3 °C (97.3 °F)  Heart Rate:  [64-75] 65  Resp:  [16-18] 16  BP: (115-129)/(55-60) 124/59     Intake & Output:    Intake/Output Summary (Last 24 hours) at 3/28/2023 1051  Last data filed at 3/28/2023 0800  Gross per 24 hour   Intake 540 ml   Output 1750 ml   Net -1210 ml     Physical Exam:  General Appearance:  Awake, alert, not in acute distress    Head:  Normocephalic, without obvious abnormality, atraumatic   Eyes:  Conjunctiva clear, anicteric sclera.      Ears:  No external abnormalities   Throat: Moist oral mucosa   Neck: Supple, symmetrical   Lungs:    Bilateral scattered rales, respirations unlabored   Heart:  S1 and S2 heard.  Systolic murmur present.   Abdomen: Soft, non tender, non distended, bowel sound heard   Genitourinary:  No palpable bladder   Extremities:  Musculoskeletal: No cyanosis. Bilateral lower extremity mild edema  No injury or deformity   Skin: No rashes    Neurologic:  Behavior/  Emotional: Oriented x 3, non focal  Appropriate, cooperative         Current Medications:  Current Facility-Administered Medications   Medication Dose Route Frequency   • acetaminophen  650 mg oral q4h PRN   • amiodarone  200 mg oral Daily   • [Provider Managed Hold] apixaban  5 mg oral BID   • atorvastatin  40 mg oral Daily (6p)   • atropine  0.5 mg intravenous q5 min PRN   • [START ON 3/29/2023] bumetanide  2 mg oral Daily   • hydroxyurea  500 mg oral Daily   • insulin lispro U-100  3 Units subcutaneous TID with meals   • magnesium oxide  400 mg oral Daily   • nitroglycerin  0.4 mg sublingual q5 min PRN   • [Provider Managed Hold] torsemide  20 mg oral Daily        Labs:  BUN   Date Value Ref Range Status   03/28/2023 76 (H) 8 - 20 mg/dL Final     Creatinine   Date Value Ref Range Status   03/28/2023 1.9 (H) 0.6 - 1.1 mg/dL Final     Sodium   Date Value Ref Range Status   03/28/2023 138 136 - 144 mEQ/L Final     Potassium   Date Value Ref Range Status   03/28/2023 3.9 3.6 - 5.1 mEQ/L Final     CO2   Date Value Ref Range Status   03/28/2023 29 22 - 32 mEQ/L Final     Magnesium   Date Value Ref Range Status   03/28/2023 2.0 1.8 - 2.5 mg/dL Final     Calcium   Date Value Ref Range Status   03/28/2023 8.6 (L) 8.9 - 10.3 mg/dL Final     Albumin   Date Value Ref Range Status   03/21/2023 2.0 (L) 3.4 - 5.0 g/dL Final     WBC   Date Value Ref Range Status   03/28/2023 31.05 (HH) 3.80 - 10.50 K/uL Final     Comment:     This result has been called to PETER BURNHAM by PAMELA VERDIN on 03 28 23 at 06:01, and has been read back.      Hemoglobin   Date Value Ref Range Status   03/28/2023 11.4 (L) 11.8 - 15.7 g/dL Final     Platelets   Date Value Ref Range Status   03/28/2023 369 150 - 369 K/uL Final     Lab Results   Component Value Date    CREATININE 1.9 (H) 03/28/2023    CREATININE 2.1 (H) 03/27/2023    CREATININE 2.3 (H) 03/26/2023    CREATININE 2.4 (H) 03/25/2023    CREATININE 2.1 (H) 03/24/2023    CREATININE 1.7 (H) 03/23/2023    CREATININE 1.4 (H) 03/22/2023    CREATININE 1.4 (H) 03/21/2023    CREATININE 1.3 (H) 03/20/2023    CREATININE 1.2 (H) 03/19/2023     I have reviewed the pertinent patient's labs.    Imaging:  I have reviewed the pertinent patient's imaging results.     Assessment/Plan     Laureen Persaud is a 77 y.o. female with PMHx of  CKD II-IIIa, HTN, DM II, HLD, ASHLI, CVA/TIA  (2020), HFpEF, PAH, valvular heart disease (severe AAS, moderate-severe MS, moderate-severe MR), A-fib, iron deficiency anemia, JAK2 gene mutation initially admitted to [facility] on 3/10/2020 with a complaint of weakness, fatigue, and was empirically treated for pneumonia along with  CHF.  Patient had a TTE done which revealed worsening AV gradient.  Subsequently, she was transported to [facility] and underwent cardiac catheterization which confirmed significant aortic and mitral valve disease.  She was thought to be high risks by CT surgery at [facility] and subsequently transferred to [facility] [place] evaluation on 3/18/2023.        1.  Likely CKD II-IIIa  -Had creatinine of 1.0-1.1 mg/dL as of 2021.  Subsequently, creatinine was 1.2 mg/dL as of 3/18/2023 at outside hospital and was 1.1 mg/dL on 3/18/2023 when [place] to [facility].  -UA on 3/20/2023 bland without any hematuria, pyuria, proteinuria -ruling out any other alarming GN or interstitial disease  -Likely has some degree of CKD on the basis of hypertension, chronic CRS physiology     2.  GRACE  -Likely related to CRS physiology and worsened from IV contrast exposure.  -Had a cardiac catheterization at [facility] -unsure about date.  Subsequently, received on 100 cc of IV contrast dye on 3/20/2023 for CT TAVR  -Presented with a creatinine of 1.1 mg/dL on 3/18/2023 and up to 1.3 mg/dL on 3/20/2023 -nephrology consulted  -UA on 3/20/2023 bland  -Ultrasound on 3/20/2023 without hydronephrosis  - RHC on 3/21/2023: RA 8, PA 92/32 (52), PCW 31 with CI 4.9      -Creatinine peaked up to 2.4 mg/dL on 2/25/2023  -Labs from this morning reviewed.  Creatinine down to 1.9 mg/dL.  Electrolytes remains stable.     Recommend:  -Continue supportive measures as you are.    -No objection to continue on maintenance diuretics - defer to cardiology  -If not emergent, avoid re-exposure of IV contrast dye or any surgical procedure until renal function is at around baseline. Once renal function comes down to baseline, no objection for any procedure from renal perspective.    -Continue to avoid further nephrotoxins including contrast dye and other nephrotoxins like NSAIDs, sodium phosphate enema, vitamin C, etc.  -Avoid hypotension and  maintain MAP > 65 mmHg.  -Follow daily BMP while in hospital  -No indication for renal replacement therapy.     3.  HFpEF, valvular heart disease, PAH  -Noted to have severe AS, moderate-severe MS, moderate-severe MR  -Being work-up for possible surgical intervention - ? option includes TAVR + investigation of [facility] candidacy vs. TAVR then possible TAVR in MAC as per structural heart team.   -As per cardiology, CT surgery.     4.  A-fib  -s/p DCCV x3  -On amiodarone  -As per cardiology, primary team.     5.  History of iron deficiency anemia, JAK2 gene mutation myeloproliferative disorder  -Noted to have significant leukocytosis  -Started on Hydrea 500 mg every other day  -Further work-up/management as per primary team, hematology/oncology.     6.  Right renal mass  -Renal ultrasound on 3/20/2023 noted to have solid 1.9 cm right renal mass, suspicious for renal malignancy.    Recommend:  -Will need urology evaluation -defer timing to primary team     7.  Nephrolithiasis.  -Renal ultrasound on 3/20/2023 noted to have nonobstructing 1 cm right mid renal calculus  -Outpatient follow-up with nephrology and urology upon discharge     8.  Possible pneumonia  -Was covered with antibiotics at outside hospital.  Evaluated by ID and recommended to observe off antibiotics.  -As per ID, primary team.     9.  History of CVA/TIA, ASHLI, HLD  -As per primary team.    -Discussed with Dr. Reddy

## 2023-03-28 NOTE — PROGRESS NOTES
OMFS Note: Patient seen in Room 2002 tonight regarding intermittent bleeding for the past 8 hours or so. 3.4cc Carbocaine without vasoconstrictor given infiltratively medial and lateral. SurgiFoam pledgets placed in socket #3. (4) 3-0 Chromic sutures placed. Gauze packing placed. No bleeding after 15 minutes. Re-consult prn. Ki Barros III. ERNESTINA

## 2023-03-28 NOTE — DISCHARGE INSTRUCTIONS
Renal ultrasound on 3/20/2023 noted to have solid 1.9 cm right renal mass and nonobstructing 1 cm right mid renal calculus. Please follow up with nephrology/urology follow-up as outpatient.    Obtain repeat CBC in 2 weeks on hydroxyurea and send results to hematology office.    Follow up with cardiologist (Dr. Scottie Pelaez) and primary care doctor as soon as possible to determine if it is safe to resume Eliquis.

## 2023-03-28 NOTE — PROGRESS NOTES
Cardiothoracic Intensivist Progress Note       CARDIOTHORACIC   COVID Status: Negative 3/27/23    IV dye allergy: no    Subjective     History of Present Illness: Laureen Persaud is a 77 y.o. cisgender female with history of worsening aortic and mitral valvular disease admitted for surgical evaluation, chronic atrial fibrillation, and ASHLI that are being treated.                              Review of Systems:                                     Constitutional: frail appearing                                                  Eyes: denies difficulty with vision  Ears, nose, mouth, throat, and face: denies oral discomfort                                        Respiratory: mild shortness of breath with exertion                                  Cardiovascular: denies chest discomfort                                  Gastrointestinal: denies abdominal pain                                    Genitourinary: denies difficulty voiding                                      Hematologic: denies bleeding issues                                Musculoskeletal: denies muscle pain                                      Neurological: generalized weakness                                   Integumentary: multiple skin tears    Medical History: History reviewed. No pertinent past medical history.    Surgical History: History reviewed. No pertinent surgical history.    Prior to Admission medications    Not on File       Allergies: Neomycin, Polymyxin b, Procaine, and Bacitracin    Social History:   Social History     Socioeconomic History   • Marital status:      Spouse name: None   • Number of children: None   • Years of education: None   • Highest education level: None     Social Determinants of Health     Financial Resource Strain: Low Risk  (3/20/2023)    Overall Financial Resource Strain (CARDIA)    • Difficulty of Paying Living Expenses: Not hard at all   Transportation Needs: No Transportation Needs (3/20/2023)    PRAPARE -  Transportation    • Lack of Transportation (Medical): No    • Lack of Transportation (Non-Medical): No   Housing Stability: Unknown (3/20/2023)    Housing Stability Vital Sign    • Unable to Pay for Housing in the Last Year: No    • Unstable Housing in the Last Year: No       Family History: History reviewed. No pertinent family history.    Objective     Vital signs in last 24 hours:  Temp:  [36.1 °C (97 °F)-36.7 °C (98.1 °F)] 36.3 °C (97.3 °F)  Heart Rate:  [64-77] 69  Resp:  [16-18] 16  BP: (115-137)/(55-92) 115/55      Intake/Output Summary (Last 24 hours) at 3/28/2023 0905  Last data filed at 3/28/2023 0800  Gross per 24 hour   Intake 540 ml   Output 1750 ml   Net -1210 ml     Intake/Output this shift:  I/O this shift:  In: 60 [P.O.:60]  Out: -     Labs  Lab Results   Component Value Date    WBC 31.05 (HH) 03/28/2023    HGB 11.4 (L) 03/28/2023    HCT 38.0 03/28/2023     03/28/2023    ALT 16 03/21/2023    AST 23 03/21/2023     03/28/2023    K 3.9 03/28/2023    CL 98 03/28/2023    CREATININE 1.9 (H) 03/28/2023    BUN 76 (H) 03/28/2023    CO2 29 03/28/2023    MG 2.0 03/28/2023    TSH 1.41 03/18/2023    INR 1.4 03/28/2023    HGBA1C 6.4 (H) 03/18/2023    PT 17.1 (H) 03/28/2023    PTT 38 (H) 03/28/2023             Full Code    Head/Ear/Nose/Throat:     NCAT.                               Eyes:     EOMI and PERRL.                     Respiratory:     diminished at the bases b/l.               Cardiovascular:     SR.              Gastrointestinal:     + Bowel sounds and non-tender.                 Genitourinary:     no-deformities.                    Extremities:     +1-2 edema b/l LE.            Musculoskeletal:      No injury or deformity.                   Neurological:     follows commands.       Behavior/Emotional:     appropriate and cooperative.                           Lymph:      No adenopathy noted.                               Skin:      Multiple skin tears and scattered bruising. Purpuric rash  "on feet. Stage 2-3 sacral ulcer (present on admission)     Examination of the patient performed at the bedside. Rounded with ICU team and discussed management/plan with surgical staff. Medications, radiological studies and labs reviewed and discussed with attending of record, Dr. Macey Padgett.    Cardiothoracic Assessment and Plan:    Neurologic: A&Ox3 and pain controlled. History of CVA/TIA. Ambulates with a walker at baseline and has had multiple falls. S/P dental extractions. PT/OT/PM&R to follow.     Cardiovascular: Severe AS and MR/MI undergoing surgical evaluation, also with acute (elevated BNP) HFpEF and chronic atrial fibrillation. Off vasoactive medications and meeting BP goals. Will continue to optimize cardiac medications. Very high mortality risk based on STS calculation and is not a candidate for open surgery. No endocarditis based on LINDY. Not a candidate for the Apollo valve. TAVR valve will not likely improve overall condition and after surgeon discussion with patient and family, will not proceed with any surgery. Appreciate HF input.     Respiratory: I personally reviewed the most recent CXR which portrayed small b/l pleural effusions and pulmonary congestion compared to previous exam. Will encourage IS, mobilization and wean O2 PRN. CPAP for ASHLI.      Genitourinary: CKD stage 3a (GFR 45-59 ml/min). BUN elevated and will continue to monitor. There could be a cardiorenal component vs contrast involvement however BUN was elevated upon admission. No davenport. Will avoid nephrotoxic medications. Increased risk for transition to renal failure. Appreciate Nephrology input.     Endocrine: Follow [facility]. DM management. Appreciate Endocrinology input.     Hematologic: No evidence active bleeding. Loveno for DVT ppx. Was on apixaban for a-fib as outpatient and will continue to hold due to bleeding risks. \"Anemia of chronic disease.History of JAK2 gene mutation and appreciate Heme/Onc input; will continue " hydroxyurea.      Infectious Disease: Was on azithromycin and ceftriaxone for possible endocarditis/pneumonia however ID OK monitoring off antibiotics for now since LINDY negative. Appreciate Infectious Disease input.      Fluids, Electrolytes: Electrolytes replaced per protocol. Goal for net negative fluid balance.     Gastrointestinal: PO as tolerated. GI ppx. History of ascites but unknown cause and has had paracentesis recently. Hepatitis panel negative.     Skin: OOB, PT when able. Wound care for skin tears and sacral ulcer.      Tubes, lines and drains: Will remove superfluous invasive monitors PRN.    Disposition: Guarded. Continue close monitoring on stepdown unit. Plan for SNF when bed available.           Carlitos Reddy, DO  3/28/2023

## 2023-03-28 NOTE — DISCHARGE SUMMARY
Cardiac Surgery Discharge Summary    Hospital Course  Laureen Persaud is a 77 y.o. female who was transferred from [facility] to [facility] on 3/18/23 for cardiac surgical evaluation for severe aortic stenosis, moderate to severe mitral stenosis, and moderate to severe mitral regurgitation.  The patient underwent a pre-op TAVR work-up which included a CT dental/TAVR, transesophageal echo, and right heart cath.  Multiple services were consulted including neurology, nephrology, heart failure, hematology, and infectious disease.  On 3/20, a transesophageal echo revealed a heavily calcified, tricuspid aortic valve.  There is a small mobile echodensity on the aortic leaflet that is most likely related to valvular degeneration/Lambl's excrescence.  Less likely vegetation.  At least moderate aortic stenosis (peak velocity of 3.5 m/s, mean gradient of 23 mmHg).  The mitral valve is thickened and sclerotic.  Mitral annular calcification.  The subvalvular apparatus is calcified.  Progressive mitral stenosis with mean gradient of 4 mmHg at 72 bpm. Severe mitral regurgitation due to P2 flail and a restricted P3 scallop.  A CT TAVR and dental scans were completed on 3/20.  CT TAVR with AV calcium score 1836 and triple vessel disease with calcium score 3134.  The CT dental showed multiple abscessed teeth, s/p removal of teeth numbers 3, 4, 19, and 30 by Ki Barros DDS on 3/22.  A right heart catheterization on 3/21 revealed elevated right and left heart filling pressures (RA 12, PA 92/32 ( 52), and PCW 31 mm Hg). Patient had GRACE with creatinine peak to 2.4, felt to be due to both CRS and dye exposure, which did improved on subsequent days with IV diuresis on Bumex. After the cardiac catheterization, Dr. Padgett spoke with the patient and the patient's family about possible surgical options.  Per Dr. Padgett, a TAVR procedure to replace the heavily calcified aortic valve is doable but high risk due to her other co-morbidities  including moderate to severe mitral stenosis and regurgitation.  As it pertained to the mitral valve, the patient was not a candidate for the APOLLO Intrepid Transcatheter Mitral Valve Regurgitation Clinical Trial.  On 3/26, after further discussion with Dr. Padgett, the patient decided to forego the TAVR procedure and medically manage her severe valvular disease.  On 3/27, the patient had significant persistent oral bleeding from the teeth extraction sites.  Ki Barros DDS placed 4 sutures and packing at the bedside and the bleeding ceased.  On 3/28, the patient was discharge to [facility] for skilled nursing and rehabilitation services.  After discussions with Dr. Reddy and Dr. Padgett, there was concern with discharging on Eliquis due to bleeding history and fall risk felt to be prohibitive at this time, and sinus rhythm during admission. Discussed with cardiology. Left message twice with primary care doctor by phone to discuss, no return phone call as of yet. I also contacted patient's primary cardiologist office ([facility]) and spoke with the nurse relaying my concerns. She stated that the patient has a follow up appointment already scheduled within 1 month, and requested DC summary, which will be faxed to cardiology office. Based on discussions, it was decided that patient will discharged off of Eliquis, and will follow up in PCP office to determine if it should be restarted.         PCP: Divya Mckinney MD  Cardiology: Omar Granger MD  Admission weight: Weight: 65 kg (143 lb 4.8 oz)  Discharge weight: 131 lbs      BRIEF OVERVIEW  H&P Notes 2/25/2023 to 3/28/2023         Date of Service Author Author Type Status Note Type File Time    03/18/23 1701 Jamilah Toure CRNP Nurse Practitioner Cosign Needed H&P 03/19/23 1046          Attending Provider: Macey Padgett MD Attending phys phone: [phone]  Primary Care Physician at Discharge: Divya Mckinney [phone]    Admission Date:  3/18/2023     Discharge Date: 3/28/2023    Primary Discharge Diagnosis  Severe mitral regurgitation    Secondary Discharge Diagnosis  Active Hospital Problems    Diagnosis Date Noted   • Palliative care by specialist 03/22/2023   • Debility 03/22/2023   • Pain, chronic 03/22/2023   • Severe mitral regurgitation 03/18/2023   • Severe aortic stenosis 03/18/2023      Resolved Hospital Problems   No resolved problems to display.       DETAILS OF HOSPITAL STAY    Operative Procedures Performed  Procedure(s):  3/21: RIGHT HEART CATH  3/22: Surgical removal of abscessed teeth numbers 3, 4, 19 and 30 under a combination of local anesthesia with intravenous sedation.    Consults:   Consult Notes 2/25/2023 to 3/28/2023         Date of Service Author Author Type Status Note Type File Time    03/22/23 1335 Alisa Chan CRNP Nurse Practitioner Signed Consults 03/23/23 1023    03/21/23 1801 Ki Barros DDS Physician Signed Consults 03/22/23 2134    03/21/23 1559 Kush Ramirez DMD Physician Signed Consults 03/27/23 1441    03/20/23 2157 Marky Laureano DO Fellow Attested Consults 03/21/23 2032    03/20/23 1140 Mariangel Ray MD Physician Signed Consults 03/20/23 1544    03/20/23 1020 Chelsie Tamayo RN Wound Ostomy Continence Signed Consults 03/20/23 1148    03/20/23 0928 Angela Sanchez LDN Registered Dietitian Addendum Consults 03/20/23 0934    03/20/23 0835 Sekou Baer MD Physician Signed Consults 03/20/23 0902    03/20/23 0714 Azra Rivero MD Fellow Attested Consults 03/21/23 0039    03/19/23 2047 Da Fernandez MD Physician Signed Consults 03/19/23 2102    03/19/23 1025 Sherry Garcia MD Physician Signed Consults 03/19/23 1106    03/19/23 1004 Antonio Rivera MD Physician Signed Consults 03/19/23 1021          Consult Orders During Admission:  IP CONSULT TO ENDOCRINOLOGY  IP CONSULT TO INFECTIOUS DISEASE  IP CONSULT TO PHYSICAL MEDICINE REHAB  IP CONSULT TO NUTRITION  SERVICES  IP CONSULT TO NEUROLOGY  IP CONSULT TO WOUND OSTOMY CONTINENCE  IP CONSULT TO HEMATOLOGY/ONCOLOGY  IP CONSULT TO NEPHROLOGY  IP CONSULT TO CARDIOLOGY  IP CONSULT TO ORAL SURGERY  IP CONSULT TO PAIN/PALLIATIVE CARE  IP CONSULT TO SPIRITUAL HEALTH AND EDUCATION       Presenting Problem/History of Present Illness  Severe mitral regurgitation [I34.0]   Laureen Persaud is a 77 y.o. female with PMH of Afib on Eliquis, DCCV x 3, HFpEF, HTN, HLD, DM II, anxiety, severe aortic stenosis, moderate to severe mitral stenosis and moderate to severe mitral regurgitation, iron deficiency anemia, JAK2 gene mutation, ASHLI with CPAP, CVA/TIA 2021, PAH, and colon resection who presented to [facility] on 3/10/23 with weakness, fatigue, fever, falls at home, and heart failure.  She was treated with empiric antibiotics for PNA and IV diuretics for acute on chronic heat failure.  An echocardiogram was done and demonstrated worsening aortic stenosis and moderate to severe mitral stenosis and mitral regurgitation.  She underwent a subsequent cardiac catheterization which confirmed significant aortic and mitral valve disease.  She was noted to have ascites and underwent an ultrasound guided paracentesis with removal of 1100 mL on 3/16.  She was evaluated by cardiac surgery at [facility] and was felt to be too high risk for surgical aortic valve replacement.  Dr. Padgett was notified about the patient, who was then transferred to [facility] for further surgical evaluation.     Exam on Day of Discharge  Patient seen and examined on day of discharge.    Head/Ear/Nose/Throat:     NCAT.                               Eyes:     EOMI and PERRL.                     Respiratory:     diminished at the bases b/l.               Cardiovascular:     SR.              Gastrointestinal:     + Bowel sounds and non-tender.                 Genitourinary:     no-deformities.                    Extremities:     +1-2 edema b/l LE.             Musculoskeletal:      No injury or deformity.                   Neurological:     follows commands.       Behavior/Emotional:     appropriate and cooperative.                           Lymph:      No adenopathy noted.                               Skin:      Multiple skin tears and scattered bruising. Purpuric rash on feet. Stage 2-3 sacral ulcer (present on admission)         Imaging:  X-RAY CHEST 1 VIEW  Result Date: 3/24/2023  IMPRESSION: No significant interval change. Diffuse interstitial opacities, possibly representing edema. Trace/small pleural effusions.     MRI BRAIN WITHOUT CONTRAST  Result Date: 3/21/2023  IMPRESSION: 1.  No evidence of major vessel occlusion or high grade stenosis in the [place]. 2.  No evidence of acute infarction, hemorrhage, or mass effect. 3.  Minimal microangiopathic and involutional changes.     MRI ANGIOGRAM HEAD WITHOUT CONTRAST  Result Date: 3/21/2023  IMPRESSION: 1.  No evidence of major vessel occlusion or high grade stenosis in the [place]. 2.  No evidence of acute infarction, hemorrhage, or mass effect. 3.  Minimal microangiopathic and involutional changes.     CTA TAVR WITH AND WITHOUT IV CONTRAST  Result Date: 3/21/2023  IMPRESSION: 1. CTA TAVR assessments and measurements as described above. 2. Bilateral 11 mm renal soft tissue masses.     Ultrasound carotid bilateral  Result Date: 3/21/2023  IMPRESSION: 1. Right:  Velocities correlating to less than 50% stenosis. 2. Left:  Velocities correlating to less than 50% stenosis. 3. Bilateral antegrade flow within the vertebral arteries.     CT DENTAL SCAN WITHOUT IV CONTRAST  Result Date: 3/20/2023  IMPRESSION: Please see above.   There is a periapical lucency about the root tip of tooth #18 and 19 as well as  about tooth #30. Multiple frankly carious lesions are present throughout the  maxillary and mandibular teeth.  Carious lesions appear to involve tooth #3, #4,  #13 and #15. There are also likely carious  lesions in tooth #20, 29, 30 and 31.     Scattered mucosal thickening is present in the visualized maxillary sinuses. The  visualized portions of the mastoid air cells appear patent. Degenerative changes  are present in the visualized portion of the cervical spine.    X-RAY CHEST 1 VIEW  Result Date: 3/20/2023  IMPRESSION: No significant interval change.     ULTRASOUND KIDNEYS  Result Date: 3/20/2023  IMPRESSION: 1.  No hydronephrosis 2.  Solid 1.9 cm right renal mass, suspicious for renal malignancy. Contrast-enhanced MRI can be performed for further characterization. 3.  Additional indeterminate exophytic 0.7 cm complex cyst versus solid mass. This would also be more optimally characterized with MRI. 4.  Nonobstructing 1 cm right mid renal calculus. 5.  Splenomegaly. Ascites Actionable Finding: Follow up should be considered.     X-RAY CHEST 1 VIEW  Result Date: 3/19/2023  IMPRESSION: Cardiomegaly, interstitial edema, pulmonary vascular congestion and small effusions, suggesting decompensated heart failure.  Bibasilar airspace disease is also noted.       Labs  Lab Results   Component Value Date    WBC 31.05 (HH) 03/28/2023    HGB 11.4 (L) 03/28/2023    HCT 38.0 03/28/2023     03/28/2023    ALT 16 03/21/2023    AST 23 03/21/2023     03/28/2023    K 3.9 03/28/2023    CL 98 03/28/2023    CREATININE 1.9 (H) 03/28/2023    BUN 76 (H) 03/28/2023    CO2 29 03/28/2023    TSH 1.41 03/18/2023    INR 1.4 03/28/2023    HGBA1C 6.4 (H) 03/18/2023       Discharge Orders  Released Discharge Orders     Order Details Provider Status    acetaminophen (TYLENOL) 325 mg tablet Take 2 tablets (650 mg total) by mouth every 4 (four) hours as needed for pain or fever (specify temp in comments): (temp > 100.4°F / 38°C (If treating new fever, call physician).). Jen Dunn CRNP Prescribed    amiodarone (PACERONE) 200 mg tablet Take 1 tablet (200 mg total) by mouth daily. Jen Dunn CRNP Prescribed    atorvastatin  (LIPITOR) 40 mg tablet Take 1 tablet (40 mg total) by mouth daily. Jen Dunn CRNP Prescribed    bumetanide (BUMEX) 2 mg tablet Take 1 tablet (2 mg total) by mouth daily. Jen Dunn CRNP Prescribed    hydroxyurea (HYDREA) 500 mg capsule Take  1 capsule (500 mg total) daily  Take at the same time each day. Jen Dunn CRNP Prescribed    insulin lispro U-100 (HumaLOG) 100 unit/mL pen Inject 3 Units under the skin 3 (three) times a day with meals. Jen Dunn CRNP Prescribed    magnesium oxide (MAG-OX) 400 mg (241.3 mg magnesium) tablet Take 1 tablet (400 mg total) by mouth daily. Jen Dunn CRNP Prescribed    apixaban (ELIQUIS) tablet 5 mg 5 mg, oral, 2 times daily, First dose on Sat 3/18/23 at 2000Treatment to Prevent Blood Clots in Chronic Atrial FibrillationIndications: prevent thromboembolism in chronic atrial fibrillation Jen Dunn CRNP Do Not Order at Discharge    atropine injection 0.5 mg 0.5 mg, intravenous, Every 5 min PRN, bradycardia, symptomatic bradycardia and notify MD, Starting on Sat 3/18/23 at 1646, For 4 doses Jen Dunn CRNP Do Not Order at Discharge    nitroglycerin (NITROSTAT) SL tablet 0.4 mg 0.4 mg, sublingual, Every 5 min PRN, chest pain, Starting on Sat 3/18/23 at 1646Notify MD when given Please verify the patient has not taken sildenafil (VIAGRA, REVATIO); tadalafil (CIALIS, ADCIRCA); vardenafil (LEVITRA, STAXYN) or avanafil (STENDRA) in the past 48 hours before administration. Jen Dunn CRNP Do Not Order at Discharge    torsemide (DEMADEX) tablet 20 mg 20 mg, oral, Daily, First dose (after last modification) on Wed 3/29/23 at 0900 Jen Dunn CRNP Do Not Order at Discharge    Activity:  As Tolerated Routine, Clinic Performed Jen Dunn CRNP New at Discharge    Activity:  Per Rehab Recommendations Routine, Clinic Performed Jen Dunn CRNP New at Discharge    Discharge diet Routine, Normal Jen Dunn CRNP New at  Discharge    Discharge Condition:  Improving Routine, Clinic Performed Shaun, Kalyca L, CRNP New at Discharge    Discharge Summary:  Enclosed Routine, Clinic Performed Shaun, Kalyca L, CRNP New at Discharge    Admission H&P Valid:  Yes Routine, Clinic Performed Shaun, Kalyca L, CRNP New at Discharge    Patient Aware of Diagnosis: Yes Routine, Clinic Performed Shaun, Kalyca L, CRNP New at Discharge    Free of Communicable Disease:   Yes Routine, Clinic Performed Shaun, Kalyca L, CRNP New at Discharge    Rehab Potential:  Good Routine, Clinic Performed Shaun, Kalyca L, CRNP New at Discharge    Discharge Potential:  Length of Stay 31-90 Days Routine, Clinic Performed Shaun, Kalyca L, CRNP New at Discharge    Level of Care:  Skilled Routine, Clinic Performed Shaun, Kalyca L, CRNP New at Discharge    Vital Signs Per Facility Protocol Routine, Clinic Performed Shaun, Kalyca L, CRNP New at Discharge    Weights Per Facility Protocol Routine, Clinic Performed Shaun, Kalyca L, CRNP New at Discharge    Treatment Options: Full Resuscitation  Shaun, Kalyca L, CRNP New at Discharge    Physical Therapy Eval and Treat Routine, Clinic Performed Shaun, Kalyca L, CRNP New at Discharge    Occupational Therapy Eval and Treat Routine, Clinic Performed Shaun, Kalyca L, CRNP New at Discharge    Social Work Services Routine, Clinic Performed Shaun, Kalyca L, CRNP New at Discharge    Nutritionist Routine, Clinic Performed Shaun, Kalyca L, CRNP New at Discharge    Follow Up:  Update Status Report to Physician Within 1 Week  Shaun, Kalyca L, CRNP New at Discharge    Follow Up:  With Primary MD within 1 week discharge from facility  Shaun, Kalyca L, CRNP New at Discharge    Follow-up with Provider: Referral By - SHAUN KALYCA L To - CHACHA ESCOBAR visitFollow up with your oncologist within 3-4 weeks. Please call to schedule appointment. Shaun, Kalyca L, CRNP New at Discharge    Follow-up with Provider: Referral By -  JEN DUNN 1 visit Jen Dunn CRNP New at Discharge    Follow-up with Department: Follow up with nephrology as recommended in 2-3 weeks for renal mass noted on imaging. Please call to schedule your appointment. Jen Dunn CRNP New at Discharge    [place] [facility]Follow up with urology as recommended in 2-3 weeks for renal mass noted on imaging. Please call to schedule your appointment. Jen Dunn CRNP New at Discharge    Future Lab Orders at [facility] Routine, Clinic Performed Jen Dunn CRNP New at Discharge    Discharge diet Routine, Normal Jen Dunn CRNP New at Discharge    Follow-up with Provider: Referral By - JEN DUNN To - KERA MALIK 1 visitFollow up with cardiologist as soon as possible (within 1-2 weeks) to determine when it is safe to resume Eliquis. Please call to schedule appointment. Jen Dunn CRNP Modify at Discharge          Outpatient Follow-Ups  Encounter Information    This patient does not currently have any appointments scheduled.       Referrals:  No orders of the defined types were placed in this encounter.      Active Issues Requiring Follow-up  Issue: Follow ups  What is Needed: Follow up with urology, nephrology, PCP, cardiology, and heme/onc as noted in discharge instructions. Please call to schedule these appointments.    Issue: Hydroxyurea  What is Needed: Repeat CBC in 2 weeks, sent to hematology office.    Issue: Eliquis  What is Needed: Follow up with primary care doctor and cardiologist as soon as possible for possible resumption of anticoagulation.    Discharge Disposition  Skilled Nursing Facility - [facility]  Code Status at Discharge: Full Code    PATRICIA Dong  3/28/2023  1:51 PM

## 2023-03-29 ENCOUNTER — TELEPHONE (OUTPATIENT)
Dept: SOCIAL WORK | Facility: HOSPITAL | Age: 78
End: 2023-03-29
Payer: MEDICARE

## 2023-03-29 NOTE — TELEPHONE ENCOUNTER
SW received a VM from Grace at ext. [phone] at [facility] as for pt's d/c location. SW returned the call and provided her with the info for [facility]. No further needs noted. -

## 2023-04-21 LAB — HBA1C MFR BLD HPLC: 5.6 %

## 2023-05-08 ENCOUNTER — OFFICE VISIT (OUTPATIENT)
Dept: ENDOCRINOLOGY | Facility: HOSPITAL | Age: 78
End: 2023-05-08

## 2023-05-08 VITALS
DIASTOLIC BLOOD PRESSURE: 80 MMHG | HEIGHT: 58 IN | SYSTOLIC BLOOD PRESSURE: 122 MMHG | HEART RATE: 79 BPM | BODY MASS INDEX: 26.66 KG/M2 | WEIGHT: 127 LBS

## 2023-05-08 DIAGNOSIS — E11.42 DIABETIC POLYNEUROPATHY ASSOCIATED WITH TYPE 2 DIABETES MELLITUS (HCC): ICD-10-CM

## 2023-05-08 DIAGNOSIS — E11.65 TYPE 2 DIABETES MELLITUS WITH HYPERGLYCEMIA, WITHOUT LONG-TERM CURRENT USE OF INSULIN (HCC): Primary | ICD-10-CM

## 2023-05-08 DIAGNOSIS — E04.2 GOITER, NONTOXIC, MULTINODULAR: ICD-10-CM

## 2023-05-08 DIAGNOSIS — Z79.4 TYPE 2 DIABETES MELLITUS WITH HYPERGLYCEMIA, WITH LONG-TERM CURRENT USE OF INSULIN (HCC): ICD-10-CM

## 2023-05-08 DIAGNOSIS — I10 PRIMARY HYPERTENSION: ICD-10-CM

## 2023-05-08 DIAGNOSIS — E78.2 MIXED HYPERLIPIDEMIA: ICD-10-CM

## 2023-05-08 DIAGNOSIS — R80.9 MICROALBUMINURIA DUE TO TYPE 2 DIABETES MELLITUS (HCC): ICD-10-CM

## 2023-05-08 DIAGNOSIS — E11.65 TYPE 2 DIABETES MELLITUS WITH HYPERGLYCEMIA, WITH LONG-TERM CURRENT USE OF INSULIN (HCC): ICD-10-CM

## 2023-05-08 DIAGNOSIS — E11.29 MICROALBUMINURIA DUE TO TYPE 2 DIABETES MELLITUS (HCC): ICD-10-CM

## 2023-05-08 PROBLEM — E11.3293 MILD NONPROLIFERATIVE DIABETIC RETINOPATHY OF BOTH EYES WITHOUT MACULAR EDEMA ASSOCIATED WITH TYPE 2 DIABETES MELLITUS (HCC): Status: ACTIVE | Noted: 2023-05-08

## 2023-05-08 RX ORDER — INSULIN LISPRO 100 [IU]/ML
INJECTION, SOLUTION INTRAVENOUS; SUBCUTANEOUS
Qty: 15 ML | Refills: 6 | Status: SHIPPED | OUTPATIENT
Start: 2023-05-08

## 2023-05-08 RX ORDER — HYDROXYUREA 500 MG/1
500 CAPSULE ORAL DAILY
COMMUNITY
Start: 2023-04-16

## 2023-05-08 RX ORDER — INSULIN LISPRO 100 [IU]/ML
INJECTION, SOLUTION INTRAVENOUS; SUBCUTANEOUS
COMMUNITY
End: 2023-05-08 | Stop reason: SDUPTHER

## 2023-05-08 RX ORDER — BUMETANIDE 2 MG/1
2 TABLET ORAL DAILY
COMMUNITY
Start: 2023-04-16

## 2023-05-08 NOTE — PATIENT INSTRUCTIONS
Hgba1c is 5 6%  this was excellent  For ease, let's have you take Humalog insulin 3 units every day before lunch and not worry about giving it if sugars are high  Continue to stay off the bydureon  Continue to test blood sugars up to 3 times a day  Follow up in 3 months with blood work

## 2023-05-08 NOTE — PROGRESS NOTES
5/8/2023    Assessment/Plan      Diagnoses and all orders for this visit:    Type 2 diabetes mellitus with hyperglycemia, without long-term current use of insulin (HCC)  -     insulin lispro (HumaLOG) 100 units/mL injection pen; 3 units with lunch  -     HEMOGLOBIN A1C W/ EAG ESTIMATION Lab Collect; Future  -     Comprehensive metabolic panel Lab Collect; Future  -     CBC and differential Lab Collect; Future    Diabetic polyneuropathy associated with type 2 diabetes mellitus (HCC)  -     HEMOGLOBIN A1C W/ EAG ESTIMATION Lab Collect; Future  -     Comprehensive metabolic panel Lab Collect; Future  -     CBC and differential Lab Collect; Future    Microalbuminuria due to type 2 diabetes mellitus ([facility] [address] )  -     HEMOGLOBIN A1C W/ EAG ESTIMATION Lab Collect; Future  -     Comprehensive metabolic panel Lab Collect; Future  -     CBC and differential Lab Collect; Future    Goiter, nontoxic, multinodular  -     HEMOGLOBIN A1C W/ EAG ESTIMATION Lab Collect; Future  -     Comprehensive metabolic panel Lab Collect; Future  -     CBC and differential Lab Collect; Future    Primary hypertension  -     HEMOGLOBIN A1C W/ EAG ESTIMATION Lab Collect; Future  -     Comprehensive metabolic panel Lab Collect; Future  -     CBC and differential Lab Collect; Future    Mixed hyperlipidemia  -     HEMOGLOBIN A1C W/ EAG ESTIMATION Lab Collect; Future  -     Comprehensive metabolic panel Lab Collect; Future  -     CBC and differential Lab Collect; Future    Type 2 diabetes mellitus with hyperglycemia, with long-term current use of insulin (HCC)    Other orders  -     bumetanide (BUMEX) 2 mg tablet; Take 2 mg by mouth daily  -     hydroxyurea (HYDREA) 500 mg capsule; Take 500 mg by mouth in the morning  -     Discontinue: insulin lispro (HumaLOG) 100 units/mL injection pen; Inject under the skin 3 units with each meal only if blood sugar is over 200        Assessment/Plan:  1  Type 2 diabetes  Hemoglobin A1c is 5 6%    This does demonstrate excellent control of her diabetes but may have been partly due to her being in the hospital and previous use of Bydureon  Based on her recent renal issues, Bydureon is not an appropriate medication to remain on and she was started on Humalog insulin as needed for high blood sugars  I have asked her to start taking Humalog insulin 3 units daily at lunch to try to prevent her sugars from going up at supper and then we can keep her off Bydureon and other medications which are not appropriate with renal disease  If we need a GLP-1 inhibitor, Trulicity is approved with renal disease  I have asked her to continue to test her blood sugars up to 3 times a day  2   Diabetic neuropathy  Has chronic neuropathic symptoms  Diabetic foot exam was performed in the office today  3   Diabetic microalbuminuria  She does have chronic renal insufficiency/CKD stage III and did have acute renal failure in the hospital which has improved and normalized to her usual creatinine  She needs to follow-up with nephrology  4   Nontoxic multinodular goiter  No compressive thyroid symptoms  Last ultrasound was May 2022 so she is due for repeat thyroid ultrasound in May 2024  Most recent thyroid function studies are normal consistent with biochemical euthyroidism  5   Hypertension  She is normotensive in the office on her current dose of Bumex  6   Hyperlipidemia  She will continue the same atorvastatin 40 mg daily  I have asked her to follow-up in 3 months with preceding hemoglobin A1c, CMP, and CBC  CC: Diabetes type II, thyroid, blood pressure, lipid follow-up    History of Present Illness     HPI: Derrick Araiza is a 65y o  year old female with type 2 diabetes insulin requiring with neuropathy, retinopathy, and microalbuminuria for 14-19 years, multinodular goiter, hypertension, hyperlipidemia for follow-up visit  She is on insulin at home and takes Humalog insulin 3 units for high blood sugars over 200   She denies any polyuria, polydipsia, and blurry vision  She has polyphasia and occasional once a night nocturia  She has numbness to the fingers and calfs  She denies chest pain or shortness of breath  She denies heart attack and claudication but does admit to neuropathy, nephropathy, retinopathy and stroke  Hypoglycemic episodes: No rare  The patient's last eye exam was in March 2023 with mild retinopathy  The patient's last foot exam was in may 2022  Last A1C was   Lab Results   Component Value Date    HGBA1C 5 6 04/21/2023     Blood Sugar/Glucometer/Pump/CGM review: Blood sugars are tested at least 3 times a day and are in the low to mid 100s in the morning, upper 100s to 200s before lunch, upper 100s to 300s before supper, and mid 100s to 200s at bedtime  She has a nontoxic multinodular goiter  Multiple nodules were biopsied benign in the past   Last thyroid ultrasound was May 2022 with stable size thyroid nodules  She denies compressive thyroid symptoms or difficulties with swallowing  She has hyperlipidemia and takes atorvastatin 40 mg daily  He denies chest pain or shortness of breath  She has hypertension and microalbuminuria and takes no medications for this problem at present  She is taking bumex 2 mg dailyt  She was unable to use ACE or ARB's due to hyperkalemia in the past   She denies headache or strokelike symptoms  She was admitted to the hospital in March 2023 with the intention of getting a TAVR procedure of an aortic valve for aortic stenosis but this could not be done due to her severe mitral stenosis  She was not a candidate for mitral valve procedure  She was discharged for medical management of her valvular disease  While in hospital, she had acute kidney injury with a creatinine up to 2 4 thought to be due to right heart catheterization/dye exposure  She was in rehab at [facility] and was started on humalog insulin  They were checking later in the day   She was taken off bydureon in the hosptial      Review of Systems   Constitutional: Negative for fatigue and unexpected weight change  Weight 14 pounds less than last visit  HENT: Negative for trouble swallowing  Eyes: Negative for visual disturbance  Wears glasses  Respiratory: Negative for chest tightness and shortness of breath  Cardiovascular: Positive for leg swelling  Negative for chest pain  Leg edema improved, off all salt in diet  Gastrointestinal: Positive for constipation  Negative for abdominal pain, diarrhea and nausea  Some constipation at times  Endocrine: Positive for polyphagia  Negative for polydipsia and polyuria  Nocturia occasionally once a night  48 ounce fluid restriction a day  Always hungry  Skin: Negative for rash  Neurological: Positive for numbness  Negative for dizziness, weakness, light-headedness and headaches  Has neuropathy symptoms in the fingers and int he calves, not the toes, numbness  needs a walker  Psychiatric/Behavioral: Negative for sleep disturbance  Historical Information   Past Medical History:   Diagnosis Date   • Aortic stenosis    • Atrial fibrillation (HCC)    • CHF (congestive heart failure) (HCC)    • Colon cancer (HCC)     limited to wall of colon, no chemo or XRT needed   • CVA (cerebral vascular accident) ([facility] [address] )     mini stroke with vertigo   • Diabetes mellitus ([facility] [address] )    • Frequent nosebleeds     quite significant and severe as in ER   • Hypertension    • Hypomagnesemia    • Kidney stone    • Leukocytosis     chanelle 2 gene positive   • Mitral stenosis    • Myeloproliferative disease (HCC)     CHANELLE 2   • Osteoarthritis    • Vasculitis (HCC)     rash on legs       Past Surgical History:   Procedure Laterality Date   • APPENDECTOMY      with colectomy   • CARDIOVERSION     • COLECTOMY LAKE      24 inches   • COLONOSCOPY  2016   • HYSTERECTOMY     • LAPAROSCOPIC CHOLECYSTECTOMY       Social History   Social History     Substance and Sexual Activity   Alcohol Use No     Social History     Substance and Sexual Activity   Drug Use No     Social History     Tobacco Use   Smoking Status Never   Smokeless Tobacco Never   Tobacco Comments    Never used  Family History:   Family History   Problem Relation Age of Onset   • Asthma Mother    • Heart disease Mother         post heart surgery   • Heart attack Father    • Coronary artery disease Father    • Heart attack Brother    • Colon cancer Brother    • Diabetes type II Brother    • Colon cancer Brother    • Heart attack Brother    • Diabetes type II Brother    • Heart attack Brother    • Colon cancer Brother    • No Known Problems Brother    • Heart disease Paternal Uncle    • Hypertension Daughter    • Hypertension Daughter    • No Known Problems Daughter        Meds/Allergies   Current Outpatient Medications   Medication Sig Dispense Refill   • allopurinol (ZYLOPRIM) 100 mg tablet TAKE 2 TABLETS BY MOUTH EVERY DAY (Patient taking differently: Take 100 mg by mouth daily) 180 tablet 3   • amiodarone 200 mg tablet Take 200 mg by mouth daily     • atorvastatin (LIPITOR) 40 mg tablet Take 40 mg by mouth daily     • bumetanide (BUMEX) 2 mg tablet Take 2 mg by mouth daily     • Cholecalciferol (VITAMIN D3) 50 MCG (2000 UT) capsule Take 2,000 Units by mouth daily      • glucose blood (ONE TOUCH ULTRA TEST) test strip Test 3 times a day 300 each 3   • hydroxyurea (HYDREA) 500 mg capsule Take 500 mg by mouth in the morning     • insulin lispro (HumaLOG) 100 units/mL injection pen 3 units with lunch 15 mL 6   • [employer] (ONE TOUCH DELICA LANCING DEV) MISC Use to test blood sugars once daily 1 each 0   • Magnesium 500 MG CAPS Take by mouth daily       • apixaban (ELIQUIS) 5 mg Take 5 mg by mouth 2 (two) times a day   (Patient not taking: Reported on 5/8/2023)       No current facility-administered medications for this visit       Allergies   Allergen Reactions   • Epinephrine "    Other reaction(s): increased HR   • Lidocaine      Other reaction(s): increased HR   • Neomycin      Other reaction(s): rash   • Pioglitazone      Other reaction(s): Fluid retention   • Bacitracin Rash       Objective   Vitals: Blood pressure 122/80, pulse 79, height 4' 10\" (1 473 m), weight 57 6 kg (127 lb)  Invasive Devices     None                 Physical Exam  Vitals reviewed  Constitutional:       Appearance: Normal appearance  She is well-developed  She is obese  HENT:      Head: Normocephalic and atraumatic  Eyes:      Conjunctiva/sclera: Conjunctivae normal    Neck:      Thyroid: No thyromegaly  Vascular: No carotid bruit  Comments: Thyroid irregular and feel  Cardiovascular:      Rate and Rhythm: Normal rate and regular rhythm  Pulses: Pulses are weak  Dorsalis pedis pulses are 1+ on the right side and 1+ on the left side  Posterior tibial pulses are 1+ on the right side and 1+ on the left side  Heart sounds: Normal heart sounds  No murmur heard  Comments: 1+ dorsalis pedis pulses bilaterally  Pulmonary:      Effort: Pulmonary effort is normal       Breath sounds: Normal breath sounds  No wheezing  Abdominal:      Palpations: Abdomen is soft  Musculoskeletal:         General: No deformity  Cervical back: Normal range of motion and neck supple  Right lower leg: Edema present  Left lower leg: Edema present  Comments: 1 to 2+ left lower extremity edema and trace to 1+ right lower extremity edema at the ankle  No ulcerations of the feet  Feet:      Right foot:      Skin integrity: No ulcer, skin breakdown, erythema, warmth, callus or dry skin  Left foot:      Skin integrity: No ulcer, skin breakdown, erythema, warmth, callus or dry skin  Lymphadenopathy:      Cervical: No cervical adenopathy  Skin:     General: Skin is warm and dry  Findings: No rash     Neurological:      Mental Status: She is alert and oriented to " person, place, and time  Deep Tendon Reflexes: Reflexes are normal and symmetric  Comments: Vibration sensation diminished to the first toe DIP joint bilaterally  microfilament sensation intact bilaterally except to the right 1st toe and left heel  Patient's shoes and socks removed  Right Foot/Ankle   Right Foot Inspection  Skin Exam: skin normal and skin intact  No dry skin, no warmth, no callus, no erythema, no maceration, no abnormal color, no pre-ulcer, no ulcer and no callus  Toe Exam: swelling  no right toe deformity    Sensory   Vibration: diminished  Monofilament testing: diminished    Vascular  Capillary refills: < 3 seconds  The right DP pulse is 1+  The right PT pulse is 1+  Left Foot/Ankle  Left Foot Inspection  Skin Exam: skin normal and skin intact  No dry skin, no warmth, no erythema, no maceration, normal color, no pre-ulcer, no ulcer and no callus  Toe Exam: swelling  No left toe deformity  Sensory   Vibration: diminished  Monofilament testing: intact    Vascular  Capillary refills: < 3 seconds  The left DP pulse is 1+  The left PT pulse is 1+  Assign Risk Category  No deformity present  Loss of protective sensation  Weak pulses  Risk: 2        The history was obtained from the review of the chart and from the patient and   Lab Results:    Most recent Alc is  Lab Results   Component Value Date    HGBA1C 5 6 04/21/2023           Blood work performed on 4/21/2023 at [address] [place] [address] showed a CMP with a glucose of 105 fasting BUN 68, creatinine 1 38, GFR 57, BUN/creatinine ratio 49 3 but was otherwise normal     Total cholesterol 94, triglyceride 107, HDL 46, LDL 27  TSH is 1 67      Lab Results   Component Value Date    CREATININE 1 05 10/18/2021    CREATININE 1 10 04/21/2021    CREATININE 0 92 11/09/2020    BUN 42 10/18/2021    K 5 3 10/18/2021     10/18/2021    CO2 22 4 10/18/2021     eGFR   Date Value Ref Range Status   12/09/2019 64 ml/min/1 73sq m Final     EXTERNAL EGFR   Date Value Ref Range Status   10/18/2021 51  Final       Lab Results   Component Value Date    HDL 32 (A) 11/09/2020    TRIG 122 11/09/2020       Lab Results   Component Value Date    ALT 8 04/21/2021    AST 20 04/21/2021    ALKPHOS 107 04/21/2021       Lab Results   Component Value Date    FREET4 1 08 12/09/2019             Future Appointments   Date Time Provider Rasheed Clancy   8/9/2023  1:40 PM Gatuam Mcadams MD [facility]

## 2023-05-10 ENCOUNTER — TELEPHONE (OUTPATIENT)
Dept: ENDOCRINOLOGY | Facility: HOSPITAL | Age: 78
End: 2023-05-10

## 2023-05-10 RX ORDER — BUMETANIDE 2 MG/1
2 TABLET ORAL DAILY
OUTPATIENT
Start: 2023-05-10

## 2023-05-10 NOTE — TELEPHONE ENCOUNTER
The pharmacist also called about the patient today as well  This is concerning her Humalog prescription that was sent over yesterday, the Lispro pen is no longer covered by her insurance  What is covered is either the [employer] ( ndc [id number]), Novolog flexpen ([facility] [id number]) or the Novolog pen fill (ndc [id number])    Please review and advise what Novolog you would like sent to replace original prescription

## 2023-05-10 NOTE — TELEPHONE ENCOUNTER
Ifeoma Dill the nurse called today with some questions concerning the patient insulin  She stated that the patient originally was sent home and told to take the Humalog 3 units three times a day if her sugars were over 200  Then after her appointment with you  Yesterday she told the nurse that she is to be checking her sugars three time a day and then only taking the insulin (3 units) at lunch  Ifeoma Dill would like clarification on the directions for the insulin to make sure what she was told is right

## 2023-05-11 DIAGNOSIS — E11.65 TYPE 2 DIABETES MELLITUS WITH HYPERGLYCEMIA, WITHOUT LONG-TERM CURRENT USE OF INSULIN (HCC): Primary | ICD-10-CM

## 2023-05-11 RX ORDER — INSULIN ASPART 100 [IU]/ML
INJECTION, SOLUTION INTRAVENOUS; SUBCUTANEOUS
Qty: 15 ML | Refills: 6 | Status: SHIPPED | OUTPATIENT
Start: 2023-05-11

## 2023-05-11 NOTE — TELEPHONE ENCOUNTER
At the moment, that is correct she is to take 3 units of Humalog at lunch only but she is to check her blood sugars 3 times a day and send them to me so that I can decide whether she needs insulin at other times of the day  They were struggling with the concept of a sliding scale and a sliding scale is not an appropriate way to treat diabetes so I have discontinued that

## 2023-05-11 NOTE — TELEPHONE ENCOUNTER
I was able to call Laura Stockton the nurse from [facility] and let her know that the patient is only to be on the 3 units of Humalog at lunch and no sliding scale  She was also told that she needs to be checking her sugars three times a day and sending them in to us as well

## 2023-05-12 DIAGNOSIS — E11.65 TYPE 2 DIABETES MELLITUS WITH HYPERGLYCEMIA, WITH LONG-TERM CURRENT USE OF INSULIN (HCC): Primary | ICD-10-CM

## 2023-05-12 DIAGNOSIS — Z79.4 TYPE 2 DIABETES MELLITUS WITH HYPERGLYCEMIA, WITH LONG-TERM CURRENT USE OF INSULIN (HCC): Primary | ICD-10-CM

## 2023-05-12 RX ORDER — PEN NEEDLE, DIABETIC 32GX 5/32"
NEEDLE, DISPOSABLE MISCELLANEOUS
Qty: 300 EACH | Refills: 3 | Status: SHIPPED | OUTPATIENT
Start: 2023-05-12

## 2023-06-02 ENCOUNTER — TELEPHONE (OUTPATIENT)
Dept: ENDOCRINOLOGY | Facility: HOSPITAL | Age: 78
End: 2023-06-02

## 2023-06-02 NOTE — TELEPHONE ENCOUNTER
No, Bydureon cannot be used with her recent kidney function becoming worse  It is not allowed to be used with poor kidney function  We should increase her NovoLog insulin to 5 units at lunch and add 3 units at supper  I would then want her to check her blood sugars 2-3 times a day and call them into the office next week

## 2023-06-02 NOTE — TELEPHONE ENCOUNTER
Patient called and wanted me to ask:   She is currently taking 3 units of novilog and she want to know if its okay to inject the Bydureon once a week because her sugars have been running a little eboni  Example     This morning was 162 and afternoon tests have all been mid-200's  Please advise  Thanks!

## 2023-07-26 DIAGNOSIS — E11.42 DIABETIC POLYNEUROPATHY ASSOCIATED WITH TYPE 2 DIABETES MELLITUS (HCC): ICD-10-CM

## 2023-07-26 DIAGNOSIS — E11.65 TYPE 2 DIABETES MELLITUS WITH HYPERGLYCEMIA, WITHOUT LONG-TERM CURRENT USE OF INSULIN (HCC): Primary | ICD-10-CM

## 2023-07-26 RX ORDER — BLOOD SUGAR DIAGNOSTIC
STRIP MISCELLANEOUS
Qty: 300 STRIP | Refills: 3 | Status: SHIPPED | OUTPATIENT
Start: 2023-07-26

## 2023-07-26 RX ORDER — BLOOD-GLUCOSE METER
EACH MISCELLANEOUS
Qty: 1 KIT | Refills: 0 | Status: SHIPPED | OUTPATIENT
Start: 2023-07-26

## 2023-07-26 RX ORDER — LANCETS
EACH MISCELLANEOUS
Qty: 300 EACH | Refills: 3 | Status: SHIPPED | OUTPATIENT
Start: 2023-07-26

## 2023-10-10 ENCOUNTER — TELEPHONE (OUTPATIENT)
Age: 78
End: 2023-10-10

## 2023-10-10 NOTE — TELEPHONE ENCOUNTER
Patients GI provider:  Dr. Juany Kaye    Number to return call: ([phone]    Reason for call: Pt calling because she has been having a lot of mucus when she try's to have a bowel movement and cramps in her abdomen.  She is feeling pressure to continue going but is only getting mucus and she is very concerned and would like to speak with Dr. Juany Kaye or a PA/NP asap please     Pt can be reached at above number or [phone]

## 2023-10-11 NOTE — TELEPHONE ENCOUNTER
Spoke to patient via telephone:    Cramping pain assoc with BMs x4 days. Was away and diet/routine changed. Feels urge to void but only small amount of stool is evacuated. When she does move bowels, it is mostly mucus. Passed some stool yesterday - little marbles. Recommend Miralax 1-2x daily until stools return to baseline. If increased pain, fevers, N/V, bloody stools, recommend urgent ED evaluation.

## 2023-10-12 ENCOUNTER — OFFICE VISIT (OUTPATIENT)
Dept: ENDOCRINOLOGY | Facility: HOSPITAL | Age: 78
End: 2023-10-12
Payer: MEDICARE

## 2023-10-12 VITALS
HEART RATE: 76 BPM | DIASTOLIC BLOOD PRESSURE: 68 MMHG | HEIGHT: 58 IN | SYSTOLIC BLOOD PRESSURE: 118 MMHG | BODY MASS INDEX: 28.71 KG/M2 | WEIGHT: 136.8 LBS

## 2023-10-12 DIAGNOSIS — E11.42 DIABETIC POLYNEUROPATHY ASSOCIATED WITH TYPE 2 DIABETES MELLITUS (HCC): ICD-10-CM

## 2023-10-12 DIAGNOSIS — E11.29 MICROALBUMINURIA DUE TO TYPE 2 DIABETES MELLITUS: ICD-10-CM

## 2023-10-12 DIAGNOSIS — Z79.4 TYPE 2 DIABETES MELLITUS WITH HYPERGLYCEMIA, WITH LONG-TERM CURRENT USE OF INSULIN (HCC): Primary | ICD-10-CM

## 2023-10-12 DIAGNOSIS — I10 PRIMARY HYPERTENSION: ICD-10-CM

## 2023-10-12 DIAGNOSIS — E04.2 GOITER, NONTOXIC, MULTINODULAR: ICD-10-CM

## 2023-10-12 DIAGNOSIS — R80.9 MICROALBUMINURIA DUE TO TYPE 2 DIABETES MELLITUS: ICD-10-CM

## 2023-10-12 DIAGNOSIS — E11.65 TYPE 2 DIABETES MELLITUS WITH HYPERGLYCEMIA, WITH LONG-TERM CURRENT USE OF INSULIN (HCC): Primary | ICD-10-CM

## 2023-10-12 DIAGNOSIS — E78.2 MIXED HYPERLIPIDEMIA: ICD-10-CM

## 2023-10-12 PROCEDURE — 99214 OFFICE O/P EST MOD 30 MIN: CPT | Performed by: INTERNAL MEDICINE

## 2023-10-12 RX ORDER — METOLAZONE 2.5 MG/1
2.5 TABLET ORAL WEEKLY
COMMUNITY

## 2023-10-12 NOTE — PROGRESS NOTES
10/12/2023    Assessment/Plan      Diagnoses and all orders for this visit:    Type 2 diabetes mellitus with hyperglycemia, with long-term current use of insulin (HCC)  -     HEMOGLOBIN A1C W/ EAG ESTIMATION Lab Collect  -     Fructosamine- Lab Collect  -     HEMOGLOBIN A1C W/ EAG ESTIMATION Lab Collect; Future  -     Comprehensive metabolic panel Lab Collect; Future  -     TSH, 3rd generation Lab Collect; Future  -     T4, free Lab Collect; Future    Microalbuminuria due to type 2 diabetes mellitus   -     HEMOGLOBIN A1C W/ EAG ESTIMATION Lab Collect  -     Fructosamine- Lab Collect  -     HEMOGLOBIN A1C W/ EAG ESTIMATION Lab Collect; Future  -     Comprehensive metabolic panel Lab Collect; Future  -     TSH, 3rd generation Lab Collect; Future  -     T4, free Lab Collect; Future    Diabetic polyneuropathy associated with type 2 diabetes mellitus (HCC)  -     HEMOGLOBIN A1C W/ EAG ESTIMATION Lab Collect  -     Fructosamine- Lab Collect  -     HEMOGLOBIN A1C W/ EAG ESTIMATION Lab Collect; Future  -     Comprehensive metabolic panel Lab Collect; Future  -     TSH, 3rd generation Lab Collect; Future  -     T4, free Lab Collect; Future    Goiter, nontoxic, multinodular  -     HEMOGLOBIN A1C W/ EAG ESTIMATION Lab Collect  -     Fructosamine- Lab Collect  -     HEMOGLOBIN A1C W/ EAG ESTIMATION Lab Collect; Future  -     Comprehensive metabolic panel Lab Collect; Future  -     TSH, 3rd generation Lab Collect; Future  -     T4, free Lab Collect; Future    Primary hypertension  -     HEMOGLOBIN A1C W/ EAG ESTIMATION Lab Collect  -     Fructosamine- Lab Collect  -     HEMOGLOBIN A1C W/ EAG ESTIMATION Lab Collect; Future  -     Comprehensive metabolic panel Lab Collect; Future  -     TSH, 3rd generation Lab Collect; Future  -     T4, free Lab Collect; Future    Mixed hyperlipidemia  -     HEMOGLOBIN A1C W/ EAG ESTIMATION Lab Collect  -     Fructosamine- Lab Collect  -     HEMOGLOBIN A1C W/ EAG ESTIMATION Lab Collect;  Future  - Comprehensive metabolic panel Lab Collect; Future  -     TSH, 3rd generation Lab Collect; Future  -     T4, free Lab Collect; Future    Other orders  -     metolazone (ZAROXOLYN) 2.5 mg tablet; Take 2.5 mg by mouth once a week        Assessment/Plan:  1. Type 2 diabetes, insulin requiring. She unfortunately did not get hemoglobin A1c done with her most recent blood work. She will get a hemoglobin A1c and fructosamine done at her earliest convenience. For now, she will continue the same low-dose NovoLog insulin at lunch and supper. She will continue to test her blood sugars but she will try to test up to 3 times a day, at least in the morning and once or twice later in the day particularly around supper and bedtime. I have asked her to send me those blood sugars in several weeks. 2.  Diabetic neuropathy. She has neuropathic symptoms and I will have her start vitamin B12 500 to 1000 mcg daily to see if that improves her symptoms. Diabetic foot exam was up-to-date. 3.  Microalbuminuria. She is following with nephrology. 4.  Nontoxic multinodular goiter. She denies compressive thyroid symptoms or difficulties with swallowing. Last thyroid ultrasound in May 2022 showed stable thyroid nodules. She will likely be due for new ultrasound in May 2024. I will repeat thyroid function studies with her next visit. 5.  Hypertension. She is normotensive in the office on her low dose of Bumex. 6.  Hyperlipidemia. She will continue the same atorvastatin 40 mg daily. She will get a hemoglobin A1c and fructosamine performed now.     I have asked her to follow-up in 3 months with preceding hemoglobin A1c, CMP, TSH, and free T4.      CC: Diabetes type II, thyroid, blood pressure, lipid follow-up    History of Present Illness     HPI: Talon Hoff is a 77y.o. year old female with type 2 diabetes, insulin requiring with retinopathy, neuropathy for  15-20  years, nontoxic multinodular goiter, hypertension, hyperlipidemia for follow-up visit. She is on insulin at home and takes NovoLog insulin 5 units at lunch and 3 units at dinner. She admits to polyphasia as she can always eat, polydipsia, and once a night nocturia at times. She denies polyuria and blurry vision. She has chronic numbness and tingling of the knees down to the feet and her fingers are numb and she will drop a paper while holding it or have difficulty writing. She denies chest pain or shortness of breath. She denies heart attack and claudication but does admit to neuropathy, nephropathy, retinopathy, and stroke. Hypoglycemic episodes: No rare. The patient's last eye exam was in March 2023 with mild retinopathy. The patient's last foot exam was in May 2023 at endocrine office visit. Last A1C was   Lab Results   Component Value Date    HGBA1C 5.6 04/21/2023   . Blood Sugar/Glucometer/Pump/CGM review: checks blood sugars 1-2 times a day in am. Reports blood sugars  in am, occasionally over 150, not testing regularly in the evening. She has a nontoxic multinodular goiter. Multiple nodules were biopsied benign in the past.  Last thyroid ultrasound was May 2022 with stable size thyroid nodules. She denies compressive thyroid symptoms or difficulties with swallowing. She has hyperlipidemia and takes atorvastatin 40 mg daily. She denies chest pain or shortness of breath. She has hypertension and takes Bumex 2 mg daily. She denies headache or strokelike symptoms but is generally weak. Review of Systems   Constitutional:  Positive for fatigue. Negative for unexpected weight change. Very fatigued when trying to walk a lot. Weight 9 pounds more than last visit. HENT:  Negative for trouble swallowing. Eyes:  Negative for visual disturbance. Wears glasses. Respiratory:  Negative for chest tightness and shortness of breath. Cardiovascular:  Positive for leg swelling.  Negative for chest pain.        Saw Dr. Alfaro Sober today. BP has been on the low side 90 systolic. Gastrointestinal:  Positive for constipation. Negative for abdominal pain, diarrhea and nausea. Constipated at times when visiting family. Endocrine: Positive for polydipsia and polyphagia. Negative for polyuria. Some thirst recently. Can always eat. Nocturia once  night. Not always. Musculoskeletal:  Positive for arthralgias. Knee pain. Skin:  Negative for wound. Neurological:  Positive for weakness and numbness. Negative for dizziness, light-headedness and headaches. Generally weak and uses a walker. Numbness from the knees down and in the fingers and will drop paper and difficulty writing. Psychiatric/Behavioral:  Negative for sleep disturbance. Historical Information   Past Medical History:   Diagnosis Date    Aortic stenosis     Atrial fibrillation (HCC)     CHF (congestive heart failure) (HCC)     Colon cancer (HCC)     limited to wall of colon, no chemo or XRT needed    CVA (cerebral vascular accident) ([address])     mini stroke with vertigo    Diabetes mellitus ([address])     Frequent nosebleeds     quite significant and severe as in ER    Hypertension     Hypomagnesemia     Kidney stone     Leukocytosis     chanelle 2 gene positive    Mitral stenosis     Myeloproliferative disease (HCC)     CHANELLE 2    Osteoarthritis     Vasculitis (HCC)     rash on legs. Past Surgical History:   Procedure Laterality Date    APPENDECTOMY      with colectomy    CARDIOVERSION      COLECTOMY [place]      24 inches    COLONOSCOPY  2016    HYSTERECTOMY      LAPAROSCOPIC CHOLECYSTECTOMY       Social History   Social History     Substance and Sexual Activity   Alcohol Use No     Social History     Substance and Sexual Activity   Drug Use No     Social History     Tobacco Use   Smoking Status Never   Smokeless Tobacco Never   Tobacco Comments    Never used.      Family History:   Family History   Problem Relation Age of Onset    Asthma Mother     Heart disease Mother         post heart surgery    Heart attack Father     Coronary artery disease Father     Heart attack Brother     Colon cancer Brother     Diabetes type II Brother     Colon cancer Brother     Heart attack Brother     Diabetes type II Brother     Heart attack Brother     Colon cancer Brother     No Known Problems Brother     Heart disease Paternal Uncle     Hypertension Daughter     Hypertension Daughter     No Known Problems Daughter        Meds/Allergies   Current Outpatient Medications   Medication Sig Dispense Refill    allopurinol (ZYLOPRIM) 100 mg tablet TAKE 2 TABLETS BY MOUTH EVERY DAY (Patient taking differently: Take 100 mg by mouth daily) 180 tablet 3    amiodarone 200 mg tablet Take 200 mg by mouth daily      atorvastatin (LIPITOR) 40 mg tablet Take 40 mg by mouth daily      Blood Glucose Monitoring Suppl (ONE TOUCH ULTRA 2) w/Device KIT Use to check blood sugars three times a day 1 kit 0    bumetanide (BUMEX) 2 mg tablet Take 2 mg by mouth daily      Cholecalciferol (VITAMIN D3) 50 MCG (2000 UT) capsule Take 2,000 Units by mouth daily       glucose blood (OneTouch Ultra) test strip Test 3 times a day 300 strip 3    hydroxyurea (HYDREA) 500 mg capsule Take 500 mg by mouth in the morning      Insulin Pen Needle (BD Pen Needle Nga U/F) 32G X 4 MM MISC Use 3 new pen needles. 300 each 3    Lancet Devices (ONE TOUCH DELICA LANCING DEV) MISC Use to test blood sugars once daily 1 each 0    Lancets (onetouch ultrasoft) lancets Use to check blood sugars 3 times a day 300 each 3    Magnesium 500 MG CAPS Take by mouth daily        metolazone (ZAROXOLYN) 2.5 mg tablet Take 2.5 mg by mouth once a week      NovoLOG FlexPen 100 units/mL injection pen Inject 3 units under the skin at lunch (Patient taking differently: Inject 5 units under the skin at lunch and 3 units at dinner) 15 mL 6     No current facility-administered medications for this visit.      Allergies Allergen Reactions    Epinephrine      Other reaction(s): increased HR    Lidocaine      Other reaction(s): increased HR    Neomycin      Other reaction(s): rash    Pioglitazone      Other reaction(s): Fluid retention    Bacitracin Rash       Objective   Vitals: Blood pressure 118/68, pulse 76, height 4' 10" (1.473 m), weight 62.1 kg (136 lb 12.8 oz). Invasive Devices       None                   Physical Exam  Vitals reviewed. Constitutional:       Appearance: Normal appearance. She is well-developed. She is obese. HENT:      Head: Normocephalic and atraumatic. Eyes:      Conjunctiva/sclera: Conjunctivae normal.   Neck:      Thyroid: No thyromegaly. Vascular: No carotid bruit. Comments: Thyroid irregular nodular in feel. Cardiovascular:      Rate and Rhythm: Normal rate and regular rhythm. Heart sounds: Normal heart sounds. No murmur heard. Comments: 2/6 systolic murmur. Pulmonary:      Effort: Pulmonary effort is normal.      Breath sounds: Normal breath sounds. No wheezing. Abdominal:      Palpations: Abdomen is soft. Musculoskeletal:         General: No deformity. Normal range of motion. Cervical back: Normal range of motion and neck supple. Right lower leg: Edema present. Left lower leg: Edema present. Comments: 3+ right and 2+ left lower extremity edema. No tremor of the outstretched hands. Lymphadenopathy:      Cervical: No cervical adenopathy. Skin:     General: Skin is warm and dry. Findings: Bruising present. No rash. Comments: Significant purpura and easy bruisin off blood thinners. Skin paper thin and easily tears. Neurological:      Mental Status: She is alert and oriented to person, place, and time. Deep Tendon Reflexes: Reflexes are normal and symmetric. The history was obtained from the review of the chart and from the patient and .     Lab Results:     Blood work performed on 10/11/2023 showed a CMP with a glucose of 100, BUN 72, creatinine 1.41, GFR 38, BUN/creatinine ratio 51.1, alkaline phosphatase 165, but was otherwise normal.    CBC shows a hemoglobin of 13.6 with a hematocrit of 45.4 and a WBC count of 30.5. Hemoglobin A1c was not yet performed.        Most recent Alc is  Lab Results   Component Value Date    HGBA1C 5.6 04/21/2023               Lab Results   Component Value Date    CREATININE 1.05 10/18/2021    CREATININE 1.10 04/21/2021    CREATININE 0.92 11/09/2020    BUN 42 10/18/2021    K 5.3 10/18/2021     10/18/2021    CO2 22.4 10/18/2021     eGFR   Date Value Ref Range Status   12/09/2019 64 ml/min/1.73sq m Final     EXTERNAL EGFR   Date Value Ref Range Status   10/18/2021 51  Final         Lab Results   Component Value Date    HDL 32 (A) 11/09/2020    TRIG 122 11/09/2020       Lab Results   Component Value Date    ALT 8 04/21/2021    AST 20 04/21/2021    ALKPHOS 107 04/21/2021       Lab Results   Component Value Date    FREET4 1.08 12/09/2019             Future Appointments   Date Time Provider [address]   4/17/2024  2:20 PM Anuj Llamas MD [facility]

## 2023-10-12 NOTE — PATIENT INSTRUCTIONS
We'll order the hgba1c to be done as soon as able. For now, no change in insulin doses. Work on testing blood sugars 2-3 times a day. Send them to me in 2-4 weeks. Follow up in 6 months with blood work. Vitamin B12 500-1000 mcg daily for nerve symptoms.      Dr. Miles [place] neurologist.

## 2023-10-27 LAB — HBA1C MFR BLD HPLC: 6.4 %

## 2024-03-26 ENCOUNTER — TELEPHONE (OUTPATIENT)
Age: 79
End: 2024-03-26

## 2024-03-26 NOTE — TELEPHONE ENCOUNTER
Pt called in wanting to know if Dr. Corral could give her a call or if she could get her advice on whether going to a new Chiropractor would be a good choice for her Nueropathy. She was contacted by [facility] and would like to know her thoughts on it.    Please give pt a call back [phone]

## 2024-03-27 NOTE — TELEPHONE ENCOUNTER
Chiropractors do not treat neuropathy.  Neuropathy is typically treated by a neurologist.  Unfortunately, I know nothing about this particular chiropractic practice.

## 2024-03-28 NOTE — TELEPHONE ENCOUNTER
"Patient called back and I relayed Dr. Corral's message. She thanked Dr. Corral for her input. Patient's only other question is \"is neuropathy reversible?\"    Patient would like a call back.   "

## 2024-03-28 NOTE — TELEPHONE ENCOUNTER
It depends on the cause of neuropathy.  If it is neuropathy from a vitamin deficiency then it can be reversed.  If it is neuropathy from diabetes then most likely cannot be reversed but may be able to be prevented from worsening or at least symptoms controlled.  If it is neuropathy from a nerve pinch then the only thing that would help that would be relieving the nerve pinched if that is even possible.

## 2024-03-29 NOTE — TELEPHONE ENCOUNTER
Vitamin b12 1000 mcg daily and alpha lipoic acid 600 mg daily ay be helpful. Unfortunately no guarantees

## 2024-03-29 NOTE — TELEPHONE ENCOUNTER
Spoke to patient. She would like to know if there are any vitamins she could take to prevent it from getting worse

## 2024-04-15 ENCOUNTER — TELEPHONE (OUTPATIENT)
Age: 79
End: 2024-04-15

## 2024-04-15 NOTE — TELEPHONE ENCOUNTER
Pt called in requesting to have her lab orders faxed over to [facility]. I called the office and Diana spoke with pt to assist her better.

## 2024-04-17 ENCOUNTER — OFFICE VISIT (OUTPATIENT)
Dept: ENDOCRINOLOGY | Facility: HOSPITAL | Age: 79
End: 2024-04-17
Payer: MEDICARE

## 2024-04-17 VITALS
WEIGHT: 135 LBS | HEART RATE: 74 BPM | HEIGHT: 58 IN | DIASTOLIC BLOOD PRESSURE: 78 MMHG | SYSTOLIC BLOOD PRESSURE: 124 MMHG | BODY MASS INDEX: 28.34 KG/M2 | OXYGEN SATURATION: 98 %

## 2024-04-17 DIAGNOSIS — E11.42 DIABETIC POLYNEUROPATHY ASSOCIATED WITH TYPE 2 DIABETES MELLITUS (HCC): ICD-10-CM

## 2024-04-17 DIAGNOSIS — Z79.4 TYPE 2 DIABETES MELLITUS WITH HYPERGLYCEMIA, WITH LONG-TERM CURRENT USE OF INSULIN (HCC): Primary | ICD-10-CM

## 2024-04-17 DIAGNOSIS — N18.31 CHRONIC KIDNEY DISEASE, STAGE 3A (HCC): ICD-10-CM

## 2024-04-17 DIAGNOSIS — E04.2 GOITER, NONTOXIC, MULTINODULAR: ICD-10-CM

## 2024-04-17 DIAGNOSIS — E11.65 TYPE 2 DIABETES MELLITUS WITH HYPERGLYCEMIA, WITH LONG-TERM CURRENT USE OF INSULIN (HCC): Primary | ICD-10-CM

## 2024-04-17 DIAGNOSIS — I10 PRIMARY HYPERTENSION: ICD-10-CM

## 2024-04-17 DIAGNOSIS — E66.01 MORBID OBESITY (HCC): ICD-10-CM

## 2024-04-17 DIAGNOSIS — R80.9 MICROALBUMINURIA DUE TO TYPE 2 DIABETES MELLITUS  (HCC): ICD-10-CM

## 2024-04-17 DIAGNOSIS — E78.2 MIXED HYPERLIPIDEMIA: ICD-10-CM

## 2024-04-17 DIAGNOSIS — E11.29 MICROALBUMINURIA DUE TO TYPE 2 DIABETES MELLITUS  (HCC): ICD-10-CM

## 2024-04-17 PROCEDURE — 99215 OFFICE O/P EST HI 40 MIN: CPT | Performed by: INTERNAL MEDICINE

## 2024-04-17 RX ORDER — FLUOXETINE 10 MG/1
CAPSULE ORAL
COMMUNITY
Start: 2024-04-09

## 2024-04-17 RX ORDER — INSULIN ASPART INJECTION 100 [IU]/ML
INJECTION, SOLUTION SUBCUTANEOUS
Qty: 15 ML | Refills: 3 | Status: SHIPPED | OUTPATIENT
Start: 2024-04-17

## 2024-04-17 NOTE — PATIENT INSTRUCTIONS
Hgba1c is 6.4%. this is excellent.     Continue the same insulin doses but we will change to fiasp 5 units at lunch and dinner, skip if not eating.     Work on testing blood sugars sometimes a second time of day.     The thyroid ultrasound was last 2 years ago, we'll repeat it when able.     Follow up in 6 months with blood work.

## 2024-04-17 NOTE — PROGRESS NOTES
4/21/2024    Assessment/Plan     1. Type 2 diabetes mellitus with hyperglycemia, with long-term current use of insulin ([facility])  -     Comprehensive metabolic panel; Future; Expected date: 09/02/2024  -     Hemoglobin A1C; Future; Expected date: 09/02/2024  -     Albumin / creatinine urine ratio; Future; Expected date: 09/02/2024  -     insulin aspart, w/niacinamide, (Fiasp FlexTouch) 100 Units/mL injection pen; 5 units at lunch and 5 units at dinner    2. Diabetic polyneuropathy associated with type 2 diabetes mellitus ([facility])  -     Comprehensive metabolic panel; Future; Expected date: 09/02/2024  -     Hemoglobin A1C; Future; Expected date: 09/02/2024  -     Albumin / creatinine urine ratio; Future; Expected date: 09/02/2024    3. Microalbuminuria due to type 2 diabetes mellitus  ([facility])  -     Comprehensive metabolic panel; Future; Expected date: 09/02/2024  -     Hemoglobin A1C; Future; Expected date: 09/02/2024  -     Albumin / creatinine urine ratio; Future; Expected date: 09/02/2024    4. Goiter, nontoxic, multinodular  -     Comprehensive metabolic panel; Future; Expected date: 09/02/2024  -     Hemoglobin A1C; Future; Expected date: 09/02/2024  -     Albumin / creatinine urine ratio; Future; Expected date: 09/02/2024  -     US thyroid; Future; Expected date: 04/17/2024    5. Primary hypertension  -     Comprehensive metabolic panel; Future; Expected date: 09/02/2024  -     Hemoglobin A1C; Future; Expected date: 09/02/2024  -     Albumin / creatinine urine ratio; Future; Expected date: 09/02/2024    6. Mixed hyperlipidemia  -     Comprehensive metabolic panel; Future; Expected date: 09/02/2024  -     Hemoglobin A1C; Future; Expected date: 09/02/2024  -     Albumin / creatinine urine ratio; Future; Expected date: 09/02/2024    7. Chronic kidney disease, stage 3a ([facility])    8. Morbid obesity ([facility])         1. Type 2 diabetes, insulin requiring.  Most recent hemoglobin A1c is excellent. She is not having hypoglycemia.  She will continue to utilize short acting insulin 5 units with lunch and supper, but I will have to switch her NovoLog insulin to Fiasp insulin due to insurance coverage. She was educated to not take the insulin if she does not eat a meal. She will continue to test her blood sugars. At least once and sometimes twice a day for me.    2. Diabetic neuropathy.  She had asked about the possibility of utilizing alpha lipoic acid or vitamin B12 and she could use vitamin B12 500 mcg and alpha lipoic acid 600 mg a day for several months to see if it helps, but she was informed it may not help.    3. Microalbuminuria.  This will be followed over time.    4. Nontoxic multinodular goiter.  She has no compressive thyroid symptoms. Thyroid ultrasound is ordered for repeat.    5. Hypertension.  She is normotensive in the office on her current dose of Bumex and metolazone.    6. Hyperlipidemia.  She will continue the same atorvastatin 40 mg daily.     I have asked her to follow up in 6 months with preceding hemoglobin A1c, CMP, and urine microalbumin to creatinine ratio. She will also get a thyroid ultrasound performed at her earliest convenience.    I have spent a total time of 40 minutes on 04/21/24 in caring for this patient including Diagnostic results, Prognosis, Risks and benefits of tx options, Instructions for management, Patient and family education, Importance of tx compliance, Risk factor reductions, Impressions, Counseling / Coordination of care, Documenting in the medical record, Reviewing / ordering tests, medicine, procedures  , and Obtaining or reviewing history  .    CC: Diabetes type II, thyroid, blood pressure, lipid follow-up    History of Present Illness     HPI: Daysi Matthews is a 78 y.o. year old female with type 2 diabetes, insulin requiring with retinopathy, neuropathy diagnosed 15 to 20 years ago, nontoxic multinodular goiter, hypertension, hyperlipidemia for a follow-up visit. She is accompanied with male  adult.    She has a nontoxic multinodular goiter. Multiple nodules were biopsied in the past with benign pathology. Last thyroid ultrasound was 05/2022 with stable size thyroid nodules.    She is currently on a regimen of NovoLog insulin, with a prescribed dosage of 5 units at lunch and 5 units at dinner. She underwent rehabilitation 1 year ago. Upon her admission to the nursing home, her insulin dosage was recalibrated to 3 units. At present, she does not follow a three-meal daily eating pattern.    She denies polyuria but reports nocturia, waking up twice a night to urinate, alongside a diminished appetite. She abstained from consuming her meal today due to her commitment to an external engagement.    Hypoglycemic episodes: NO.    BGM/CGM/glucometer review: She checks blood sugars once or twice a day blood sugar record was reviewed in the office from October 2023 to present.  Her blood glucose levels have fluctuated between 95 mg/dL and 150 mg/dL, with a notable peak at 200 mg/dL recorded on 10/2023.    She suffers from neuropathy, characterized by numbness and tingling in her fingers and toes, and numbness in her legs. She suspects something is wrong with her legs as consequence of her diabetes.    She is currently under the care of a cardiologist for the management of fluid retention. Initially, she was administered Bumex, which did not yield the desired results, leading to the prescription of 40 mg of Lasix. This dosage also proved ineffective, and an increase to 80 mg was not administered due to her low blood pressure. Recently, her systolic blood pressure was noted at 120 mmHg, an improvement from the previously recorded values in the 90s, which prompted a decision to escalate the Lasix dosage to 80 mg. She has reported urinating only three times and attempts to elevate her legs as much as possible at home. She denies experiencing chest pain or shortness of breath, although a crackling sound was detected in  her lungs on a recent examination. The healthcare team is in discussion regarding whether she should be hospitalized.    She has ceased metolazone administration, deeming it unnecessary. She is currently undergoing therapy with Bumex at a dosage of 2 mg daily and atorvastatin at 40 mg daily for the management of cholesterol levels.    She does not report any dysphagia or food impaction, but experiences vocal hoarseness and a recurring need to clear her throat.    She presents with 2 instances of aortic stenosis and 2 torn rotator cuffs, engaging in therapy twice daily. She is not on anticoagulant therapy.        Last A1C was   Lab Results   Component Value Date    HGBA1C 6.4 10/27/2023   .        Review of Systems  The pertinent positive and negative findings are as noted in the HPI.    Historical Information   Past Medical History:   Diagnosis Date    Aortic stenosis     Atrial fibrillation (HCC)     CHF (congestive heart failure) (HCC)     Colon cancer (HCC)     limited to wall of colon, no chemo or XRT needed    CVA (cerebral vascular accident) (HCC)     mini stroke with vertigo    Diabetes mellitus (HCC)     Frequent nosebleeds     quite significant and severe as in ER    Hypertension     Hypomagnesemia     Kidney stone     Leukocytosis     chanelle 2 gene positive    Mitral stenosis     Myeloproliferative disease (HCC)     CHANELLE 2    Osteoarthritis     Vasculitis (HCC)     rash on legs.     Past Surgical History:   Procedure Laterality Date    APPENDECTOMY      with colectomy    CARDIOVERSION      COLECTOMY [place]      24 inches    COLONOSCOPY  2016    HYSTERECTOMY      LAPAROSCOPIC CHOLECYSTECTOMY       Social History   Social History     Substance and Sexual Activity   Alcohol Use No     Social History     Substance and Sexual Activity   Drug Use No     Social History     Tobacco Use   Smoking Status Never   Smokeless Tobacco Never   Tobacco Comments    Never used.     Family History:   Family History   Problem  Relation Age of Onset    Asthma Mother     Heart disease Mother         post heart surgery    Heart attack Father     Coronary artery disease Father     Heart attack Brother     Colon cancer Brother     Diabetes type II Brother     Colon cancer Brother     Heart attack Brother     Diabetes type II Brother     Heart attack Brother     Colon cancer Brother     No Known Problems Brother     Heart disease Paternal Uncle     Hypertension Daughter     Hypertension Daughter     No Known Problems Daughter        Meds/Allergies   Current Outpatient Medications   Medication Sig Dispense Refill    allopurinol (ZYLOPRIM) 100 mg tablet TAKE 2 TABLETS BY MOUTH EVERY DAY (Patient taking differently: Take 100 mg by mouth daily) 180 tablet 3    amiodarone 200 mg tablet Take 200 mg by mouth daily      atorvastatin (LIPITOR) 40 mg tablet Take 40 mg by mouth daily      Blood Glucose Monitoring Suppl (ONE TOUCH ULTRA 2) w/Device KIT Use to check blood sugars three times a day 1 kit 0    bumetanide (BUMEX) 2 mg tablet Take 2 mg by mouth daily      Cholecalciferol (VITAMIN D3) 50 MCG (2000 UT) capsule Take 2,000 Units by mouth daily       FLUoxetine (PROzac) 10 mg capsule       glucose blood (OneTouch Ultra) test strip Test 3 times a day 300 strip 3    hydroxyurea (HYDREA) 500 mg capsule Take 500 mg by mouth in the morning      insulin aspart, w/niacinamide, (Fiasp FlexTouch) 100 Units/mL injection pen 5 units at lunch and 5 units at dinner 15 mL 3    Insulin Pen Needle (BD Pen Needle Nga U/F) 32G X 4 MM MISC Use 3 new pen needles. 300 each 3    Lancet Devices (ONE TOUCH DELICA LANCING DEV) MISC Use to test blood sugars once daily 1 each 0    Lancets (onetouch ultrasoft) lancets Use to check blood sugars 3 times a day 300 each 3    Magnesium 500 MG CAPS Take by mouth daily        metolazone (ZAROXOLYN) 2.5 mg tablet Take 2.5 mg by mouth once a week      NovoLOG FlexPen 100 units/mL injection pen Inject 3 units under the skin at lunch  "(Patient taking differently: Inject 5 units under the skin at lunch and 5 units at dinner) 15 mL 6     No current facility-administered medications for this visit.     Allergies   Allergen Reactions    Epinephrine      Other reaction(s): increased HR    Lidocaine      Other reaction(s): increased HR    Neomycin      Other reaction(s): rash    Pioglitazone      Other reaction(s): Fluid retention    Bacitracin Rash       Objective   Vitals: Blood pressure 124/78, pulse 74, height 4' 10\" (1.473 m), weight 61.2 kg (135 lb), SpO2 98%.  Invasive Devices       None                   Physical Exam  Physical exam normal with pertinent positives and negatives.    Neck: Thyroid very low in the neck and nodular in field. No discrete nodules palpable due to how low in the neck it is. No lymphadenopathy.   Respiratory: Lungs clear.  Diminished breath sounds throughout.  Cardiovascular: Heart regular with a 2/6 holosystolic murmur.  Musculoskeletal: No tremor of the outstretched hands.   Neurological: Patellar deep tendon reflexes are diminished.  Extremities: With 3+ bilateral lower extremity edema, right larger than left. Some weeping of serous and serosanguineous fluid from old scabs are just between the skin on the right.    The history was obtained from the review of the chart and from the patient.    Lab Results:    Most recent Alc is  Lab Results   Component Value Date    HGBA1C 6.4 10/27/2023               Lab Results   Component Value Date    CREATININE 1.05 10/18/2021    CREATININE 1.10 04/21/2021    CREATININE 0.92 11/09/2020    BUN 42 10/18/2021    K 5.3 10/18/2021     10/18/2021    CO2 22.4 10/18/2021     eGFR   Date Value Ref Range Status   12/09/2019 64 ml/min/1.73sq m Final     EXTERNAL EGFR   Date Value Ref Range Status   10/18/2021 51  Final         Lab Results   Component Value Date    HDL 32 (A) 11/09/2020    TRIG 122 11/09/2020       Lab Results   Component Value Date    ALT 8 04/21/2021    AST 20 " 04/21/2021    ALKPHOS 107 04/21/2021       Lab Results   Component Value Date    FREET4 1.08 12/09/2019       Blood work performed on 04/15/2024 showed a hemoglobin A1c of 6.4%.     TSH is 2.16 with a free T4 of 1.92.     CMP showed a glucose of 120 fasting, BUN 73, creatinine 1.39, GFR 39, BUN/creatinine ratio 52.5, but was otherwise normal. CBC was notable for a normal hemoglobin and hematocrit.    Future Appointments   Date Time Provider Department Center   10/23/2024  2:00 PM Margret Corral MD [facility]       Transcribed for Margret Corral MD, by Jacque Bess on 04/21/24 at 9:13 AM. Powered by [employer].

## 2024-06-24 DIAGNOSIS — Z79.4 TYPE 2 DIABETES MELLITUS WITH HYPERGLYCEMIA, WITH LONG-TERM CURRENT USE OF INSULIN (HCC): ICD-10-CM

## 2024-06-24 DIAGNOSIS — E11.65 TYPE 2 DIABETES MELLITUS WITH HYPERGLYCEMIA, WITH LONG-TERM CURRENT USE OF INSULIN (HCC): ICD-10-CM

## 2024-06-24 RX ORDER — PEN NEEDLE, DIABETIC 32GX 5/32"
NEEDLE, DISPOSABLE MISCELLANEOUS
Qty: 300 EACH | Refills: 1 | Status: SHIPPED | OUTPATIENT
Start: 2024-06-24

## 2024-06-28 ENCOUNTER — TELEPHONE (OUTPATIENT)
Age: 79
End: 2024-06-28

## 2024-06-28 NOTE — TELEPHONE ENCOUNTER
Patient's daughter called in asking for assistance with getting her mother diabetic shoes.     I was able to speak with [facility] and spoke with [place] who informed me that the patient's reach out to the clinics on their own and set up their appointments and depending on their insurance they might require a doctor's visit note or a foot exam. I did explain that to the patient's daughter and provider her with two locations:     [facility] [phone]    [facility] [phone]

## (undated) DEVICE — GLIDEWIRE ANGLED .25IN X 150 J WIRE

## (undated) DEVICE — CATHETER 6FR SWAN

## (undated) DEVICE — GLIDESHEATH SLENDER SS (.021) 6FR 10CM